# Patient Record
Sex: MALE | Race: WHITE | NOT HISPANIC OR LATINO | Employment: FULL TIME | URBAN - METROPOLITAN AREA
[De-identification: names, ages, dates, MRNs, and addresses within clinical notes are randomized per-mention and may not be internally consistent; named-entity substitution may affect disease eponyms.]

---

## 2017-01-05 ENCOUNTER — TELEPHONE (OUTPATIENT)
Dept: RHEUMATOLOGY | Facility: CLINIC | Age: 57
End: 2017-01-05

## 2017-01-05 NOTE — TELEPHONE ENCOUNTER
Therapy Plan already in place in EPIC.   Staff Message sent to Oncology to contact patient to discuss scheduling with them.  Rosalinda Rodrigues RN  [facility]

## 2017-01-17 ENCOUNTER — COMMUNICATION - HEALTHEAST (OUTPATIENT)
Dept: FAMILY MEDICINE | Facility: CLINIC | Age: 57
End: 2017-01-17

## 2017-01-20 ENCOUNTER — OFFICE VISIT - HEALTHEAST (OUTPATIENT)
Dept: FAMILY MEDICINE | Facility: CLINIC | Age: 57
End: 2017-01-20

## 2017-01-20 ENCOUNTER — COMMUNICATION - HEALTHEAST (OUTPATIENT)
Dept: SCHEDULING | Facility: CLINIC | Age: 57
End: 2017-01-20

## 2017-01-20 DIAGNOSIS — R05.9 COUGH: ICD-10-CM

## 2017-01-20 ASSESSMENT — MIFFLIN-ST. JEOR: SCORE: 1831.18

## 2017-01-23 ENCOUNTER — COMMUNICATION - HEALTHEAST (OUTPATIENT)
Dept: FAMILY MEDICINE | Facility: CLINIC | Age: 57
End: 2017-01-23

## 2017-02-03 ENCOUNTER — TELEPHONE (OUTPATIENT)
Dept: RHEUMATOLOGY | Facility: CLINIC | Age: 57
End: 2017-02-03

## 2017-02-03 NOTE — TELEPHONE ENCOUNTER
Received a call from Infusion authorizations (Paolo) and she reported that she sent a fax over to be signed by Dr. Phillips for authorization to infuse Remicade. She also reported that they need a prior authorization started for the infusion of remicade. Please start HARVEY TRACEY RN BSN PHN  [facility]

## 2017-02-07 ENCOUNTER — INFUSION THERAPY VISIT (OUTPATIENT)
Dept: INFUSION THERAPY | Facility: CLINIC | Age: 57
End: 2017-02-07
Attending: INTERNAL MEDICINE
Payer: COMMERCIAL

## 2017-02-07 VITALS
SYSTOLIC BLOOD PRESSURE: 132 MMHG | DIASTOLIC BLOOD PRESSURE: 75 MMHG | HEART RATE: 103 BPM | WEIGHT: 230 LBS | BODY MASS INDEX: 36.57 KG/M2 | TEMPERATURE: 98.3 F

## 2017-02-07 DIAGNOSIS — H20.10 IRITIS, CHRONIC: Primary | ICD-10-CM

## 2017-02-07 DIAGNOSIS — M06.9 RHEUMATOID ARTHRITIS OF HAND, UNSPECIFIED LATERALITY, UNSPECIFIED RHEUMATOID FACTOR PRESENCE: ICD-10-CM

## 2017-02-07 PROCEDURE — 96415 CHEMO IV INFUSION ADDL HR: CPT

## 2017-02-07 PROCEDURE — 96413 CHEMO IV INFUSION 1 HR: CPT

## 2017-02-07 PROCEDURE — 25000128 H RX IP 250 OP 636: Performed by: INTERNAL MEDICINE

## 2017-02-07 RX ADMIN — INFLIXIMAB 1000 MG: 100 INJECTION, POWDER, LYOPHILIZED, FOR SOLUTION INTRAVENOUS at 13:42

## 2017-02-07 NOTE — PROGRESS NOTES
"Infusion Nursing Note:  Prosper Miller presents today for Remicade.    Patient seen by provider today: No   present during visit today: Not Applicable.    Note: N/A.    Intravenous Access:  Peripheral IV placed.    Treatment Conditions:  Rheumatology Infusion Checklist: PRIOR TO INFUSION OF BIOLOGICAL MEDICATIONS OR ANY OF THESE AS LISTED: Remicade (infliximab) \".rheumbiologicalchecklist\"    Prior to Infusion of biological medications or any of these as listed:    1. Elevated temperature, fever, chills, productive cough or abnormal vital signs, night sweats, coughing up blood or sputum, no appetite or abnormal vital signs : NO    2. Open wounds or new incisions: NO    3. Recent hospitalization: NO    4.  Recent surgeries:  NO    5. Any upcoming surgeries or dental procedures?:NO    6. Any current or recent bouts of illness or infection? On any antibiotics? : NO    7. Any new, sudden or worsening abdominal pain :NO    8. Vaccination within 4 weeks? Patient or someone in the household is scheduled to receive vaccination? No live virus vaccines prior to or during treatment :NO    9. Any nervous system diseases [i.e. multiple sclerosis, Guillain-[place], seizures, neurological  changes]: NO    10. Pregnant or breast feeding; or plans on pregnancy in the future: NO    11. Signs of worsening depression or suicidal ideations while taking benlysta:NO    12. New-onset medical symptoms: NO    13.  New cancer diagnosis or on chemotherapy or radiation NO    14.  Evaluate for any sign of active TB [Unexplained weight loss, Loss of appetite, Night sweats, Fever, Fatigue, Chills, Coughing for 3 weeks or longer, Hemoptysis (coughing up blood), Chest pain]: NO    **Note: If answered yes to any of the above, hold the infusion and contact ordering rheumatologist or on-call rheumatologist.         Post Infusion Assessment:  Patient tolerated infusion without incident.  Rheumatology Post Infusion: POST-INFUSION OF BIOLOGICAL " MEDICATION:    Reviewed with patient.  Inform patient if any fever, chills or signs of infection, new symptoms, abdominal pain, heart palpitations, shortness of breath, reaction, weakness, neurological changes, seek medical attention immediately and should not receive infusions. No live virus vaccines prior to or during treatment or up to 6 months post infusion. If the patient has an upcoming procedure or surgery, this should be discussed with the rheumatologist and surgeon or provider.    Discharge Plan:   Patient discharged in stable condition accompanied by: self.    Katie Kemp RN

## 2017-02-15 ENCOUNTER — COMMUNICATION - HEALTHEAST (OUTPATIENT)
Dept: NURSING | Facility: CLINIC | Age: 57
End: 2017-02-15

## 2017-03-01 ENCOUNTER — OFFICE VISIT (OUTPATIENT)
Dept: RHEUMATOLOGY | Facility: CLINIC | Age: 57
End: 2017-03-01
Payer: COMMERCIAL

## 2017-03-01 VITALS
SYSTOLIC BLOOD PRESSURE: 128 MMHG | DIASTOLIC BLOOD PRESSURE: 91 MMHG | WEIGHT: 236.4 LBS | BODY MASS INDEX: 37.58 KG/M2 | HEART RATE: 98 BPM

## 2017-03-01 DIAGNOSIS — H20.10 IRITIS, CHRONIC: ICD-10-CM

## 2017-03-01 DIAGNOSIS — M06.9 RHEUMATOID ARTHRITIS OF HAND, UNSPECIFIED LATERALITY, UNSPECIFIED RHEUMATOID FACTOR PRESENCE: Primary | ICD-10-CM

## 2017-03-01 PROCEDURE — 99214 OFFICE O/P EST MOD 30 MIN: CPT | Performed by: INTERNAL MEDICINE

## 2017-03-01 NOTE — NURSING NOTE
"Initial BP (!) 128/91 (BP Location: Right arm, Patient Position: Chair, Cuff Size: Adult Large)  Pulse 98  Wt 236 lb 6.4 oz (107.2 kg)  BMI 37.58 kg/m2 Estimated body mass index is 37.58 kg/(m^2) as calculated from the following:    Height as of 11/30/16: 5' 6.5\" (1.689 m).    Weight as of this encounter: 236 lb 6.4 oz (107.2 kg). .    "

## 2017-03-21 ENCOUNTER — TELEPHONE (OUTPATIENT)
Dept: RHEUMATOLOGY | Facility: CLINIC | Age: 57
End: 2017-03-21

## 2017-03-30 ENCOUNTER — OFFICE VISIT - HEALTHEAST (OUTPATIENT)
Dept: FAMILY MEDICINE | Facility: CLINIC | Age: 57
End: 2017-03-30

## 2017-03-30 DIAGNOSIS — Z01.818 PREOP EXAMINATION: ICD-10-CM

## 2017-03-30 DIAGNOSIS — H26.9 CATARACT, BILATERAL: ICD-10-CM

## 2017-03-30 ASSESSMENT — MIFFLIN-ST. JEOR: SCORE: 1849.32

## 2017-03-31 ENCOUNTER — INFUSION THERAPY VISIT (OUTPATIENT)
Dept: INFUSION THERAPY | Facility: CLINIC | Age: 57
End: 2017-03-31
Attending: INTERNAL MEDICINE
Payer: COMMERCIAL

## 2017-03-31 VITALS
DIASTOLIC BLOOD PRESSURE: 79 MMHG | WEIGHT: 238 LBS | SYSTOLIC BLOOD PRESSURE: 124 MMHG | HEART RATE: 88 BPM | BODY MASS INDEX: 37.84 KG/M2 | TEMPERATURE: 97.8 F

## 2017-03-31 DIAGNOSIS — M06.9 RHEUMATOID ARTHRITIS OF HAND, UNSPECIFIED LATERALITY, UNSPECIFIED RHEUMATOID FACTOR PRESENCE: ICD-10-CM

## 2017-03-31 DIAGNOSIS — H20.10 IRITIS, CHRONIC: Primary | ICD-10-CM

## 2017-03-31 PROCEDURE — 25000128 H RX IP 250 OP 636: Performed by: INTERNAL MEDICINE

## 2017-03-31 PROCEDURE — 96415 CHEMO IV INFUSION ADDL HR: CPT

## 2017-03-31 PROCEDURE — 96413 CHEMO IV INFUSION 1 HR: CPT

## 2017-03-31 RX ADMIN — INFLIXIMAB 1100 MG: 100 INJECTION, POWDER, LYOPHILIZED, FOR SOLUTION INTRAVENOUS at 14:55

## 2017-03-31 NOTE — PROGRESS NOTES
Infusion Nursing Note:  Prosper Miller presents today for Remicade.    Patient seen by provider today: No   present during visit today: Not Applicable.    Note: N/A.    Intravenous Access:  Peripheral IV placed.    Treatment Conditions:  Rheumatology Infusion Checklist: PRIOR TO INFUSION OF BIOLOGICAL MEDICATIONS OR ANY OF THESE AS LISTED: Remicade (infliximab)     Prior to Infusion of biological medications or any of these as listed:    1. Elevated temperature, fever, chills, productive cough or abnormal vital signs, night sweats, coughing up blood or sputum, no appetite or abnormal vital signs : NO    2. Open wounds or new incisions: NO    3. Recent hospitalization: NO    4.  Recent surgeries:  NO    5. Any upcoming surgeries or dental procedures?:NO    6. Any current or recent bouts of illness or infection? On any antibiotics? : NO    7. Any new, sudden or worsening abdominal pain :NO    8. Vaccination within 4 weeks? Patient or someone in the household is scheduled to receive vaccination? No live virus vaccines prior to or during treatment :NO    9. Any nervous system diseases [i.e. multiple sclerosis, Guillain-[place], seizures, neurological  changes]: NO    10. Pregnant or breast feeding; or plans on pregnancy in the future: NO    11. Signs of worsening depression or suicidal ideations while taking benlysta:NO    12. New-onset medical symptoms: NO    13.  New cancer diagnosis or on chemotherapy or radiation NO    14.  Evaluate for any sign of active TB [Unexplained weight loss, Loss of appetite, Night sweats, Fever, Fatigue, Chills, Coughing for 3 weeks or longer, Hemoptysis (coughing up blood), Chest pain]: NO    **Note: If answered yes to any of the above, hold the infusion and contact ordering rheumatologist or on-call rheumatologist.   .      Post Infusion Assessment:  Patient tolerated infusion without incident.    Discharge Plan:   Patient discharged in stable condition accompanied by:  self.  Departure Mode: Ambulatory.  Next dose scheduled for 4/19.    Katie Kemp RN

## 2017-03-31 NOTE — MR AVS SNAPSHOT
"              After Visit Summary   3/31/2017    Prosper Miller    MRN: [id number]           Patient Information     Date Of Birth          1960        Visit Information        Provider Department      3/31/2017 1:30 PM ROOM 1 [facility]        Today's Diagnoses     Iritis, chronic    -  1    Rheumatoid arthritis of hand, unspecified laterality, unspecified rheumatoid factor presence (H)           Follow-ups after your visit        Your next 10 appointments already scheduled     May 19, 2017  1:00 PM CDT   Level 3 with ROOM 6 [facility] ([facility])    n Medical Ctr [facility]  [address]  [facility] [address]   [phone]            Sep 13, 2017  4:15 PM CDT   Return Visit with Brandon Phillips MD   [facility] ([facility]    [address] [facility] [id number] [phone]              Who to contact     If you have questions or need follow up information about today's clinic visit or your schedule please contact [facility] directly at [phone].  Normal or non-critical lab and imaging results will be communicated to you by Kinsightshart, letter or phone within 4 business days after the clinic has received the results. If you do not hear from us within 7 days, please contact the clinic through [employer] or phone. If you have a critical or abnormal lab result, we will notify you by phone as soon as possible.  Submit refill requests through [employer] or call your pharmacy and they will forward the refill request to us. Please allow 3 business days for your refill to be completed.          Additional Information About Your Visit        Kinsightshart Information     [employer] lets you send messages to your doctor, view your test results, renew your prescriptions, schedule appointments and more. To sign up, go to [email or web address] . Click on \"Log in\" on the left side of the screen, which will " "take you to the Welcome page. Then click on \"Sign up Now\" on the right side of the page.     You will be asked to enter the access code listed below, as well as some personal information. Please follow the directions to create your username and password.     Your access code is: [email or web address]  Expires: 2017  5:26 PM     Your access code will  in 90 days. If you need help or a new code, please call your [place] clinic or [phone].        Care EveryWhere ID     This is your Care EveryWhere ID. This could be used by other organizations to access your [place] medical records  [id number]        Your Vitals Were     Pulse Temperature BMI (Body Mass Index)             88 97.8  F (36.6  C) (Oral) 37.84 kg/m2          Blood Pressure from Last 3 Encounters:   17 124/79   17 (!) 128/91   17 132/75    Weight from Last 3 Encounters:   17 108 kg (238 lb)   17 107.2 kg (236 lb 6.4 oz)   17 104.3 kg (230 lb)              We Performed the Following     Treatment Conditions     Treatment Conditions     Treatment Conditions        Primary Care Provider    None Specified       No primary provider on file.        Thank you!     Thank you for choosing [facility]  for your care. Our goal is always to provide you with excellent care. Hearing back from our patients is one way we can continue to improve our services. Please take a few minutes to complete the written survey that you may receive in the mail after your visit with us. Thank you!             Your Updated Medication List - Protect others around you: Learn how to safely use, store and throw away your medicines at www.disposemymeds.org.          This list is accurate as of: 3/31/17  5:26 PM.  Always use your most recent med list.                   Brand Name Dispense Instructions for use    cycloSPORINE modified 25 MG capsule    GENERIC EQUIVALENT     Take 25 mg by mouth       EQL NATURAL ZINC 50 MG Tabs          " fluticasone 50 MCG/ACT spray    FLONASE     USE ONE SPRAY IN EACH NOSTRIL TWICE DAILY       lisinopril 30 MG tablet    PRINIVIL,ZESTRIL     Take 45 mg by mouth       pantoprazole 40 MG EC tablet    PROTONIX     TAKE ONE TABLET BY MOUTH TWICE DAILY       REMICADE 100 MG injection   Generic drug:  inFLIXimab          vitamin D3 2000 UNITS Caps

## 2017-05-26 ENCOUNTER — INFUSION THERAPY VISIT (OUTPATIENT)
Dept: INFUSION THERAPY | Facility: CLINIC | Age: 57
End: 2017-05-26
Attending: INTERNAL MEDICINE
Payer: COMMERCIAL

## 2017-05-26 VITALS
BODY MASS INDEX: 37.84 KG/M2 | TEMPERATURE: 97.6 F | HEART RATE: 71 BPM | DIASTOLIC BLOOD PRESSURE: 67 MMHG | SYSTOLIC BLOOD PRESSURE: 125 MMHG | WEIGHT: 238 LBS

## 2017-05-26 DIAGNOSIS — M06.9 RHEUMATOID ARTHRITIS OF HAND, UNSPECIFIED LATERALITY, UNSPECIFIED RHEUMATOID FACTOR PRESENCE: ICD-10-CM

## 2017-05-26 DIAGNOSIS — H20.10 IRITIS, CHRONIC: Primary | ICD-10-CM

## 2017-05-26 PROCEDURE — 25000128 H RX IP 250 OP 636: Performed by: INTERNAL MEDICINE

## 2017-05-26 PROCEDURE — 96413 CHEMO IV INFUSION 1 HR: CPT

## 2017-05-26 PROCEDURE — 96415 CHEMO IV INFUSION ADDL HR: CPT

## 2017-05-26 RX ADMIN — SODIUM CHLORIDE 250 ML: 9 INJECTION, SOLUTION INTRAVENOUS at 13:20

## 2017-05-26 RX ADMIN — INFLIXIMAB 1100 MG: 100 INJECTION, POWDER, LYOPHILIZED, FOR SOLUTION INTRAVENOUS at 13:20

## 2017-05-26 NOTE — PROGRESS NOTES
"Infusion Nursing Note:  Prosper Miller presents today for Remicade.   Patient seen by provider today: No   present during visit today: Not Applicable.    Note:     Intravenous Access:  Peripheral IV placed.    Treatment Conditions:  Rheumatology Infusion Checklist: PRIOR TO INFUSION OF BIOLOGICAL MEDICATIONS OR ANY OF THESE AS LISTED: Remicaide (infliximab) \".rheumbiologicalchecklist\"    Prior to Infusion of biological medications or any of these as listed:    1. Elevated temperature, fever, chills, productive cough or abnormal vital signs, night sweats, coughing up blood or sputum, no appetite or abnormal vital signs : NO    2. Open wounds or new incisions: NO    3. Recent hospitalization: NO    4.  Recent surgeries:  NO    5. Any upcoming surgeries or dental procedures?:NO    6. Any current or recent bouts of illness or infection? On any antibiotics? : NO    7. Any new, sudden or worsening abdominal pain :NO    8. Vaccination within 4 weeks? Patient or someone in the household is scheduled to receive vaccination? No live virus vaccines prior to or during treatment :NO    9. Any nervous system diseases [i.e. multiple sclerosis, Guillain-[place], seizures, neurological  changes]: NO    10. Pregnant or breast feeding; or plans on pregnancy in the future: NO    11. Signs of worsening depression or suicidal ideations while taking benlysta:NO    12. New-onset medical symptoms: NO    13.  New cancer diagnosis or on chemotherapy or radiation NO    14.  Evaluate for any sign of active TB [Unexplained weight loss, Loss of appetite, Night sweats, Fever, Fatigue, Chills, Coughing for 3 weeks or longer, Hemoptysis (coughing up blood), Chest pain]: NO    **Note: If answered yes to any of the above, hold the infusion and contact ordering rheumatologist or on-call rheumatologist.   .      Post Infusion Assessment:  Patient tolerated infusion without incident.  No evidence of extravasations.  Access discontinued per " protocol.    Discharge Plan:   Patient discharged in stable condition accompanied by: self.    Germania Bermudez RN

## 2017-05-26 NOTE — MR AVS SNAPSHOT
"              After Visit Summary   5/26/2017    Prosper Miller    MRN: [id number]           Patient Information     Date Of Birth          1960        Visit Information        Provider Department      5/26/2017 1:00 PM ROOM 7 [facility]        Today's Diagnoses     Iritis, chronic    -  1    Rheumatoid arthritis of hand, unspecified laterality, unspecified rheumatoid factor presence (H)           Follow-ups after your visit        Your next 10 appointments already scheduled     Jul 14, 2017  1:00 PM CDT   Level 3 with ROOM 1 [facility] ([facility])    n Medical Ctr [facility]  [address]  [facility] [address]   [phone]            Sep 13, 2017  4:15 PM CDT   Return Visit with Brandon Phillips MD   [facility]    [address] [facility] [id number] [phone]              Who to contact     If you have questions or need follow up information about today's clinic visit or your schedule please contact [facility] directly at [phone].  Normal or non-critical lab and imaging results will be communicated to you by kaufDAhart, letter or phone within 4 business days after the clinic has received the results. If you do not hear from us within 7 days, please contact the clinic through [employer] or phone. If you have a critical or abnormal lab result, we will notify you by phone as soon as possible.  Submit refill requests through [employer] or call your pharmacy and they will forward the refill request to us. Please allow 3 business days for your refill to be completed.          Additional Information About Your Visit        kaufDAhart Information     [employer] lets you send messages to your doctor, view your test results, renew your prescriptions, schedule appointments and more. To sign up, go to [email or web address] . Click on \"Log in\" on the left side of the screen, which will " "take you to the Welcome page. Then click on \"Sign up Now\" on the right side of the page.     You will be asked to enter the access code listed below, as well as some personal information. Please follow the directions to create your username and password.     Your access code is: [email or web address]  Expires: 2017  5:26 PM     Your access code will  in 90 days. If you need help or a new code, please call your [place] clinic or [phone].        Care EveryWhere ID     This is your Care EveryWhere ID. This could be used by other organizations to access your [place] medical records  [id number]        Your Vitals Were     Pulse Temperature BMI (Body Mass Index)             71 97.6  F (36.4  C) 37.84 kg/m2          Blood Pressure from Last 3 Encounters:   17 125/67   17 124/79   17 (!) 128/91    Weight from Last 3 Encounters:   17 108 kg (238 lb)   17 108 kg (238 lb)   17 107.2 kg (236 lb 6.4 oz)              Today, you had the following     No orders found for display       Primary Care Provider    None Specified       No primary provider on file.        Thank you!     Thank you for choosing [facility]  for your care. Our goal is always to provide you with excellent care. Hearing back from our patients is one way we can continue to improve our services. Please take a few minutes to complete the written survey that you may receive in the mail after your visit with us. Thank you!             Your Updated Medication List - Protect others around you: Learn how to safely use, store and throw away your medicines at www.disposemymeds.org.          This list is accurate as of: 17  3:50 PM.  Always use your most recent med list.                   Brand Name Dispense Instructions for use    cycloSPORINE modified 25 MG capsule    GENERIC EQUIVALENT     Take 25 mg by mouth       EQL NATURAL ZINC 50 MG Tabs          fluticasone 50 MCG/ACT spray    FLONASE     USE ONE SPRAY " IN EACH NOSTRIL TWICE DAILY       lisinopril 30 MG tablet    PRINIVIL,ZESTRIL     Take 45 mg by mouth       pantoprazole 40 MG EC tablet    PROTONIX     TAKE ONE TABLET BY MOUTH TWICE DAILY       REMICADE 100 MG injection   Generic drug:  inFLIXimab          vitamin D3 2000 UNITS Caps

## 2017-06-20 ENCOUNTER — COMMUNICATION - HEALTHEAST (OUTPATIENT)
Dept: FAMILY MEDICINE | Facility: CLINIC | Age: 57
End: 2017-06-20

## 2017-06-20 DIAGNOSIS — K21.9 ESOPHAGEAL REFLUX: ICD-10-CM

## 2017-07-14 ENCOUNTER — INFUSION THERAPY VISIT (OUTPATIENT)
Dept: INFUSION THERAPY | Facility: CLINIC | Age: 57
End: 2017-07-14
Attending: INTERNAL MEDICINE
Payer: COMMERCIAL

## 2017-07-14 VITALS — HEART RATE: 93 BPM | SYSTOLIC BLOOD PRESSURE: 114 MMHG | DIASTOLIC BLOOD PRESSURE: 74 MMHG

## 2017-07-14 DIAGNOSIS — H20.10 IRITIS, CHRONIC: Primary | ICD-10-CM

## 2017-07-14 DIAGNOSIS — M06.9 RHEUMATOID ARTHRITIS OF HAND, UNSPECIFIED LATERALITY, UNSPECIFIED RHEUMATOID FACTOR PRESENCE: ICD-10-CM

## 2017-07-14 PROCEDURE — 25000128 H RX IP 250 OP 636: Performed by: INTERNAL MEDICINE

## 2017-07-14 PROCEDURE — 96415 CHEMO IV INFUSION ADDL HR: CPT

## 2017-07-14 PROCEDURE — 96413 CHEMO IV INFUSION 1 HR: CPT

## 2017-07-14 RX ADMIN — INFLIXIMAB 1100 MG: 100 INJECTION, POWDER, LYOPHILIZED, FOR SOLUTION INTRAVENOUS at 13:08

## 2017-07-14 NOTE — MR AVS SNAPSHOT
"              After Visit Summary   7/14/2017    Prosper Miller    MRN: [id number]           Patient Information     Date Of Birth          1960        Visit Information        Provider Department      7/14/2017 1:00 PM ROOM 1 [facility]        Today's Diagnoses     Iritis, chronic    -  1    Rheumatoid arthritis of hand, unspecified laterality, unspecified rheumatoid factor presence (H)           Follow-ups after your visit        Your next 10 appointments already scheduled     Sep 01, 2017  1:00 PM CDT   Level 3 with ROOM 2 [facility] ([facility])    n Medical Ctr [facility]  [address]  [facility] [address]   [phone]            Sep 13, 2017  4:15 PM CDT   Return Visit with Brandon Phillips MD   [facility]    [address] [facility] [id number] [phone]              Who to contact     If you have questions or need follow up information about today's clinic visit or your schedule please contact [facility] directly at [phone].  Normal or non-critical lab and imaging results will be communicated to you by [employer], letter or phone within 4 business days after the clinic has received the results. If you do not hear from us within 7 days, please contact the clinic through [facility] or phone. If you have a critical or abnormal lab result, we will notify you by phone as soon as possible.  Submit refill requests through [employer] or call your pharmacy and they will forward the refill request to us. Please allow 3 business days for your refill to be completed.          Additional Information About Your Visit        H[employer] Mobile[employer] Information     [employer] lets you send messages to your doctor, view your test results, renew your prescriptions, schedule appointments and more. To sign up, go to [email or web address] . Click on \"Log in\" on the left side of the screen, which will " "take you to the Welcome page. Then click on \"Sign up Now\" on the right side of the page.     You will be asked to enter the access code listed below, as well as some personal information. Please follow the directions to create your username and password.     Your access code is: [email or web address]  Expires: 10/12/2017  4:38 PM     Your access code will  in 90 days. If you need help or a new code, please call your [place] clinic or [phone].        Care EveryWhere ID     This is your Care EveryWhere ID. This could be used by other organizations to access your [place] medical records  [id number]        Your Vitals Were     Pulse                   93            Blood Pressure from Last 3 Encounters:   17 114/74   17 125/67   17 124/79    Weight from Last 3 Encounters:   17 108 kg (238 lb)   17 108 kg (238 lb)   17 107.2 kg (236 lb 6.4 oz)              Today, you had the following     No orders found for display       Primary Care Provider    None Specified       No primary provider on file.        Equal Access to Services     [facility]: Hadii sophie Bermudez, magy pierre, clotilde garcia, faina patel . So [facility] [phone].    ATENCIÓN: Si habla español, tiene a boyer disposición servicios gratuitos de asistencia lingüística. Llame al [phone].    We comply with applicable federal civil rights laws and [place] laws. We do not discriminate on the basis of race, color, national origin, age, disability sex, sexual orientation or gender identity.            Thank you!     Thank you for choosing [facility]  for your care. Our goal is always to provide you with excellent care. Hearing back from our patients is one way we can continue to improve our services. Please take a few minutes to complete the written survey that you may receive in the mail after your visit with us. Thank you!             Your Updated " Medication List - Protect others around you: Learn how to safely use, store and throw away your medicines at www.disposemymeds.org.          This list is accurate as of: 7/14/17  4:38 PM.  Always use your most recent med list.                   Brand Name Dispense Instructions for use Diagnosis    cycloSPORINE modified 25 MG capsule    GENERIC EQUIVALENT     Take 25 mg by mouth        EQL NATURAL ZINC 50 MG Tabs           fluticasone 50 MCG/ACT spray    FLONASE     USE ONE SPRAY IN EACH NOSTRIL TWICE DAILY        lisinopril 30 MG tablet    PRINIVIL,ZESTRIL     Take 45 mg by mouth        pantoprazole 40 MG EC tablet    PROTONIX     TAKE ONE TABLET BY MOUTH TWICE DAILY        REMICADE 100 MG injection   Generic drug:  inFLIXimab           vitamin D3 2000 UNITS Caps

## 2017-07-14 NOTE — PROGRESS NOTES
"Infusion Nursing Note:  Prosper Miller presents today for Remicade.    Patient seen by provider today: No   present during visit today: Not Applicable.    Note: N/A.    Intravenous Access:  Peripheral IV placed.    Treatment Conditions:  Rheumatology Infusion Checklist: PRIOR TO INFUSION OF BIOLOGICAL MEDICATIONS OR ANY OF THESE AS LISTED: Remicaide (infliximab) \".rheumbiologicalchecklist\"    Prior to Infusion of biological medications or any of these as listed:    1. Elevated temperature, fever, chills, productive cough or abnormal vital signs, night sweats, coughing up blood or sputum, no appetite or abnormal vital signs : NO    2. Open wounds or new incisions: NO    3. Recent hospitalization: NO    4.  Recent surgeries:  NO    5. Any upcoming surgeries or dental procedures?:NO    6. Any current or recent bouts of illness or infection? On any antibiotics? : NO    7. Any new, sudden or worsening abdominal pain :NO    8. Vaccination within 4 weeks? Patient or someone in the household is scheduled to receive vaccination? No live virus vaccines prior to or during treatment :NO    9. Any nervous system diseases [i.e. multiple sclerosis, Guillain-[place], seizures, neurological  changes]: NO    10. Pregnant or breast feeding; or plans on pregnancy in the future: NO    11. Signs of worsening depression or suicidal ideations while taking benlysta:NO    12. New-onset medical symptoms: NO    13.  New cancer diagnosis or on chemotherapy or radiation NO    14.  Evaluate for any sign of active TB [Unexplained weight loss, Loss of appetite, Night sweats, Fever, Fatigue, Chills, Coughing for 3 weeks or longer, Hemoptysis (coughing up blood), Chest pain]: NO    **Note: If answered yes to any of the above, hold the infusion and contact ordering rheumatologist or on-call rheumatologist.   .      Post Infusion Assessment:  Patient tolerated infusion without incident.    Discharge Plan:   Patient discharged in stable " condition accompanied by: self.    Rebel Nance, RN

## 2017-09-01 ENCOUNTER — INFUSION THERAPY VISIT (OUTPATIENT)
Dept: INFUSION THERAPY | Facility: CLINIC | Age: 57
End: 2017-09-01
Attending: INTERNAL MEDICINE
Payer: COMMERCIAL

## 2017-09-01 VITALS — HEART RATE: 74 BPM | TEMPERATURE: 97.1 F | SYSTOLIC BLOOD PRESSURE: 128 MMHG | DIASTOLIC BLOOD PRESSURE: 78 MMHG

## 2017-09-01 DIAGNOSIS — M06.9 RHEUMATOID ARTHRITIS OF HAND, UNSPECIFIED LATERALITY, UNSPECIFIED RHEUMATOID FACTOR PRESENCE: ICD-10-CM

## 2017-09-01 DIAGNOSIS — H20.10 IRITIS, CHRONIC: Primary | ICD-10-CM

## 2017-09-01 PROCEDURE — 96413 CHEMO IV INFUSION 1 HR: CPT

## 2017-09-01 PROCEDURE — 96415 CHEMO IV INFUSION ADDL HR: CPT

## 2017-09-01 PROCEDURE — 25000128 H RX IP 250 OP 636: Performed by: INTERNAL MEDICINE

## 2017-09-01 RX ADMIN — SODIUM CHLORIDE 250 ML: 9 INJECTION, SOLUTION INTRAVENOUS at 15:34

## 2017-09-01 RX ADMIN — INFLIXIMAB 1100 MG: 100 INJECTION, POWDER, LYOPHILIZED, FOR SOLUTION INTRAVENOUS at 13:20

## 2017-09-01 NOTE — MR AVS SNAPSHOT
"              After Visit Summary   9/1/2017    Prosper Miller    MRN: [id number]           Patient Information     Date Of Birth          1960        Visit Information        Provider Department      9/1/2017 1:00 PM ROOM 2 [facility]fusion        Today's Diagnoses     Iritis, chronic    -  1    Rheumatoid arthritis of hand, unspecified laterality, unspecified rheumatoid factor presence (H)           Follow-ups after your visit        Your next 10 appointments already scheduled     Sep 13, 2017  4:15 PM CDT   Return Visit with Brandon Phillips MD   [facility] ([facility]    [address]  [facility] [address]   [phone]            Oct 20, 2017  1:00 PM CDT   Level 3 with ROOM 8 [facility] ([facility])    n Medical C[facility]  [address]  [facility] [address]   [phone]              Who to contact     If you have questions or need follow up information about today's clinic visit or your schedule please contact [facility] directly at [phone].  Normal or non-critical lab and imaging results will be communicated to you by Meteo Protecthart, letter or phone within 4 business days after the clinic has received the results. If you do not hear from us within 7 days, please contact the clinic through Incentientt or phone. If you have a critical or abnormal lab result, we will notify you by phone as soon as possible.  Submit refill requests through July Systems or call your pharmacy and they will forward the refill request to us. Please allow 3 business days for your refill to be completed.          Additional Information About Your Visit        MyChart Information     July Systems lets you send messages to your doctor, view your test results, renew your prescriptions, schedule appointments and more. To sign up, go to [email or web address] . Click on \"Log in\" on the left side of the screen, which will " "take you to the Welcome page. Then click on \"Sign up Now\" on the right side of the page.     You will be asked to enter the access code listed below, as well as some personal information. Please follow the directions to create your username and password.     Your access code is: [email or web address]  Expires: 10/12/2017  4:38 PM     Your access code will  in 90 days. If you need help or a new code, please call your [place] clinic or [phone].        Care EveryWhere ID     This is your Care EveryWhere ID. This could be used by other organizations to access your [place] medical records  [id number]        Your Vitals Were     Pulse Temperature                74 97.1  F (36.2  C) (Oral)           Blood Pressure from Last 3 Encounters:   17 128/78   17 114/74   17 125/67    Weight from Last 3 Encounters:   17 108 kg (238 lb)   17 108 kg (238 lb)   17 107.2 kg (236 lb 6.4 oz)              Today, you had the following     No orders found for display       Primary Care Provider    None Specified       No primary provider on file.        Equal Access to Services     [facility] : Hadii sophie Bermudez, magy pierre, clotilde garcia, faina patel . So [facility] [phone].    ATENCIÓN: Si habla español, tiene a boyer disposición servicios gratuitos de asistencia lingüística. Llame al [phone].    We comply with applicable federal civil rights laws and [place] laws. We do not discriminate on the basis of race, color, national origin, age, disability sex, sexual orientation or gender identity.            Thank you!     Thank you for choosing [facility]  for your care. Our goal is always to provide you with excellent care. Hearing back from our patients is one way we can continue to improve our services. Please take a few minutes to complete the written survey that you may receive in the mail after your visit with us. Thank " you!             Your Updated Medication List - Protect others around you: Learn how to safely use, store and throw away your medicines at www.disposemymeds.org.          This list is accurate as of: 9/1/17  4:09 PM.  Always use your most recent med list.                   Brand Name Dispense Instructions for use Diagnosis    cycloSPORINE modified 25 MG capsule    GENERIC EQUIVALENT     Take 25 mg by mouth        EQL NATURAL ZINC 50 MG Tabs           fluticasone 50 MCG/ACT spray    FLONASE     USE ONE SPRAY IN EACH NOSTRIL TWICE DAILY        lisinopril 30 MG tablet    PRINIVIL,ZESTRIL     Take 45 mg by mouth        pantoprazole 40 MG EC tablet    PROTONIX     TAKE ONE TABLET BY MOUTH TWICE DAILY        REMICADE 100 MG injection   Generic drug:  inFLIXimab           vitamin D3 2000 UNITS Caps

## 2017-09-01 NOTE — PROGRESS NOTES
"Infusion Nursing Note:  Prosper Miller presents today for Remicade    Patient seen by provider today: No   present during visit today: Not Applicable.    Note: N/A.    Intravenous Access:  Peripheral IV placed.    Treatment Conditions:  Rheumatology Infusion Checklist: PRIOR TO INFUSION OF BIOLOGICAL MEDICATIONS OR ANY OF THESE AS LISTED: Remicaide (infliximab) \".rheumbiologicalchecklist\"    Prior to Infusion of biological medications or any of these as listed:    1. Elevated temperature, fever, chills, productive cough or abnormal vital signs, night sweats, coughing up blood or sputum, no appetite or abnormal vital signs : NO    2. Open wounds or new incisions: NO    3. Recent hospitalization: NO    4.  Recent surgeries:  NO    5. Any upcoming surgeries or dental procedures?:NO    6. Any current or recent bouts of illness or infection? On any antibiotics? : NO    7. Any new, sudden or worsening abdominal pain :NO    8. Vaccination within 4 weeks? Patient or someone in the household is scheduled to receive vaccination? No live virus vaccines prior to or during treatment :NO    9. Any nervous system diseases [i.e. multiple sclerosis, Guillain-[place], seizures, neurological  changes]: NO    10. Pregnant or breast feeding; or plans on pregnancy in the future: NA    11. Signs of worsening depression or suicidal ideations while taking benlysta:NO    12. New-onset medical symptoms: NO    13.  New cancer diagnosis or on chemotherapy or radiation NO    14.  Evaluate for any sign of active TB [Unexplained weight loss, Loss of appetite, Night sweats, Fever, Fatigue, Chills, Coughing for 3 weeks or longer, Hemoptysis (coughing up blood), Chest pain]: NO    **Note: If answered yes to any of the above, hold the infusion and contact ordering rheumatologist or on-call rheumatologist.   .        Post Infusion Assessment:  Patient tolerated Remicade infusion without incident.  Blood return noted pre and post " infusion.  Site patent and intact, free from redness, edema or discomfort.  No evidence of extravasations.  Access discontinued per protocol.      Rheumatology Post Infusion: POST-INFUSION OF BIOLOGICAL MEDICATION:    Reviewed with patient.  Given biologic medication or medication hand-out. Inform patient if any fever, chills or signs of infection, new symptoms, abdominal pain, heart palpitations, shortness of breath, reaction, weakness, neurological changes, seek medical attention immediately and should not receive infusions. No live virus vaccines prior to or during treatment or up to 6 months post infusion. If the patient has an upcoming procedure or surgery, this should be discussed with the rheumatologist and surgeon or provider..      Discharge Plan:   Patient discharged in stable condition accompanied by: self.  Departure Mode: Ambulatory.  Pt to return on 10/20/17 at 1:00 pm for next Remicade.      Lily Alexandre RN

## 2017-09-13 ENCOUNTER — OFFICE VISIT (OUTPATIENT)
Dept: RHEUMATOLOGY | Facility: CLINIC | Age: 57
End: 2017-09-13
Payer: COMMERCIAL

## 2017-09-13 VITALS
DIASTOLIC BLOOD PRESSURE: 101 MMHG | SYSTOLIC BLOOD PRESSURE: 154 MMHG | HEART RATE: 101 BPM | BODY MASS INDEX: 37.68 KG/M2 | WEIGHT: 237 LBS | OXYGEN SATURATION: 94 % | RESPIRATION RATE: 16 BRPM

## 2017-09-13 DIAGNOSIS — H20.10 IRITIS, CHRONIC: ICD-10-CM

## 2017-09-13 DIAGNOSIS — M06.9 RHEUMATOID ARTHRITIS OF HAND, UNSPECIFIED LATERALITY, UNSPECIFIED RHEUMATOID FACTOR PRESENCE: Primary | ICD-10-CM

## 2017-09-13 LAB
ALBUMIN SERPL-MCNC: 3.8 G/DL (ref 3.4–5)
ALP SERPL-CCNC: 66 U/L (ref 40–150)
ALT SERPL W P-5'-P-CCNC: 45 U/L (ref 0–70)
ANION GAP SERPL CALCULATED.3IONS-SCNC: 7 MMOL/L (ref 3–14)
AST SERPL W P-5'-P-CCNC: 25 U/L (ref 0–45)
BASOPHILS # BLD AUTO: 0.1 10E9/L (ref 0–0.2)
BASOPHILS NFR BLD AUTO: 0.8 %
BILIRUB SERPL-MCNC: 0.4 MG/DL (ref 0.2–1.3)
BUN SERPL-MCNC: 18 MG/DL (ref 7–30)
CALCIUM SERPL-MCNC: 9.5 MG/DL (ref 8.5–10.1)
CHLORIDE SERPL-SCNC: 107 MMOL/L (ref 94–109)
CO2 SERPL-SCNC: 28 MMOL/L (ref 20–32)
CREAT SERPL-MCNC: 1.1 MG/DL (ref 0.66–1.25)
DIFFERENTIAL METHOD BLD: NORMAL
EOSINOPHIL # BLD AUTO: 0.3 10E9/L (ref 0–0.7)
EOSINOPHIL NFR BLD AUTO: 3.7 %
ERYTHROCYTE [DISTWIDTH] IN BLOOD BY AUTOMATED COUNT: 12.4 % (ref 10–15)
GFR SERPL CREATININE-BSD FRML MDRD: 69 ML/MIN/1.7M2
GLUCOSE SERPL-MCNC: 95 MG/DL (ref 70–99)
HCT VFR BLD AUTO: 42.9 % (ref 40–53)
HGB BLD-MCNC: 15.1 G/DL (ref 13.3–17.7)
LYMPHOCYTES # BLD AUTO: 3 10E9/L (ref 0.8–5.3)
LYMPHOCYTES NFR BLD AUTO: 39.4 %
MCH RBC QN AUTO: 32.4 PG (ref 26.5–33)
MCHC RBC AUTO-ENTMCNC: 35.2 G/DL (ref 31.5–36.5)
MCV RBC AUTO: 92 FL (ref 78–100)
MONOCYTES # BLD AUTO: 0.6 10E9/L (ref 0–1.3)
MONOCYTES NFR BLD AUTO: 8.3 %
NEUTROPHILS # BLD AUTO: 3.6 10E9/L (ref 1.6–8.3)
NEUTROPHILS NFR BLD AUTO: 47.8 %
PLATELET # BLD AUTO: 280 10E9/L (ref 150–450)
POTASSIUM SERPL-SCNC: 4.2 MMOL/L (ref 3.4–5.3)
PROT SERPL-MCNC: 7.7 G/DL (ref 6.8–8.8)
RBC # BLD AUTO: 4.66 10E12/L (ref 4.4–5.9)
SODIUM SERPL-SCNC: 142 MMOL/L (ref 133–144)
WBC # BLD AUTO: 7.6 10E9/L (ref 4–11)

## 2017-09-13 PROCEDURE — 99214 OFFICE O/P EST MOD 30 MIN: CPT | Performed by: INTERNAL MEDICINE

## 2017-09-13 PROCEDURE — 85025 COMPLETE CBC W/AUTO DIFF WBC: CPT | Performed by: INTERNAL MEDICINE

## 2017-09-13 PROCEDURE — 80053 COMPREHEN METABOLIC PANEL: CPT | Performed by: INTERNAL MEDICINE

## 2017-09-13 PROCEDURE — 36415 COLL VENOUS BLD VENIPUNCTURE: CPT | Performed by: INTERNAL MEDICINE

## 2017-09-13 NOTE — NURSING NOTE
"Chief Complaint   Patient presents with     Recheck Medication     RA       Initial BP (!) 154/101 (BP Location: Left arm, Patient Position: Chair)  Pulse 101  Resp 16  Wt 237 lb (107.5 kg)  SpO2 94%  BMI 37.68 kg/m2 Estimated body mass index is 37.68 kg/(m^2) as calculated from the following:    Height as of 11/30/16: 5' 6.5\" (1.689 m).    Weight as of this encounter: 237 lb (107.5 kg).  Medication Reconciliation: complete   Leeanne Abbott LPN    "

## 2017-09-13 NOTE — PROGRESS NOTES
HISTORY OF PRESENT ILLNESS:  Suresh Miller is seen back in followup for his seropositive rheumatoid arthritis.  He is doing his Remicade infusions basically every 7 weeks, they are lasting the full-time and doing well.  He says he may be needing a knee replacement at some point in the future.  We discussed the need to hold the Remicade and if to get any type of surgery like that performed after the regular interval for the Remicade.  He is also doing so well that he is thinking of increasing the dosing interval for the Remicade.  Is not having any infusion reactions or infections.      He had cataract surgery which went well and he is quite happy with the outcome.  Otherwise, today he is dealing with end of life issues with his mother and is the go between the medical community and her family.        PHYSICAL EXAM:     VITALS:  Blood pressure 154/101.     MUSCULOSKELETAL:  No synovitis of the wrists, MCPs, or PIPs.  No synovitis of the elbows, shoulders, knees or ankles.     SKIN:  Without lesions.      IMPRESSION:   1.  Seropositive rheumatoid arthritis.      RECOMMENDATIONS:   1.  Continue Remicade infusions every 7-8 weeks.     2.  Check labs today.   3.  Follow up with me in 6 months or sooner if there are problems.         ISRA BELLO MD             D: 2017 16:57   T: 2017 18:25   MT: CHANDU#150      Name:     SURESH MILLER   MRN:      [id number]        Account:      [id number]   :      1960           Visit Date:   2017      Document: [id number]

## 2017-10-20 ENCOUNTER — INFUSION THERAPY VISIT (OUTPATIENT)
Dept: INFUSION THERAPY | Facility: CLINIC | Age: 57
End: 2017-10-20
Attending: INTERNAL MEDICINE
Payer: COMMERCIAL

## 2017-10-20 VITALS — SYSTOLIC BLOOD PRESSURE: 128 MMHG | HEART RATE: 89 BPM | DIASTOLIC BLOOD PRESSURE: 83 MMHG | TEMPERATURE: 96.8 F

## 2017-10-20 DIAGNOSIS — H20.10 IRITIS, CHRONIC: Primary | ICD-10-CM

## 2017-10-20 DIAGNOSIS — M06.9 RHEUMATOID ARTHRITIS OF HAND, UNSPECIFIED LATERALITY, UNSPECIFIED RHEUMATOID FACTOR PRESENCE: ICD-10-CM

## 2017-10-20 PROCEDURE — 25000128 H RX IP 250 OP 636: Performed by: INTERNAL MEDICINE

## 2017-10-20 PROCEDURE — 96413 CHEMO IV INFUSION 1 HR: CPT

## 2017-10-20 PROCEDURE — 96415 CHEMO IV INFUSION ADDL HR: CPT

## 2017-10-20 RX ADMIN — INFLIXIMAB 1100 MG: 100 INJECTION, POWDER, LYOPHILIZED, FOR SOLUTION INTRAVENOUS at 13:27

## 2017-10-20 NOTE — MR AVS SNAPSHOT
"              After Visit Summary   10/20/2017    Prosper Miller    MRN: [id number]           Patient Information     Date Of Birth          1960        Visit Information        Provider Department      10/20/2017 1:00 PM ROOM 8 [facility]fusion        Today's Diagnoses     Iritis, chronic    -  1    Rheumatoid arthritis of hand, unspecified laterality, unspecified rheumatoid factor presence (H)           Follow-ups after your visit        Your next 10 appointments already scheduled     Dec 08, 2017  1:00 PM CST   Level 3 with ROOM 8 [facility] ([facility])    n Medical Ctr [facility]  [address]  [facility] [address]   [phone]            Mar 14, 2018  4:00 PM CDT   Return Visit with Brandon Phillips MD   [facility]    [address] [facility] [id number] [phone]              Who to contact     If you have questions or need follow up information about today's clinic visit or your schedule please contact [facility] directly at [phone].  Normal or non-critical lab and imaging results will be communicated to you by Ph.Creativehart, letter or phone within 4 business days after the clinic has received the results. If you do not hear from us within 7 days, please contact the clinic through [employer] or phone. If you have a critical or abnormal lab result, we will notify you by phone as soon as possible.  Submit refill requests through Naked or call your pharmacy and they will forward the refill request to us. Please allow 3 business days for your refill to be completed.          Additional Information About Your Visit        Ph.Creativehart Information     Naked lets you send messages to your doctor, view your test results, renew your prescriptions, schedule appointments and more. To sign up, go to [email or web address][email or web address] . Click on \"Log in\" on the left side of the screen, which will " "take you to the Welcome page. Then click on \"Sign up Now\" on the right side of the page.     You will be asked to enter the access code listed below, as well as some personal information. Please follow the directions to create your username and password.     Your access code is: [email or web address]  Expires: 2018  3:44 PM     Your access code will  in 90 days. If you need help or a new code, please call your [place] clinic or [phone].        Care EveryWhere ID     This is your Care EveryWhere ID. This could be used by other organizations to access your [facility] medical records  [id number]        Your Vitals Were     Pulse Temperature                89 96.8  F (36  C) (Oral)           Blood Pressure from Last 3 Encounters:   10/20/17 128/83   17 (!) 154/101   17 128/78    Weight from Last 3 Encounters:   17 107.5 kg (237 lb)   17 108 kg (238 lb)   17 108 kg (238 lb)              Today, you had the following     No orders found for display       Primary Care Provider Office Phone # Fax #    Momo Gooden [phone]       [facility] [address] [place] [address]        Equal Access to Services     [facility]: Hadii aad ku hadasho Soomaali, waaxda luqadaha, qaybta kaalmada adeegyada, faina kraftin hayaan betty patel . So [facility] [phone].    ATENCIÓN: Si habla español, tiene a boyer disposición servicios gratuitos de asistencia lingüística. Llame al [phone].    We comply with applicable federal civil rights laws and [place] laws. We do not discriminate on the basis of race, color, national origin, age, disability, sex, sexual orientation, or gender identity.            Thank you!     Thank you for choosing [facility]  for your care. Our goal is always to provide you with excellent care. Hearing back from our patients is one way we can continue to improve our services. Please take a few minutes to complete the " written survey that you may receive in the mail after your visit with us. Thank you!             Your Updated Medication List - Protect others around you: Learn how to safely use, store and throw away your medicines at www.disposemymeds.org.          This list is accurate as of: 10/20/17  3:44 PM.  Always use your most recent med list.                   Brand Name Dispense Instructions for use Diagnosis    EQL NATURAL ZINC 50 MG Tabs           fluticasone 50 MCG/ACT spray    FLONASE     USE ONE SPRAY IN EACH NOSTRIL TWICE DAILY        lisinopril 30 MG tablet    PRINIVIL,ZESTRIL     Take 45 mg by mouth        pantoprazole 40 MG EC tablet    PROTONIX     TAKE ONE TABLET BY MOUTH TWICE DAILY        REMICADE 100 MG injection   Generic drug:  inFLIXimab           vitamin D3 2000 UNITS Caps

## 2017-10-20 NOTE — PROGRESS NOTES
Infusion Nursing Note:  Prosper Miller presents today for Remicade.    Patient seen by provider today: No   present during visit today: Not Applicable.    Note: N/A.    Intravenous Access:  Peripheral IV placed.    Post Infusion Assessment:  Patient tolerated infusion without incident.  Blood return noted pre and post infusion.  Site patent and intact, free from redness, edema or discomfort.  No evidence of extravasations.  Access discontinued per protocol.    Discharge Plan:   Patient discharged in stable condition accompanied by: self.  Departure Mode: Ambulatory.    Ritika Wharton RN

## 2017-10-20 NOTE — PROGRESS NOTES
Treatment Conditions:  Rheumatology Infusion Checklist: PRIOR TO INFUSION OF BIOLOGICAL MEDICATIONS OR ANY OF THESE AS LISTED: Remicade (infliximab)       Prior to Infusion of biological medications or any of these as listed:    1. Elevated temperature, fever, chills, productive cough or abnormal vital signs, night sweats, coughing up blood or sputum, no appetite or abnormal vital signs : NO    2. Open wounds or new incisions: NO    3. Recent hospitalization: NO    4.  Recent surgeries:  NO    5. Any upcoming surgeries or dental procedures?:NO    6. Any current or recent bouts of illness or infection? On any antibiotics? : NO    7. Any new, sudden or worsening abdominal pain :NO    8. Vaccination within 4 weeks? Patient or someone in the household is scheduled to receive vaccination? No live virus vaccines prior to or during treatment :NO    9. Any nervous system diseases [i.e. multiple sclerosis, Guillain-[place], seizures, neurological  changes]: NO    10. Pregnant or breast feeding; or plans on pregnancy in the future: NO    11. Signs of worsening depression or suicidal ideations while taking benlysta:NO    12. New-onset medical symptoms: NO    13.  New cancer diagnosis or on chemotherapy or radiation NO    14.  Evaluate for any sign of active TB [Unexplained weight loss, Loss of appetite, Night sweats, Fever, Fatigue, Chills, Coughing for 3 weeks or longer, Hemoptysis (coughing up blood), Chest pain]: NO    **Note: If answered yes to any of the above, hold the infusion and contact ordering rheumatologist or on-call rheumatologist.   .  See additional note for visit data.  Katie Kemp RN

## 2017-10-25 ENCOUNTER — COMMUNICATION - HEALTHEAST (OUTPATIENT)
Dept: FAMILY MEDICINE | Facility: CLINIC | Age: 57
End: 2017-10-25

## 2017-11-20 ENCOUNTER — NURSE TRIAGE (OUTPATIENT)
Dept: NURSING | Facility: CLINIC | Age: 57
End: 2017-11-20

## 2017-11-20 NOTE — TELEPHONE ENCOUNTER
Caller is wife; has concerns about patient's continuing care after  Rheumatologist  resigns on 01/01/18; has on going care with Remicade infusion very 7 weeks; Has tried to obtain  appointment with  Rheumatology in [place] but unable to secure an appointment within 6 weeks .  Advised to call rheumatology clinic in [facility] direct and speak with clinic staff about continuation of therapy and expedited referrals  Given specialty care number    Advised to call back if unsuccessful with this approach.          Additional Information    [1] Follow-up call to recent contact AND [2] information only call, no triage required    Protocols used: INFORMATION ONLY CALL-ADULT-  Medina Luna RN  FNA

## 2017-11-21 ENCOUNTER — TELEPHONE (OUTPATIENT)
Dept: RHEUMATOLOGY | Facility: CLINIC | Age: 57
End: 2017-11-21

## 2017-11-21 NOTE — TELEPHONE ENCOUNTER
Marium will not be available by phone between 11:45-1:30.     Denise Behrendt  [facility] CSS

## 2017-11-21 NOTE — TELEPHONE ENCOUNTER
Reason for Call:  Other prescription    Detailed comments: pt wife calling stating she would like to talk to someone about pts infusions. She was told to keep in contact w/ infusion center and they would guide the pt. She would like to know when the order will  for the infusion.     Phone Number Patient can be reached at: Other phone number:  [phone] - wife Marium    Best Time: any     Can we leave a detailed message on this number? YES    Call taken on 2017 at 9:23 AM by Esme Sylvester

## 2017-11-21 NOTE — TELEPHONE ENCOUNTER
Called and advised the set plan is still to be finalized but believe we will still be having pt's covered for a period of time (short period of time) after Dr Phillips leaves.   Advised ensuring ASAP appt with new Rheumatologist that can take over ordering right away to avoid infusion interruptions.  And check again when he gets his next infusion.  Rosalinda Rodrigues RN  [facility]

## 2017-12-08 ENCOUNTER — INFUSION THERAPY VISIT (OUTPATIENT)
Dept: INFUSION THERAPY | Facility: CLINIC | Age: 57
End: 2017-12-08
Attending: INTERNAL MEDICINE
Payer: COMMERCIAL

## 2017-12-08 VITALS — SYSTOLIC BLOOD PRESSURE: 135 MMHG | HEART RATE: 76 BPM | DIASTOLIC BLOOD PRESSURE: 83 MMHG

## 2017-12-08 DIAGNOSIS — M06.9 RHEUMATOID ARTHRITIS OF HAND, UNSPECIFIED LATERALITY, UNSPECIFIED RHEUMATOID FACTOR PRESENCE: ICD-10-CM

## 2017-12-08 DIAGNOSIS — H20.10 IRITIS, CHRONIC: Primary | ICD-10-CM

## 2017-12-08 PROCEDURE — 25000128 H RX IP 250 OP 636: Performed by: INTERNAL MEDICINE

## 2017-12-08 PROCEDURE — 96413 CHEMO IV INFUSION 1 HR: CPT

## 2017-12-08 PROCEDURE — 96415 CHEMO IV INFUSION ADDL HR: CPT

## 2017-12-08 RX ADMIN — INFLIXIMAB 1100 MG: 100 INJECTION, POWDER, LYOPHILIZED, FOR SOLUTION INTRAVENOUS at 12:38

## 2017-12-08 RX ADMIN — SODIUM CHLORIDE 250 ML: 9 INJECTION, SOLUTION INTRAVENOUS at 12:24

## 2017-12-08 NOTE — MR AVS SNAPSHOT
"              After Visit Summary   12/8/2017    Prosper Miller    MRN: [id number]           Patient Information     Date Of Birth          1960        Visit Information        Provider Department      12/8/2017 1:00 PM ROOM 8 [facility]fusion         Follow-ups after your visit        Your next 10 appointments already scheduled     Dec 08, 2017  1:00 PM CST   Level 3 with ROOM 8 [facility] ([facility])    n Medical Ctr [facility]  [address]  [place] [address]   [phone]            Mar 26, 2018  3:00 PM CDT   [facility]    [address]  [place] [address] [phone]              Who to contact     If you have questions or need follow up information about today's clinic visit or your schedule please contact [facility] CANC[facility] INFUSION directly at [phone].  Normal or non-critical lab and imaging results will be communicated to you by HoneyComb Corporationhart, letter or phone within 4 business days after the clinic has received the results. If you do not hear from us within 7 days, please contact the clinic through Guides.cot or phone. If you have a critical or abnormal lab result, we will notify you by phone as soon as possible.  Submit refill requests through [employer] or call your pharmacy and they will forward the refill request to us. Please allow 3 business days for your refill to be completed.          Additional Information About Your Visit        HoneyComb Corporationhart Information     [employer] lets you send messages to your doctor, view your test results, renew your prescriptions, schedule appointments and more. To sign up, go to [email or web address] . Click on \"Log in\" on the left side of the screen, which will take you to the Welcome page. Then click on \"Sign up Now\" on the right side of the page.     You will be asked to enter the access code listed below, as well as some " personal information. Please follow the directions to create your username and password.     Your access code is: [email or web address]  Expires: 2018  2:44 PM     Your access code will  in 90 days. If you need help or a new code, please call your [place] clinic or [phone].        Care EveryWhere ID     This is your Care EveryWhere ID. This could be used by other organizations to access your [place] medical records  [id number]         Blood Pressure from Last 3 Encounters:   10/20/17 128/83   17 (!) 154/101   17 128/78    Weight from Last 3 Encounters:   17 107.5 kg (237 lb)   17 108 kg (238 lb)   17 108 kg (238 lb)              Today, you had the following     No orders found for display       Primary Care Provider Office Phone # Fax #    Momo Gooden [phone]       [facility] [address] [place] [address]        Equal Access to Services     [facility]: Hadii aad ku hadasho Soomaali, waaxda luqadaha, qaybta kaalmada adeegyada, faina patel . So [facility] [phone].    ATENCIÓN: Si habla español, tiene a boyer disposición servicios gratuitos de asistencia lingüística. Yumiame al [phone].    We comply with applicable federal civil rights laws and [place] laws. We do not discriminate on the basis of race, color, national origin, age, disability, sex, sexual orientation, or gender identity.            Thank you!     Thank you for choosing [facility]  for your care. Our goal is always to provide you with excellent care. Hearing back from our patients is one way we can continue to improve our services. Please take a few minutes to complete the written survey that you may receive in the mail after your visit with us. Thank you!             Your Updated Medication List - Protect others around you: Learn how to safely use, store and throw away your medicines at www.disposemymeds.org.          This list  is accurate as of: 12/8/17 11:19 AM.  Always use your most recent med list.                   Brand Name Dispense Instructions for use Diagnosis    EQL NATURAL ZINC 50 MG Tabs           fluticasone 50 MCG/ACT spray    FLONASE     USE ONE SPRAY IN EACH NOSTRIL TWICE DAILY        lisinopril 30 MG tablet    PRINIVIL,ZESTRIL     Take 45 mg by mouth        pantoprazole 40 MG EC tablet    PROTONIX     TAKE ONE TABLET BY MOUTH TWICE DAILY        REMICADE 100 MG injection   Generic drug:  inFLIXimab           vitamin D3 2000 UNITS Caps

## 2017-12-08 NOTE — PROGRESS NOTES
"Infusion Nursing Note:  Prosper Miller presents today for Remicade.    Patient seen by provider today: No   present during visit today: Not Applicable.    Note: N/A.    Intravenous Access:  Peripheral IV placed.    Treatment Conditions:  Rheumatology Infusion Checklist: PRIOR TO INFUSION OF BIOLOGICAL MEDICATIONS OR ANY OF THESE AS LISTED: Remicaide (infliximab) \".rheumbiologicalchecklist\"    Prior to Infusion of biological medications or any of these as listed:    1. Elevated temperature, fever, chills, productive cough or abnormal vital signs, night sweats, coughing up blood or sputum, no appetite or abnormal vital signs : NO    2. Open wounds or new incisions: NO    3. Recent hospitalization: NO    4.  Recent surgeries:  NO    5. Any upcoming surgeries or dental procedures?:NO    6. Any current or recent bouts of illness or infection? On any antibiotics? : NO    7. Any new, sudden or worsening abdominal pain :NO    8. Vaccination within 4 weeks? Patient or someone in the household is scheduled to receive vaccination? No live virus vaccines prior to or during treatment :NO    9. Any nervous system diseases [i.e. multiple sclerosis, Guillain-[place], seizures, neurological  changes]: NO    10. Pregnant or breast feeding; or plans on pregnancy in the future: NO    11. Signs of worsening depression or suicidal ideations while taking benlysta:NO    12. New-onset medical symptoms: NO    13.  New cancer diagnosis or on chemotherapy or radiation NO    14.  Evaluate for any sign of active TB [Unexplained weight loss, Loss of appetite, Night sweats, Fever, Fatigue, Chills, Coughing for 3 weeks or longer, Hemoptysis (coughing up blood), Chest pain]: NO    **Note: If answered yes to any of the above, hold the infusion and contact ordering rheumatologist or on-call rheumatologist.       Post Infusion Assessment:  Patient tolerated infusion without incident.  Blood return noted pre and post infusion.  Site patent and " intact, free from redness, edema or discomfort.  No evidence of extravasations.  Access discontinued per protocol.    Discharge Plan:   Patient discharged in stable condition accompanied by: self.  Departure Mode: Ambulatory.    Ritika Wharton RN

## 2018-01-26 ENCOUNTER — INFUSION THERAPY VISIT (OUTPATIENT)
Dept: INFUSION THERAPY | Facility: CLINIC | Age: 58
End: 2018-01-26
Attending: INTERNAL MEDICINE
Payer: COMMERCIAL

## 2018-01-26 VITALS — DIASTOLIC BLOOD PRESSURE: 81 MMHG | SYSTOLIC BLOOD PRESSURE: 139 MMHG | TEMPERATURE: 97.4 F | HEART RATE: 70 BPM

## 2018-01-26 DIAGNOSIS — M06.9 RHEUMATOID ARTHRITIS OF HAND, UNSPECIFIED LATERALITY, UNSPECIFIED RHEUMATOID FACTOR PRESENCE: ICD-10-CM

## 2018-01-26 DIAGNOSIS — H20.10 IRITIS, CHRONIC: Primary | ICD-10-CM

## 2018-01-26 PROCEDURE — 96415 CHEMO IV INFUSION ADDL HR: CPT

## 2018-01-26 PROCEDURE — 96413 CHEMO IV INFUSION 1 HR: CPT

## 2018-01-26 PROCEDURE — 25000128 H RX IP 250 OP 636: Performed by: INTERNAL MEDICINE

## 2018-01-26 RX ADMIN — INFLIXIMAB 1100 MG: 100 INJECTION, POWDER, LYOPHILIZED, FOR SOLUTION INTRAVENOUS at 13:22

## 2018-01-26 NOTE — MR AVS SNAPSHOT
"              After Visit Summary   1/26/2018    Prosper Miller    MRN: [id number]           Patient Information     Date Of Birth          1960        Visit Information        Provider Department      1/26/2018 1:00 PM ROOM 8 [facility]        Today's Diagnoses     Iritis, chronic    -  1    Rheumatoid arthritis of hand, unspecified laterality, unspecified rheumatoid factor presence (H)           Follow-ups after your visit        Your next 10 appointments already scheduled     Mar 16, 2018  1:00 PM CDT   Level 3 with ROOM 3 [facility] ([facility])    n [facility]  [address]  [facility] [address]   [phone]            Mar 26, 2018  3:00 PM CDT   New Visit with Alex Segundo MD   [facility] ([facility]    [address]  [place] [address] [phone]              Who to contact     If you have questions or need follow up information about today's clinic visit or your schedule please contact [facility] directly at [phone].  Normal or non-critical lab and imaging results will be communicated to you by Lipocalyxhart, letter or phone within 4 business days after the clinic has received the results. If you do not hear from us within 7 days, please contact the clinic through CarWoo!t or phone. If you have a critical or abnormal lab result, we will notify you by phone as soon as possible.  Submit refill requests through [employer] or call your pharmacy and they will forward the refill request to us. Please allow 3 business days for your refill to be completed.          Additional Information About Your Visit        MyChart Information     [employer] lets you send messages to your doctor, view your test results, renew your prescriptions, schedule appointments and more. To sign up, go to [email or web address].[email or web address] . Click on \"Log in\" on the left side of the screen, which will take you to " "the Welcome page. Then click on \"Sign up Now\" on the right side of the page.     You will be asked to enter the access code listed below, as well as some personal information. Please follow the directions to create your username and password.     Your access code is: [email or web address]  Expires: 2018  3:50 PM     Your access code will  in 90 days. If you need help or a new code, please call your [place] clinic or [phone].        Care EveryWhere ID     This is your Care EveryWhere ID. This could be used by other organizations to access your [place] medical records  [id number]        Your Vitals Were     Pulse Temperature                70 97.4  F (36.3  C) (Oral)           Blood Pressure from Last 3 Encounters:   18 139/81   17 135/83   10/20/17 128/83    Weight from Last 3 Encounters:   17 107.5 kg (237 lb)   17 108 kg (238 lb)   17 108 kg (238 lb)              Today, you had the following     No orders found for display       Primary Care Provider Office Phone # Fax #    Momo Gooden [phone]       [address] [address]        Equal Access to Services     MARISSA FLORES AH: Hadii aad ku hadasho Soomaali, waaxda luqadaha, qaybta kaalmada adeegyada, waxay idiin hayaan betty patel . So [facility] [phone].    ATENCIÓN: Si habla español, tiene a boyer disposición servicios gratuitos de asistencia lingüística. Llame al [phone].    We comply with applicable federal civil rights laws and [place] laws. We do not discriminate on the basis of race, color, national origin, age, disability, sex, sexual orientation, or gender identity.            Thank you!     Thank you for choosing [facility]  for your care. Our goal is always to provide you with excellent care. Hearing back from our patients is one way we can continue to improve our services. Please take a few minutes to complete the written survey that " you may receive in the mail after your visit with us. Thank you!             Your Updated Medication List - Protect others around you: Learn how to safely use, store and throw away your medicines at www.disposemymeds.org.          This list is accurate as of 1/26/18  3:50 PM.  Always use your most recent med list.                   Brand Name Dispense Instructions for use Diagnosis    EQL NATURAL ZINC 50 MG Tabs           fluticasone 50 MCG/ACT spray    FLONASE     USE ONE SPRAY IN EACH NOSTRIL TWICE DAILY        lisinopril 30 MG tablet    PRINIVIL,ZESTRIL     Take 45 mg by mouth        pantoprazole 40 MG EC tablet    PROTONIX     TAKE ONE TABLET BY MOUTH TWICE DAILY        REMICADE 100 MG injection   Generic drug:  inFLIXimab           vitamin D3 2000 UNITS Caps

## 2018-01-26 NOTE — PROGRESS NOTES
"Infusion Nursing Note:  Prosper Miller presents today for Remicade.    Patient seen by provider today: No   present during visit today: Not Applicable.    Note: N/A.    Intravenous Access:  Peripheral IV placed.    Treatment Conditions:  Rheumatology Infusion Checklist: PRIOR TO INFUSION OF BIOLOGICAL MEDICATIONS OR ANY OF THESE AS LISTED: Remicaide (infliximab) \".rheumbiologicalchecklist\"    Prior to Infusion of biological medications or any of these as listed:    1. Elevated temperature, fever, chills, productive cough or abnormal vital signs, night sweats, coughing up blood or sputum, no appetite or abnormal vital signs : NO    2. Open wounds or new incisions: NO    3. Recent hospitalization: NO    4.  Recent surgeries:  NO    5. Any upcoming surgeries or dental procedures?:NO    6. Any current or recent bouts of illness or infection? On any antibiotics? : NO    7. Any new, sudden or worsening abdominal pain :NO    8. Vaccination within 4 weeks? Patient or someone in the household is scheduled to receive vaccination? No live virus vaccines prior to or during treatment :NO    9. Any nervous system diseases [i.e. multiple sclerosis, Guillain-[place], seizures, neurological  changes]: NO    10. Pregnant or breast feeding; or plans on pregnancy in the future: N/A    11. Signs of worsening depression or suicidal ideations while taking benlysta:N/A    12. New-onset medical symptoms: NO    13.  New cancer diagnosis or on chemotherapy or radiation NO    14.  Evaluate for any sign of active TB [Unexplained weight loss, Loss of appetite, Night sweats, Fever, Fatigue, Chills, Coughing for 3 weeks or longer, Hemoptysis (coughing up blood), Chest pain]: NO    **Note: If answered yes to any of the above, hold the infusion and contact ordering rheumatologist or on-call rheumatologist.       Post Infusion Assessment:  Patient tolerated infusion without incident.  Blood return noted pre and post infusion.  Site patent " and intact, free from redness, edema or discomfort.  No evidence of extravasations.  Access discontinued per protocol.    Discharge Plan:   Patient discharged in stable condition accompanied by: self.  Departure Mode: Ambulatory.    Rebel Nance RN

## 2018-02-02 DIAGNOSIS — M06.9 RHEUMATOID ARTHRITIS OF HAND, UNSPECIFIED LATERALITY, UNSPECIFIED RHEUMATOID FACTOR PRESENCE: Primary | ICD-10-CM

## 2018-02-02 DIAGNOSIS — Z79.899 HIGH RISK MEDICATION USE: ICD-10-CM

## 2018-02-02 DIAGNOSIS — H20.10 IRITIS, CHRONIC: ICD-10-CM

## 2018-02-02 NOTE — PROGRESS NOTES
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  With his next infusion, will update labs.  Previous patient of Dr. Phillips.     RA and iritis  - Continue remicade 10mg/kg IV every 7 weeks  - Labs: CBC, creatinine, hepatic panel, hepatitis B/C, QuantiFERON-TB    Has scheduled rheumatology f/u with me on 3/26/2018    CBC  Recent Labs   Lab Test  09/13/17   1542   WBC  7.6   RBC  4.66   HGB  15.1   HCT  42.9   MCV  92   RDW  12.4   PLT  280   MCH  32.4   MCHC  35.2   NEUTROPHIL  47.8   LYMPH  39.4   MONOCYTE  8.3   EOSINOPHIL  3.7   BASOPHIL  0.8   ANEU  3.6   ALYM  3.0   MICHAEL  0.6   AEOS  0.3   ABAS  0.1     CMP  Recent Labs   Lab Test  09/13/17   1542   NA  142   POTASSIUM  4.2   CHLORIDE  107   CO2  28   ANIONGAP  7   GLC  95   BUN  18   CR  1.10   GFRESTIMATED  69   GFRESTBLACK  83   ERNESTINA  9.5   BILITOTAL  0.4   ALBUMIN  3.8   PROTTOTAL  7.7   ALKPHOS  66   AST  25   ALT  45     Alex Segundo MD  2/2/2018 6:03 AM

## 2018-03-16 ENCOUNTER — INFUSION THERAPY VISIT (OUTPATIENT)
Dept: INFUSION THERAPY | Facility: CLINIC | Age: 58
End: 2018-03-16
Attending: INTERNAL MEDICINE
Payer: COMMERCIAL

## 2018-03-16 VITALS — DIASTOLIC BLOOD PRESSURE: 77 MMHG | TEMPERATURE: 97.7 F | HEART RATE: 74 BPM | SYSTOLIC BLOOD PRESSURE: 135 MMHG

## 2018-03-16 DIAGNOSIS — M06.9 RHEUMATOID ARTHRITIS OF HAND, UNSPECIFIED LATERALITY, UNSPECIFIED RHEUMATOID FACTOR PRESENCE: ICD-10-CM

## 2018-03-16 DIAGNOSIS — H20.10 IRITIS, CHRONIC: Primary | ICD-10-CM

## 2018-03-16 PROCEDURE — 96413 CHEMO IV INFUSION 1 HR: CPT

## 2018-03-16 PROCEDURE — 96415 CHEMO IV INFUSION ADDL HR: CPT

## 2018-03-16 PROCEDURE — 25000128 H RX IP 250 OP 636: Performed by: INTERNAL MEDICINE

## 2018-03-16 RX ADMIN — INFLIXIMAB 1100 MG: 100 INJECTION, POWDER, LYOPHILIZED, FOR SOLUTION INTRAVENOUS at 13:48

## 2018-03-16 NOTE — MR AVS SNAPSHOT
"              After Visit Summary   3/16/2018    Prosper Miller    MRN: [id number]           Patient Information     Date Of Birth          1960        Visit Information        Provider Department      3/16/2018 1:00 PM ROOM 3 [facility]        Today's Diagnoses     Iritis, chronic    -  1    Rheumatoid arthritis of hand, unspecified laterality, unspecified rheumatoid factor presence (H)           Follow-ups after your visit        Your next 10 appointments already scheduled     Mar 26, 2018  3:00 PM CDT   New Visit with lAex Segundo MD   [facility] ([facility]    [address]  [place] [address]   [phone]            May 04, 2018  1:00 PM CDT   Level 3 with ROOM 5 [facility] ([facility])    [facility]  [address]  [facility] [address] [phone]              Who to contact     If you have questions or need follow up information about today's clinic visit or your schedule please contact [facility] directly at [phone].  Normal or non-critical lab and imaging results will be communicated to you by ABT Molecular Imaginghart, letter or phone within 4 business days after the clinic has received the results. If you do not hear from us within 7 days, please contact the clinic through QR Wildt or phone. If you have a critical or abnormal lab result, we will notify you by phone as soon as possible.  Submit refill requests through [employer] or call your pharmacy and they will forward the refill request to us. Please allow 3 business days for your refill to be completed.          Additional Information About Your Visit        MyChart Information     [employer] lets you send messages to your doctor, view your test results, renew your prescriptions, schedule appointments and more. To sign up, go to [email or web address].[email or web address] . Click on \"Log in\" on the left side of the screen, which will take you to " "the Welcome page. Then click on \"Sign up Now\" on the right side of the page.     You will be asked to enter the access code listed below, as well as some personal information. Please follow the directions to create your username and password.     Your access code is: [email or web address]  Expires: 2018  4:50 PM     Your access code will  in 90 days. If you need help or a new code, please call your [place] clinic or [phone].        Care EveryWhere ID     This is your Care EveryWhere ID. This could be used by other organizations to access your [place] medical records  [id number]        Your Vitals Were     Pulse Temperature                74 97.7  F (36.5  C) (Oral)           Blood Pressure from Last 3 Encounters:   18 135/77   18 139/81   17 135/83    Weight from Last 3 Encounters:   17 107.5 kg (237 lb)   17 108 kg (238 lb)   17 108 kg (238 lb)              Today, you had the following     No orders found for display       Primary Care Provider Office Phone # Fax #    Momo Gooden [phone]       [address] [address]        Equal Access to Services     MARISSA FLORES AH: Hadii aad ku hadasho Soomaali, waaxda luqadaha, qaybta kaalmada adeegyada, waxay idiin hayaan betty patel . So [facility] [phone].    ATENCIÓN: Si habla español, tiene a boyer disposición servicios gratuitos de asistencia lingüística. Llame al [phone].    We comply with applicable federal civil rights laws and [place] laws. We do not discriminate on the basis of race, color, national origin, age, disability, sex, sexual orientation, or gender identity.            Thank you!     Thank you for choosing [facility]  for your care. Our goal is always to provide you with excellent care. Hearing back from our patients is one way we can continue to improve our services. Please take a few minutes to complete the written survey that " you may receive in the mail after your visit with us. Thank you!             Your Updated Medication List - Protect others around you: Learn how to safely use, store and throw away your medicines at www.disposemymeds.org.          This list is accurate as of 3/16/18  3:54 PM.  Always use your most recent med list.                   Brand Name Dispense Instructions for use Diagnosis    EQL NATURAL ZINC 50 MG Tabs           fluticasone 50 MCG/ACT spray    FLONASE     USE ONE SPRAY IN EACH NOSTRIL TWICE DAILY        lisinopril 30 MG tablet    PRINIVIL,ZESTRIL     Take 45 mg by mouth        pantoprazole 40 MG EC tablet    PROTONIX     TAKE ONE TABLET BY MOUTH TWICE DAILY        REMICADE 100 MG injection   Generic drug:  inFLIXimab           vitamin D3 2000 UNITS Caps

## 2018-03-16 NOTE — PROGRESS NOTES
"Infusion Nursing Note:  Prosper Miller presents today for Remicade.    Patient seen by provider today: No   present during visit today: Not Applicable.    Note: N/A.    Intravenous Access:  Peripheral IV placed.    Treatment Conditions:  Rheumatology Infusion Checklist: PRIOR TO INFUSION OF BIOLOGICAL MEDICATIONS OR ANY OF THESE AS LISTED: Remicaide (infliximab) \".rheumbiologicalchecklist\"    Prior to Infusion of biological medications or any of these as listed:    1. Elevated temperature, fever, chills, productive cough or abnormal vital signs, night sweats, coughing up blood or sputum, no appetite or abnormal vital signs : NO    2. Open wounds or new incisions: NO    3. Recent hospitalization: NO    4.  Recent surgeries:  NO    5. Any upcoming surgeries or dental procedures?:NO    6. Any current or recent bouts of illness or infection? On any antibiotics? : NO    7. Any new, sudden or worsening abdominal pain :NO    8. Vaccination within 4 weeks? Patient or someone in the household is scheduled to receive vaccination? No live virus vaccines prior to or during treatment :no    9. Any nervous system diseases [i.e. multiple sclerosis, Guillain-[place], seizures, neurological  changes]: NO    10. Pregnant or breast feeding; or plans on pregnancy in the future: NO    11. Signs of worsening depression or suicidal ideations while taking benlysta:NO    12. New-onset medical symptoms: NO    13.  New cancer diagnosis or on chemotherapy or radiation NO    14.  Evaluate for any sign of active TB [Unexplained weight loss, Loss of appetite, Night sweats, Fever, Fatigue, Chills, Coughing for 3 weeks or longer, Hemoptysis (coughing up blood), Chest pain]: NO    **Note: If answered yes to any of the above, hold the infusion and contact ordering rheumatologist or on-call rheumatologist.   .      Post Infusion Assessment:  Patient tolerated infusion without incident.  Blood return noted pre and post infusion.  Site patent " and intact, free from redness, edema or discomfort.  No evidence of extravasations.  Access discontinued per protocol.    Discharge Plan:   Patient discharged in stable condition accompanied by: self.    Consuelo Thakur RN

## 2018-03-26 ENCOUNTER — RECORDS - HEALTHEAST (OUTPATIENT)
Dept: ADMINISTRATIVE | Facility: OTHER | Age: 58
End: 2018-03-26

## 2018-03-26 ENCOUNTER — OFFICE VISIT (OUTPATIENT)
Dept: RHEUMATOLOGY | Facility: CLINIC | Age: 58
End: 2018-03-26
Payer: COMMERCIAL

## 2018-03-26 VITALS
BODY MASS INDEX: 38.63 KG/M2 | DIASTOLIC BLOOD PRESSURE: 100 MMHG | HEART RATE: 98 BPM | OXYGEN SATURATION: 95 % | TEMPERATURE: 98.4 F | SYSTOLIC BLOOD PRESSURE: 167 MMHG | WEIGHT: 243 LBS | RESPIRATION RATE: 14 BRPM

## 2018-03-26 DIAGNOSIS — Z79.899 HIGH RISK MEDICATION USE: ICD-10-CM

## 2018-03-26 DIAGNOSIS — M06.9 RHEUMATOID ARTHRITIS OF HAND, UNSPECIFIED LATERALITY, UNSPECIFIED RHEUMATOID FACTOR PRESENCE: Primary | ICD-10-CM

## 2018-03-26 DIAGNOSIS — H20.10 IRITIS, CHRONIC: ICD-10-CM

## 2018-03-26 LAB
ALBUMIN SERPL-MCNC: 3.5 G/DL (ref 3.4–5)
ALP SERPL-CCNC: 63 U/L (ref 40–150)
ALT SERPL W P-5'-P-CCNC: 47 U/L (ref 0–70)
AST SERPL W P-5'-P-CCNC: 30 U/L (ref 0–45)
BASOPHILS # BLD AUTO: 0 10E9/L (ref 0–0.2)
BASOPHILS NFR BLD AUTO: 0.3 %
BILIRUB DIRECT SERPL-MCNC: 0.1 MG/DL (ref 0–0.2)
BILIRUB SERPL-MCNC: 0.4 MG/DL (ref 0.2–1.3)
CREAT SERPL-MCNC: 1.01 MG/DL (ref 0.66–1.25)
CRP SERPL-MCNC: <2.9 MG/L (ref 0–8)
DIFFERENTIAL METHOD BLD: NORMAL
EOSINOPHIL # BLD AUTO: 0.1 10E9/L (ref 0–0.7)
EOSINOPHIL NFR BLD AUTO: 1.5 %
ERYTHROCYTE [DISTWIDTH] IN BLOOD BY AUTOMATED COUNT: 12.6 % (ref 10–15)
ERYTHROCYTE [SEDIMENTATION RATE] IN BLOOD BY WESTERGREN METHOD: 16 MM/H (ref 0–20)
GFR SERPL CREATININE-BSD FRML MDRD: 76 ML/MIN/1.7M2
HCT VFR BLD AUTO: 41.6 % (ref 40–53)
HGB BLD-MCNC: 14.9 G/DL (ref 13.3–17.7)
LYMPHOCYTES # BLD AUTO: 3 10E9/L (ref 0.8–5.3)
LYMPHOCYTES NFR BLD AUTO: 34.5 %
MCH RBC QN AUTO: 32.3 PG (ref 26.5–33)
MCHC RBC AUTO-ENTMCNC: 35.8 G/DL (ref 31.5–36.5)
MCV RBC AUTO: 90 FL (ref 78–100)
MONOCYTES # BLD AUTO: 0.7 10E9/L (ref 0–1.3)
MONOCYTES NFR BLD AUTO: 7.8 %
NEUTROPHILS # BLD AUTO: 4.8 10E9/L (ref 1.6–8.3)
NEUTROPHILS NFR BLD AUTO: 55.9 %
PLATELET # BLD AUTO: 330 10E9/L (ref 150–450)
PROT SERPL-MCNC: 7.3 G/DL (ref 6.8–8.8)
RBC # BLD AUTO: 4.62 10E12/L (ref 4.4–5.9)
WBC # BLD AUTO: 8.6 10E9/L (ref 4–11)

## 2018-03-26 PROCEDURE — 86480 TB TEST CELL IMMUN MEASURE: CPT | Performed by: INTERNAL MEDICINE

## 2018-03-26 PROCEDURE — 82565 ASSAY OF CREATININE: CPT | Performed by: INTERNAL MEDICINE

## 2018-03-26 PROCEDURE — 99214 OFFICE O/P EST MOD 30 MIN: CPT | Performed by: INTERNAL MEDICINE

## 2018-03-26 PROCEDURE — 36415 COLL VENOUS BLD VENIPUNCTURE: CPT | Performed by: INTERNAL MEDICINE

## 2018-03-26 PROCEDURE — 85652 RBC SED RATE AUTOMATED: CPT | Performed by: INTERNAL MEDICINE

## 2018-03-26 PROCEDURE — 80076 HEPATIC FUNCTION PANEL: CPT | Performed by: INTERNAL MEDICINE

## 2018-03-26 PROCEDURE — 86140 C-REACTIVE PROTEIN: CPT | Performed by: INTERNAL MEDICINE

## 2018-03-26 PROCEDURE — 86803 HEPATITIS C AB TEST: CPT | Performed by: INTERNAL MEDICINE

## 2018-03-26 PROCEDURE — 86704 HEP B CORE ANTIBODY TOTAL: CPT | Performed by: INTERNAL MEDICINE

## 2018-03-26 PROCEDURE — 85025 COMPLETE CBC W/AUTO DIFF WBC: CPT | Performed by: INTERNAL MEDICINE

## 2018-03-26 PROCEDURE — 87340 HEPATITIS B SURFACE AG IA: CPT | Performed by: INTERNAL MEDICINE

## 2018-03-26 ASSESSMENT — PAIN SCALES - GENERAL: PAINLEVEL: NO PAIN (0)

## 2018-03-26 NOTE — Clinical Note
Please fax my clinic note dated 3/26/2018 to Mr. Miller's PCP:  Momo Gooden at [facility]  Thank you, Alex Segundo MD 3/26/2018 4:29 PM

## 2018-03-26 NOTE — PROGRESS NOTES
Last clinic notes have been faxed to Momo Gooden at Fax #  [phone]    Thank you  Torri Castorena MA

## 2018-03-26 NOTE — LETTER
[address] [place] [address]    April 2, 2018    Prosper Miller  [address] [place] [address]          Dear Prosper,    Your labs are normal.     Please let me know if you have any questions    Enclosed is a copy of your results.     Results for orders placed or performed in visit on 03/26/18   CBC with platelets differential   Result Value Ref Range    WBC 8.6 4.0 - 11.0 10e9/L    RBC Count 4.62 4.4 - 5.9 10e12/L    Hemoglobin 14.9 13.3 - 17.7 g/dL    Hematocrit 41.6 40.0 - 53.0 %    MCV 90 78 - 100 fl    MCH 32.3 26.5 - 33.0 pg    MCHC 35.8 31.5 - 36.5 g/dL    RDW 12.6 10.0 - 15.0 %    Platelet Count 330 150 - 450 10e9/L    Diff Method Automated Method     % Neutrophils 55.9 %    % Lymphocytes 34.5 %    % Monocytes 7.8 %    % Eosinophils 1.5 %    % Basophils 0.3 %    Absolute Neutrophil 4.8 1.6 - 8.3 10e9/L    Absolute Lymphocytes 3.0 0.8 - 5.3 10e9/L    Absolute Monocytes 0.7 0.0 - 1.3 10e9/L    Absolute Eosinophils 0.1 0.0 - 0.7 10e9/L    Absolute Basophils 0.0 0.0 - 0.2 10e9/L   Creatinine   Result Value Ref Range    Creatinine 1.01 0.66 - 1.25 mg/dL    GFR Estimate 76 >60 mL/min/1.7m2    GFR Estimate If Black >90 >60 mL/min/1.7m2   Erythrocyte sedimentation rate auto   Result Value Ref Range    Sed Rate 16 0 - 20 mm/h   CRP inflammation   Result Value Ref Range    CRP Inflammation <2.9 0.0 - 8.0 mg/L   Hepatic panel   Result Value Ref Range    Bilirubin Direct 0.1 0.0 - 0.2 mg/dL    Bilirubin Total 0.4 0.2 - 1.3 mg/dL    Albumin 3.5 3.4 - 5.0 g/dL    Protein Total 7.3 6.8 - 8.8 g/dL    Alkaline Phosphatase 63 40 - 150 U/L    ALT 47 0 - 70 U/L    AST 30 0 - 45 U/L   Hepatitis C Screen Reflex to HCV RNA Quant and Genotype   Result Value Ref Range    Hepatitis C Antibody Nonreactive NR^Nonreactive   Hepatitis B surface antigen   Result Value Ref Range    Hep B Surface Agn Nonreactive NR^Nonreactive   Hepatitis  B core antibody   Result Value Ref Range    Hepatitis B Core Lazara Nonreactive NR^Nonreactive   M Tuberculosis by Quantiferon   Result Value Ref Range    M Tuberculosis Result Negative NEG^Negative    M Tuberculosis Antigen Value 0.00 IU/mL       If you have any questions or concerns, please call myself or my nurse at [phone].      Sincerely,        Alex Segundo MD/[place]

## 2018-03-26 NOTE — PROGRESS NOTES
Rheumatology Clinic Visit      Prosper Miller MRN# [id number]   YOB: 1960 Age: 57 year old      Date of visit: 3/26/18   PCP: Momo Gooden at [facility]  Ophthalmology: Dr. Michael Jenkins at the [facility]    Chief Complaint   Patient presents with:  Arthritis: Followup for his seropositive rheumatoid arthritis Dr. BELLO patient      Assessment and Plan     1.  Rheumatoid arthritis: No active synovitis on exam today.  He was diagnosed with an inflammatory arthritis in approximately 2006.  Records documents seropositive rheumatoid arthritis but I cannot find records for CCP or rheumatoid factor.  Previously on leflunomide and cyclosporine.  He is doing well with Remicade 10 mg/kg every 7 weeks and therefore will continue this.  He notes that if he has a delay in Remicade infusion that his iritis flares.  We had a discussion today about inflammatory arthritis, iritis, and Remicade; he had several questions about the diagnoses and treatments.  - Continue Remicade 10 mg/kg IV every 7 weeks  - Labs today: CBC, creatinine, hepatic panel, ESR, CRP, hepatitis B/C, QuantiFERON-TB    # Infliximab (Remicade) Risks and Benefits: The risks and benefits of infliximab were discussed in detail and the patient verbalized understanding.  The risks discussed include, but are not limited to, the risk for hypersensitivity, anaphylaxis, anaphylactoid reactions, an increased risk for serious infections leading to hospitalization or death, a possible increased risk for lymphoma and other malignancies, a possible worsening of demyelinating diseases, a possible worsening of heart failure, cytopenias, hepatotoxicity, risk for drug induced lupus, possible reactivation of hepatitis B, and possible reactivation of latent tuberculosis.   The most common adverse reactions are infections, infusion-related reactions, and headache.  It was discussed that the medication would need to be discontinued  if a serious infection develops.  It was discussed that live vaccinations should not be received while using infliximab or within 30 days prior to starting infliximab.  I encouraged reviewing the package insert and asking any questions about the medication.     2.  Iritis: Well controlled with Remicade as noted in #1.  If he has a delay in the Remicade infusion then he says he has a flare of his iritis.  He follows with Dr. Michael Jenkins at [facility]; will request the ophthalmology records.     3.  Elevated blood pressure: Advised that he follow-up with his PCP for management.    Mr. Miller verbalized agreement with and understanding of the rational for the diagnosis and treatment plan.  All questions were answered to best of my ability and the patient's satisfaction. Mr. Miller was advised to contact the clinic with any questions that may arise after the clinic visit.      More than 50% of the face-to-face time was spent counseling the patient.  Total face-to-face time was at least 25 minutes.    Thank you for involving me in the care of the patient    Return to clinic: 6 months, sooner if needed      HPI   Prosper Miller is a 57 year old male with a past medical history significant for hypertension, hyperlipidemia, obstructive sleep apnea, IgA nephropathy, rheumatoid arthritis, and iritis who presents for follow-up of rheumatoid arthritis and immunosuppressive management for iritis. Note that this is Mr. Miller's first clinic visit with me.      9/13/2017 rheumatology clinic note by Dr. Brandon Phillips documents seropositive rheumatoid arthritis doing well on Remicade infusions every 7 weeks.  Might be needing a knee replacement in the future.    [facility] records document previous use of leflunomide.     Prior to this appointment, his chart was reviewed and it was determined that it was reasonable to continue Remicade 10 mg/kg every 7 weeks as as previously written. [facility] did not honor  Dr. Phillips's order since his departure so the orders needed to be re-written.     Today, Mr. Miller reports that he is doing well.  He reports that if he has any delay and Remicade, that he is receiving 10 mg/kg IV every 7 weeks, that he has a flare of his iritis.  He tells me that he has aches and pains in his joints because of his work as an .  Currently with morning stiffness for no more than 10-15 minutes.  No joint swelling.  If he has joint pain, typically worsens with activity and improves with rest.    Denies fevers, chills, nausea, vomiting, constipation, diarrhea. No abdominal pain. No chest pain/pressure, palpitations, or shortness of breath. No LE swelling. No neck pain. No oral or nasal sores.  No rash.     Tobacco: Former smoker  EtOH: Weekly  Drugs: None  Occupation:     [employer]   GEN: No fevers, chills, night sweats, or weight change  SKIN: No itching, rashes, sores  HEENT: No epistaxis. No oral or nasal ulcers.  See HPI  CV: No chest pain, pressure, palpitations, or dyspnea on exertion.  PULM: No SOB, wheeze, cough.  GI: No nausea, vomiting, constipation, diarrhea. No blood in stool. No abdominal pain.  : No blood in urine.  MSK: See HPI.  NEURO: No numbness or tingling  EXT: No LE swelling  PSYCH: Negative    Active Problem List     Patient Active Problem List   Diagnosis     Iritis, chronic     Rheumatoid arthritis of hand, unspecified laterality, unspecified rheumatoid factor presence (H)     Past Medical History   History reviewed. No pertinent past medical history.  Past Surgical History   History reviewed. No pertinent surgical history.  Allergy   No Known Allergies  Current Medication List     Current Outpatient Prescriptions   Medication Sig     Cholecalciferol (VITAMIN D3) 2000 UNITS CAPS      fluticasone (FLONASE) 50 MCG/ACT spray USE ONE SPRAY IN EACH NOSTRIL TWICE DAILY     inFLIXimab (REMICADE) 100 MG injection      lisinopril (PRINIVIL,ZESTRIL) 30 MG tablet  "Take 45 mg by mouth     pantoprazole (PROTONIX) 40 MG EC tablet TAKE ONE TABLET BY MOUTH TWICE DAILY     EQL NATURAL ZINC 50 MG TABS      No current facility-administered medications for this visit.          Social History   See HPI    Family History   History reviewed. No pertinent family history.       Physical Exam     Temp Readings from Last 3 Encounters:   03/26/18 98.4  F (36.9  C) (Oral)   03/16/18 97.7  F (36.5  C) (Oral)   01/26/18 97.4  F (36.3  C) (Oral)     BP Readings from Last 5 Encounters:   03/26/18 (!) 167/100   03/16/18 135/77   01/26/18 139/81   12/08/17 135/83   10/20/17 128/83     Pulse Readings from Last 1 Encounters:   03/26/18 98     Resp Readings from Last 1 Encounters:   03/26/18 14     Estimated body mass index is 38.63 kg/(m^2) as calculated from the following:    Height as of 11/30/16: 1.689 m (5' 6.5\").    Weight as of this encounter: 110.2 kg (243 lb).    GEN: NAD; obese  HEENT: MMM. No oral lesions. Anicteric, noninjected sclera  CV: S1, S2. RRR. No m/r/g.  PULM: CTA bilaterally. No w/c.  ABD: +BS.    MSK: MCPs, PIPs, DIPs, wrists, elbows, shoulders, knees, ankles, and MTPs without swelling or tenderness to palpation.  Hips nontender to palpation.  Heberden's and Bennie's nodes present.  No dactylitis.      NEURO: UE and LE strengths 5/5 and equal bilaterally.   SKIN: No rash.  No nail pitting.  EXT: No LE edema  PSYCH: Alert. Appropriate.    Labs / Imaging (select studies)     CBC  Recent Labs   Lab Test  09/13/17   1542   WBC  7.6   RBC  4.66   HGB  15.1   HCT  42.9   MCV  92   RDW  12.4   PLT  280   MCH  32.4   MCHC  35.2   NEUTROPHIL  47.8   LYMPH  39.4   MONOCYTE  8.3   EOSINOPHIL  3.7   BASOPHIL  0.8   ANEU  3.6   ALYM  3.0   MICHAEL  0.6   AEOS  0.3   ABAS  0.1     CMP  Recent Labs   Lab Test  09/13/17   1542   NA  142   POTASSIUM  4.2   CHLORIDE  107   CO2  28   ANIONGAP  7   GLC  95   BUN  18   CR  1.10   GFRESTIMATED  69   GFRESTBLACK  83   ERNESTINA  9.5   BILITOTAL  0.4   ALBUMIN "  3.8   PROTTOTAL  7.7   ALKPHOS  66   AST  25   ALT  45     Calcium/VitaminD  Recent Labs   Lab Test  09/13/17   1542   ERNESTINA  9.5       Immunization History     Immunization History   Administered Date(s) Administered     Mantoux Tuberculin Skin Test 02/01/2007          Chart documentation done in part with Dragon Voice recognition Software. Although reviewed after completion, some word and grammatical error may remain.    Alex Segundo MD

## 2018-03-26 NOTE — MR AVS SNAPSHOT
After Visit Summary   3/26/2018    Prosper Miller    MRN: [id number]           Patient Information     Date Of Birth          1960        Visit Information        Provider Department      3/26/2018 3:00 PM Alex Segundo MD [facility]        Today's Diagnoses     Rheumatoid arthritis of hand, unspecified laterality, unspecified rheumatoid factor presence (H)        Iritis, chronic        High risk medication use          Care Instructions    [email or web address] - go to rheumatoid arthritis, then cost support          Follow-ups after your visit        Your next 10 appointments already scheduled     May 04, 2018  1:00 PM CDT   Level 3 with ROOM 5 [facility] ([facility])    [facility]  [address]  [facility] [place] [address]   [phone]            Sep 24, 2018  3:20 PM CDT   Return Visit with Alex Segundo MD   [facility] [place] ([facility])    [address]  [place] [address] [phone]              Who to contact     If you have questions or need follow up information about today's clinic visit or your schedule please contact [facility] directly at [phone].  Normal or non-critical lab and imaging results will be communicated to you by MyChart, letter or phone within 4 business days after the clinic has received the results. If you do not hear from us within 7 days, please contact the clinic through [employer] or phone. If you have a critical or abnormal lab result, we will notify you by phone as soon as possible.  Submit refill requests through S[employer] or call your pharmacy and they will forward the refill request to us. Please allow 3 business days for your refill to be completed.          Additional Information About Your Visit        [employer] Information     Spling lets you send messages to your doctor, view your test results, renew your prescriptions, schedule  "appointments and more. To sign up, go to [email or web address] . Click on \"Log in\" on the left side of the screen, which will take you to the Welcome page. Then click on \"Sign up Now\" on the right side of the page.     You will be asked to enter the access code listed below, as well as some personal information. Please follow the directions to create your username and password.     Your access code is: [email or web address]  Expires: 2018  4:50 PM     Your access code will  in 90 days. If you need help or a new code, please call your [place] clinic or [phone].        Care EveryWhere ID     This is your Care EveryWhere ID. This could be used by other organizations to access your [place] medical records  [id number]        Your Vitals Were     Pulse Temperature Respirations Pulse Oximetry BMI (Body Mass Index)       98 98.4  F (36.9  C) (Oral) 14 95% 38.63 kg/m2        Blood Pressure from Last 3 Encounters:   18 (!) 167/100   18 135/77   18 139/81    Weight from Last 3 Encounters:   18 110.2 kg (243 lb)   17 107.5 kg (237 lb)   17 108 kg (238 lb)              We Performed the Following     CBC with platelets differential     Creatinine     CRP inflammation     Erythrocyte sedimentation rate auto     Hepatic panel     Hepatitis B core antibody     Hepatitis B surface antigen     Hepatitis C Screen Reflex to HCV RNA Quant and Genotype     M Tuberculosis by Quantiferon        Primary Care Provider Office Phone # Fax #    Momo Gooden [phone]       [facility] [address] [address] [address]        Equal Access to Services     RODRIGO FLORES : Hadii sophie Bermudez, waaxda luqadaha, qaybta kaalmada radha, faina negrete. So [facility] [phone].    ATENCIÓN: Si habla español, tiene a boyer disposición servicios gratuitos de asistencia lingüística. Llame al [phone].    We comply with applicable " federal civil rights laws and [place] laws. We do not discriminate on the basis of race, color, national origin, age, disability, sex, sexual orientation, or gender identity.            Thank you!     Thank you for choosing [facility]  for your care. Our goal is always to provide you with excellent care. Hearing back from our patients is one way we can continue to improve our services. Please take a few minutes to complete the written survey that you may receive in the mail after your visit with us. Thank you!             Your Updated Medication List - Protect others around you: Learn how to safely use, store and throw away your medicines at www.disposemymeds.org.          This list is accurate as of 3/26/18  3:34 PM.  Always use your most recent med list.                   Brand Name Dispense Instructions for use Diagnosis    EQL NATURAL ZINC 50 MG Tabs           fluticasone 50 MCG/ACT spray    FLONASE     USE ONE SPRAY IN EACH NOSTRIL TWICE DAILY        lisinopril 30 MG tablet    PRINIVIL,ZESTRIL     Take 45 mg by mouth        pantoprazole 40 MG EC tablet    PROTONIX     TAKE ONE TABLET BY MOUTH TWICE DAILY        REMICADE 100 MG injection   Generic drug:  inFLIXimab           vitamin D3 2000 UNITS Caps

## 2018-03-27 LAB
HBV CORE AB SERPL QL IA: NONREACTIVE
HBV SURFACE AG SERPL QL IA: NONREACTIVE
HCV AB SERPL QL IA: NONREACTIVE

## 2018-03-28 LAB
M TB TUBERC IFN-G BLD QL: NEGATIVE
M TB TUBERC IFN-G/MITOGEN IGNF BLD: 0 IU/ML

## 2018-04-02 NOTE — PROGRESS NOTES
"Please mail Mr. Miller his results with the following message.    \"Mr. Miller,    Your labs are normal.    Please let me know if you have any questions.    Sincerely,  Alex Segundo MD  4/1/2018 11:12 PM\"  "

## 2018-04-22 ENCOUNTER — APPOINTMENT (OUTPATIENT)
Dept: GENERAL RADIOLOGY | Facility: CLINIC | Age: 58
End: 2018-04-22
Attending: PHYSICIAN ASSISTANT
Payer: COMMERCIAL

## 2018-04-22 ENCOUNTER — RECORDS - HEALTHEAST (OUTPATIENT)
Dept: ADMINISTRATIVE | Facility: OTHER | Age: 58
End: 2018-04-22

## 2018-04-22 ENCOUNTER — HOSPITAL ENCOUNTER (EMERGENCY)
Facility: CLINIC | Age: 58
Discharge: HOME | End: 2018-04-22
Attending: PHYSICIAN ASSISTANT | Admitting: PHYSICIAN ASSISTANT
Payer: COMMERCIAL

## 2018-04-22 VITALS
DIASTOLIC BLOOD PRESSURE: 91 MMHG | WEIGHT: 230 LBS | HEIGHT: 66 IN | BODY MASS INDEX: 36.96 KG/M2 | SYSTOLIC BLOOD PRESSURE: 168 MMHG | TEMPERATURE: 98.6 F | OXYGEN SATURATION: 96 % | RESPIRATION RATE: 18 BRPM

## 2018-04-22 DIAGNOSIS — S22.41XA CLOSED FRACTURE OF MULTIPLE RIBS OF RIGHT SIDE, INITIAL ENCOUNTER: Primary | ICD-10-CM

## 2018-04-22 PROCEDURE — 99213 OFFICE O/P EST LOW 20 MIN: CPT | Mod: Z6 | Performed by: PHYSICIAN ASSISTANT

## 2018-04-22 PROCEDURE — G0463 HOSPITAL OUTPT CLINIC VISIT: HCPCS | Performed by: PHYSICIAN ASSISTANT

## 2018-04-22 PROCEDURE — 71101 X-RAY EXAM UNILAT RIBS/CHEST: CPT | Mod: RT

## 2018-04-22 RX ORDER — OXYCODONE HYDROCHLORIDE 5 MG/1
5 TABLET ORAL EVERY 6 HOURS PRN
Qty: 12 TABLET | Refills: 0 | Status: SHIPPED | OUTPATIENT
Start: 2018-04-22 | End: 2021-11-16

## 2018-04-22 ASSESSMENT — ENCOUNTER SYMPTOMS
NEUROLOGICAL NEGATIVE: 1
CONSTITUTIONAL NEGATIVE: 1
CARDIOVASCULAR NEGATIVE: 1
GASTROINTESTINAL NEGATIVE: 1
BACK PAIN: 1
RESPIRATORY NEGATIVE: 1

## 2018-04-22 NOTE — ED AVS SNAPSHOT
[facility]    [address] [address]    Phone:  [phone]    Fax:  [phone]                                       Prosper Miller   MRN: [id number]    Department:  [facility]   Date of Visit:  4/22/2018           After Visit Summary Signature Page     I have received my discharge instructions, and my questions have been answered. I have discussed any challenges I see with this plan with the nurse or doctor.    ..........................................................................................................................................  Patient/Patient Representative Signature      ..........................................................................................................................................  Patient Representative Print Name and Relationship to Patient    ..................................................               ................................................  Date                                            Time    ..........................................................................................................................................  Reviewed by Signature/Title    ...................................................              ..............................................  Date                                                            Time

## 2018-04-22 NOTE — LETTER
April 22, 2018      To Whom It May Concern:      Prosper Miller was seen in our Emergency Department today, 04/22/18.  Please allow patient to do light duty as work as tolerable as he has two rib fractures.  Thank you.      Sincerely,        Aide Comer PA-C

## 2018-04-22 NOTE — ED AVS SNAPSHOT
[facility] Emergency Department    [address] [facility] [address]    Phone:  [phone]    Fax:  [phone]                                       Prosper Miller   MRN: [id number]    Department:  [facility] Emergency Department   Date of Visit:  4/22/2018           Patient Information     Date Of Birth          1960        Your diagnoses for this visit were:     Closed fracture of multiple ribs of right side, initial encounter Nondisplaced fractures through the right 10th and 11th posterior ribs       You were seen by Aide Comer PA-C.      Follow-up Information     Follow up with Momo Gooden MD. Call in 1 week.    Specialty:  Family Practice    Why:  For follow-up    Contact information:    [facility]  [address] [address] [id number] [phone]          Follow up with [facility] Emergency Department.    Specialty:  EMERGENCY MEDICINE    Why:  As needed, If symptoms worsen    Contact information:    [address] [place] [address] [phone]    Additional information:    The medical center is located at   [address] [place] [address] (between [facility] and   [place] [address] in [place], [place] miles north   of [place]).        Discharge Instructions         Rib Fracture    You broke one or more ribs. This is called a rib fracture. Rib fractures do not require a cast like other bones. They will heal by themselves in about 4 to 6 weeks. The first 3 to 4 weeks will be the most painful because deep breathing, coughing, or changing position from sitting to lying down, may cause the broken ends to move slightly.  Home care    Rest. You should not be doing any heavy lifting or strenuous exertion until the pain goes away.    It hurts to breathe when you have a broken rib. This puts you at risk of getting pneumonia from poor airflow through your lungs. To prevent this:  ? Take several very deep breaths once an hour while you're awake. Exhale through pursed  lips as if you are blowing up a balloon. If possible, actually blow up a balloon or a rubber glove. This exercise builds up pressure inside the lung and prevents collapse of the small air sacs of the lung. This exercise may cause some pain at the site of injury, which is normal.  ? You may have gotten a breathing exercise device called an incentive spirometer. Use it at least 4 times a day, or as directed.    Apply an ice pack over the injured area for 15 to 20 minutes every 1 to 2 hours. You should do this for the first 24 to 48 hours. You can make an ice pack by filling a plastic bag that seals at the top with ice cubes and then wrapping it with a thin towel. Continue with ice packs as needed for the relief of pain and swelling.    You may use over-the-counter pain medicine to control pain, unless another pain medicine was prescribed. If you have chronic liver or kidney disease or ever had a stomach ulcer or GI bleeding, talk with your healthcare provider before using these medicines.    If your pain is not controlled, contact your healthcare provider. Sometimes a stronger pain medicine may be needed. A nerve block can be done in case of severe pain. It will numb the nerve between the ribs.  Follow-up care  Follow up with your healthcare provider, or as advised. Rarely, a broken rib will cause complications within the first few days that may not be evident during your initial exam. This can include collapsed lung, bleeding around the lung or into the abdomen, or pneumonia. Therefore, watch for the signs below.  If X-rays were taken, you will be told of any new findings that may affect your care.  Call 911  Call 911 if you have:    Dizziness, weakness or fainting    Shortness of breath with or without chest discomfort    New or worsening abdominal pain    Discomfort in other areas of your upper body such as your shoulders, jaw, neck, or arms  When to seek medical advice  Call your healthcare provider right away if  any of these occur:    Increasing chest pain with breathing    Fever of 100.4 F (38 C) or higher, or as directed by your healthcare provider    Congested cough  Date Last Reviewed: 12/3/2015    6813-2429 [employer]. [address] [place] [address], [place] [address]. All rights reserved. This information is not intended as a substitute for professional medical care. Always follow your healthcare professional's instructions.          Your next 10 appointments already scheduled     May 04, 2018  1:00 PM CDT   Level 3 with ROOM 5 [facility] ([facility])    [facility]  [address]  [facility] [address]   [phone]            Sep 24, 2018  3:20 PM CDT   Return Visit with Alex Segundo MD   [facility]    [address] [place] [address]  [place] [address] [phone]              24 Hour Appointment Hotline       To make an appointment at any [facility], call [phone] ([phone]). If you don't have a family doctor or clinic, we will help you find one. [facility] are conveniently located to serve the needs of you and your family.             Review of your medicines      START taking        Dose / Directions Last dose taken    oxyCODONE IR 5 MG tablet   Commonly known as:  ROXICODONE   Dose:  5 mg   Quantity:  12 tablet        Take 1 tablet (5 mg) by mouth every 6 hours as needed for pain   Refills:  0          Our records show that you are taking the medicines listed below. If these are incorrect, please call your family doctor or clinic.        Dose / Directions Last dose taken    EQL NATURAL ZINC 50 MG Tabs        Refills:  0        fluticasone 50 MCG/ACT spray   Commonly known as:  FLONASE        USE ONE SPRAY IN EACH NOSTRIL TWICE DAILY   Refills:  0        lisinopril 30 MG tablet   Commonly known as:  PRINIVIL,ZESTRIL   Dose:  45 mg        Take 45 mg by mouth   Refills:  0         pantoprazole 40 MG EC tablet   Commonly known as:  PROTONIX        TAKE ONE TABLET BY MOUTH TWICE DAILY   Refills:  0        REMICADE 100 MG injection   Generic drug:  inFLIXimab        Refills:  0        vitamin D3 2000 units Caps        Refills:  0                Information about OPIOIDS     PRESCRIPTION OPIOIDS: WHAT YOU NEED TO KNOW   You have a prescription for an opioid (narcotic) pain medicine. Opioids can cause addiction. If you have a history of chemical dependency of any type, you are at a higher risk of becoming addicted to opioids. Only take this medicine after all other options have been tried. Take it for as short a time and as few doses as possible.     Do not:    Drive. If you drive while taking these medicines, you could be arrested for driving under the influence (DUI).    Operate heavy machinery    Do any other dangerous activities while taking these medicines.     Drink any alcohol while taking these medicines.      Take with any other medicines that contain acetaminophen. Read all labels carefully. Look for the word  acetaminophen  or  Tylenol.  Ask your pharmacist if you have questions or are unsure.    Store your pills in a secure place, locked if possible. We will not replace any lost or stolen medicine. If you don t finish your medicine, please throw away (dispose) as directed by your pharmacist. The [place] [employer]ution [employer] has more information about safe disposal: https://www.pca.[facility].[place].us/living-green/managing-unwanted-medications    All opioids tend to cause constipation. Drink plenty of water and eat foods that have a lot of fiber, such as fruits, vegetables, prune juice, apple juice and high-fiber cereal. Take a laxative (Miralax, milk of magnesia, Colace, Senna) if you don t move your bowels at least every other day.         Prescriptions were sent or printed at these locations (1 Prescription)                   [facility] [place], [place] - [address] [facility]    [address] [facility] [address]    Telephone:  [phone]   Fax:  [phone]   Hours:                  Printed at Department/Unit printer (1 of 1)         oxyCODONE IR (ROXICODONE) 5 MG tablet                Procedures and tests performed during your visit     Ribs XR, unilat 3 views + PA chest, right      Orders Needing Specimen Collection     None      Pending Results     Date and Time Order Name Status Description    4/22/2018 1413 Ribs XR, unilat 3 views + PA chest, right Preliminary             Pending Culture Results     No orders found from 4/20/2018 to 4/23/2018.            Pending Results Instructions     If you had any lab results that were not finalized at the time of your Discharge, you can call the ED Lab Result RN at [phone]. You will be contacted by this team for any positive Lab results or changes in treatment. The nurses are available 7 days a week from 10A to 6:30P.  You can leave a message 24 hours per day and they will return your call.        Test Results From Your Hospital Stay        4/22/2018  2:48 PM      Narrative     RIBS AND CHEST RIGHT THREE VIEWS   4/22/2018 2:31 PM     HISTORY: Fall onto right mid-back, tenderness to posterior inferior  ribs.     COMPARISON: None.        Impression     IMPRESSION: There are some subtle nondisplaced fractures through the  right 10th and 11th posterior ribs. No evidence for pneumothorax.                Thank you for choosing [facility]       Thank you for choosing [facility] for your care. Our goal is always to provide you with excellent care. Hearing back from our patients is one way we can continue to improve our services. Please take a few minutes to complete the written survey that you may receive in the mail after you visit with us. Thank you!        [employer] Information     [employer] lets you send messages to your doctor, view your test results, renew your prescriptions, schedule appointments and more. To sign up, go to  [email or web address] . Click on \"Log in\" on the left side of the screen, which will take you to the Welcome page. Then click on \"Sign up Now\" on the right side of the page.     You will be asked to enter the access code listed below, as well as some personal information. Please follow the directions to create your username and password.     Your access code is: [email or web address]  Expires: 2018  4:50 PM     Your access code will  in 90 days. If you need help or a new code, please call your [facility] clinic or [phone].        Care EveryWhere ID     This is your Care EveryWhere ID. This could be used by other organizations to access your [facility] medical records  [id number]        Equal Access to Services     MARISSA FLORES : Denice Bermudez, magy pierre, clotilde garcia, faina patel . So [facility] [phone].    ATENCIÓN: Si habla español, tiene a boyer disposición servicios gratuitos de asistencia lingüística. Llame al [phone].    We comply with applicable federal civil rights laws and [place] laws. We do not discriminate on the basis of race, color, national origin, age, disability, sex, sexual orientation, or gender identity.            After Visit Summary       This is your record. Keep this with you and show to your community pharmacist(s) and doctor(s) at your next visit.                  "

## 2018-04-22 NOTE — ED PROVIDER NOTES
History     Chief Complaint   Patient presents with     Back Pain     pt fell on Friday landed on a brick, then today he was going to hang some trim and reached up and now is having a lot more pain in his back/rib, also injured left elbo when fell      HPI  Prosper Landers is a 57 year old male with hx RA who presents with complaints of right mid-back and posterior rib pain for the past 2 days.  Pt reports that he slipped on the ice 2 days ago and fell backwards, landing against a brick with his right mid-back/ribs.  He developed pain immediately at that time that worsened yesterday when he was reaching up above his head to hang some trim when the pain significantly worsened.  He reports increased pain of the area with movement, palpation, deep breathing.  Denies saddle anesthesia, bowel or bladder incontinence, or lower extremity numbness/tingling/weakness.  Gait is steady but guarded.  Denies fevers, chills, nausea, vomiting, abdominal pain, urinary symptoms, or leg pain/swelling.         Problem List:    Patient Active Problem List    Diagnosis Date Noted     Iritis, chronic 11/30/2016     Priority: Medium     Rheumatoid arthritis of hand, unspecified laterality, unspecified rheumatoid factor presence (H) 11/30/2016     Priority: Medium        Past Medical History:    No past medical history on file.    Past Surgical History:    No past surgical history on file.    Family History:    No family history on file.    Social History:  Marital Status:   [2]  Social History   Substance Use Topics     Smoking status: Former Smoker     Smokeless tobacco: Never Used     Alcohol use Yes      Comment: weekly        Medications:      oxyCODONE IR (ROXICODONE) 5 MG tablet   Cholecalciferol (VITAMIN D3) 2000 UNITS CAPS   EQL NATURAL ZINC 50 MG TABS   fluticasone (FLONASE) 50 MCG/ACT spray   inFLIXimab (REMICADE) 100 MG injection   lisinopril (PRINIVIL,ZESTRIL) 30 MG tablet   pantoprazole (PROTONIX) 40 MG EC tablet  "        Review of Systems   Constitutional: Negative.    Respiratory: Negative.    Cardiovascular: Negative.    Gastrointestinal: Negative.    Musculoskeletal: Positive for back pain (right, mid-back).   Skin: Negative.    Neurological: Negative.    All other systems reviewed and are negative.      Physical Exam   BP: (!) 168/91  Heart Rate: 90  Temp: 98.6  F (37  C)  Resp: 18  Height: 167.6 cm (5' 6\")  Weight: 104.3 kg (230 lb)  SpO2: 96 %      Physical Exam   Constitutional: He is oriented to person, place, and time. He appears well-developed and well-nourished. No distress.   HENT:   Head: Normocephalic and atraumatic.   Cardiovascular: Normal rate, regular rhythm and normal heart sounds.    Pulmonary/Chest: Effort normal and breath sounds normal. No accessory muscle usage. No respiratory distress. He has no decreased breath sounds. He has no wheezes. He has no rhonchi. He has no rales. He exhibits tenderness and bony tenderness. He exhibits no laceration, no crepitus, no edema, no deformity, no swelling and no retraction.   No midline vertebral back tenderness on exam.  There is diffuse right-sided posterior inferior ribs and right-sided thoracic paraspinal muscle tenderness to palpation.     Musculoskeletal:        Cervical back: Normal. He exhibits normal range of motion, no tenderness and no bony tenderness.        Thoracic back: He exhibits tenderness. He exhibits normal range of motion and no bony tenderness.        Lumbar back: Normal. He exhibits normal range of motion, no tenderness and no bony tenderness.   Neurological: He is alert and oriented to person, place, and time. He has normal strength. No sensory deficit.   Skin: Skin is warm and dry.       ED Course     ED Course     Procedures    Results for orders placed or performed during the hospital encounter of 04/22/18 (from the past 24 hour(s))   Ribs XR, unilat 3 views + PA chest, right    Narrative    RIBS AND CHEST RIGHT THREE VIEWS   4/22/2018 " 2:31 PM     HISTORY: Fall onto right mid-back, tenderness to posterior inferior  ribs.     COMPARISON: None.      Impression    IMPRESSION: There are some subtle nondisplaced fractures through the  right 10th and 11th posterior ribs. No evidence for pneumothorax.    MESHA GUEVARA MD       Medications - No data to display    Assessments & Plan (with Medical Decision Making)     DDx: Acute muscle strain, muscle spasm, herniated disc, cauda equina, spinal fracture, spinal stenosis, sciatica, degenerative disease, ligamentous injury, spondylolisthesis, epidural abscess, osteomyelitis, AAA, abdominal etiologies, nephrolithiasis, pyelonephritis     Pt is a 57 year old male with hx RA who presents with complaints of right mid-back and posterior rib pain for the past 2 days.  Pt reports that he slipped on the ice 2 days ago and fell backwards, landing against a brick with his right mid-back/ribs.  He developed pain immediately at that time that worsened yesterday when he was reaching up above his head to hang some trim when the pain significantly worsened.  He reports increased pain of the area with movement, palpation, deep breathing.  Pt is afebrile on arrival.  No midline vertebral back tenderness on exam.  There is diffuse right-sided posterior inferior ribs and right-sided thoracic paraspinal muscle tenderness to palpation.  No evidence of cauda equina.  Denies saddle anesthesia, bowel or bladder incontinence, lower extremity numbness/tingling/weakness.  Normal lower extremity strength.  X-rays of chest and right ribs reveal subtle nondisplaced fractures through the right 10th and 11th posterior ribs.  No evidence of pneumothorax.  Discussed results with patient.  Encouraged symptomatic treatments at home.  Patient was instructed on using and provided with an incentive spirometer.  Pt does not appear in respiratory distress . O2 sats are stable in the upper 90s on RA.  Return precautions were reviewed.  Hand-outs were  provided.    Patient was sent with Oxycodone and was instructed to follow-up with PCP in 5-7 days for re-check and continued care and management or sooner if new or worsening symptoms.  He is to return to the ED for persistent and/or worsening symptoms.  Patient expressed understanding of the diagnosis and plan and was discharged home in good condition.    I have reviewed the nursing notes.    I have reviewed the findings, diagnosis, plan and need for follow up with the patient.    Discharge Medication List as of 4/22/2018  3:07 PM      START taking these medications    Details   oxyCODONE IR (ROXICODONE) 5 MG tablet Take 1 tablet (5 mg) by mouth every 6 hours as needed for pain, Disp-12 tablet, R-0, Local Print             Final diagnoses:   Closed fracture of multiple ribs of right side, initial encounter - Nondisplaced fractures through the right 10th and 11th posterior ribs       4/22/2018   [facility] EMERGENCY DEPARTMENT     Aide Comer PA-C  04/22/18 2051       Aide Comer PA-C  04/22/18 2052

## 2018-04-22 NOTE — DISCHARGE INSTRUCTIONS
Rib Fracture    You broke one or more ribs. This is called a rib fracture. Rib fractures do not require a cast like other bones. They will heal by themselves in about 4 to 6 weeks. The first 3 to 4 weeks will be the most painful because deep breathing, coughing, or changing position from sitting to lying down, may cause the broken ends to move slightly.  Home care    Rest. You should not be doing any heavy lifting or strenuous exertion until the pain goes away.    It hurts to breathe when you have a broken rib. This puts you at risk of getting pneumonia from poor airflow through your lungs. To prevent this:  ? Take several very deep breaths once an hour while you're awake. Exhale through pursed lips as if you are blowing up a balloon. If possible, actually blow up a balloon or a rubber glove. This exercise builds up pressure inside the lung and prevents collapse of the small air sacs of the lung. This exercise may cause some pain at the site of injury, which is normal.  ? You may have gotten a breathing exercise device called an incentive spirometer. Use it at least 4 times a day, or as directed.    Apply an ice pack over the injured area for 15 to 20 minutes every 1 to 2 hours. You should do this for the first 24 to 48 hours. You can make an ice pack by filling a plastic bag that seals at the top with ice cubes and then wrapping it with a thin towel. Continue with ice packs as needed for the relief of pain and swelling.    You may use over-the-counter pain medicine to control pain, unless another pain medicine was prescribed. If you have chronic liver or kidney disease or ever had a stomach ulcer or GI bleeding, talk with your healthcare provider before using these medicines.    If your pain is not controlled, contact your healthcare provider. Sometimes a stronger pain medicine may be needed. A nerve block can be done in case of severe pain. It will numb the nerve between the ribs.  Follow-up care  Follow up with  your healthcare provider, or as advised. Rarely, a broken rib will cause complications within the first few days that may not be evident during your initial exam. This can include collapsed lung, bleeding around the lung or into the abdomen, or pneumonia. Therefore, watch for the signs below.  If X-rays were taken, you will be told of any new findings that may affect your care.  Call 911  Call 911 if you have:    Dizziness, weakness or fainting    Shortness of breath with or without chest discomfort    New or worsening abdominal pain    Discomfort in other areas of your upper body such as your shoulders, jaw, neck, or arms  When to seek medical advice  Call your healthcare provider right away if any of these occur:    Increasing chest pain with breathing    Fever of 100.4 F (38 C) or higher, or as directed by your healthcare provider    Congested cough  Date Last Reviewed: 12/3/2015    6002-1208 [email or web address]. [address] [place] [address], [place] [address]. All rights reserved. This information is not intended as a substitute for professional medical care. Always follow your healthcare professional's instructions.

## 2018-04-23 ENCOUNTER — COMMUNICATION - HEALTHEAST (OUTPATIENT)
Dept: FAMILY MEDICINE | Facility: CLINIC | Age: 58
End: 2018-04-23

## 2018-05-04 ENCOUNTER — INFUSION THERAPY VISIT (OUTPATIENT)
Dept: INFUSION THERAPY | Facility: CLINIC | Age: 58
End: 2018-05-04
Attending: INTERNAL MEDICINE
Payer: COMMERCIAL

## 2018-05-04 VITALS
SYSTOLIC BLOOD PRESSURE: 141 MMHG | TEMPERATURE: 98.2 F | HEART RATE: 76 BPM | WEIGHT: 232 LBS | DIASTOLIC BLOOD PRESSURE: 82 MMHG | BODY MASS INDEX: 37.45 KG/M2

## 2018-05-04 DIAGNOSIS — H20.10 IRITIS, CHRONIC: Primary | ICD-10-CM

## 2018-05-04 DIAGNOSIS — M06.9 RHEUMATOID ARTHRITIS OF HAND, UNSPECIFIED LATERALITY, UNSPECIFIED RHEUMATOID FACTOR PRESENCE: ICD-10-CM

## 2018-05-04 PROCEDURE — 96413 CHEMO IV INFUSION 1 HR: CPT

## 2018-05-04 PROCEDURE — 25000128 H RX IP 250 OP 636: Performed by: INTERNAL MEDICINE

## 2018-05-04 PROCEDURE — 96415 CHEMO IV INFUSION ADDL HR: CPT

## 2018-05-04 RX ADMIN — SODIUM CHLORIDE 250 ML: 9 INJECTION, SOLUTION INTRAVENOUS at 13:01

## 2018-05-04 RX ADMIN — INFLIXIMAB 1100 MG: 100 INJECTION, POWDER, LYOPHILIZED, FOR SOLUTION INTRAVENOUS at 13:07

## 2018-05-04 NOTE — MR AVS SNAPSHOT
"              After Visit Summary   5/4/2018    Prosper Miller    MRN: [id number]           Patient Information     Date Of Birth          1960        Visit Information        Provider Department      5/4/2018 1:00 PM ROOM 5 [facility] Infusion        Today's Diagnoses     Iritis, chronic    -  1    Rheumatoid arthritis of hand, unspecified laterality, unspecified rheumatoid factor presence (H)           Follow-ups after your visit        Your next 10 appointments already scheduled     Jun 22, 2018  1:30 PM CDT   Level 3 with ROOM 3 [facility]fusion ([facility])    n Medical Ctr [facility]  [address]  [facility] [address]   [phone]            Sep 24, 2018  3:20 PM CDT   Return Visit with Alex Segundo MD   [facility] [place] ([facility])    [address]  [place] [address] [phone]              Who to contact     If you have questions or need follow up information about today's clinic visit or your schedule please contact [facility] directly at [phone].  Normal or non-critical lab and imaging results will be communicated to you by "[employer]hart, letter or phone within 4 business days after the clinic has received the results. If you do not hear from us within 7 days, please contact the clinic through [email or web address] or phone. If you have a critical or abnormal lab result, we will notify you by phone as soon as possible.  Submit refill requests through [email or web address] or call your pharmacy and they will forward the refill request to us. Please allow 3 business days for your refill to be completed.          Additional Information About Your Visit        MyChart Information     [email or web address] lets you send messages to your doctor, view your test results, renew your prescriptions, schedule appointments and more. To sign up, go to [email or web address] . Click on \"Log in\" on the left side of the screen, which will take you to " "the Welcome page. Then click on \"Sign up Now\" on the right side of the page.     You will be asked to enter the access code listed below, as well as some personal information. Please follow the directions to create your username and password.     Your access code is: [email or web address]  Expires: 2018  3:30 PM     Your access code will  in 90 days. If you need help or a new code, please call your [place] clinic or [phone].        Care EveryWhere ID     This is your Care EveryWhere ID. This could be used by other organizations to access your [place] medical records  [id number]        Your Vitals Were     Pulse Temperature BMI (Body Mass Index)             76 98.2  F (36.8  C) (Oral) 37.45 kg/m2          Blood Pressure from Last 3 Encounters:   18 141/82   18 (!) 168/91   18 (!) 167/100    Weight from Last 3 Encounters:   18 105.2 kg (232 lb)   18 104.3 kg (230 lb)   18 110.2 kg (243 lb)              Today, you had the following     No orders found for display       Primary Care Provider Office Phone # Fax #    Momo Gooden [phone]       [facility] [id number] [place] [address]        Equal Access to Services     [facility]: Hadii aad ku hadasho Sotaisha, waaxda luqadaha, qaybta kaalmada radha, faina patel . So [facility] [phone].    ATENCIÓN: Si habla español, tiene a boyer disposición servicios gratuitos de asistencia lingüística. Y[facility] al [phone].    We comply with applicable federal civil rights laws and [place] laws. We do not discriminate on the basis of race, color, national origin, age, disability, sex, sexual orientation, or gender identity.            Thank you!     Thank you for choosing [facility]  for your care. Our goal is always to provide you with excellent care. Hearing back from our patients is one way we can continue to improve our services. Please take a few " minutes to complete the written survey that you may receive in the mail after your visit with us. Thank you!             Your Updated Medication List - Protect others around you: Learn how to safely use, store and throw away your medicines at www.disposemymeds.org.          This list is accurate as of 5/4/18  3:30 PM.  Always use your most recent med list.                   Brand Name Dispense Instructions for use Diagnosis    EQL NATURAL ZINC 50 MG Tabs           fluticasone 50 MCG/ACT spray    FLONASE     USE ONE SPRAY IN EACH NOSTRIL TWICE DAILY        lisinopril 30 MG tablet    PRINIVIL,ZESTRIL     Take 45 mg by mouth        oxyCODONE IR 5 MG tablet    ROXICODONE    12 tablet    Take 1 tablet (5 mg) by mouth every 6 hours as needed for pain        pantoprazole 40 MG EC tablet    PROTONIX     TAKE ONE TABLET BY MOUTH TWICE DAILY        REMICADE 100 MG injection   Generic drug:  inFLIXimab           vitamin D3 2000 units Caps

## 2018-05-04 NOTE — PROGRESS NOTES
"Infusion Nursing Note:  Prosper Miller presents today for Remicade    Patient seen by provider today: No   present during visit today: Not Applicable.    Note: N/A.    Intravenous Access:  Peripheral IV placed.    Treatment Conditions:  Rheumatology Infusion Checklist: PRIOR TO INFUSION OF BIOLOGICAL MEDICATIONS OR ANY OF THESE AS LISTED: Remicaide (infliximab) \".rheumbiologicalchecklist\"    Prior to Infusion of biological medications or any of these as listed:    1. Elevated temperature, fever, chills, productive cough or abnormal vital signs, night sweats, coughing up blood or sputum, no appetite or abnormal vital signs : NO    2. Open wounds or new incisions: NO    3. Recent hospitalization: NO    4.  Recent surgeries:  NO    5. Any upcoming surgeries or dental procedures?:NO    6. Any current or recent bouts of illness or infection? On any antibiotics? : NO    7. Any new, sudden or worsening abdominal pain :NO    8. Vaccination within 4 weeks? Patient or someone in the household is scheduled to receive vaccination? No live virus vaccines prior to or during treatment :NO    9. Any nervous system diseases [i.e. multiple sclerosis, Guillain-[place], seizures, neurological  changes]: NO    10. Pregnant or breast feeding; or plans on pregnancy in the future: NO    11. Signs of worsening depression or suicidal ideations while taking benlysta:NO    12. New-onset medical symptoms: NO    13.  New cancer diagnosis or on chemotherapy or radiation NO    14.  Evaluate for any sign of active TB [Unexplained weight loss, Loss of appetite, Night sweats, Fever, Fatigue, Chills, Coughing for 3 weeks or longer, Hemoptysis (coughing up blood), Chest pain]: NO    Pt fractured multiple ribs and was seen in the ED on 4/22/18.  No surgery indicated.  Called Dr. Segundo's care team and updated him on this information and he gave the verbal okay to go ahead and proceed with Remicade today.      **Note: If answered yes to any of " the above, hold the infusion and contact ordering rheumatologist or on-call rheumatologist.       Post Infusion Assessment:  Patient tolerated infusion without incident.  Blood return noted pre and post infusion.  Site patent and intact, free from redness, edema or discomfort.  No evidence of extravasations.  Access discontinued per protocol.      Rheumatology Post Infusion: POST-INFUSION OF BIOLOGICAL MEDICATION:    Reviewed with patient.  Given biologic medication or medication hand-out. Inform patient if any fever, chills or signs of infection, new symptoms, abdominal pain, heart palpitations, shortness of breath, reaction, weakness, neurological changes, seek medical attention immediately and should not receive infusions. No live virus vaccines prior to or during treatment or up to 6 months post infusion. If the patient has an upcoming procedure or surgery, this should be discussed with the rheumatologist and surgeon or provider..    Discharge Plan:   Patient discharged in stable condition accompanied by: self.  Departure Mode: Ambulatory.  Pt to return on 6/22/18 at 1:30 pm for next Remicade infusion.     Lily Alexandre RN

## 2018-06-19 ENCOUNTER — RECORDS - HEALTHEAST (OUTPATIENT)
Dept: ADMINISTRATIVE | Facility: OTHER | Age: 58
End: 2018-06-19

## 2018-06-22 ENCOUNTER — TELEPHONE (OUTPATIENT)
Dept: RHEUMATOLOGY | Facility: CLINIC | Age: 58
End: 2018-06-22

## 2018-06-22 ENCOUNTER — INFUSION THERAPY VISIT (OUTPATIENT)
Dept: INFUSION THERAPY | Facility: CLINIC | Age: 58
End: 2018-06-22
Attending: INTERNAL MEDICINE
Payer: COMMERCIAL

## 2018-06-22 VITALS — HEART RATE: 81 BPM | DIASTOLIC BLOOD PRESSURE: 79 MMHG | TEMPERATURE: 97.5 F | SYSTOLIC BLOOD PRESSURE: 137 MMHG

## 2018-06-22 DIAGNOSIS — M06.9 RHEUMATOID ARTHRITIS OF HAND, UNSPECIFIED LATERALITY, UNSPECIFIED RHEUMATOID FACTOR PRESENCE: ICD-10-CM

## 2018-06-22 DIAGNOSIS — H20.10 IRITIS, CHRONIC: Primary | ICD-10-CM

## 2018-06-22 PROCEDURE — 96415 CHEMO IV INFUSION ADDL HR: CPT

## 2018-06-22 PROCEDURE — 25000128 H RX IP 250 OP 636: Performed by: INTERNAL MEDICINE

## 2018-06-22 PROCEDURE — 96413 CHEMO IV INFUSION 1 HR: CPT

## 2018-06-22 RX ADMIN — INFLIXIMAB 1100 MG: 100 INJECTION, POWDER, LYOPHILIZED, FOR SOLUTION INTRAVENOUS at 13:34

## 2018-06-22 NOTE — TELEPHONE ENCOUNTER
Reason for Call: Question    Detailed comments: Patient states he would like to have knee surgery done but needs to discuss how this will fit into his infusion schedule. Please call.    Phone Number Patient can be reached at: Home number on file [phone] (home)    Best Time: any    Can we leave a detailed message on this number? YES    Call taken on 6/22/2018 at 1:59 PM by Ann Marie Lantigua

## 2018-06-22 NOTE — MR AVS SNAPSHOT
After Visit Summary   6/22/2018    Prosper Miller    MRN: [id number]           Patient Information     Date Of Birth          1960        Visit Information        Provider Department      6/22/2018 1:30 PM ROOM 3 [facility] [place] [facility]        Today's Diagnoses     Iritis, chronic    -  1    Rheumatoid arthritis of hand, unspecified laterality, unspecified rheumatoid factor presence (H)           Follow-ups after your visit        Your next 10 appointments already scheduled     Aug 10, 2018  1:00 PM CDT   Level 3 with ROOM 3 [facility] [place] [facility] ([facility])    n Medical Ctr [facility]  [address] [place] [address]  [facility] [address]   [phone]            Sep 24, 2018  3:20 PM CDT   Return Visit with Alex Segundo MD   [facility] [place] ([facility])    [address]  [place] [address] [phone]              Who to contact     If you have questions or need follow up information about today's clinic visit or your schedule please contact [facility] directly at [phone].  Normal or non-critical lab and imaging results will be communicated to you by MyChart, letter or phone within 4 business days after the clinic has received the results. If you do not hear from us within 7 days, please contact the clinic through MyChart or phone. If you have a critical or abnormal lab result, we will notify you by phone as soon as possible.  Submit refill requests through [employer] or call your pharmacy and they will forward the refill request to us. Please allow 3 business days for your refill to be completed.          Additional Information About Your Visit        Care EveryWhere ID     This is your Care EveryWhere ID. This could be used by other organizations to access your [place] medical records  [id number]        Your Vitals Were     Pulse Temperature                81 97.5  F (36.4  C)           Blood Pressure from Last  3 Encounters:   06/22/18 137/79   05/04/18 141/82   04/22/18 (!) 168/91    Weight from Last 3 Encounters:   05/04/18 105.2 kg (232 lb)   04/22/18 104.3 kg (230 lb)   03/26/18 110.2 kg (243 lb)              Today, you had the following     No orders found for display       Primary Care Provider Office Phone # Fax #    Momo Gooden [phone]       [facility] [address]  [facility] [address]        Equal Access to Services     [facility]: Hadii aad ku hadasho Soomaali, waaxda luqadaha, qaybta kaalmada ademaríayacasper, faina patel . So [facility] [phone].    ATENCIÓN: Si habla español, tiene a boyer disposición servicios gratuitos de asistencia lingüística. [facility] [phone].    We comply with applicable federal civil rights laws and [place] laws. We do not discriminate on the basis of race, color, national origin, age, disability, sex, sexual orientation, or gender identity.            Thank you!     Thank you for choosing [facility]  for your care. Our goal is always to provide you with excellent care. Hearing back from our patients is one way we can continue to improve our services. Please take a few minutes to complete the written survey that you may receive in the mail after your visit with us. Thank you!             Your Updated Medication List - Protect others around you: Learn how to safely use, store and throw away your medicines at www.disposemymeds.org.          This list is accurate as of 6/22/18  3:46 PM.  Always use your most recent med list.                   Brand Name Dispense Instructions for use Diagnosis    EQL NATURAL ZINC 50 MG Tabs           fluticasone 50 MCG/ACT spray    FLONASE     USE ONE SPRAY IN EACH NOSTRIL TWICE DAILY        lisinopril 30 MG tablet    PRINIVIL,ZESTRIL     Take 45 mg by mouth        oxyCODONE IR 5 MG tablet    ROXICODONE    12 tablet    Take 1 tablet (5 mg) by mouth every 6 hours as needed for pain         pantoprazole 40 MG EC tablet    PROTONIX     TAKE ONE TABLET BY MOUTH TWICE DAILY        REMICADE 100 MG injection   Generic drug:  inFLIXimab           vitamin D3 2000 units Caps

## 2018-06-22 NOTE — PROGRESS NOTES
"Infusion Nursing Note:  Prosper Miller presents today for Remicade    Patient seen by provider today: No   present during visit today: Not Applicable.    Note: N/A.    Intravenous Access:  Peripheral IV placed.    Treatment Conditions:  Rheumatology Infusion Checklist: PRIOR TO INFUSION OF BIOLOGICAL MEDICATIONS OR ANY OF THESE AS LISTED: Remicaide (infliximab) \".rheumbiologicalchecklist\"    Prior to Infusion of biological medications or any of these as listed:    1. Elevated temperature, fever, chills, productive cough or abnormal vital signs, night sweats, coughing up blood or sputum, no appetite or abnormal vital signs : NO    2. Open wounds or new incisions: NO    3. Recent hospitalization: NO    4.  Recent surgeries:  NO    5. Any upcoming surgeries or dental procedures?:NO    6. Any current or recent bouts of illness or infection? On any antibiotics? : NO    7. Any new, sudden or worsening abdominal pain :NO    8. Vaccination within 4 weeks? Patient or someone in the household is scheduled to receive vaccination? No live virus vaccines prior to or during treatment :NO    9. Any nervous system diseases [i.e. multiple sclerosis, Guillain-[place], seizures, neurological  changes]: NO    10. Pregnant or breast feeding; or plans on pregnancy in the future: NO    11. Signs of worsening depression or suicidal ideations while taking benlysta:NO    12. New-onset medical symptoms: NO    13.  New cancer diagnosis or on chemotherapy or radiation NO    14.  Evaluate for any sign of active TB [Unexplained weight loss, Loss of appetite, Night sweats, Fever, Fatigue, Chills, Coughing for 3 weeks or longer, Hemoptysis (coughing up blood), Chest pain]: NO    **Note: If answered yes to any of the above, hold the infusion and contact ordering rheumatologist or on-call rheumatologist.   .      Post Infusion Assessment:  Patient tolerated infusion without incident.  Site patent and intact, free from redness, edema or " discomfort.  No evidence of extravasations.  Access discontinued per protocol.    Discharge Plan:   Patient discharged in stable condition accompanied by: self.  Departure Mode: Ambulatory.    Germania Bermudez RN

## 2018-06-25 NOTE — TELEPHONE ENCOUNTER
Rheumatology team: Please call to notify Mr. Miller that remicade is given every 7 weeks, so the ideal time to have surgery with regard to remicade dosing is to have it on the day that he would have had his next infusion (so 7 weeks after his last infusion); and then to restart remicade no sooner than 2 weeks after surgery is completed and in the absence of infection or delayed wound healing, and only after given the okay to restart by his surgeon.     Alex Segundo MD  6/25/2018 4:13 PM

## 2018-06-26 NOTE — TELEPHONE ENCOUNTER
RN called and spoke to patient regarding surgery and Remicade infusion. All questions answered at this time. Closing encounter.    ELEANOR Mohr

## 2018-07-31 ENCOUNTER — OFFICE VISIT - HEALTHEAST (OUTPATIENT)
Dept: FAMILY MEDICINE | Facility: CLINIC | Age: 58
End: 2018-07-31

## 2018-07-31 DIAGNOSIS — Z01.818 PREOPERATIVE EXAMINATION: ICD-10-CM

## 2018-07-31 DIAGNOSIS — M17.12 PRIMARY OSTEOARTHRITIS OF LEFT KNEE: ICD-10-CM

## 2018-07-31 DIAGNOSIS — M06.9 RHEUMATOID ARTHRITIS (H): ICD-10-CM

## 2018-07-31 DIAGNOSIS — G47.33 OSA (OBSTRUCTIVE SLEEP APNEA): ICD-10-CM

## 2018-07-31 DIAGNOSIS — I10 ESSENTIAL HYPERTENSION, BENIGN: ICD-10-CM

## 2018-07-31 LAB
ANION GAP SERPL CALCULATED.3IONS-SCNC: 10 MMOL/L (ref 5–18)
BUN SERPL-MCNC: 18 MG/DL (ref 8–22)
CALCIUM SERPL-MCNC: 9.9 MG/DL (ref 8.5–10.5)
CHLORIDE BLD-SCNC: 106 MMOL/L (ref 98–107)
CO2 SERPL-SCNC: 27 MMOL/L (ref 22–31)
CREAT SERPL-MCNC: 1.17 MG/DL (ref 0.7–1.3)
ERYTHROCYTE [DISTWIDTH] IN BLOOD BY AUTOMATED COUNT: 11.6 % (ref 11–14.5)
GFR SERPL CREATININE-BSD FRML MDRD: >60 ML/MIN/1.73M2
GLUCOSE BLD-MCNC: 95 MG/DL (ref 70–125)
HCT VFR BLD AUTO: 44.6 % (ref 40–54)
HGB BLD-MCNC: 14.8 G/DL (ref 14–18)
INR PPP: 1.03 (ref 0.9–1.1)
MCH RBC QN AUTO: 31.8 PG (ref 27–34)
MCHC RBC AUTO-ENTMCNC: 33.2 G/DL (ref 32–36)
MCV RBC AUTO: 96 FL (ref 80–100)
PLATELET # BLD AUTO: 306 THOU/UL (ref 140–440)
PMV BLD AUTO: 7.2 FL (ref 7–10)
POTASSIUM BLD-SCNC: 4.5 MMOL/L (ref 3.5–5)
RBC # BLD AUTO: 4.66 MILL/UL (ref 4.4–6.2)
SODIUM SERPL-SCNC: 143 MMOL/L (ref 136–145)
WBC: 6.7 THOU/UL (ref 4–11)

## 2018-07-31 ASSESSMENT — MIFFLIN-ST. JEOR: SCORE: 1849.32

## 2018-08-01 ENCOUNTER — COMMUNICATION - HEALTHEAST (OUTPATIENT)
Dept: FAMILY MEDICINE | Facility: CLINIC | Age: 58
End: 2018-08-01

## 2018-08-01 LAB
ATRIAL RATE - MUSE: 90 BPM
DIASTOLIC BLOOD PRESSURE - MUSE: NORMAL MMHG
INTERPRETATION ECG - MUSE: NORMAL
P AXIS - MUSE: 39 DEGREES
PR INTERVAL - MUSE: 138 MS
QRS DURATION - MUSE: 82 MS
QT - MUSE: 348 MS
QTC - MUSE: 425 MS
R AXIS - MUSE: -9 DEGREES
SYSTOLIC BLOOD PRESSURE - MUSE: NORMAL MMHG
T AXIS - MUSE: 31 DEGREES
VENTRICULAR RATE- MUSE: 90 BPM

## 2018-08-06 ENCOUNTER — RECORDS - HEALTHEAST (OUTPATIENT)
Dept: ADMINISTRATIVE | Facility: OTHER | Age: 58
End: 2018-08-06

## 2018-08-21 ENCOUNTER — RECORDS - HEALTHEAST (OUTPATIENT)
Dept: ADMINISTRATIVE | Facility: OTHER | Age: 58
End: 2018-08-21

## 2018-08-24 ENCOUNTER — INFUSION THERAPY VISIT (OUTPATIENT)
Dept: INFUSION THERAPY | Facility: CLINIC | Age: 58
End: 2018-08-24
Attending: INTERNAL MEDICINE
Payer: COMMERCIAL

## 2018-08-24 VITALS
SYSTOLIC BLOOD PRESSURE: 119 MMHG | DIASTOLIC BLOOD PRESSURE: 70 MMHG | RESPIRATION RATE: 18 BRPM | TEMPERATURE: 97.9 F | HEART RATE: 72 BPM

## 2018-08-24 DIAGNOSIS — M06.9 RHEUMATOID ARTHRITIS OF HAND, UNSPECIFIED LATERALITY, UNSPECIFIED RHEUMATOID FACTOR PRESENCE: ICD-10-CM

## 2018-08-24 DIAGNOSIS — H20.10 IRITIS, CHRONIC: Primary | ICD-10-CM

## 2018-08-24 PROCEDURE — 25000128 H RX IP 250 OP 636: Performed by: INTERNAL MEDICINE

## 2018-08-24 PROCEDURE — 96413 CHEMO IV INFUSION 1 HR: CPT

## 2018-08-24 PROCEDURE — 96415 CHEMO IV INFUSION ADDL HR: CPT

## 2018-08-24 RX ADMIN — SODIUM CHLORIDE 250 ML: 9 INJECTION, SOLUTION INTRAVENOUS at 13:31

## 2018-08-24 RX ADMIN — INFLIXIMAB 1100 MG: 100 INJECTION, POWDER, LYOPHILIZED, FOR SOLUTION INTRAVENOUS at 11:35

## 2018-08-24 ASSESSMENT — PAIN SCALES - GENERAL: PAINLEVEL: NO PAIN (1)

## 2018-08-24 NOTE — PROGRESS NOTES
1. Elevated temperature, fever, chills, productive cough or abnormal vital signs, night sweats, coughing up blood or sputum, no appetite or abnormal vital signs : NO    2. Open wounds or new incisions: NO    3. Recent hospitalization: NO    4.  Recent surgeries:  Yes, surgery done on 8/6. Patient got clearance from Dr. Segundo for this dose of Remicade.    5. Any upcoming surgeries or dental procedures?:NO    6. Any current or recent bouts of illness or infection? On any antibiotics? : NO    7. Any new, sudden or worsening abdominal pain :NO    8. Vaccination within 4 weeks? Patient or someone in the household is scheduled to receive vaccination? No live virus vaccines prior to or during treatment :NO    9. Any nervous system diseases [i.e. multiple sclerosis, Guillain-[place], seizures, neurological  changes]: NO    10. Pregnant or breast feeding; or plans on pregnancy in the future: NO    11. Signs of worsening depression or suicidal ideations while taking benlysta:NO    12. New-onset medical symptoms: NO    13.  New cancer diagnosis or on chemotherapy or radiation NO    14.  Evaluate for any sign of active TB [Unexplained weight loss, Loss of appetite, Night sweats, Fever, Fatigue, Chills, Coughing for 3 weeks or longer, Hemoptysis (coughing up blood), Chest pain]: NO        Katie Kemp RN

## 2018-08-24 NOTE — MR AVS SNAPSHOT
After Visit Summary   8/24/2018    Prosper Miller    MRN: [id number]           Patient Information     Date Of Birth          1960        Visit Information        Provider Department      8/24/2018 11:30 AM ROOM 3 [facility] Infusion        Today's Diagnoses     Iritis, chronic    -  1    Rheumatoid arthritis of hand, unspecified laterality, unspecified rheumatoid factor presence (H)           Follow-ups after your visit        Your next 10 appointments already scheduled     Sep 24, 2018  3:20 PM CDT   Return Visit with Alex Segundo MD   [facility] ([facility]    [address]  [place] [address]   [phone]            Oct 12, 2018  1:00 PM CDT   Level 3 with ROOM 7 [facility] Infusion ([facility])    [facility]  [address]  [facility] [address] [phone]              Who to contact     If you have questions or need follow up information about today's clinic visit or your schedule please contact [facility] directly at [phone].  Normal or non-critical lab and imaging results will be communicated to you by MyChart, letter or phone within 4 business days after the clinic has received the results. If you do not hear from us within 7 days, please contact the clinic through MyChart or phone. If you have a critical or abnormal lab result, we will notify you by phone as soon as possible.  Submit refill requests through [employer] or call your pharmacy and they will forward the refill request to us. Please allow 3 business days for your refill to be completed.          Additional Information About Your Visit        Care EveryWhere ID     This is your Care EveryWhere ID. This could be used by other organizations to access your [place] medical records  [id number]        Your Vitals Were     Pulse Temperature Respirations             72 97.9  F (36.6  C) (Oral) 18          Blood  Pressure from Last 3 Encounters:   08/24/18 119/70   06/22/18 137/79   05/04/18 141/82    Weight from Last 3 Encounters:   05/04/18 105.2 kg (232 lb)   04/22/18 104.3 kg (230 lb)   03/26/18 110.2 kg (243 lb)              Today, you had the following     No orders found for display       Primary Care Provider Office Phone # Fax #    Momo Gooden [phone]       [facility] [address]  [facility] [address]        Equal Access to Services     [facility]: Hadii aad ku hadasho Soomaali, waaxda luqadaha, qaybta kaalmada adeashish, faina patel . So [facility] [phone].    ATENCIÓN: Si habla español, tiene a boyer disposición servicios gratuitos de asistencia lingüística. [facility] [phone].    We comply with applicable federal civil rights laws and [place] laws. We do not discriminate on the basis of race, color, national origin, age, disability, sex, sexual orientation, or gender identity.            Thank you!     Thank you for choosing [facility]  for your care. Our goal is always to provide you with excellent care. Hearing back from our patients is one way we can continue to improve our services. Please take a few minutes to complete the written survey that you may receive in the mail after your visit with us. Thank you!             Your Updated Medication List - Protect others around you: Learn how to safely use, store and throw away your medicines at www.disposemymeds.org.          This list is accurate as of 8/24/18  2:47 PM.  Always use your most recent med list.                   Brand Name Dispense Instructions for use Diagnosis    EQL NATURAL ZINC 50 MG Tabs           fluticasone 50 MCG/ACT spray    FLONASE     USE ONE SPRAY IN EACH NOSTRIL TWICE DAILY        lisinopril 30 MG tablet    PRINIVIL,ZESTRIL     Take 45 mg by mouth        oxyCODONE IR 5 MG tablet    ROXICODONE    12 tablet    Take 1 tablet (5 mg) by mouth every 6 hours as  needed for pain        pantoprazole 40 MG EC tablet    PROTONIX     TAKE ONE TABLET BY MOUTH TWICE DAILY        REMICADE 100 MG injection   Generic drug:  inFLIXimab           vitamin D3 2000 units Caps

## 2018-08-24 NOTE — PROGRESS NOTES
Infusion Nursing Note:  Prosper Miller presents today for Remicade.    Patient seen by provider today: No   present during visit today: Not Applicable.    Note: All questions negative on checklist per ELEANOR Curran. With exception of recent knee replacement, pt.'s provide okay with Remicade.    Intravenous Access:  Peripheral IV placed.    Treatment Conditions:  Not Applicable.      Post Infusion Assessment:  Patient tolerated infusion without incident.  Blood return noted pre and post infusion.  Site patent and intact, free from redness, edema or discomfort.  No evidence of extravasations.  Access discontinued per protocol.  Rheumatology Post Infusion: POST-INFUSION OF BIOLOGICAL MEDICATION:    Reviewed with patient.  Given biologic medication or medication hand-out. Inform patient if any fever, chills or signs of infection, new symptoms, abdominal pain, heart palpitations, shortness of breath, reaction, weakness, neurological changes, seek medical attention immediately and should not receive infusions. No live virus vaccines prior to or during treatment or up to 6 months post infusion. If the patient has an upcoming procedure or surgery, this should be discussed with the rheumatologist and surgeon or provider..    Discharge Plan:   Patient discharged in stable condition accompanied by: self.  Departure Mode: Ambulatory.    Mitesh Villegas RN

## 2018-09-18 ENCOUNTER — RECORDS - HEALTHEAST (OUTPATIENT)
Dept: ADMINISTRATIVE | Facility: OTHER | Age: 58
End: 2018-09-18

## 2018-10-12 ENCOUNTER — INFUSION THERAPY VISIT (OUTPATIENT)
Dept: INFUSION THERAPY | Facility: CLINIC | Age: 58
End: 2018-10-12
Attending: INTERNAL MEDICINE
Payer: COMMERCIAL

## 2018-10-12 VITALS — HEART RATE: 104 BPM | RESPIRATION RATE: 16 BRPM | SYSTOLIC BLOOD PRESSURE: 146 MMHG | DIASTOLIC BLOOD PRESSURE: 82 MMHG

## 2018-10-12 DIAGNOSIS — H20.10 IRITIS, CHRONIC: Primary | ICD-10-CM

## 2018-10-12 DIAGNOSIS — M06.9 RHEUMATOID ARTHRITIS OF HAND, UNSPECIFIED LATERALITY, UNSPECIFIED RHEUMATOID FACTOR PRESENCE: ICD-10-CM

## 2018-10-12 PROCEDURE — 25000128 H RX IP 250 OP 636: Performed by: INTERNAL MEDICINE

## 2018-10-12 PROCEDURE — 96413 CHEMO IV INFUSION 1 HR: CPT

## 2018-10-12 PROCEDURE — 96415 CHEMO IV INFUSION ADDL HR: CPT

## 2018-10-12 RX ADMIN — INFLIXIMAB 1100 MG: 100 INJECTION, POWDER, LYOPHILIZED, FOR SOLUTION INTRAVENOUS at 13:11

## 2018-10-12 ASSESSMENT — PAIN SCALES - GENERAL: PAINLEVEL: NO PAIN (0)

## 2018-10-12 NOTE — PROGRESS NOTES
Infusion Nursing Note:  Prsoper Miller presents today for Remicade.    Patient seen by provider today: No   present during visit today: Not Applicable.    Note: N/A.    Intravenous Access:  Peripheral IV placed.    Treatment Conditions:  Rheumatology checklist completed.      Post Infusion Assessment:  Patient tolerated infusion without incident.  Blood return noted pre and post infusion.  Site patent and intact, free from redness, edema or discomfort.  No evidence of extravasations.  Access discontinued per protocol.    Discharge Plan:   Patient discharged in stable condition accompanied by: self.  Departure Mode: Ambulatory.    Mitesh Villegas RN

## 2018-10-12 NOTE — MR AVS SNAPSHOT
After Visit Summary   10/12/2018    Prosper Miller    MRN: [id number]           Patient Information     Date Of Birth          1960        Visit Information        Provider Department      10/12/2018 1:00 PM ROOM 7 [facility]        Today's Diagnoses     Iritis, chronic    -  1    Rheumatoid arthritis of hand, unspecified laterality, unspecified rheumatoid factor presence (H)           Follow-ups after your visit        Your next 10 appointments already scheduled     Nov 12, 2018 11:40 AM CST   Return Visit with Alex Segundo MD   [facility] ([facility]    [address]  [place] [address]   [phone]            Nov 30, 2018  1:00 PM CST   Level 3 with ROOM 3 [facility] ([facility])    [facility]  [address]  [facility] [address] [phone]              Who to contact     If you have questions or need follow up information about today's clinic visit or your schedule please contact [facility] directly at [phone].  Normal or non-critical lab and imaging results will be communicated to you by MyChart, letter or phone within 4 business days after the clinic has received the results. If you do not hear from us within 7 days, please contact the clinic through MyChart or phone. If you have a critical or abnormal lab result, we will notify you by phone as soon as possible.  Submit refill requests through [email or web address] or call your pharmacy and they will forward the refill request to us. Please allow 3 business days for your refill to be completed.          Additional Information About Your Visit        Care EveryWhere ID     This is your Care EveryWhere ID. This could be used by other organizations to access your [place] medical records  [id number]        Your Vitals Were     Pulse Respirations                104 16           Blood Pressure from Last 3  Encounters:   10/12/18 146/82   08/24/18 119/70   06/22/18 137/79    Weight from Last 3 Encounters:   05/04/18 105.2 kg (232 lb)   04/22/18 104.3 kg (230 lb)   03/26/18 110.2 kg (243 lb)              Today, you had the following     No orders found for display       Primary Care Provider Office Phone # Fax #    Momo Gooden [phone]       [facility] [address] [address]        Equal Access to Services     [facility]: Hadii sophie ku hadasho Sotaisha, waaxda luqadaha, qaybta kaalmada adeashish, faina patel . So [facility] [phone].    ATENCIÓN: Si habla español, tiene a boyer disposición servicios gratuitos de asistencia lingüística. [facility] [phone].    We comply with applicable federal civil rights laws and [place] laws. We do not discriminate on the basis of race, color, national origin, age, disability, sex, sexual orientation, or gender identity.            Thank you!     Thank you for choosing [facility]  for your care. Our goal is always to provide you with excellent care. Hearing back from our patients is one way we can continue to improve our services. Please take a few minutes to complete the written survey that you may receive in the mail after your visit with us. Thank you!             Your Updated Medication List - Protect others around you: Learn how to safely use, store and throw away your medicines at www.disposemymeds.org.          This list is accurate as of 10/12/18  4:31 PM.  Always use your most recent med list.                   Brand Name Dispense Instructions for use Diagnosis    EQL NATURAL ZINC 50 MG Tabs           fluticasone 50 MCG/ACT spray    FLONASE     USE ONE SPRAY IN EACH NOSTRIL TWICE DAILY        lisinopril 30 MG tablet    PRINIVIL,ZESTRIL     Take 45 mg by mouth        oxyCODONE IR 5 MG tablet    ROXICODONE    12 tablet    Take 1 tablet (5 mg) by mouth every 6 hours as needed for pain         pantoprazole 40 MG EC tablet    PROTONIX     TAKE ONE TABLET BY MOUTH TWICE DAILY        REMICADE 100 MG injection   Generic drug:  inFLIXimab           vitamin D3 2000 units Caps

## 2018-10-12 NOTE — PROGRESS NOTES
Treatment Conditions:  Biological Infusion Checklist:    ~~~ NOTE: If the patient answers yes to any of the questions below, hold the infusion and contact ordering provider or on-call provider.    1. Have you recently had an elevated temperature, fever, chills, productive cough, coughing for 3 weeks or longer or hemoptysis,  abnormal vital signs, night sweats,  chest pain or have you noticed a decrease in your appetite, unexplained weight loss or fatigue? No  2. Do you have any open wounds or new incisions? No  3. Do you have any recent or upcoming hospitalizations, surgeries or dental procedures? No  4. Do you currently have or recently have had any signs of illness or infection or are you on any antibiotics? No  5. Have you had any new, sudden or worsening abdominal pain? No  6. Have you or anyone in your household received a live vaccination in the past 4 weeks? Please note:  No live vaccines while on biologic/chemotherapy until 6 months after the last treatment.  Patient can receive the flu vaccine (shot only) and the pneumovax.  It is optimal for the patient to get these vaccines mid cycle, but they can be given at any time as long as it is not on the day of the infusion. No  7. Have you recently been diagnosed with any new nervous system diseases (ie. Multiple sclerosis, Guillain [place], seizures, neurological changes) or cancer diagnosis? Are you on any form of radiation or chemotherapy? No  8. Are you pregnant or breast feeding or do you have plans of pregnancy in the future? NA  9. Have you been having any signs of worsening depression or suicidal ideations?  (benlysta only) NA  10. Have there been any other new onset medical symptoms? No  Katie Kemp RN

## 2018-11-01 ENCOUNTER — RECORDS - HEALTHEAST (OUTPATIENT)
Dept: ADMINISTRATIVE | Facility: OTHER | Age: 58
End: 2018-11-01

## 2018-11-20 ENCOUNTER — COMMUNICATION - HEALTHEAST (OUTPATIENT)
Dept: FAMILY MEDICINE | Facility: CLINIC | Age: 58
End: 2018-11-20

## 2018-11-20 DIAGNOSIS — K21.9 ESOPHAGEAL REFLUX: ICD-10-CM

## 2018-11-24 ENCOUNTER — COMMUNICATION - HEALTHEAST (OUTPATIENT)
Dept: SCHEDULING | Facility: CLINIC | Age: 58
End: 2018-11-24

## 2018-11-24 DIAGNOSIS — K21.9 ESOPHAGEAL REFLUX: ICD-10-CM

## 2018-11-30 ENCOUNTER — INFUSION THERAPY VISIT (OUTPATIENT)
Dept: INFUSION THERAPY | Facility: CLINIC | Age: 58
End: 2018-11-30
Attending: INTERNAL MEDICINE
Payer: COMMERCIAL

## 2018-11-30 VITALS — HEART RATE: 92 BPM | SYSTOLIC BLOOD PRESSURE: 125 MMHG | DIASTOLIC BLOOD PRESSURE: 74 MMHG | TEMPERATURE: 98.1 F

## 2018-11-30 DIAGNOSIS — M06.9 RHEUMATOID ARTHRITIS OF HAND, UNSPECIFIED LATERALITY, UNSPECIFIED RHEUMATOID FACTOR PRESENCE: ICD-10-CM

## 2018-11-30 DIAGNOSIS — H20.10 IRITIS, CHRONIC: Primary | ICD-10-CM

## 2018-11-30 PROCEDURE — 25000128 H RX IP 250 OP 636: Performed by: INTERNAL MEDICINE

## 2018-11-30 PROCEDURE — 96413 CHEMO IV INFUSION 1 HR: CPT

## 2018-11-30 PROCEDURE — 96415 CHEMO IV INFUSION ADDL HR: CPT

## 2018-11-30 RX ADMIN — SODIUM CHLORIDE 250 ML: 9 INJECTION, SOLUTION INTRAVENOUS at 13:10

## 2018-11-30 RX ADMIN — INFLIXIMAB 1100 MG: 100 INJECTION, POWDER, LYOPHILIZED, FOR SOLUTION INTRAVENOUS at 13:18

## 2018-11-30 NOTE — PROGRESS NOTES
Infusion Nursing Note:  Prosper Miller presents today for Remicade.    Patient seen by provider today: No   present during visit today: Not Applicable.    Note: N/A.    Intravenous Access:  Peripheral IV placed.    Treatment Conditions:  Biological Infusion Checklist:    ~~~ NOTE: If the patient answers yes to any of the questions below, hold the infusion and contact ordering provider or on-call provider.    1. Have you recently had an elevated temperature, fever, chills, productive cough, coughing for 3 weeks or longer or hemoptysis,  abnormal vital signs, night sweats,  chest pain or have you noticed a decrease in your appetite, unexplained weight loss or fatigue? No  2. Do you have any open wounds or new incisions? No  3. Do you have any recent or upcoming hospitalizations, surgeries or dental procedures? No  4. Do you currently have or recently have had any signs of illness or infection or are you on any antibiotics? No  5. Have you had any new, sudden or worsening abdominal pain? No  6. Have you or anyone in your household received a live vaccination in the past 4 weeks? Please note:  No live vaccines while on biologic/chemotherapy until 6 months after the last treatment.  Patient can receive the flu vaccine (shot only) and the pneumovax.  It is optimal for the patient to get these vaccines mid cycle, but they can be given at any time as long as it is not on the day of the infusion. No  7. Have you recently been diagnosed with any new nervous system diseases (ie. Multiple sclerosis, Guillain [place], seizures, neurological changes) or cancer diagnosis? Are you on any form of radiation or chemotherapy? No  8. Are you pregnant or breast feeding or do you have plans of pregnancy in the future? No  9. Have you been having any signs of worsening depression or suicidal ideations?  (benlysta only) No  10. Have there been any other new onset medical symptoms? No        Post Infusion Assessment:  Patient  tolerated infusion without incident.  Blood return noted pre and post infusion.  Site patent and intact, free from redness, edema or discomfort.  No evidence of extravasations.  Access discontinued per protocol.    Discharge Plan:   Patient discharged in stable condition accompanied by: self.  Departure Mode: Ambulatory.    Ritika Wharton RN

## 2018-11-30 NOTE — MR AVS SNAPSHOT
After Visit Summary   11/30/2018    Prosper Miller    MRN: [id number]           Patient Information     Date Of Birth          1960        Visit Information        Provider Department      11/30/2018 1:00 PM ROOM 3 [facility]        Today's Diagnoses     Iritis, chronic    -  1    Rheumatoid arthritis of hand, unspecified laterality, unspecified rheumatoid factor presence (H)           Follow-ups after your visit        Your next 10 appointments already scheduled     Jan 18, 2019  1:00 PM CST   Level 3 with ROOM 6 [facility] ([facility])    n [facility]  [address]  [facility] [address]   [phone]            Apr 15, 2019  3:20 PM CDT   Return Visit with Alex Segundo MD   [facility] [place] ([facility])    [address]  [place] [address] [phone]              Who to contact     If you have questions or need follow up information about today's clinic visit or your schedule please contact [facility] directly at [phone].  Normal or non-critical lab and imaging results will be communicated to you by MyChart, letter or phone within 4 business days after the clinic has received the results. If you do not hear from us within 7 days, please contact the clinic through MyChart or phone. If you have a critical or abnormal lab result, we will notify you by phone as soon as possible.  Submit refill requests through [employer] or call your pharmacy and they will forward the refill request to us. Please allow 3 business days for your refill to be completed.          Additional Information About Your Visit        Care EveryWhere ID     This is your Care EveryWhere ID. This could be used by other organizations to access your [place] medical records  [id number]        Your Vitals Were     Pulse Temperature                92 98.1  F (36.7  C) (Oral)           Blood Pressure  from Last 3 Encounters:   11/30/18 125/74   10/12/18 146/82   08/24/18 119/70    Weight from Last 3 Encounters:   05/04/18 105.2 kg (232 lb)   04/22/18 104.3 kg (230 lb)   03/26/18 110.2 kg (243 lb)              Today, you had the following     No orders found for display       Primary Care Provider Office Phone # Fax #    Momo Gooden [phone]       [facility] [address]  [facility] [address]        Equal Access to Services     [facility]: Hadii aad ku hadasho Soomaali, waaxda luqadaha, qaybta kaalmada adeashish, faina patel . So [facility] [phone].    ATENCIÓN: Si habla español, tiene a boyer disposición servicios gratuitos de asistencia lingüística. [facility] [phone].    We comply with applicable federal civil rights laws and [place] laws. We do not discriminate on the basis of race, color, national origin, age, disability, sex, sexual orientation, or gender identity.            Thank you!     Thank you for choosing [facility]  for your care. Our goal is always to provide you with excellent care. Hearing back from our patients is one way we can continue to improve our services. Please take a few minutes to complete the written survey that you may receive in the mail after your visit with us. Thank you!             Your Updated Medication List - Protect others around you: Learn how to safely use, store and throw away your medicines at www.disposemymeds.org.          This list is accurate as of 11/30/18  3:21 PM.  Always use your most recent med list.                   Brand Name Dispense Instructions for use Diagnosis    EQL NATURAL ZINC 50 MG Tabs           fluticasone 50 MCG/ACT nasal spray    FLONASE     USE ONE SPRAY IN EACH NOSTRIL TWICE DAILY        lisinopril 30 MG tablet    PRINIVIL/ZESTRIL     Take 45 mg by mouth        oxyCODONE 5 MG tablet    ROXICODONE    12 tablet    Take 1 tablet (5 mg) by mouth every 6 hours as  needed for pain        pantoprazole 40 MG EC tablet    PROTONIX     TAKE ONE TABLET BY MOUTH TWICE DAILY        REMICADE 100 MG injection   Generic drug:  inFLIXimab           vitamin D3 2000 units Caps

## 2018-12-03 ENCOUNTER — OFFICE VISIT - HEALTHEAST (OUTPATIENT)
Dept: FAMILY MEDICINE | Facility: CLINIC | Age: 58
End: 2018-12-03

## 2018-12-03 DIAGNOSIS — M06.9 RHEUMATOID ARTHRITIS INVOLVING MULTIPLE SITES, UNSPECIFIED RHEUMATOID FACTOR PRESENCE: ICD-10-CM

## 2018-12-03 DIAGNOSIS — Z12.11 COLON CANCER SCREENING: ICD-10-CM

## 2018-12-03 DIAGNOSIS — E66.01 MORBID OBESITY (H): ICD-10-CM

## 2018-12-03 DIAGNOSIS — H93.13 TINNITUS, BILATERAL: ICD-10-CM

## 2018-12-03 DIAGNOSIS — R42 DIZZINESS: ICD-10-CM

## 2018-12-03 DIAGNOSIS — I10 ESSENTIAL HYPERTENSION, BENIGN: ICD-10-CM

## 2018-12-12 ENCOUNTER — COMMUNICATION - HEALTHEAST (OUTPATIENT)
Dept: FAMILY MEDICINE | Facility: CLINIC | Age: 58
End: 2018-12-12

## 2018-12-12 DIAGNOSIS — J31.0 CHRONIC RHINITIS: ICD-10-CM

## 2019-01-18 ENCOUNTER — INFUSION THERAPY VISIT (OUTPATIENT)
Dept: INFUSION THERAPY | Facility: CLINIC | Age: 59
End: 2019-01-18
Attending: INTERNAL MEDICINE
Payer: COMMERCIAL

## 2019-01-18 VITALS
DIASTOLIC BLOOD PRESSURE: 64 MMHG | RESPIRATION RATE: 16 BRPM | SYSTOLIC BLOOD PRESSURE: 126 MMHG | HEART RATE: 80 BPM | TEMPERATURE: 97.3 F

## 2019-01-18 DIAGNOSIS — H20.10 IRITIS, CHRONIC: Primary | ICD-10-CM

## 2019-01-18 DIAGNOSIS — M06.9 RHEUMATOID ARTHRITIS OF HAND, UNSPECIFIED LATERALITY, UNSPECIFIED RHEUMATOID FACTOR PRESENCE: ICD-10-CM

## 2019-01-18 PROCEDURE — 25000128 H RX IP 250 OP 636: Performed by: INTERNAL MEDICINE

## 2019-01-18 PROCEDURE — 96413 CHEMO IV INFUSION 1 HR: CPT

## 2019-01-18 PROCEDURE — 96415 CHEMO IV INFUSION ADDL HR: CPT

## 2019-01-18 RX ADMIN — INFLIXIMAB 1100 MG: 100 INJECTION, POWDER, LYOPHILIZED, FOR SOLUTION INTRAVENOUS at 12:32

## 2019-01-18 ASSESSMENT — PAIN SCALES - GENERAL: PAINLEVEL: MILD PAIN (2)

## 2019-01-18 NOTE — PROGRESS NOTES
After Visit Summary   5/31/2017    Laci Hinkle    MRN: [id number]           Patient Information     Date Of Birth          1994        Visit Information        Provider Department      5/31/2017 3:30 PM Bo Oconnor MD [facility]        Today's Diagnoses     Bipolar I disorder, most recent episode (or current) manic (H)    -  1      Care Instructions    Start Lamictal 12.5 mg (half-tab) at night for 2 weeks on May 31, then increase to 25 mg (1 tab) for 2 weeks starting June 14, then increase to 50 mg (2 tabs) for 2 weeks starting June 28    Watch out for rash - stop medication if this occurs    Be very consistent with taking it    Continue other usual medications    Return in 6 weeks          Follow-ups after your visit        Follow-up notes from your care team     Return in about 6 weeks (around 7/12/2017).      Your next 10 appointments already scheduled     Jul 18, 2017  3:30 PM CDT   Adult Med Follow UP with Bo Oconnor MD   Psychiatry Clinic ([facility])    [address] [facility] [address] [phone]            Aug 08, 2017  3:30 PM CDT   Adult Med Follow UP with Bo Oconnor MD   [facility] Clinic ([facility])    [address] [facility] [address] [phone]              Who to contact     Please call your clinic at [phone] to:    Ask questions about your health    Make or cancel appointments    Discuss your medicines    Learn about your test results    Speak to your doctor   If you have compliments or concerns about an experience at your clinic, or if you wish to file a complaint, please contact [facility] Patient Relations at [phone] or email us at [email or web address]         Additional Information About Your Visit        MyChart Information     Cloudbot is an electronic gateway that  Infusion Nursing Note:  Prosper Miller presents today for Remicade    Treatment Conditions:  Biological Infusion Checklist:    ~~~ NOTE: If the patient answers yes to any of the questions below, hold the infusion and contact ordering provider or on-call provider.    1. Have you recently had an elevated temperature, fever, chills, productive cough, coughing for 3 weeks or longer or hemoptysis,  abnormal vital signs, night sweats,  chest pain or have you noticed a decrease in your appetite, unexplained weight loss or fatigue? No  2. Do you have any open wounds or new incisions? No  3. Do you have any recent or upcoming hospitalizations, surgeries or dental procedures? No  4. Do you currently have or recently have had any signs of illness or infection or are you on any antibiotics? No  5. Have you had any new, sudden or worsening abdominal pain? No  6. Have you or anyone in your household received a live vaccination in the past 4 weeks? Please note:  No live vaccines while on biologic/chemotherapy until 6 months after the last treatment.  Patient can receive the flu vaccine (shot only) and the pneumovax.  It is optimal for the patient to get these vaccines mid cycle, but they can be given at any time as long as it is not on the day of the infusion. No  7. Have you recently been diagnosed with any new nervous system diseases (ie. Multiple sclerosis, Guillain [place], seizures, neurological changes) or cancer diagnosis? Are you on any form of radiation or chemotherapy? No  8. Are you pregnant or breast feeding or do you have plans of pregnancy in the future? No  9. Have you been having any signs of worsening depression or suicidal ideations?  (benlysta only) No  10. Have there been any other new onset medical symptoms? No      Lily Alexandre RN                         provides easy, online access to your medical records. With [employer], you can request a clinic appointment, read your test results, renew a prescription or communicate with your care team.     To sign up for [employer] visit the website at [email or web address]   You will be asked to enter the access code listed below, as well as some personal information. Please follow the directions to create your username and password.     Your access code is: [email or web address]  Expires: 2017  3:26 PM     Your access code will  in 90 days. If you need help or a new code, please contact your [facility] or call [phone] for assistance.        Care EveryWhere ID     This is your Care EveryWhere ID. This could be used by other organizations to access your [place] medical records  [id number]        Your Vitals Were     Pulse BMI (Body Mass Index)                86 32.77 kg/m2           Blood Pressure from Last 3 Encounters:   17 (!) 144/97   17 143/84   17 (!) 154/98    Weight from Last 3 Encounters:   17 94.9 kg (209 lb 3.2 oz)   17 93.9 kg (207 lb)   17 93.2 kg (205 lb 6.4 oz)              Today, you had the following     No orders found for display         Today's Medication Changes          These changes are accurate as of: 17  4:05 PM.  If you have any questions, ask your nurse or doctor.               Start taking these medicines.        Dose/Directions    lamoTRIgine 25 MG tablet   Commonly known as:  LaMICtal   Used for:  Bipolar I disorder, most recent episode (or current) manic (H)   Started by:  Bo Oconnor MD        12.5mg po HS for 2 weeks, then 25mg HS for 2 weeks, then 50mg HS for 2 weeks   Quantity:  49 tablet   Refills:  0            Where to get your medicines      These medications were sent to [facility] [place], [place] - [address]  [address] [place] [address], [facility] [address]     Phone:   [phone]     lamoTRIgine 25 MG tablet                Primary Care Provider Office Phone # Fax #    [facility] [phone]       [address] [place] [address]        Thank you!     Thank you for choosing [facility]  for your care. Our goal is always to provide you with excellent care. Hearing back from our patients is one way we can continue to improve our services. Please take a few minutes to complete the written survey that you may receive in the mail after your visit with us. Thank you!             Your Updated Medication List - Protect others around you: Learn how to safely use, store and throw away your medicines at www.disposemymeds.org.          This list is accurate as of: 5/31/17  4:05 PM.  Always use your most recent med list.                   Brand Name Dispense Instructions for use    cloNIDine 0.1 MG tablet    CATAPRES    270 tablet    Take 1 tab (0.1 mg) po q AM and take 2 tabs (0.2 mg) po q HS.       divalproex 500 MG 24 hr tablet    DEPAKOTE ER    360 tablet    Take 4 tablets (2,000 mg) by mouth daily       * gabapentin 100 MG capsule    NEURONTIN    90 capsule    Take 2 caps (200 mg) po q AM.  In addition, take one 400 mg cap at HS.  Daily total of 600 mg.       * gabapentin 400 MG capsule    NEURONTIN    90 capsule    Take 1 capsule (400 mg) by mouth At Bedtime .  In addition, take two 100 mg capt po q AM.  Daily total of 600 mg.       lamoTRIgine 25 MG tablet    LaMICtal    49 tablet    12.5mg po HS for 2 weeks, then 25mg HS for 2 weeks, then 50mg HS for 2 weeks       nicotine polacrilex 2 MG gum    EQ NICOTINE    60 tablet    Place 1 each (2 mg) inside cheek as needed for smoking cessation       omeprazole 20 MG CR capsule    priLOSEC    30 capsule    Take 1 capsule (20 mg) by mouth every morning (before breakfast)       * Notice:  This list has 2 medication(s) that are the same as other medications prescribed for you. Read the  directions carefully, and ask your doctor or other care provider to review them with you.

## 2019-01-18 NOTE — PROGRESS NOTES
Infusion Nursing Note:  Prosper Miller presents today for Remicade.    Patient seen by provider today: No   present during visit today: Not Applicable.    Note: N/A.    Intravenous Access:  Peripheral IV placed.    Treatment Conditions:  Biologic checklist completed by ELEANOR Bautista..      Post Infusion Assessment:  Blood return noted pre and post infusion.  Site patent and intact, free from redness, edema or discomfort.  No evidence of extravasations.  Access discontinued per protocol.    Discharge Plan:   Patient discharged in stable condition accompanied by: self.  Departure Mode: Ambulatory.    Mitesh Villegas RN

## 2019-01-22 ENCOUNTER — TELEPHONE (OUTPATIENT)
Dept: RHEUMATOLOGY | Facility: CLINIC | Age: 59
End: 2019-01-22

## 2019-01-23 NOTE — TELEPHONE ENCOUNTER
Rheumatology team: Please call to notify Mr. Miller that the remicade orders have .  I added an additional infusion order for his last remicade infusion.  He needs clinic follow up to continue on infusions - ensuring appropriate dosing and monitoring for side effects.     Alex Segundo MD  2019 10:53 PM

## 2019-02-08 ENCOUNTER — COMMUNICATION - HEALTHEAST (OUTPATIENT)
Dept: ADMINISTRATIVE | Facility: CLINIC | Age: 59
End: 2019-02-08

## 2019-03-06 ENCOUNTER — TELEPHONE (OUTPATIENT)
Dept: RHEUMATOLOGY | Facility: CLINIC | Age: 59
End: 2019-03-06

## 2019-03-06 NOTE — TELEPHONE ENCOUNTER
msg left on pt's phone regarding need for follow up with Dr. Segundo and orders for 3/8/19 infusion. Consuelo Thakur RN

## 2019-03-06 NOTE — TELEPHONE ENCOUNTER
Reason for call:  Other   Patient called regarding (reason for call): call back  Additional comments: Patient is calling because he is having a infusion on Friday and they need orders. Please call back asap    Phone number to reach patient:  Home number on file [phone] (home)    Best Time:  any    Can we leave a detailed message on this number?  YES

## 2019-03-07 ENCOUNTER — TELEPHONE (OUTPATIENT)
Dept: RHEUMATOLOGY | Facility: CLINIC | Age: 59
End: 2019-03-07

## 2019-03-07 NOTE — TELEPHONE ENCOUNTER
Pt has an appt on 4/15 to see Dr. Segundo for follow up.  He is hoping that Dr. Segundo will write orders for Remicade so that he doesn't miss a dose.  Scheduled for infusion on 3/8.  Katie Kemp RN

## 2019-03-08 NOTE — TELEPHONE ENCOUNTER
Spoke with Pat at the infusion center who states patient has an infusion apt today however there are no orders in place. Dr. Segundo's note from 19 states:    Please call to notify Mr. Miller that the remicade orders have .  I added an additional infusion order for his last remicade infusion.  He needs clinic follow up to continue on infusions - ensuring appropriate dosing and monitoring for side effects.     Patient received the last remicaide infusion however does not have a follow up apt until 4/15/19. Wondering if a sooner apt can be made.    Skip Lemos RN....3/8/2019 10:32 AM

## 2019-03-08 NOTE — TELEPHONE ENCOUNTER
Rheumatology team: Mr. Miller has not been seen for almost a year.  One appointment cancelled and one appointment no show since last seen.  Seen in clinic only once by me in March 2018.  Had been advised to have follow-up.  Cannot safely continue Remicade without appropriate monitoring.  Can see if earlier appointment to eval in clinic - possibly next Tuesday at 8AM if not already scheduled.    Please notify infusion center that new orders are not being placed at this time.    Alex Segundo MD  3/8/2019 10:14 AM

## 2019-03-08 NOTE — TELEPHONE ENCOUNTER
Pat from infusion is calling regarding previous message and needs orders placed in Epic before patients scheduled appointment today at 1:00 pm please.

## 2019-03-08 NOTE — TELEPHONE ENCOUNTER
Called infusion center and explained that patient needs to be seen in clinic before Dr. Segundo will refill remicade. I then called the patient and he was very understanding and agreed to come in on Tuesday 3/12/18 at 8 AM in [place]. Patient thanked me for calling him and offering a sooner appointment.  Jaja Sanchez [facility]  3/8/2019 10:34 AM

## 2019-03-08 NOTE — TELEPHONE ENCOUNTER
Patient needs an apt prior to additional infusion orders and has an apt on 3/12/19 in [facility]. Called and spoke with patient who has no questions.    Skip Lemos RN....3/8/2019 1:44 PM

## 2019-03-12 ENCOUNTER — OFFICE VISIT (OUTPATIENT)
Dept: RHEUMATOLOGY | Facility: CLINIC | Age: 59
End: 2019-03-12
Payer: COMMERCIAL

## 2019-03-12 VITALS
HEART RATE: 68 BPM | TEMPERATURE: 97.7 F | HEIGHT: 66 IN | SYSTOLIC BLOOD PRESSURE: 142 MMHG | BODY MASS INDEX: 37.77 KG/M2 | WEIGHT: 235 LBS | OXYGEN SATURATION: 95 % | DIASTOLIC BLOOD PRESSURE: 80 MMHG

## 2019-03-12 DIAGNOSIS — M06.9 RHEUMATOID ARTHRITIS INVOLVING MULTIPLE SITES, UNSPECIFIED RHEUMATOID FACTOR PRESENCE: Primary | ICD-10-CM

## 2019-03-12 DIAGNOSIS — H20.9 IRITIS: ICD-10-CM

## 2019-03-12 DIAGNOSIS — Z72.820 POOR SLEEP: ICD-10-CM

## 2019-03-12 DIAGNOSIS — M18.0 PRIMARY OSTEOARTHRITIS OF BOTH FIRST CARPOMETACARPAL JOINTS: ICD-10-CM

## 2019-03-12 DIAGNOSIS — Z79.899 HIGH RISK MEDICATIONS (NOT ANTICOAGULANTS) LONG-TERM USE: ICD-10-CM

## 2019-03-12 LAB
ALBUMIN SERPL-MCNC: 3.8 G/DL (ref 3.4–5)
ALP SERPL-CCNC: 74 U/L (ref 40–150)
ALT SERPL W P-5'-P-CCNC: 42 U/L (ref 0–70)
AST SERPL W P-5'-P-CCNC: 23 U/L (ref 0–45)
BASOPHILS # BLD AUTO: 0.1 10E9/L (ref 0–0.2)
BASOPHILS NFR BLD AUTO: 0.9 %
BILIRUB DIRECT SERPL-MCNC: 0.1 MG/DL (ref 0–0.2)
BILIRUB SERPL-MCNC: 0.3 MG/DL (ref 0.2–1.3)
CREAT SERPL-MCNC: 1.04 MG/DL (ref 0.66–1.25)
CRP SERPL-MCNC: <2.9 MG/L (ref 0–8)
DIFFERENTIAL METHOD BLD: NORMAL
EOSINOPHIL # BLD AUTO: 0.3 10E9/L (ref 0–0.7)
EOSINOPHIL NFR BLD AUTO: 5.5 %
ERYTHROCYTE [DISTWIDTH] IN BLOOD BY AUTOMATED COUNT: 13.1 % (ref 10–15)
ERYTHROCYTE [SEDIMENTATION RATE] IN BLOOD BY WESTERGREN METHOD: 10 MM/H (ref 0–20)
GFR SERPL CREATININE-BSD FRML MDRD: 78 ML/MIN/{1.73_M2}
HCT VFR BLD AUTO: 44.5 % (ref 40–53)
HGB BLD-MCNC: 15.7 G/DL (ref 13.3–17.7)
LYMPHOCYTES # BLD AUTO: 2.6 10E9/L (ref 0.8–5.3)
LYMPHOCYTES NFR BLD AUTO: 47.1 %
MCH RBC QN AUTO: 32.2 PG (ref 26.5–33)
MCHC RBC AUTO-ENTMCNC: 35.3 G/DL (ref 31.5–36.5)
MCV RBC AUTO: 91 FL (ref 78–100)
MONOCYTES # BLD AUTO: 0.5 10E9/L (ref 0–1.3)
MONOCYTES NFR BLD AUTO: 8.9 %
NEUTROPHILS # BLD AUTO: 2.1 10E9/L (ref 1.6–8.3)
NEUTROPHILS NFR BLD AUTO: 37.6 %
PLATELET # BLD AUTO: 343 10E9/L (ref 150–450)
PROT SERPL-MCNC: 7.8 G/DL (ref 6.8–8.8)
RBC # BLD AUTO: 4.88 10E12/L (ref 4.4–5.9)
WBC # BLD AUTO: 5.6 10E9/L (ref 4–11)

## 2019-03-12 PROCEDURE — 80076 HEPATIC FUNCTION PANEL: CPT | Performed by: INTERNAL MEDICINE

## 2019-03-12 PROCEDURE — 85652 RBC SED RATE AUTOMATED: CPT | Performed by: INTERNAL MEDICINE

## 2019-03-12 PROCEDURE — 82565 ASSAY OF CREATININE: CPT | Performed by: INTERNAL MEDICINE

## 2019-03-12 PROCEDURE — 86140 C-REACTIVE PROTEIN: CPT | Performed by: INTERNAL MEDICINE

## 2019-03-12 PROCEDURE — 99214 OFFICE O/P EST MOD 30 MIN: CPT | Performed by: INTERNAL MEDICINE

## 2019-03-12 PROCEDURE — 36415 COLL VENOUS BLD VENIPUNCTURE: CPT | Performed by: INTERNAL MEDICINE

## 2019-03-12 PROCEDURE — 85025 COMPLETE CBC W/AUTO DIFF WBC: CPT | Performed by: INTERNAL MEDICINE

## 2019-03-12 ASSESSMENT — MIFFLIN-ST. JEOR: SCORE: 1828.7

## 2019-03-12 NOTE — PROGRESS NOTES
Rheumatology Clinic Visit      Prosper Miller MRN# [id number]   YOB: 1960 Age: 58 year old      Date of visit: 3/12/19   PCP: Momo Gooden at [facility]  Ophthalmology: Dr. Michael Jenkins at the [facility]    Chief Complaint   Patient presents with:  Arthritis: RA, Bilateral thumb pain.       Assessment and Plan     1.  Rheumatoid arthritis: Reportedly dx'd in 2006. Previously followed by Dr. Phillips. Established care with me on 3/26/2018.  No active synovitis on exam when first evaluated by me. Records documents seropositive rheumatoid arthritis but I cannot find records for CCP or rheumatoid factor.  Previously on leflunomide and cyclosporine.  He is doing well with Remicade 10 mg/kg every 7 weeks and he would like to see if he can take it every 8 weeks.  I re-wrote the orders to be for infusions every 7-8 weeks and if he does well on this every 8 weeks then will try reducing the dose.   - Continue Remicade 10 mg/kg IV every 7-8 weeks  - Labs today: CBC, Creatinine, Hepatic Panel, ESR, CRP    2.  Iritis: Well controlled with Remicade as noted in #1.      3.  Osteoarthritis of the bilateral first CMC joints: Mild symptoms today.  Discussed the diagnosis and treatment options.   for   NSAIDs or Tylenol as needed.  - Hand therapy referral    4.  Poor sleep: Has sleep apnea and uses a CPAP.  Suspect that he would benefit from a sleep psychology evaluation.  He is going to try listing everything that he needs to remember for the next day on a note pad that he will keep by his bed to see if that helps, as he says that he cannot turn off his brain when he goes to sleep.  He does not want to see sleep psychology right now.  Follow-up with his PCP for this issue.    5.  Elevated blood pressure: Prosper to follow up with Primary Care provider regarding elevated blood pressure.     6.  Vaccinations: Vaccinations reviewed with Mr. Miller.  Risks and benefits of vaccinations  were discussed.   CDC stance on shingrix when on moderate to high immunosuppression was reviewed.   - Influenza: encouraged yearly vaccination  - [id number]: refused by patient  - [id number]: refused by patient  - Shingrix: Refused by patient     7.  Nonradiating chronic lower back pain: Advised physical therapy but he would like to just do exercises on his own for now.  Symptoms are reportedly mild    Mr. Miller verbalized agreement with and understanding of the rational for the diagnosis and treatment plan.  All questions were answered to best of my ability and the patient's satisfaction. Mr. Miller was advised to contact the clinic with any questions that may arise after the clinic visit.      Thank you for involving me in the care of the patient    Return to clinic: 6 months      HPI   Prosper Miller is a 58 year old male with a past medical history significant for hypertension, hyperlipidemia, obstructive sleep apnea, IgA nephropathy, rheumatoid arthritis, and iritis who presents for follow-up of rheumatoid arthritis and immunosuppressive management for iritis.     9/13/2017 rheumatology clinic note by Dr. Brandon Phillips documents seropositive rheumatoid arthritis doing well on Remicade infusions every 7 weeks.  Might be needing a knee replacement in the future.    [facility] records document previous use of leflunomide.     3/2018: Mr. Miller reported that he was doing well.  He reports that if he has any delay and Remicade, that he is receiving 10 mg/kg IV every 7 weeks, that he has a flare of his iritis.  He tells me that he has aches and pains in his joints because of his work as an .  Currently with morning stiffness for no more than 10-15 minutes.  No joint swelling.  If he has joint pain, typically worsens with activity and improves with rest.    9/24/2018: patient cancelled appointment  11/12/2018: no show     I was notified by the infusion center last week that   Angela's remiade orders had .  Advised that Mr. Miller was overdue for clinic follow-up.     Today, Mr. Miller reports that he is doing well.  He has joint pain at his bilateral first CMC joints that is worse with activity, and worse in the morning; no swelling or increased warmth.  Bilateral first CMC joint pain has been worse for the past 2-3 weeks.  Nonradiating chronic lower back pain that he would like to treat on his own with self taught physical activity; and he says that his wife has encouraged him to do stretching exercises.  No other joint pain.  Eyes have remained stable but he has not followed up with his ophthalmologist; I advised him to do so.  He would like to see if he can reduce the frequency of his Remicade dose.      Denies fevers, chills, nausea, vomiting, constipation, diarrhea. No abdominal pain. No chest pain/pressure, palpitations, or shortness of breath. No LE swelling. No neck pain. No oral or nasal sores.  No rash.     Tobacco: Former smoker  EtOH: Weekly  Drugs: None  Occupation:     [employer]   GEN: No fevers, chills, night sweats, or weight change  SKIN: No itching, rashes, sores  HEENT: No epistaxis. No oral or nasal ulcers.  See HPI  CV: No chest pain, pressure, palpitations, or dyspnea on exertion.  PULM: No SOB, wheeze, cough.  GI: No nausea, vomiting, constipation, diarrhea. No blood in stool. No abdominal pain.  : No blood in urine.  MSK: See HPI.  NEURO: No numbness or tingling  EXT: No LE swelling  PSYCH: Negative    Active Problem List     Patient Active Problem List   Diagnosis     Iritis, chronic     Rheumatoid arthritis of hand, unspecified laterality, unspecified rheumatoid factor presence (H)     Past Medical History   History reviewed. No pertinent past medical history.  Past Surgical History   History reviewed. No pertinent surgical history.  Allergy   No Known Allergies  Current Medication List     Current Outpatient Medications   Medication Sig      "Cholecalciferol (VITAMIN D3) 2000 UNITS CAPS      EQL NATURAL ZINC 50 MG TABS      inFLIXimab (REMICADE) 100 MG injection      lisinopril (PRINIVIL,ZESTRIL) 30 MG tablet Take 45 mg by mouth     pantoprazole (PROTONIX) 40 MG EC tablet TAKE ONE TABLET BY MOUTH TWICE DAILY     fluticasone (FLONASE) 50 MCG/ACT spray USE ONE SPRAY IN EACH NOSTRIL TWICE DAILY     oxyCODONE IR (ROXICODONE) 5 MG tablet Take 1 tablet (5 mg) by mouth every 6 hours as needed for pain (Patient not taking: Reported on 3/12/2019)     No current facility-administered medications for this visit.          Social History   See HPI    Family History   History reviewed. No pertinent family history.       Physical Exam     Temp Readings from Last 3 Encounters:   03/12/19 97.7  F (36.5  C) (Oral)   01/18/19 97.3  F (36.3  C) (Tympanic)   11/30/18 98.1  F (36.7  C) (Oral)     BP Readings from Last 5 Encounters:   03/12/19 157/80   01/18/19 126/64   11/30/18 125/74   10/12/18 146/82   08/24/18 119/70     Pulse Readings from Last 1 Encounters:   03/12/19 68     Resp Readings from Last 1 Encounters:   01/18/19 16     Estimated body mass index is 37.93 kg/m  as calculated from the following:    Height as of this encounter: 1.676 m (5' 6\").    Weight as of this encounter: 106.6 kg (235 lb).    GEN: NAD  HEENT: MMM. No oral lesions. Anicteric, noninjected sclera  CV: S1, S2. RRR. No m/r/g.  PULM: CTA bilaterally. No w/c.  MSK: MCPs, PIPs, DIPs, wrists, elbows, shoulders, knees, ankles, and MTPs without swelling or tenderness to palpation.  Hips nontender to palpation.  Heberden's and Bennie's nodes present.  Subtle squaring and tenderness palpation of the bilateral first CMC joints that were without effusion or overlying erythema or increased warmth.       NEURO: UE and LE strengths 5/5 and equal bilaterally.   SKIN: No rash.  No nail pitting.  EXT: No LE edema  PSYCH: Alert. Appropriate.    Labs / Imaging (select studies)     CBC  Recent Labs   Lab Test " 03/26/18  1540 09/13/17  1542   WBC 8.6 7.6   RBC 4.62 4.66   HGB 14.9 15.1   HCT 41.6 42.9   MCV 90 92   RDW 12.6 12.4    280   MCH 32.3 32.4   MCHC 35.8 35.2   NEUTROPHIL 55.9 47.8   LYMPH 34.5 39.4   MONOCYTE 7.8 8.3   EOSINOPHIL 1.5 3.7   BASOPHIL 0.3 0.8   ANEU 4.8 3.6   ALYM 3.0 3.0   MICHAEL 0.7 0.6   AEOS 0.1 0.3   ABAS 0.0 0.1     CMP  Recent Labs   Lab Test 03/26/18  1540 09/13/17  1542   NA  --  142   POTASSIUM  --  4.2   CHLORIDE  --  107   CO2  --  28   ANIONGAP  --  7   GLC  --  95   BUN  --  18   CR 1.01 1.10   GFRESTIMATED 76 69   GFRESTBLACK >90 83   ERNESTINA  --  9.5   BILITOTAL 0.4 0.4   ALBUMIN 3.5 3.8   PROTTOTAL 7.3 7.7   ALKPHOS 63 66   AST 30 25   ALT 47 45     Calcium/VitaminD  Recent Labs   Lab Test 09/13/17  1542   ERNESTINA 9.5     ESR/CRP  Recent Labs   Lab Test 03/26/18  1540   SED 16   CRP <2.9     Hepatitis B  Recent Labs   Lab Test 03/26/18  1540   HBCAB Nonreactive   HEPBANG Nonreactive     Hepatitis C  Recent Labs   Lab Test 03/26/18  1540   HCVAB Nonreactive     Tuberculosis Screening  Recent Labs   Lab Test 03/26/18  1541   TBRSLT Negative   TBAGN 0.00       Immunization History     Immunization History   Administered Date(s) Administered     Mantoux Tuberculin Skin Test 02/01/2007          Chart documentation done in part with Dragon Voice recognition Software. Although reviewed after completion, some word and grammatical error may remain.    Alex Segundo MD

## 2019-03-12 NOTE — NURSING NOTE
Prosper to follow up with Primary Care provider regarding elevated blood pressure.    Blood pressure rechecked after visit 142/80  Jaja Sanchez CMA Rheumatology  3/12/2019 8:51 AM

## 2019-03-15 ENCOUNTER — INFUSION THERAPY VISIT (OUTPATIENT)
Dept: INFUSION THERAPY | Facility: CLINIC | Age: 59
End: 2019-03-15
Attending: INTERNAL MEDICINE
Payer: COMMERCIAL

## 2019-03-15 VITALS — SYSTOLIC BLOOD PRESSURE: 129 MMHG | TEMPERATURE: 97.8 F | DIASTOLIC BLOOD PRESSURE: 64 MMHG | HEART RATE: 94 BPM

## 2019-03-15 DIAGNOSIS — M06.9 RHEUMATOID ARTHRITIS OF HAND, UNSPECIFIED LATERALITY, UNSPECIFIED RHEUMATOID FACTOR PRESENCE: Primary | ICD-10-CM

## 2019-03-15 DIAGNOSIS — H20.9 IRITIS: ICD-10-CM

## 2019-03-15 PROCEDURE — 25800030 ZZH RX IP 258 OP 636: Performed by: INTERNAL MEDICINE

## 2019-03-15 PROCEDURE — 25000128 H RX IP 250 OP 636: Performed by: INTERNAL MEDICINE

## 2019-03-15 PROCEDURE — 96413 CHEMO IV INFUSION 1 HR: CPT

## 2019-03-15 PROCEDURE — 96415 CHEMO IV INFUSION ADDL HR: CPT

## 2019-03-15 RX ADMIN — SODIUM CHLORIDE 250 ML: 9 INJECTION, SOLUTION INTRAVENOUS at 11:31

## 2019-03-15 RX ADMIN — INFLIXIMAB 1100 MG: 100 INJECTION, POWDER, LYOPHILIZED, FOR SOLUTION INTRAVENOUS at 11:32

## 2019-03-15 NOTE — PROGRESS NOTES
Infusion Nursing Note:  Prosper Angela presents today for Remicade   Patient seen by provider today: No   present during visit today: Not Applicable.    Note: N/A.    Intravenous Access:  Peripheral IV placed.    Treatment Conditions:  Biological Infusion Checklist:    ~~~ NOTE: If the patient answers yes to any of the questions below, hold the infusion and contact ordering provider or on-call provider.    1. Have you recently had an elevated temperature, fever, chills, productive cough, coughing for 3 weeks or longer or hemoptysis,  abnormal vital signs, night sweats,  chest pain or have you noticed a decrease in your appetite, unexplained weight loss or fatigue? No  2. Do you have any open wounds or new incisions? No  3. Do you have any recent or upcoming hospitalizations, surgeries or dental procedures? No  4. Do you currently have or recently have had any signs of illness or infection or are you on any antibiotics? No  5. Have you had any new, sudden or worsening abdominal pain? No  6. Have you or anyone in your household received a live vaccination in the past 4 weeks? Please note:  No live vaccines while on biologic/chemotherapy until 6 months after the last treatment.  Patient can receive the flu vaccine (shot only) and the pneumovax.  It is optimal for the patient to get these vaccines mid cycle, but they can be given at any time as long as it is not on the day of the infusion. No  7. Have you recently been diagnosed with any new nervous system diseases (ie. Multiple sclerosis, Guillain [place], seizures, neurological changes) or cancer diagnosis? Are you on any form of radiation or chemotherapy? No  8. Are you pregnant or breast feeding or do you have plans of pregnancy in the future? No  9. Have you been having any signs of worsening depression or suicidal ideations?  (benlysta only) No  10. Have there been any other new onset medical symptoms? No        Post Infusion Assessment:  Patient  tolerated infusion without incident.  Blood return noted pre and post infusion.  Site patent and intact, free from redness, edema or discomfort.  No evidence of extravasations.  Access discontinued per protocol.      Biologic Infusion Post Education: Call the triage nurse at your clinic or seek medical attention if you have chills and/or temperature greater than or equal to 100.5, uncontrolled nausea/vomiting, diarrhea, constipation, dizziness, shortness of breath, chest pain, heart palpitations, weakness or any other new or concerning symptoms, questions or concerns.  You cannot have any live virus vaccines prior to or during treatment or up to 6 months post infusion.  If you have an upcoming surgery, medical procedure or dental procedure during treatment, this should be discussed with your ordering physician and your surgeon/dentist.  If you are having any concerning symptom, if you are unsure if you should get your next infusion or wish to speak to a provider before your next infusion, please call your care coordinator or triage nurse at your clinic to notify them so we can adequately serve you.       Discharge Plan:   Patient discharged in stable condition accompanied by: self.  Departure Mode: Ambulatory.  Pt to return on 5/10/19 at 1:00 pm for next Remicade infusion.     Lily Alexandre RN

## 2019-03-22 ENCOUNTER — RECORDS - HEALTHEAST (OUTPATIENT)
Dept: ADMINISTRATIVE | Facility: OTHER | Age: 59
End: 2019-03-22

## 2019-05-10 ENCOUNTER — INFUSION THERAPY VISIT (OUTPATIENT)
Dept: INFUSION THERAPY | Facility: CLINIC | Age: 59
End: 2019-05-10
Attending: INTERNAL MEDICINE
Payer: COMMERCIAL

## 2019-05-10 VITALS
HEART RATE: 86 BPM | BODY MASS INDEX: 37.19 KG/M2 | RESPIRATION RATE: 16 BRPM | TEMPERATURE: 97.6 F | WEIGHT: 230.4 LBS | SYSTOLIC BLOOD PRESSURE: 123 MMHG | DIASTOLIC BLOOD PRESSURE: 71 MMHG

## 2019-05-10 DIAGNOSIS — H20.9 IRITIS: Primary | ICD-10-CM

## 2019-05-10 DIAGNOSIS — M06.9 RHEUMATOID ARTHRITIS OF HAND, UNSPECIFIED LATERALITY, UNSPECIFIED RHEUMATOID FACTOR PRESENCE: ICD-10-CM

## 2019-05-10 PROCEDURE — 96413 CHEMO IV INFUSION 1 HR: CPT

## 2019-05-10 PROCEDURE — 96375 TX/PRO/DX INJ NEW DRUG ADDON: CPT

## 2019-05-10 PROCEDURE — 96415 CHEMO IV INFUSION ADDL HR: CPT

## 2019-05-10 PROCEDURE — 25800030 ZZH RX IP 258 OP 636: Performed by: INTERNAL MEDICINE

## 2019-05-10 PROCEDURE — 25000128 H RX IP 250 OP 636: Performed by: INTERNAL MEDICINE

## 2019-05-10 RX ADMIN — INFLIXIMAB 1100 MG: 100 INJECTION, POWDER, LYOPHILIZED, FOR SOLUTION INTRAVENOUS at 12:35

## 2019-05-10 RX ADMIN — SODIUM CHLORIDE 250 ML: 9 INJECTION, SOLUTION INTRAVENOUS at 14:39

## 2019-05-10 ASSESSMENT — PAIN SCALES - GENERAL: PAINLEVEL: NO PAIN (0)

## 2019-05-10 NOTE — PROGRESS NOTES
Infusion Nursing Note:  Prosper Miller presents today for Remicade.    Patient seen by provider today: No   present during visit today: Not Applicable.    Note: N/A.    Intravenous Access:  Peripheral IV placed.    Treatment Conditions:  Biological Infusion Checklist:  ~~~ NOTE: If the patient answers yes to any of the questions below, hold the infusion and contact ordering provider or on-call provider.    1. Have you recently had an elevated temperature, fever, chills, productive cough, coughing for 3 weeks or longer or hemoptysis, abnormal vital signs, night sweats,  chest pain or have you noticed a decrease in your appetite, unexplained weight loss or fatigue? No  2. Do you have any open wounds or new incisions? No  3. Do you have any recent or upcoming hospitalizations, surgeries or dental procedures? No  4. Do you currently have or recently have had any signs of illness or infection or are you on any antibiotics? No  5. Have you had any new, sudden or worsening abdominal pain? No  6. Have you or anyone in your household received a live vaccination in the past 4 weeks? Please note:  No live vaccines while on biologic/chemotherapy until 6 months after the last treatment.  Patient can receive the flu vaccine (shot only) and the pneumovax.  It is optimal for the patient to get these vaccines mid cycle, but they can be given at any time as long as it is not on the day of the infusion. No  7. Have you recently been diagnosed with any new nervous system diseases (ie. Multiple sclerosis, Guillain [place], seizures, neurological changes) or cancer diagnosis? No  8. Are you on any form of radiation or chemotherapy? No  9. Are you pregnant or breast feeding or do you have plans of pregnancy in the future? No  10. Have you been having any signs of worsening depression or suicidal ideations?  (benlysta only) No  11. Have there been any other new onset medical symptoms? No        Post Infusion Assessment:  Patient  tolerated infusion without incident.  Blood return noted pre and post infusion.  Site patent and intact, free from redness, edema or discomfort.  No evidence of extravasations.  Access discontinued per protocol.  Biologic Infusion Post Education: Call the triage nurse at your clinic or seek medical attention if you have chills and/or temperature greater than or equal to 100.5, uncontrolled nausea/vomiting, diarrhea, constipation, dizziness, shortness of breath, chest pain, heart palpitations, weakness or any other new or concerning symptoms, questions or concerns.  You cannot have any live virus vaccines prior to or during treatment or up to 6 months post infusion.  If you have an upcoming surgery, medical procedure or dental procedure during treatment, this should be discussed with your ordering physician and your surgeon/dentist.  If you are having any concerning symptom, if you are unsure if you should get your next infusion or wish to speak to a provider before your next infusion, please call your care coordinator or triage nurse at your clinic to notify them so we can adequately serve you.       Discharge Plan:   Patient discharged in stable condition accompanied by: self.  Departure Mode: Ambulatory.    Mitesh Villegas RN

## 2019-07-05 ENCOUNTER — INFUSION THERAPY VISIT (OUTPATIENT)
Dept: INFUSION THERAPY | Facility: CLINIC | Age: 59
End: 2019-07-05
Attending: INTERNAL MEDICINE
Payer: COMMERCIAL

## 2019-07-05 VITALS
DIASTOLIC BLOOD PRESSURE: 64 MMHG | WEIGHT: 235 LBS | SYSTOLIC BLOOD PRESSURE: 123 MMHG | HEART RATE: 76 BPM | BODY MASS INDEX: 37.93 KG/M2 | TEMPERATURE: 98.2 F

## 2019-07-05 DIAGNOSIS — H20.9 IRITIS: Primary | ICD-10-CM

## 2019-07-05 DIAGNOSIS — M06.9 RHEUMATOID ARTHRITIS OF HAND, UNSPECIFIED LATERALITY, UNSPECIFIED RHEUMATOID FACTOR PRESENCE: ICD-10-CM

## 2019-07-05 PROCEDURE — 25000128 H RX IP 250 OP 636: Performed by: INTERNAL MEDICINE

## 2019-07-05 PROCEDURE — 25800030 ZZH RX IP 258 OP 636: Performed by: INTERNAL MEDICINE

## 2019-07-05 PROCEDURE — 96413 CHEMO IV INFUSION 1 HR: CPT

## 2019-07-05 PROCEDURE — 96415 CHEMO IV INFUSION ADDL HR: CPT

## 2019-07-05 RX ADMIN — INFLIXIMAB 1100 MG: 100 INJECTION, POWDER, LYOPHILIZED, FOR SOLUTION INTRAVENOUS at 09:52

## 2019-07-05 NOTE — PROGRESS NOTES
Infusion Nursing Note:  Prosper Miller presents today for IV Remicade.    Patient seen by provider today: No   present during visit today: Not Applicable.    Note: IV Remicade infused via a peripheral IV without complications. Pt. To return on 8/30 for IV Remicade.    Intravenous Access:  No Intravenous access/labs at this visit.    Treatment Conditions:  Biological Infusion Checklist:  ~~~ NOTE: If the patient answers yes to any of the questions below, hold the infusion and contact ordering provider or on-call provider.    1. Have you recently had an elevated temperature, fever, chills, productive cough, coughing for 3 weeks or longer or hemoptysis, abnormal vital signs, night sweats,  chest pain or have you noticed a decrease in your appetite, unexplained weight loss or fatigue? No  2. Do you have any open wounds or new incisions? No  3. Do you have any recent or upcoming hospitalizations, surgeries or dental procedures? No  4. Do you currently have or recently have had any signs of illness or infection or are you on any antibiotics? No  5. Have you had any new, sudden or worsening abdominal pain? No  6. Have you or anyone in your household received a live vaccination in the past 4 weeks? Please note:  No live vaccines while on biologic/chemotherapy until 6 months after the last treatment.  Patient can receive the flu vaccine (shot only) and the pneumovax.  It is optimal for the patient to get these vaccines mid cycle, but they can be given at any time as long as it is not on the day of the infusion. No  7. Have you recently been diagnosed with any new nervous system diseases (ie. Multiple sclerosis, Guillain [place], seizures, neurological changes) or cancer diagnosis? No  8. Are you on any form of radiation or chemotherapy? No  9. Are you pregnant or breast feeding or do you have plans of pregnancy in the future? No  10. Have you been having any signs of worsening depression or suicidal ideations?   (benlysta only) No  11. Have there been any other new onset medical symptoms? No        Post Infusion Assessment:  Patient tolerated infusion without incident.  Blood return noted pre and post infusion.  Site patent and intact, free from redness, edema or discomfort.  No evidence of extravasations.  Access discontinued per protocol.  Biologic Infusion Post Education: Call the triage nurse at your clinic or seek medical attention if you have chills and/or temperature greater than or equal to 100.5, uncontrolled nausea/vomiting, diarrhea, constipation, dizziness, shortness of breath, chest pain, heart palpitations, weakness or any other new or concerning symptoms, questions or concerns.  You cannot have any live virus vaccines prior to or during treatment or up to 6 months post infusion.  If you have an upcoming surgery, medical procedure or dental procedure during treatment, this should be discussed with your ordering physician and your surgeon/dentist.  If you are having any concerning symptom, if you are unsure if you should get your next infusion or wish to speak to a provider before your next infusion, please call your care coordinator or triage nurse at your clinic to notify them so we can adequately serve you.       Discharge Plan:   Patient and/or family verbalized understanding of discharge instructions and all questions answered.  Patient discharged in stable condition accompanied by: self.  Departure Mode: Ambulatory.    Genie Aguilar RN

## 2019-07-29 ENCOUNTER — COMMUNICATION - HEALTHEAST (OUTPATIENT)
Dept: FAMILY MEDICINE | Facility: CLINIC | Age: 59
End: 2019-07-29

## 2019-07-29 DIAGNOSIS — I10 ESSENTIAL HYPERTENSION, BENIGN: ICD-10-CM

## 2019-08-26 ENCOUNTER — OFFICE VISIT - HEALTHEAST (OUTPATIENT)
Dept: FAMILY MEDICINE | Facility: CLINIC | Age: 59
End: 2019-08-26

## 2019-08-26 DIAGNOSIS — J06.9 VIRAL UPPER RESPIRATORY TRACT INFECTION: ICD-10-CM

## 2019-08-26 DIAGNOSIS — H92.01 RIGHT EAR PAIN: ICD-10-CM

## 2019-08-26 DIAGNOSIS — M06.9 RHEUMATOID ARTHRITIS INVOLVING MULTIPLE SITES, UNSPECIFIED RHEUMATOID FACTOR PRESENCE: ICD-10-CM

## 2019-08-30 ENCOUNTER — INFUSION THERAPY VISIT (OUTPATIENT)
Dept: INFUSION THERAPY | Facility: CLINIC | Age: 59
End: 2019-08-30
Attending: INTERNAL MEDICINE
Payer: COMMERCIAL

## 2019-08-30 VITALS
SYSTOLIC BLOOD PRESSURE: 141 MMHG | DIASTOLIC BLOOD PRESSURE: 77 MMHG | TEMPERATURE: 96.4 F | HEART RATE: 100 BPM | RESPIRATION RATE: 18 BRPM

## 2019-08-30 DIAGNOSIS — H20.9 IRITIS: Primary | ICD-10-CM

## 2019-08-30 DIAGNOSIS — M06.9 RHEUMATOID ARTHRITIS OF HAND, UNSPECIFIED LATERALITY, UNSPECIFIED RHEUMATOID FACTOR PRESENCE: ICD-10-CM

## 2019-08-30 PROCEDURE — 25800030 ZZH RX IP 258 OP 636: Performed by: INTERNAL MEDICINE

## 2019-08-30 PROCEDURE — 96415 CHEMO IV INFUSION ADDL HR: CPT

## 2019-08-30 PROCEDURE — 25000128 H RX IP 250 OP 636: Performed by: INTERNAL MEDICINE

## 2019-08-30 PROCEDURE — 96413 CHEMO IV INFUSION 1 HR: CPT

## 2019-08-30 RX ADMIN — SODIUM CHLORIDE 250 ML: 9 INJECTION, SOLUTION INTRAVENOUS at 13:31

## 2019-08-30 RX ADMIN — INFLIXIMAB 1100 MG: 100 INJECTION, POWDER, LYOPHILIZED, FOR SOLUTION INTRAVENOUS at 13:29

## 2019-08-30 ASSESSMENT — PAIN SCALES - GENERAL: PAINLEVEL: NO PAIN (0)

## 2019-08-30 NOTE — PROGRESS NOTES
Infusion Nursing Note:  Prosper Miller presents today for Remicade.    Patient seen by provider today: No   present during visit today: Not Applicable.    Note: N/A.    Intravenous Access:  Peripheral IV placed.    Treatment Conditions:  Biological Infusion Checklist:  ~~~ NOTE: If the patient answers yes to any of the questions below, hold the infusion and contact ordering provider or on-call provider.    1. Have you recently had an elevated temperature, fever, chills, productive cough, coughing for 3 weeks or longer or hemoptysis, abnormal vital signs, night sweats,  chest pain or have you noticed a decrease in your appetite, unexplained weight loss or fatigue? No  2. Do you have any open wounds or new incisions? No  3. Do you have any recent or upcoming hospitalizations, surgeries or dental procedures? No  4. Do you currently have or recently have had any signs of illness or infection or are you on any antibiotics? No  5. Have you had any new, sudden or worsening abdominal pain? No  6. Have you or anyone in your household received a live vaccination in the past 4 weeks? Please note:  No live vaccines while on biologic/chemotherapy until 6 months after the last treatment.  Patient can receive the flu vaccine (shot only) and the pneumovax.  It is optimal for the patient to get these vaccines mid cycle, but they can be given at any time as long as it is not on the day of the infusion. No  7. Have you recently been diagnosed with any new nervous system diseases (ie. Multiple sclerosis, Guillain [place], seizures, neurological changes) or cancer diagnosis? No  8. Are you on any form of radiation or chemotherapy? No  9. Are you pregnant or breast feeding or do you have plans of pregnancy in the future? No  10. Have you been having any signs of worsening depression or suicidal ideations?  (benlysta only) No  11. Have there been any other new onset medical symptoms? No  Post Infusion Assessment:  Patient  tolerated infusion without incident.  Blood return noted pre and post infusion.  Site patent and intact, free from redness, edema or discomfort.  No evidence of extravasations.  Access discontinued per protocol.     Discharge Plan:   Discharge instructions reviewed with: Patient.  Patient and/or family verbalized understanding of discharge instructions and all questions answered.  Copy of AVS reviewed with patient and/or family.  Patient will return 10/25/2019 for next appointment.  Patient discharged in stable condition accompanied by: self.  Departure Mode: Ambulatory.    Navya Castro RN

## 2019-09-14 ENCOUNTER — COMMUNICATION - HEALTHEAST (OUTPATIENT)
Dept: FAMILY MEDICINE | Facility: CLINIC | Age: 59
End: 2019-09-14

## 2019-09-14 DIAGNOSIS — I10 ESSENTIAL HYPERTENSION, BENIGN: ICD-10-CM

## 2019-09-16 ENCOUNTER — OFFICE VISIT (OUTPATIENT)
Dept: RHEUMATOLOGY | Facility: CLINIC | Age: 59
End: 2019-09-16
Payer: COMMERCIAL

## 2019-09-16 VITALS
SYSTOLIC BLOOD PRESSURE: 132 MMHG | BODY MASS INDEX: 38.35 KG/M2 | TEMPERATURE: 98.4 F | HEIGHT: 66 IN | WEIGHT: 238.6 LBS | HEART RATE: 89 BPM | OXYGEN SATURATION: 94 % | DIASTOLIC BLOOD PRESSURE: 80 MMHG

## 2019-09-16 DIAGNOSIS — R19.7 DIARRHEA, UNSPECIFIED TYPE: ICD-10-CM

## 2019-09-16 DIAGNOSIS — Z79.899 HIGH RISK MEDICATIONS (NOT ANTICOAGULANTS) LONG-TERM USE: ICD-10-CM

## 2019-09-16 DIAGNOSIS — H20.9 IRITIS: ICD-10-CM

## 2019-09-16 DIAGNOSIS — M06.9 RHEUMATOID ARTHRITIS INVOLVING MULTIPLE SITES, UNSPECIFIED RHEUMATOID FACTOR PRESENCE: Primary | ICD-10-CM

## 2019-09-16 LAB
ALBUMIN SERPL-MCNC: 3.6 G/DL (ref 3.4–5)
ALP SERPL-CCNC: 72 U/L (ref 40–150)
ALT SERPL W P-5'-P-CCNC: 36 U/L (ref 0–70)
AST SERPL W P-5'-P-CCNC: 24 U/L (ref 0–45)
BASOPHILS # BLD AUTO: 0 10E9/L (ref 0–0.2)
BASOPHILS NFR BLD AUTO: 0.5 %
BILIRUB DIRECT SERPL-MCNC: 0.1 MG/DL (ref 0–0.2)
BILIRUB SERPL-MCNC: 0.3 MG/DL (ref 0.2–1.3)
CREAT SERPL-MCNC: 1.06 MG/DL (ref 0.66–1.25)
CRP SERPL-MCNC: 3.9 MG/L (ref 0–8)
DIFFERENTIAL METHOD BLD: NORMAL
EOSINOPHIL # BLD AUTO: 0.4 10E9/L (ref 0–0.7)
EOSINOPHIL NFR BLD AUTO: 6.3 %
ERYTHROCYTE [DISTWIDTH] IN BLOOD BY AUTOMATED COUNT: 12.7 % (ref 10–15)
ERYTHROCYTE [SEDIMENTATION RATE] IN BLOOD BY WESTERGREN METHOD: 30 MM/H (ref 0–20)
GFR SERPL CREATININE-BSD FRML MDRD: 76 ML/MIN/{1.73_M2}
HCT VFR BLD AUTO: 42.3 % (ref 40–53)
HGB BLD-MCNC: 14.5 G/DL (ref 13.3–17.7)
LYMPHOCYTES # BLD AUTO: 2.5 10E9/L (ref 0.8–5.3)
LYMPHOCYTES NFR BLD AUTO: 43.2 %
MCH RBC QN AUTO: 32.2 PG (ref 26.5–33)
MCHC RBC AUTO-ENTMCNC: 34.3 G/DL (ref 31.5–36.5)
MCV RBC AUTO: 94 FL (ref 78–100)
MONOCYTES # BLD AUTO: 0.5 10E9/L (ref 0–1.3)
MONOCYTES NFR BLD AUTO: 9.1 %
NEUTROPHILS # BLD AUTO: 2.3 10E9/L (ref 1.6–8.3)
NEUTROPHILS NFR BLD AUTO: 40.9 %
PLATELET # BLD AUTO: 302 10E9/L (ref 150–450)
PROT SERPL-MCNC: 7.7 G/DL (ref 6.8–8.8)
RBC # BLD AUTO: 4.5 10E12/L (ref 4.4–5.9)
WBC # BLD AUTO: 5.7 10E9/L (ref 4–11)

## 2019-09-16 PROCEDURE — 83516 IMMUNOASSAY NONANTIBODY: CPT | Mod: 59 | Performed by: INTERNAL MEDICINE

## 2019-09-16 PROCEDURE — 36415 COLL VENOUS BLD VENIPUNCTURE: CPT | Performed by: INTERNAL MEDICINE

## 2019-09-16 PROCEDURE — 99214 OFFICE O/P EST MOD 30 MIN: CPT | Performed by: INTERNAL MEDICINE

## 2019-09-16 PROCEDURE — 80076 HEPATIC FUNCTION PANEL: CPT | Performed by: INTERNAL MEDICINE

## 2019-09-16 PROCEDURE — 82565 ASSAY OF CREATININE: CPT | Performed by: INTERNAL MEDICINE

## 2019-09-16 PROCEDURE — 86140 C-REACTIVE PROTEIN: CPT | Performed by: INTERNAL MEDICINE

## 2019-09-16 PROCEDURE — 83516 IMMUNOASSAY NONANTIBODY: CPT | Performed by: INTERNAL MEDICINE

## 2019-09-16 PROCEDURE — 85025 COMPLETE CBC W/AUTO DIFF WBC: CPT | Performed by: INTERNAL MEDICINE

## 2019-09-16 PROCEDURE — 85652 RBC SED RATE AUTOMATED: CPT | Performed by: INTERNAL MEDICINE

## 2019-09-16 ASSESSMENT — MIFFLIN-ST. JEOR: SCORE: 1840.03

## 2019-09-16 NOTE — NURSING NOTE
RAPID3 (0-30) Cumulative Score  0.12          RAPID3 Weighted Score (divide #4 by 3 and that is the weighted score)  0.4       Jaja Sanchez [facility] Rheumatology  9/16/2019 8:07 AM

## 2019-09-16 NOTE — PROGRESS NOTES
Rheumatology Clinic Visit      Prosper Miller MRN# [id number]   YOB: 1960 Age: 59 year old      Date of visit: 9/16/19   PCP: Momo Gooden at [facility]  Ophthalmology: Dr. Michael Jenkins at the [facility]    Chief Complaint   Patient presents with:  Arthritis: RA. Patient has some pain and stiffness    Assessment and Plan     1.  Rheumatoid arthritis: Reportedly dx'd in 2006. Previously followed by Dr. Phillips. Established care with me on 3/26/2018.  No active synovitis on exam when first evaluated by me. Records documents seropositive rheumatoid arthritis but I cannot find records for CCP or rheumatoid factor.  Previously on leflunomide and cyclosporine.  He is doing well with Remicade 10 mg/kg every 7-8 weeks, currently on a frequency of every 8 weeks.  He reports that he is doing great.  No synovitis on exam.  Continue Remicade     - Continue Remicade 10 mg/kg IV every 7-8 weeks  - Labs today: CBC, Creatinine, Hepatic Panel, ESR, CRP    2.  Iritis: Well controlled with Remicade as noted in #1.  No flares since last seen.    3.  Osteoarthritis of the bilateral first CMC joints: Mild symptoms today.  Re-discussed the diagnosis and treatment options.   He was previously referred to hand therapy but did not want to go.  He says that he will monitor for now as the symptoms are tolerable.    4.  Vaccinations: Vaccinations reviewed with Mr. Miller.  Risks and benefits of vaccinations were discussed.    - Influenza: refused by patient  - [id number]: refused by patient  - [id number]: refused by patient  - Shingrix: Refused by patient     5.  Nonradiating chronic lower back pain: Advised physical therapy but he refused.  He says that he is going to continue doing exercises but admits that he does not do them frequently.  Encouraged him to continue doing physical therapy exercises.     6.  Chronic diarrhea: Reportedly has 2-4 loose bowel movements each morning, and this has  been stable for many years.  He reports having a colonoscopy in outside facility about 3 months ago that showed polyps, but he says that he never mentioned that he was having diarrhea to the gastroenterologist.  Check TTG with his labs today.  Advised that if this does not suggest a gluten sensitivity that he then talk with his primary care provider for further work-up, and if needed to consider seeing a gastroenterologist.    Mr. Miller verbalized agreement with and understanding of the rational for the diagnosis and treatment plan.  All questions were answered to best of my ability and the patient's satisfaction. Mr. Miller was advised to contact the clinic with any questions that may arise after the clinic visit.      Thank you for involving me in the care of the patient    Return to clinic: 6 months      HPI   Prosper Miller is a 59 year old male with a past medical history significant for hypertension, hyperlipidemia, obstructive sleep apnea, IgA nephropathy, rheumatoid arthritis, and iritis who presents for follow-up of rheumatoid arthritis and immunosuppressive management for iritis.     9/13/2017 rheumatology clinic note by Dr. Brandon Phillips documents seropositive rheumatoid arthritis doing well on Remicade infusions every 7 weeks.  Might be needing a knee replacement in the future.    [facility] records document previous use of leflunomide.     3/2018: Mr. Miller reported that he was doing well.  He reports that if he has any delay and Remicade, that he is receiving 10 mg/kg IV every 7 weeks, that he has a flare of his iritis.  He tells me that he has aches and pains in his joints because of his work as an .  Currently with morning stiffness for no more than 10-15 minutes.  No joint swelling.  If he has joint pain, typically worsens with activity and improves with rest.    9/24/2018: patient cancelled appointment  11/12/2018: no show     3/12/2019: Mr. Miller reported that he  was doing well.  He has joint pain at his bilateral first CMC joints that is worse with activity, and worse in the morning; no swelling or increased warmth.  Bilateral first CMC joint pain has been worse for the past 2-3 weeks.  Nonradiating chronic lower back pain that he would like to treat on his own with self taught physical activity; and he says that his wife has encouraged him to do stretching exercises.  No other joint pain.  Eyes have remained stable but he has not followed up with his ophthalmologist; I advised him to do so.  He would like to see if he can reduce the frequency of his Remicade dose.      Today, 9/16/2019: Doing well.  Status-post left TKA.  Right knee bothers him and is worse with activity; improves with rest; improves with a knee brace; he is following with orthopedic surgery for this issue.  Mild bilateral first CMC osteoarthritis; did not go to hand therapy; he says that his symptoms are tolerable so he does not want to do anything for the mild bilateral first CMC osteoarthritis at this time.  Tolerating Remicade and currently getting it every 8 weeks.  No flares of arthritis since last seen.  He says overall he is doing well.  On chronic issue that he brings up today is that he has diarrhea and says that it occurs each morning, 2-4 BMs each morning, and has been this way for over 10 years.  He says that he recently had a colonoscopy where polyps were removed but nothing else was found.  He says that he has not seen a gastroneurologist for this issue.    Denies fevers, chills, nausea, vomiting, constipation. No abdominal pain. No chest pain/pressure, palpitations, or shortness of breath. No LE swelling. No neck pain. No oral or nasal sores.  No rash.     Tobacco: Former smoker  EtOH: Weekly  Drugs: None  Occupation:     [employer]   GEN: No fevers, chills, night sweats, or weight change  SKIN: No itching, rashes, sores  HEENT: No epistaxis. No oral or nasal ulcers.  See HPI  CV:  No chest pain, pressure, palpitations, or dyspnea on exertion.  PULM: No SOB, wheeze, cough.  GI: No nausea, vomiting, constipation.  No blood in stool. No abdominal pain.  See HPI  : No blood in urine.  MSK: See HPI.  NEURO: No numbness or tingling  EXT: No LE swelling  PSYCH: Negative    Active Problem List     Patient Active Problem List   Diagnosis     Iritis     Rheumatoid arthritis of hand, unspecified laterality, unspecified rheumatoid factor presence (H)     Rheumatoid arthritis involving multiple sites, unspecified rheumatoid factor presence (H)     Past Medical History   History reviewed. No pertinent past medical history.  Past Surgical History   History reviewed. No pertinent surgical history.  Allergy   No Known Allergies  Current Medication List     Current Outpatient Medications   Medication Sig     Cholecalciferol (VITAMIN D3) 2000 UNITS CAPS      EQL NATURAL ZINC 50 MG TABS      fluticasone (FLONASE) 50 MCG/ACT spray USE ONE SPRAY IN EACH NOSTRIL TWICE DAILY     inFLIXimab (REMICADE) 100 MG injection      lisinopril (PRINIVIL,ZESTRIL) 30 MG tablet Take 45 mg by mouth     pantoprazole (PROTONIX) 40 MG EC tablet TAKE ONE TABLET BY MOUTH TWICE DAILY     oxyCODONE IR (ROXICODONE) 5 MG tablet Take 1 tablet (5 mg) by mouth every 6 hours as needed for pain (Patient not taking: Reported on 3/12/2019)     No current facility-administered medications for this visit.          Social History   See HPI    Family History   History reviewed. No pertinent family history.       Physical Exam     Temp Readings from Last 3 Encounters:   09/16/19 98.4  F (36.9  C) (Oral)   08/30/19 96.4  F (35.8  C) (Oral)   07/05/19 98.2  F (36.8  C) (Oral)     BP Readings from Last 5 Encounters:   09/16/19 132/80   08/30/19 (!) 141/77   07/05/19 123/64   05/10/19 123/71   03/15/19 129/64     Pulse Readings from Last 1 Encounters:   09/16/19 89     Resp Readings from Last 1 Encounters:   08/30/19 18     Estimated body mass index is  "38.51 kg/m  as calculated from the following:    Height as of this encounter: 1.676 m (5' 6\").    Weight as of this encounter: 108.2 kg (238 lb 9.6 oz).    GEN: NAD  HEENT: MMM. No oral lesions. Anicteric, noninjected sclera  CV: S1, S2. RRR. No m/r/g.  PULM: CTA bilaterally. No w/c.  MSK: MCPs, PIPs, DIPs, wrists, elbows, shoulders, knees, ankles, and MTPs without swelling or tenderness to palpation.  Hips nontender to palpation.  Heberden's and Bennie's nodes present.  Subtle squaring and tenderness to palpation of the bilateral first CMC joints that were without effusion or overlying erythema or increased warmth.     Wearing a soft brace on the right knee  NEURO: UE and LE strengths 5/5 and equal bilaterally.   SKIN: No rash.  No nail pitting.  EXT: No LE edema  PSYCH: Alert. Appropriate.    Labs / Imaging (select studies)     CBC  Recent Labs   Lab Test 03/12/19  0849 03/26/18  1540 09/13/17  1542   WBC 5.6 8.6 7.6   RBC 4.88 4.62 4.66   HGB 15.7 14.9 15.1   HCT 44.5 41.6 42.9   MCV 91 90 92   RDW 13.1 12.6 12.4    330 280   MCH 32.2 32.3 32.4   MCHC 35.3 35.8 35.2   NEUTROPHIL 37.6 55.9 47.8   LYMPH 47.1 34.5 39.4   MONOCYTE 8.9 7.8 8.3   EOSINOPHIL 5.5 1.5 3.7   BASOPHIL 0.9 0.3 0.8   ANEU 2.1 4.8 3.6   ALYM 2.6 3.0 3.0   MICHAEL 0.5 0.7 0.6   AEOS 0.3 0.1 0.3   ABAS 0.1 0.0 0.1     CMP  Recent Labs   Lab Test 03/12/19  0849 03/26/18  1540 09/13/17  1542   NA  --   --  142   POTASSIUM  --   --  4.2   CHLORIDE  --   --  107   CO2  --   --  28   ANIONGAP  --   --  7   GLC  --   --  95   BUN  --   --  18   CR 1.04 1.01 1.10   GFRESTIMATED 78 76 69   GFRESTBLACK >90 >90 83   ERNESTINA  --   --  9.5   BILITOTAL 0.3 0.4 0.4   ALBUMIN 3.8 3.5 3.8   PROTTOTAL 7.8 7.3 7.7   ALKPHOS 74 63 66   AST 23 30 25   ALT 42 47 45     Calcium/VitaminD  Recent Labs   Lab Test 09/13/17  1542   ERNESTINA 9.5     ESR/CRP  Recent Labs   Lab Test 03/12/19  0849 03/26/18  1540   SED 10 16   CRP <2.9 <2.9     Hepatitis B  Recent Labs   Lab " Test 03/26/18  1540   HBCAB Nonreactive   HEPBANG Nonreactive     Hepatitis C  Recent Labs   Lab Test 03/26/18  1540   HCVAB Nonreactive     Tuberculosis Screening  Recent Labs   Lab Test 03/26/18  1541   TBRSLT Negative   TBAGN 0.00       Immunization History     Immunization History   Administered Date(s) Administered     Mantoux Tuberculin Skin Test 02/01/2007          Chart documentation done in part with Dragon Voice recognition Software. Although reviewed after completion, some word and grammatical error may remain.    Alex Segundo MD

## 2019-09-16 NOTE — LETTER
[address] [place] [address]    September 26, 2019    Prosper Angela  [address] [place] [id number]          Dear Prosper,    Inflammatory marker ESR was elevated.  Other labs were normal.  The blood test for celiac disease was negative.     Please let me know if you have any questions    Enclosed is a copy of your results.     Results for orders placed or performed in visit on 09/16/19   CBC with platelets differential   Result Value Ref Range    WBC 5.7 4.0 - 11.0 10e9/L    RBC Count 4.50 4.4 - 5.9 10e12/L    Hemoglobin 14.5 13.3 - 17.7 g/dL    Hematocrit 42.3 40.0 - 53.0 %    MCV 94 78 - 100 fl    MCH 32.2 26.5 - 33.0 pg    MCHC 34.3 31.5 - 36.5 g/dL    RDW 12.7 10.0 - 15.0 %    Platelet Count 302 150 - 450 10e9/L    % Neutrophils 40.9 %    % Lymphocytes 43.2 %    % Monocytes 9.1 %    % Eosinophils 6.3 %    % Basophils 0.5 %    Absolute Neutrophil 2.3 1.6 - 8.3 10e9/L    Absolute Lymphocytes 2.5 0.8 - 5.3 10e9/L    Absolute Monocytes 0.5 0.0 - 1.3 10e9/L    Absolute Eosinophils 0.4 0.0 - 0.7 10e9/L    Absolute Basophils 0.0 0.0 - 0.2 10e9/L    Diff Method Automated Method    Creatinine   Result Value Ref Range    Creatinine 1.06 0.66 - 1.25 mg/dL    GFR Estimate 76 >60 mL/min/[1.73_m2]    GFR Estimate If Black 88 >60 mL/min/[1.73_m2]   Erythrocyte sedimentation rate auto   Result Value Ref Range    Sed Rate 30 (H) 0 - 20 mm/h   CRP inflammation   Result Value Ref Range    CRP Inflammation 3.9 0.0 - 8.0 mg/L   Hepatic panel   Result Value Ref Range    Bilirubin Direct 0.1 0.0 - 0.2 mg/dL    Bilirubin Total 0.3 0.2 - 1.3 mg/dL    Albumin 3.6 3.4 - 5.0 g/dL    Protein Total 7.7 6.8 - 8.8 g/dL    Alkaline Phosphatase 72 40 - 150 U/L    ALT 36 0 - 70 U/L    AST 24 0 - 45 U/L   Tissue transglutaminase celestina IgA and IgG   Result Value Ref Range    Tissue Transglutaminase Antibody IgA 1 <7 U/mL    Tissue Transglutaminase  Lazara IgG <1 <7 U/mL       If you have any questions or concerns, please call myself or my nurse at [phone].      Sincerely,        Alex Segundo MD/[place]

## 2019-09-16 NOTE — PATIENT INSTRUCTIONS
Rheumatology    Dr. Alex Segundo         [facility]   (Monday)  [address]  [place] [address]       [facility]   (Tuesday)  [address]  [place] [address]    [facility]   (Wed., Thurs., and Friday)  [address] [place] [address]    Phone number: [phone]  Thank you for choosing [facility].  Jaja Sanchez CMA

## 2019-09-17 LAB
TTG IGA SER-ACNC: 1 U/ML
TTG IGG SER-ACNC: <1 U/ML

## 2019-09-25 NOTE — RESULT ENCOUNTER NOTE
"Please mail Mr. Miller his results with the following message.    \"Mr. Miller,    Inflammatory marker ESR was elevated.  Other labs were normal.  The blood test for celiac disease was negative.    Please let me know if you have any questions.    Sincerely,  Alex Segundo MD\"    "

## 2019-10-25 ENCOUNTER — INFUSION THERAPY VISIT (OUTPATIENT)
Dept: INFUSION THERAPY | Facility: CLINIC | Age: 59
End: 2019-10-25
Attending: INTERNAL MEDICINE
Payer: COMMERCIAL

## 2019-10-25 VITALS — HEART RATE: 78 BPM | TEMPERATURE: 95.8 F | DIASTOLIC BLOOD PRESSURE: 80 MMHG | SYSTOLIC BLOOD PRESSURE: 137 MMHG

## 2019-10-25 DIAGNOSIS — H20.9 IRITIS: Primary | ICD-10-CM

## 2019-10-25 DIAGNOSIS — M06.9 RHEUMATOID ARTHRITIS OF HAND, UNSPECIFIED LATERALITY, UNSPECIFIED RHEUMATOID FACTOR PRESENCE: ICD-10-CM

## 2019-10-25 PROCEDURE — 96415 CHEMO IV INFUSION ADDL HR: CPT

## 2019-10-25 PROCEDURE — 25000128 H RX IP 250 OP 636: Performed by: INTERNAL MEDICINE

## 2019-10-25 PROCEDURE — 25800030 ZZH RX IP 258 OP 636: Performed by: INTERNAL MEDICINE

## 2019-10-25 PROCEDURE — 96413 CHEMO IV INFUSION 1 HR: CPT

## 2019-10-25 RX ADMIN — INFLIXIMAB 1100 MG: 100 INJECTION, POWDER, LYOPHILIZED, FOR SOLUTION INTRAVENOUS at 13:22

## 2019-10-25 NOTE — PROGRESS NOTES
Infusion Nursing Note:  Prosper Miller presents today for Remicade.    Patient seen by provider today: No   present during visit today: Not Applicable.    Note: N/A.    Intravenous Access:  Peripheral IV placed.    Treatment Conditions:  Biological Infusion Checklist:  ~~~ NOTE: If the patient answers yes to any of the questions below, hold the infusion and contact ordering provider or on-call provider.    1. Have you recently had an elevated temperature, fever, chills, productive cough, coughing for 3 weeks or longer or hemoptysis, abnormal vital signs, night sweats,  chest pain or have you noticed a decrease in your appetite, unexplained weight loss or fatigue? No  2. Do you have any open wounds or new incisions? No  3. Do you have any recent or upcoming hospitalizations, surgeries or dental procedures? No  4. Do you currently have or recently have had any signs of illness or infection or are you on any antibiotics? No  5. Have you had any new, sudden or worsening abdominal pain? No  6. Have you or anyone in your household received a live vaccination in the past 4 weeks? Please note:  No live vaccines while on biologic/chemotherapy until 6 months after the last treatment.  Patient can receive the flu vaccine (shot only) and the pneumovax.  It is optimal for the patient to get these vaccines mid cycle, but they can be given at any time as long as it is not on the day of the infusion. No  7. Have you recently been diagnosed with any new nervous system diseases (ie. Multiple sclerosis, Guillain Bailey, seizures, neurological changes) or cancer diagnosis? No  8. Are you on any form of radiation or chemotherapy? No  9. Are you pregnant or breast feeding or do you have plans of pregnancy in the future? No  10. Have you been having any signs of worsening depression or suicidal ideations?  (benlysta only) No  11. Have there been any other new onset medical symptoms? No        Post Infusion Assessment:  Patient  tolerated infusion without incident.  Blood return noted pre and post infusion.  Site patent and intact, free from redness, edema or discomfort.  No evidence of extravasations.  Access discontinued per protocol.  Biologic Infusion Post Education: Call the triage nurse at your clinic or seek medical attention if you have chills and/or temperature greater than or equal to 100.5, uncontrolled nausea/vomiting, diarrhea, constipation, dizziness, shortness of breath, chest pain, heart palpitations, weakness or any other new or concerning symptoms, questions or concerns.  You cannot have any live virus vaccines prior to or during treatment or up to 6 months post infusion.  If you have an upcoming surgery, medical procedure or dental procedure during treatment, this should be discussed with your ordering physician and your surgeon/dentist.  If you are having any concerning symptom, if you are unsure if you should get your next infusion or wish to speak to a provider before your next infusion, please call your care coordinator or triage nurse at your clinic to notify them so we can adequately serve you.       Discharge Plan:   Patient discharged in stable condition accompanied by: self.  Departure Mode: Ambulatory.    Mitesh Villegas RN

## 2019-12-14 ENCOUNTER — COMMUNICATION - HEALTHEAST (OUTPATIENT)
Dept: FAMILY MEDICINE | Facility: CLINIC | Age: 59
End: 2019-12-14

## 2019-12-14 DIAGNOSIS — I10 ESSENTIAL HYPERTENSION, BENIGN: ICD-10-CM

## 2019-12-20 ENCOUNTER — INFUSION THERAPY VISIT (OUTPATIENT)
Dept: INFUSION THERAPY | Facility: CLINIC | Age: 59
End: 2019-12-20
Attending: INTERNAL MEDICINE
Payer: COMMERCIAL

## 2019-12-20 VITALS — TEMPERATURE: 95.5 F | HEART RATE: 83 BPM | SYSTOLIC BLOOD PRESSURE: 143 MMHG | DIASTOLIC BLOOD PRESSURE: 85 MMHG

## 2019-12-20 DIAGNOSIS — H20.9 IRITIS: ICD-10-CM

## 2019-12-20 DIAGNOSIS — M06.9 RHEUMATOID ARTHRITIS OF HAND, UNSPECIFIED LATERALITY, UNSPECIFIED RHEUMATOID FACTOR PRESENCE: Primary | ICD-10-CM

## 2019-12-20 PROCEDURE — 25800030 ZZH RX IP 258 OP 636: Performed by: INTERNAL MEDICINE

## 2019-12-20 PROCEDURE — 25000128 H RX IP 250 OP 636: Performed by: INTERNAL MEDICINE

## 2019-12-20 PROCEDURE — 96413 CHEMO IV INFUSION 1 HR: CPT

## 2019-12-20 PROCEDURE — 96415 CHEMO IV INFUSION ADDL HR: CPT

## 2019-12-20 PROCEDURE — 99207 ZZC NO CHARGE NURSE ONLY: CPT

## 2019-12-20 RX ADMIN — INFLIXIMAB 1100 MG: 100 INJECTION, POWDER, LYOPHILIZED, FOR SOLUTION INTRAVENOUS at 12:53

## 2019-12-20 RX ADMIN — SODIUM CHLORIDE 250 ML: 9 INJECTION, SOLUTION INTRAVENOUS at 12:53

## 2019-12-20 NOTE — PROGRESS NOTES
Treatment Conditions:  Pt called and checklist completed.   Biological Infusion Checklist:  ~~~ NOTE: If the patient answers yes to any of the questions below, hold the infusion and contact ordering provider or on-call provider.    1. Have you recently had an elevated temperature, fever, chills, productive cough, coughing for 3 weeks or longer or hemoptysis, abnormal vital signs, night sweats,  chest pain or have you noticed a decrease in your appetite, unexplained weight loss or fatigue? No  2. Do you have any open wounds or new incisions? No  3. Do you have any recent or upcoming hospitalizations, surgeries or dental procedures? No  4. Do you currently have or recently have had any signs of illness or infection or are you on any antibiotics? No  5. Have you had any new, sudden or worsening abdominal pain? No  6. Have you or anyone in your household received a live vaccination in the past 4 weeks? Please note:  No live vaccines while on biologic/chemotherapy until 6 months after the last treatment.  Patient can receive the flu vaccine (shot only) and the pneumovax.  It is optimal for the patient to get these vaccines mid cycle, but they can be given at any time as long as it is not on the day of the infusion. No  7. Have you recently been diagnosed with any new nervous system diseases (ie. Multiple sclerosis, Guillain Kipling, seizures, neurological changes) or cancer diagnosis? No  8. Are you on any form of radiation or chemotherapy? No  9. Are you pregnant or breast feeding or do you have plans of pregnancy in the future? No  10. Have you been having any signs of worsening depression or suicidal ideations?  (benlysta only) No  11. Have there been any other new onset medical symptoms? No      Katie Kemp RN

## 2019-12-20 NOTE — PROGRESS NOTES
Infusion Nursing Note:  Prosper Miller presents today for Remicade.    Patient seen by provider today: No   present during visit today: Not Applicable.    Note: Pt completed biologic checklist with Bina HAYES prior to arrival.    Intravenous Access:  Peripheral IV placed.    Treatment Conditions:  See note from Bina HAYES for biologic checklist.      Post Infusion Assessment:   Patient tolerated infusion without incident.  Blood return noted pre and post infusion.  Site patent and intact, free from redness, edema or discomfort.  No evidence of extravasations.  Access discontinued per protocol.  Biologic Infusion Post Education: Call the triage nurse at your clinic or seek medical attention if you have chills and/or temperature greater than or equal to 100.5, uncontrolled nausea/vomiting, diarrhea, constipation, dizziness, shortness of breath, chest pain, heart palpitations, weakness or any other new or concerning symptoms, questions or concerns.  You cannot have any live virus vaccines prior to or during treatment or up to 6 months post infusion.  If you have an upcoming surgery, medical procedure or dental procedure during treatment, this should be discussed with your ordering physician and your surgeon/dentist.  If you are having any concerning symptom, if you are unsure if you should get your next infusion or wish to speak to a provider before your next infusion, please call your care coordinator or triage nurse at your clinic to notify them so we can adequately serve you.       Discharge Plan:   AVS to patient via [employer].  Patient will return 2/14/2020 for next appointment.   Patient discharged in stable condition accompanied by: self.  Departure Mode: Ambulatory.    Cecile Ruano RN

## 2020-02-14 ENCOUNTER — INFUSION THERAPY VISIT (OUTPATIENT)
Dept: INFUSION THERAPY | Facility: CLINIC | Age: 60
End: 2020-02-14
Attending: INTERNAL MEDICINE
Payer: COMMERCIAL

## 2020-02-14 VITALS — HEART RATE: 66 BPM | TEMPERATURE: 97 F | SYSTOLIC BLOOD PRESSURE: 124 MMHG | DIASTOLIC BLOOD PRESSURE: 82 MMHG

## 2020-02-14 DIAGNOSIS — H20.9 IRITIS: ICD-10-CM

## 2020-02-14 DIAGNOSIS — M06.9 RHEUMATOID ARTHRITIS OF HAND, UNSPECIFIED LATERALITY, UNSPECIFIED RHEUMATOID FACTOR PRESENCE: Primary | ICD-10-CM

## 2020-02-14 PROCEDURE — 96413 CHEMO IV INFUSION 1 HR: CPT

## 2020-02-14 PROCEDURE — 99207 ZZC NO CHARGE NURSE ONLY: CPT

## 2020-02-14 PROCEDURE — 25800030 ZZH RX IP 258 OP 636: Performed by: INTERNAL MEDICINE

## 2020-02-14 PROCEDURE — 96415 CHEMO IV INFUSION ADDL HR: CPT

## 2020-02-14 PROCEDURE — 25000128 H RX IP 250 OP 636: Performed by: INTERNAL MEDICINE

## 2020-02-14 RX ADMIN — SODIUM CHLORIDE 250 ML: 9 INJECTION, SOLUTION INTRAVENOUS at 12:00

## 2020-02-14 RX ADMIN — INFLIXIMAB 1100 MG: 100 INJECTION, POWDER, LYOPHILIZED, FOR SOLUTION INTRAVENOUS at 12:02

## 2020-02-14 NOTE — PROGRESS NOTES
Infusion Nursing Note:  Prosper Miller presents today for Remicade.    Patient seen by provider today: No   present during visit today: Not Applicable.    Note: Pt completed checklist over the phone with Zaida DEL REAL    Intravenous Access:  Peripheral IV placed.    Treatment Conditions:  Biological Infusion Checklist:  ~~~ NOTE: If the patient answers yes to any of the questions below, hold the infusion and contact ordering provider or on-call provider.    1. Have you recently had an elevated temperature, fever, chills, productive cough, coughing for 3 weeks or longer or hemoptysis, abnormal vital signs, night sweats,  chest pain or have you noticed a decrease in your appetite, unexplained weight loss or fatigue? No  2. Do you have any open wounds or new incisions? No  3. Do you have any recent or upcoming hospitalizations, surgeries or dental procedures? No  4. Do you currently have or recently have had any signs of illness or infection or are you on any antibiotics? No  5. Have you had any new, sudden or worsening abdominal pain? No  6. Have you or anyone in your household received a live vaccination in the past 4 weeks? Please note:  No live vaccines while on biologic/chemotherapy until 6 months after the last treatment.  Patient can receive the flu vaccine (shot only) and the pneumovax.  It is optimal for the patient to get these vaccines mid cycle, but they can be given at any time as long as it is not on the day of the infusion. No  7. Have you recently been diagnosed with any new nervous system diseases (ie. Multiple sclerosis, Guillain Montara, seizures, neurological changes) or cancer diagnosis? No  8. Are you on any form of radiation or chemotherapy? No  9. Are you pregnant or breast feeding or do you have plans of pregnancy in the future? No  10. Have you been having any signs of worsening depression or suicidal ideations?  (benlysta only) No  11. Have there been any other new onset medical symptoms?  No        Post Infusion Assessment:  Patient tolerated infusion without incident.  Blood return noted pre and post infusion.  Site patent and intact, free from redness, edema or discomfort.  No evidence of extravasations.  Access discontinued per protocol.  Biologic Infusion Post Education: Call the triage nurse at your clinic or seek medical attention if you have chills and/or temperature greater than or equal to 100.5, uncontrolled nausea/vomiting, diarrhea, constipation, dizziness, shortness of breath, chest pain, heart palpitations, weakness or any other new or concerning symptoms, questions or concerns.  You cannot have any live virus vaccines prior to or during treatment or up to 6 months post infusion.  If you have an upcoming surgery, medical procedure or dental procedure during treatment, this should be discussed with your ordering physician and your surgeon/dentist.  If you are having any concerning symptom, if you are unsure if you should get your next infusion or wish to speak to a provider before your next infusion, please call your care coordinator or triage nurse at your clinic to notify them so we can adequately serve you.       Discharge Plan:   AVS to patient via [employer].  Patient will return 4/10/2020 for next appointment.   Patient discharged in stable condition accompanied by: self.  Departure Mode: Ambulatory.    Cecile Ruano RN

## 2020-02-21 ENCOUNTER — COMMUNICATION - HEALTHEAST (OUTPATIENT)
Dept: FAMILY MEDICINE | Facility: CLINIC | Age: 60
End: 2020-02-21

## 2020-02-21 DIAGNOSIS — J31.0 CHRONIC RHINITIS: ICD-10-CM

## 2020-02-24 ENCOUNTER — OFFICE VISIT - HEALTHEAST (OUTPATIENT)
Dept: FAMILY MEDICINE | Facility: CLINIC | Age: 60
End: 2020-02-24

## 2020-02-24 DIAGNOSIS — E78.00 PURE HYPERCHOLESTEROLEMIA: ICD-10-CM

## 2020-02-24 DIAGNOSIS — D12.6 ADENOMATOUS POLYP OF COLON, UNSPECIFIED PART OF COLON: ICD-10-CM

## 2020-02-24 DIAGNOSIS — R19.7 DIARRHEA, UNSPECIFIED TYPE: ICD-10-CM

## 2020-02-24 DIAGNOSIS — M06.9 RHEUMATOID ARTHRITIS INVOLVING MULTIPLE SITES, UNSPECIFIED RHEUMATOID FACTOR PRESENCE: ICD-10-CM

## 2020-02-24 DIAGNOSIS — L98.9 SKIN LESION: ICD-10-CM

## 2020-02-24 DIAGNOSIS — I10 ESSENTIAL HYPERTENSION, BENIGN: ICD-10-CM

## 2020-02-24 LAB
ANION GAP SERPL CALCULATED.3IONS-SCNC: 11 MMOL/L (ref 5–18)
BUN SERPL-MCNC: 12 MG/DL (ref 8–22)
CALCIUM SERPL-MCNC: 9.2 MG/DL (ref 8.5–10.5)
CHLORIDE BLD-SCNC: 105 MMOL/L (ref 98–107)
CO2 SERPL-SCNC: 26 MMOL/L (ref 22–31)
CREAT SERPL-MCNC: 1.07 MG/DL (ref 0.7–1.3)
GFR SERPL CREATININE-BSD FRML MDRD: >60 ML/MIN/1.73M2
GLUCOSE BLD-MCNC: 92 MG/DL (ref 70–125)
POTASSIUM BLD-SCNC: 3.8 MMOL/L (ref 3.5–5)
SODIUM SERPL-SCNC: 142 MMOL/L (ref 136–145)
TSH SERPL DL<=0.005 MIU/L-ACNC: 1.67 UIU/ML (ref 0.3–5)

## 2020-02-29 ENCOUNTER — COMMUNICATION - HEALTHEAST (OUTPATIENT)
Dept: FAMILY MEDICINE | Facility: CLINIC | Age: 60
End: 2020-02-29

## 2020-03-09 ENCOUNTER — OFFICE VISIT (OUTPATIENT)
Dept: RHEUMATOLOGY | Facility: CLINIC | Age: 60
End: 2020-03-09
Payer: COMMERCIAL

## 2020-03-09 VITALS
DIASTOLIC BLOOD PRESSURE: 85 MMHG | SYSTOLIC BLOOD PRESSURE: 145 MMHG | HEART RATE: 90 BPM | WEIGHT: 235 LBS | OXYGEN SATURATION: 95 % | BODY MASS INDEX: 36.88 KG/M2 | HEIGHT: 67 IN

## 2020-03-09 DIAGNOSIS — M06.9 RHEUMATOID ARTHRITIS INVOLVING MULTIPLE SITES, UNSPECIFIED RHEUMATOID FACTOR PRESENCE: Primary | ICD-10-CM

## 2020-03-09 LAB
ALBUMIN SERPL-MCNC: 3.5 G/DL (ref 3.4–5)
ALP SERPL-CCNC: 69 U/L (ref 40–150)
ALT SERPL W P-5'-P-CCNC: 37 U/L (ref 0–70)
AST SERPL W P-5'-P-CCNC: 21 U/L (ref 0–45)
BASOPHILS # BLD AUTO: 0 10E9/L (ref 0–0.2)
BASOPHILS NFR BLD AUTO: 0.5 %
BILIRUB DIRECT SERPL-MCNC: 0.1 MG/DL (ref 0–0.2)
BILIRUB SERPL-MCNC: 0.3 MG/DL (ref 0.2–1.3)
CREAT SERPL-MCNC: 1.05 MG/DL (ref 0.66–1.25)
CRP SERPL-MCNC: <2.9 MG/L (ref 0–8)
DIFFERENTIAL METHOD BLD: NORMAL
EOSINOPHIL # BLD AUTO: 0.4 10E9/L (ref 0–0.7)
EOSINOPHIL NFR BLD AUTO: 5.6 %
ERYTHROCYTE [DISTWIDTH] IN BLOOD BY AUTOMATED COUNT: 14 % (ref 10–15)
ERYTHROCYTE [SEDIMENTATION RATE] IN BLOOD BY WESTERGREN METHOD: 15 MM/H (ref 0–20)
GFR SERPL CREATININE-BSD FRML MDRD: 77 ML/MIN/{1.73_M2}
HCT VFR BLD AUTO: 44.5 % (ref 40–53)
HGB BLD-MCNC: 15.2 G/DL (ref 13.3–17.7)
LYMPHOCYTES # BLD AUTO: 2.9 10E9/L (ref 0.8–5.3)
LYMPHOCYTES NFR BLD AUTO: 43.4 %
MCH RBC QN AUTO: 31.9 PG (ref 26.5–33)
MCHC RBC AUTO-ENTMCNC: 34.2 G/DL (ref 31.5–36.5)
MCV RBC AUTO: 94 FL (ref 78–100)
MONOCYTES # BLD AUTO: 0.4 10E9/L (ref 0–1.3)
MONOCYTES NFR BLD AUTO: 6.7 %
NEUTROPHILS # BLD AUTO: 2.9 10E9/L (ref 1.6–8.3)
NEUTROPHILS NFR BLD AUTO: 43.8 %
PLATELET # BLD AUTO: 275 10E9/L (ref 150–450)
PROT SERPL-MCNC: 7.7 G/DL (ref 6.8–8.8)
RBC # BLD AUTO: 4.76 10E12/L (ref 4.4–5.9)
WBC # BLD AUTO: 6.6 10E9/L (ref 4–11)

## 2020-03-09 PROCEDURE — 82565 ASSAY OF CREATININE: CPT | Performed by: INTERNAL MEDICINE

## 2020-03-09 PROCEDURE — 80076 HEPATIC FUNCTION PANEL: CPT | Performed by: INTERNAL MEDICINE

## 2020-03-09 PROCEDURE — 36415 COLL VENOUS BLD VENIPUNCTURE: CPT | Performed by: INTERNAL MEDICINE

## 2020-03-09 PROCEDURE — 99213 OFFICE O/P EST LOW 20 MIN: CPT | Performed by: INTERNAL MEDICINE

## 2020-03-09 PROCEDURE — 85652 RBC SED RATE AUTOMATED: CPT | Performed by: INTERNAL MEDICINE

## 2020-03-09 PROCEDURE — 85025 COMPLETE CBC W/AUTO DIFF WBC: CPT | Performed by: INTERNAL MEDICINE

## 2020-03-09 PROCEDURE — 86140 C-REACTIVE PROTEIN: CPT | Performed by: INTERNAL MEDICINE

## 2020-03-09 ASSESSMENT — MIFFLIN-ST. JEOR: SCORE: 1839.58

## 2020-03-09 NOTE — PROGRESS NOTES
Rheumatology Clinic Visit      Prosper Miller MRN# [id number]   YOB: 1960 Age: 59 year old      Date of visit: 3/09/20   PCP: Momo Gooden at [facility]  Ophthalmology: Dr. Michael Jenkins at the [facility]    Chief Complaint   Patient presents with:  RECHECK: follow up    Assessment and Plan     1.  Rheumatoid arthritis: Reportedly dx'd in 2006. Previously followed by Dr. Phillips. Established care with me on 3/26/2018.  No active synovitis on exam when first evaluated by me. Records documents seropositive rheumatoid arthritis but I cannot find records for CCP or rheumatoid factor.  Previously on leflunomide and cyclosporine.  He is doing well with Remicade 10 mg/kg every 7-8 weeks, currently on a frequency of every 8 weeks.  He reports that he is doing great.  No synovitis on exam.  Continue Remicade     - Continue Remicade 10 mg/kg IV every 7-8 weeks  - Labs today: CBC, Creatinine, Hepatic Panel, ESR, CRP    2.  Iritis: Well controlled with Remicade as noted in #1.  No flares since last seen.    3.  Osteoarthritis of the bilateral first CMC joints: Mild symptoms today.  Re-discussed the diagnosis and treatment options.   He was previously referred to hand therapy but did not want to go.  He says that he will monitor for now as the symptoms are tolerable.    4.  Vaccinations: Vaccinations reviewed with Mr. Miller.  Risks and benefits of vaccinations were discussed.    - Influenza: refused by patient  - [id number]: refused by patient  - [id number]: refused by patient  - Shingrix: Refused by patient     5.  Nonradiating chronic lower back pain: Improved with at-home exercises; he has refused physical therapy in the past.  Not an issue today.    Mr. Miller verbalized agreement with and understanding of the rational for the diagnosis and treatment plan.  All questions were answered to best of my ability and the patient's satisfaction. Mr. Miller was advised to contact  the clinic with any questions that may arise after the clinic visit.      Thank you for involving me in the care of the patient    Return to clinic: 6 months      HPI   Prosper Miller is a 59 year old male with a past medical history significant for hypertension, hyperlipidemia, obstructive sleep apnea, IgA nephropathy, rheumatoid arthritis, and iritis who presents for follow-up of rheumatoid arthritis and immunosuppressive management for iritis.     9/13/2017 rheumatology clinic note by Dr. Brandon Phillips documents seropositive rheumatoid arthritis doing well on Remicade infusions every 7 weeks.  Might be needing a knee replacement in the future.    [facility] records document previous use of leflunomide.     3/2018: Mr. Miller reported that he was doing well.  He reports that if he has any delay and Remicade, that he is receiving 10 mg/kg IV every 7 weeks, that he has a flare of his iritis.  He tells me that he has aches and pains in his joints because of his work as an .  Currently with morning stiffness for no more than 10-15 minutes.  No joint swelling.  If he has joint pain, typically worsens with activity and improves with rest.    9/24/2018: patient cancelled appointment  11/12/2018: no show     3/12/2019: Mr. Miller reported that he was doing well.  He has joint pain at his bilateral first CMC joints that is worse with activity, and worse in the morning; no swelling or increased warmth.  Bilateral first CMC joint pain has been worse for the past 2-3 weeks.  Nonradiating chronic lower back pain that he would like to treat on his own with self taught physical activity; and he says that his wife has encouraged him to do stretching exercises.  No other joint pain.  Eyes have remained stable but he has not followed up with his ophthalmologist; I advised him to do so.  He would like to see if he can reduce the frequency of his Remicade dose.      9/16/2019: Doing well.  Status-post  left TKA.  Right knee bothers him and is worse with activity; improves with rest; improves with a knee brace; he is following with orthopedic surgery for this issue.  Mild bilateral first CMC osteoarthritis; did not go to hand therapy; he says that his symptoms are tolerable so he does not want to do anything for the mild bilateral first CMC osteoarthritis at this time.  Tolerating Remicade and currently getting it every 8 weeks.  No flares of arthritis since last seen.  He says overall he is doing well.  On chronic issue that he brings up today is that he has diarrhea and says that it occurs each morning, 2-4 BMs each morning, and has been this way for over 10 years.  He says that he recently had a colonoscopy where polyps were removed but nothing else was found.  He says that he has not seen a gastroneurologist for this issue.    Today, 3/9/2020: Doing well.  Occasional joint ache that is minimal and of short duration.  Morning stiffness for less than 20 minutes.  No gelling phenomenon.  Overall feels well.  Tolerates Remicade infusions well.  No recurrence of iritis.  Happy with how well he is doing.    Denies fevers, chills, nausea, vomiting, constipation. No abdominal pain. No chest pain/pressure, palpitations, or shortness of breath. No LE swelling. No neck pain. No oral or nasal sores.  No rash.     Tobacco: Former smoker  EtOH: Weekly  Drugs: None  Occupation:     [employer]   GEN: No fevers, chills, night sweats, or weight change  SKIN: No itching, rashes, sores  HEENT: No epistaxis. No oral or nasal ulcers.  See HPI  CV: No chest pain, pressure, palpitations, or dyspnea on exertion.  PULM: No SOB, wheeze, cough.  GI: No nausea, vomiting, constipation.  No blood in stool. No abdominal pain.  See HPI  : No blood in urine.  MSK: See HPI.  NEURO: No numbness or tingling  EXT: No LE swelling  PSYCH: Negative    Active Problem List     Patient Active Problem List   Diagnosis     Iritis     Rheumatoid  "arthritis of hand, unspecified laterality, unspecified rheumatoid factor presence (H)     Rheumatoid arthritis involving multiple sites, unspecified rheumatoid factor presence (H)     Past Medical History   History reviewed. No pertinent past medical history.  Past Surgical History   History reviewed. No pertinent surgical history.  Allergy   No Known Allergies  Current Medication List     Current Outpatient Medications   Medication Sig     Cholecalciferol (VITAMIN D3) 2000 UNITS CAPS      EQL NATURAL ZINC 50 MG TABS      fluticasone (FLONASE) 50 MCG/ACT spray USE ONE SPRAY IN EACH NOSTRIL TWICE DAILY     inFLIXimab (REMICADE) 100 MG injection      lisinopril (PRINIVIL,ZESTRIL) 30 MG tablet Take 45 mg by mouth     oxyCODONE IR (ROXICODONE) 5 MG tablet Take 1 tablet (5 mg) by mouth every 6 hours as needed for pain (Patient not taking: Reported on 3/12/2019)     pantoprazole (PROTONIX) 40 MG EC tablet TAKE ONE TABLET BY MOUTH TWICE DAILY     No current facility-administered medications for this visit.          Social History   See HPI    Family History   History reviewed. No pertinent family history.       Physical Exam     Temp Readings from Last 3 Encounters:   02/14/20 97  F (36.1  C) (Oral)   12/20/19 95.5  F (35.3  C) (Oral)   10/25/19 95.8  F (35.4  C) (Oral)     BP Readings from Last 5 Encounters:   03/09/20 (!) 145/85   02/14/20 124/82   12/20/19 (!) 143/85   10/25/19 137/80   09/16/19 132/80     Pulse Readings from Last 1 Encounters:   03/09/20 90     Resp Readings from Last 1 Encounters:   08/30/19 18     Estimated body mass index is 36.81 kg/m  as calculated from the following:    Height as of this encounter: 1.702 m (5' 7\").    Weight as of this encounter: 106.6 kg (235 lb).    GEN: NAD  HEENT: MMM. No oral lesions. Anicteric, noninjected sclera  CV: S1, S2. RRR. No m/r/g.  PULM: CTA bilaterally. No w/c.  MSK: MCPs, PIPs, DIPs, wrists, elbows, shoulders, knees, ankles, and MTPs without swelling or " tenderness to palpation.  Hips nontender to palpation.  Heberden's and Bennie's nodes present.  Subtle squaring without tenderness to palpation of the bilateral first CMC joints  NEURO: UE and LE strengths 5/5 and equal bilaterally.   SKIN: No rash.  No nail pitting.  EXT: No LE edema  PSYCH: Alert. Appropriate.    Labs / Imaging (select studies)     CBC  Recent Labs   Lab Test 09/16/19  0804 03/12/19  0849 03/26/18  1540   WBC 5.7 5.6 8.6   RBC 4.50 4.88 4.62   HGB 14.5 15.7 14.9   HCT 42.3 44.5 41.6   MCV 94 91 90   RDW 12.7 13.1 12.6    343 330   MCH 32.2 32.2 32.3   MCHC 34.3 35.3 35.8   NEUTROPHIL 40.9 37.6 55.9   LYMPH 43.2 47.1 34.5   MONOCYTE 9.1 8.9 7.8   EOSINOPHIL 6.3 5.5 1.5   BASOPHIL 0.5 0.9 0.3   ANEU 2.3 2.1 4.8   ALYM 2.5 2.6 3.0   MICHAEL 0.5 0.5 0.7   AEOS 0.4 0.3 0.1   ABAS 0.0 0.1 0.0     CMP  Recent Labs   Lab Test 09/16/19  0804 03/12/19  0849 03/26/18  1540 09/13/17  1542   NA  --   --   --  142   POTASSIUM  --   --   --  4.2   CHLORIDE  --   --   --  107   CO2  --   --   --  28   ANIONGAP  --   --   --  7   GLC  --   --   --  95   BUN  --   --   --  18   CR 1.06 1.04 1.01 1.10   GFRESTIMATED 76 78 76 69   GFRESTBLACK 88 >90 >90 83   ERNESTINA  --   --   --  9.5   BILITOTAL 0.3 0.3 0.4 0.4   ALBUMIN 3.6 3.8 3.5 3.8   PROTTOTAL 7.7 7.8 7.3 7.7   ALKPHOS 72 74 63 66   AST 24 23 30 25   ALT 36 42 47 45     Calcium/VitaminD  Recent Labs   Lab Test 09/13/17  1542   ERNESTINA 9.5     ESR/CRP  Recent Labs   Lab Test 09/16/19  0804 03/12/19  0849 03/26/18  1540   SED 30* 10 16   CRP 3.9 <2.9 <2.9     Hepatitis B  Recent Labs   Lab Test 03/26/18  1540   HBCAB Nonreactive   HEPBANG Nonreactive     Hepatitis C  Recent Labs   Lab Test 03/26/18  1540   HCVAB Nonreactive     Tuberculosis Screening  Recent Labs   Lab Test 03/26/18  1541   TBRSLT Negative   TBAGN 0.00     Immunization History     Immunization History   Administered Date(s) Administered     Mantoux Tuberculin Skin Test 02/01/2007          Chart  documentation done in part with Dragon Voice recognition Software. Although reviewed after completion, some word and grammatical error may remain.    Alex Segundo MD

## 2020-03-09 NOTE — RESULT ENCOUNTER NOTE
"Please mail Mr. Miller his results with the following message.    \"Mr. Miller,    Your labs are normal.    Please let me know if you have any questions.    Sincerely,  Alex Segundo MD  3/9/2020 5:26 PM\"  "

## 2020-03-11 ENCOUNTER — COMMUNICATION - HEALTHEAST (OUTPATIENT)
Dept: ADMINISTRATIVE | Facility: CLINIC | Age: 60
End: 2020-03-11

## 2020-04-02 ENCOUNTER — TELEPHONE (OUTPATIENT)
Dept: RHEUMATOLOGY | Facility: CLINIC | Age: 60
End: 2020-04-02

## 2020-04-02 NOTE — TELEPHONE ENCOUNTER
Left detailed message advising patient that Dr. Segundo would like him to proceed with infusion next week. Discussed rheum protocol to hold infusion with any signs or symptoms of infection and to resume once infection resolves. Provided call back number for additional questions.    Skip Lemos RN....4/2/2020 8:49 AM

## 2020-04-04 ENCOUNTER — COMMUNICATION - HEALTHEAST (OUTPATIENT)
Dept: FAMILY MEDICINE | Facility: CLINIC | Age: 60
End: 2020-04-04

## 2020-04-04 DIAGNOSIS — I10 ESSENTIAL HYPERTENSION, BENIGN: ICD-10-CM

## 2020-04-08 ENCOUNTER — TELEPHONE (OUTPATIENT)
Dept: RHEUMATOLOGY | Facility: CLINIC | Age: 60
End: 2020-04-08

## 2020-04-08 NOTE — TELEPHONE ENCOUNTER
Skip / RN: please call to inform Mr. Miller that his insurance is requiring inflectra instead of remicade; inflectra is the biosimilar for remicade; this change is reasonable and mandated by his insurance.    Paolo Ley: okay to use home infusion; infusion orders updated.  Note that there is no order set for inflectra so I was advised to just put it in the comments under the infusion medication and it is there now.      Thanks!  Alex Segundo MD  4/8/2020 1:47 PM          ----- Message from Paolo Ley sent at 4/7/2020 11:53 AM CDT -----  Regarding: Biosimilar request  Hi Dr. Segundo,    In addition to our request to moving Sam to home infusion his insurance ([employer]) is actually requiring he change to Inflectra. It's a new policy that took effect on April 1st. If you are okay with this can you please update his orders to reflect the change? If not I would need a letter of medical necessity stating why he cannot make the switch to Inflectra.    I'm still trying to get a hold of Sam to discuss home infusion with him as well. We've been playing a bit of phone tag.    Please let me know if you have any questions and thank you for your help.    Have a great day!  ~Paolo Ley  Infusion   [facility]  [place]  [address] [place] [address]  [email or web address]  p: [phone]  f: [phone]

## 2020-04-08 NOTE — TELEPHONE ENCOUNTER
Called patient and informed him of Dr. Segundo's message below.  Patient verbalized understanding of and agreement with plan and had no questions.     Skip Lemos RN....4/8/2020 1:56 PM

## 2020-04-09 ENCOUNTER — HOME INFUSION (PRE-WILLOW HOME INFUSION) (OUTPATIENT)
Dept: PHARMACY | Facility: CLINIC | Age: 60
End: 2020-04-09

## 2020-04-10 ENCOUNTER — HOME INFUSION (PRE-WILLOW HOME INFUSION) (OUTPATIENT)
Dept: PHARMACY | Facility: CLINIC | Age: 60
End: 2020-04-10

## 2020-04-13 ENCOUNTER — TELEPHONE (OUTPATIENT)
Dept: RHEUMATOLOGY | Facility: CLINIC | Age: 60
End: 2020-04-13

## 2020-04-13 ENCOUNTER — HOME INFUSION (PRE-WILLOW HOME INFUSION) (OUTPATIENT)
Dept: PHARMACY | Facility: CLINIC | Age: 60
End: 2020-04-13

## 2020-04-13 NOTE — TELEPHONE ENCOUNTER
Called [facility] and left message to return call to clinic.    Skip Lemos RN....4/13/2020 1:24 PM

## 2020-04-13 NOTE — TELEPHONE ENCOUNTER
Reason for call:  Other   Patient called regarding (reason for call): call back  Additional comments: Peggy from [facility] is calling because she wants to know if it is ok for the patient to have 250 ml per hour for 2 hrs. of the inFLIXimab (REMICADE) 100 MG injection. Please call back     Phone number to reach patient:  Other phone number:  [phone]    Best Time:  any    Can we leave a detailed message on this number?  YES    Travel screening: Not Applicable

## 2020-04-13 NOTE — TELEPHONE ENCOUNTER
Spoke with the pharmacist Peggy at  home infusion who stated patient came in for [employer] infusion today and reported he used to get 250 ml per hour for 2 hours. They use the standard 2 hour up-titration. He received the standard infusion dosing today however would like to know if he can can switch to 250 ml/hr for his next visit. Forwarded to Dr. Segundo to advise on.    Skip Lemos RN....4/13/2020 1:34 PM

## 2020-04-13 NOTE — PROGRESS NOTES
This is a recent snapshot of the patient's [facility] medical record.  For current drug dose and complete information and questions, call [phone] or In Basket pool, [facility] ([id number])  CSN Number:  [id number]

## 2020-04-14 ENCOUNTER — HOME INFUSION (PRE-WILLOW HOME INFUSION) (OUTPATIENT)
Dept: PHARMACY | Facility: CLINIC | Age: 60
End: 2020-04-14

## 2020-04-14 NOTE — PROGRESS NOTES
[facility] / Site of Care Referral     Patient Name: Prosper Miller  : 1960  Medication: Inflectra  Start of care:4/10/2020   Infusion location: Patient's home  Skilled Nursing: [facility] RN    Americo Pina  [facility]  [phone] Office  [phone] Fax  [phone] (Nurse Triage M-F 8-5)

## 2020-04-14 NOTE — PROGRESS NOTES
This is a recent snapshot of the patient's [facility] medical record.  For current drug dose and complete information and questions, call [phone] or In Basket pool, [facility] ([id number])  CSN Number:  [id number]

## 2020-04-15 NOTE — TELEPHONE ENCOUNTER
Skip: please inform the pharmacist that it is okay to infuse Inflectra at 250mL/hr as he was doing with remicade previously.    Alex Segundo MD  4/15/2020 5:53 PM

## 2020-04-16 ENCOUNTER — COMMUNICATION - HEALTHEAST (OUTPATIENT)
Dept: SCHEDULING | Facility: CLINIC | Age: 60
End: 2020-04-16

## 2020-04-16 DIAGNOSIS — K21.9 ESOPHAGEAL REFLUX: ICD-10-CM

## 2020-04-16 NOTE — TELEPHONE ENCOUNTER
Spoke with Peggy at [facility] and provided a verbal for 250 ml/hr of Inflectra per Dr. Segundo's message below.    Skip Lemos RN....4/16/2020 8:46 AM

## 2020-06-04 ENCOUNTER — HOME INFUSION (PRE-WILLOW HOME INFUSION) (OUTPATIENT)
Dept: PHARMACY | Facility: CLINIC | Age: 60
End: 2020-06-04

## 2020-06-05 ENCOUNTER — HOME INFUSION (PRE-WILLOW HOME INFUSION) (OUTPATIENT)
Dept: PHARMACY | Facility: CLINIC | Age: 60
End: 2020-06-05

## 2020-06-05 NOTE — PROGRESS NOTES
This is a recent snapshot of the patient's [facility] medical record.  For current drug dose and complete information and questions, call [phone] or In Basket pool, [facility] ([id number])  CSN Number:  [id number]

## 2020-06-08 NOTE — PROGRESS NOTES
This is a recent snapshot of the patient's [facility] medical record.  For current drug dose and complete information and questions, call [phone] or In Basket pool, [facility] ([id number])  CSN Number:  [id number]

## 2020-07-29 ENCOUNTER — HOME INFUSION (PRE-WILLOW HOME INFUSION) (OUTPATIENT)
Dept: PHARMACY | Facility: CLINIC | Age: 60
End: 2020-07-29

## 2020-07-30 NOTE — PROGRESS NOTES
This is a recent snapshot of the patient's [facility] medical record.  For current drug dose and complete information and questions, call [phone] or In Basket pool, [facility] ([id number])  CSN Number:  [id number]

## 2020-07-31 ENCOUNTER — HOME INFUSION (PRE-WILLOW HOME INFUSION) (OUTPATIENT)
Dept: PHARMACY | Facility: CLINIC | Age: 60
End: 2020-07-31

## 2020-08-01 NOTE — PROGRESS NOTES
Skilled Nurse visit in the  patient home to administer Inflectra 1100mg IV.  No recent elevated temperature, fever, chills, productive cough, coughing for 3 weeks or longer or hemoptysis, abnormal vital signs, night sweats, chest pain. No  decrease in your appetite, unexplained weight loss or fatigue.  No other new onset medical symptoms.  Current weight 235.  PIV placed left hand, 1 attempt.  No pre-medications ordered. Infusion completed without complication or reaction. Pt reports therapy is helping in managing symptoms related to therapy.

## 2020-08-28 ENCOUNTER — TELEPHONE (OUTPATIENT)
Dept: RHEUMATOLOGY | Facility: CLINIC | Age: 60
End: 2020-08-28

## 2020-08-28 NOTE — TELEPHONE ENCOUNTER
Reason for call:  Other   Patient called regarding (reason for call): call back  Additional comments: Patient would like a call back regarding when will be the time for a Flu Shot     Phone number to reach patient:  Cell number on file:    Telephone Information:   Mobile [phone]       Best Time:  Anytime      Can we leave a detailed message on this number?  YES    Travel screening: Not Applicable

## 2020-08-28 NOTE — TELEPHONE ENCOUNTER
RN: flu shot can be received at any point in relation to inflectra infusion. However, iIdeal to separate by at least a week so that if there is any reaction then it may be more clear as to the potential cause.    Alex Segundo MD  8/28/2020 12:36 PM

## 2020-08-28 NOTE — TELEPHONE ENCOUNTER
RN: Please call to notify Prosper Miller that I recommend getting the influenza vaccination by mid-October.        Alex Segundo MD  8/28/2020 10:31 AM

## 2020-08-28 NOTE — TELEPHONE ENCOUNTER
Called patient and left a detailed message relaying Dr. Segundo's message below.    Skip Lemos RN....8/28/2020 1:08 PM

## 2020-08-28 NOTE — TELEPHONE ENCOUNTER
Called patient and relayed the message below. Patient is wondering if he can get the flu shot 1 week prior to his Inflectra infusion. Forwarded to Dr. Segundo to advise if there is any contraindication.    (brie okay)    Skip Lemos RN....8/28/2020 11:33 AM

## 2020-09-14 ENCOUNTER — VIRTUAL VISIT (OUTPATIENT)
Dept: RHEUMATOLOGY | Facility: CLINIC | Age: 60
End: 2020-09-14
Payer: COMMERCIAL

## 2020-09-14 DIAGNOSIS — Z79.899 HIGH RISK MEDICATIONS (NOT ANTICOAGULANTS) LONG-TERM USE: ICD-10-CM

## 2020-09-14 DIAGNOSIS — M06.9 RHEUMATOID ARTHRITIS INVOLVING MULTIPLE SITES, UNSPECIFIED RHEUMATOID FACTOR PRESENCE: Primary | ICD-10-CM

## 2020-09-14 DIAGNOSIS — H20.9 IRITIS: ICD-10-CM

## 2020-09-14 PROCEDURE — 99213 OFFICE O/P EST LOW 20 MIN: CPT | Mod: TEL | Performed by: INTERNAL MEDICINE

## 2020-09-14 NOTE — PROGRESS NOTES
"Prosper Miller is a 60 year old male who is being evaluated via a billable telephone visit.      The patient has been notified of following:     \"This telephone visit will be conducted via a call between you and your physician/provider. We have found that certain health care needs can be provided without the need for a physical exam.  This service lets us provide the care you need with a short phone conversation.  If a prescription is necessary we can send it directly to your pharmacy.  If lab work is needed we can place an order for that and you can then stop by our lab to have the test done at a later time.    Telephone visits are billed at different rates depending on your insurance coverage. During this emergency period, for some insurers they may be billed the same as an in-person visit.  Please reach out to your insurance provider with any questions.    If during the course of the call the physician/provider feels a telephone visit is not appropriate, you will not be charged for this service.\"    Patient has given verbal consent for Telephone visit?  Yes    What phone number would you like to be contacted at? [phone]    How would you like to obtain your AVS? Mail a copy    Rheumatology Telephone/Telehealth Visit      Prosper Miller MRN# [id number]   YOB: 1960 Age: 60 year old      Date of visit: 9/14/20   PCP: Momo Gooden at [facility]  Ophthalmology: Dr. Michael Jenkins at the [facility]    Chief Complaint   Patient presents with:  Arthritis: Doing fine    Assessment and Plan     1.  Rheumatoid arthritis: Reportedly dx'd in 2006. Previously followed by Dr. Phillips. Established care with me on 3/26/2018.  No active synovitis on exam when first evaluated by me. Records documents seropositive rheumatoid arthritis but I cannot find records for CCP or rheumatoid factor.  Previously on leflunomide and cyclosporine.  He is doing well with Infliximab 10 mg/kg " every 7-8 weeks, currently on a frequency of every 8 weeks but he would like to try every 9 weeks; will change the order to reflect this but advised continuing every 8 weeks for now to reduce risk for flare during the COVID19 pandemic.  Note that he changed from Remicade to Inflectra with no change in symptoms.  - Continue Infliximab 10 mg/kg IV every 7-8 weeks  - Labs every 14-16 weeks with infusions: CBC, Creatinine, Hepatic Panel, ESR, CRP    2.  Iritis: Well controlled with Remicade as noted in #1.  No flares since last seen.    3.  Osteoarthritis of the bilateral first CMC joints: Mild symptoms today.  Re-discussed the diagnosis and treatment options.   He was previously referred to hand therapy but did not want to go.  He says that he will monitor for now as the symptoms are tolerable.    4.  Vaccinations: Vaccinations reviewed with Mr. Miller.  Risks and benefits of vaccinations were discussed.    - Influenza: refused by patient  - [id number]: refused by patient  - Blvgnreqv92: refused by patient  - Shingrix: Refused by patient     5.  Nonradiating chronic lower back pain, history: Improved with at-home exercises; he has refused physical therapy in the past.  Not an issue today.    # Relevant labs and imaging were reviewed with the patient    # High risk medication toxicity monitoring: discussion and labs reviewed; appropriate labs ordered. See above.  Instructed that if confirmed to have COVID-19 or exposure to someone with confirmed COVID-19 to call this clinic for directions on DMARD management.    # Note that this is a virtual visit to reduce the risk of COVID-19 exposure during this current pandemic.      # Considered to be at high risk of complications from the COVID-19 virus.  It is recommended to limit contact with other people and if possible to work remotely or provide a leave of absence to reduce the risk for COVID-19.      Mr. Miller verbalized agreement with and understanding of the rational for  the diagnosis and treatment plan.  All questions were answered to best of my ability and the patient's satisfaction. Mr. Miller was advised to contact the clinic with any questions that may arise after the clinic visit.      Thank you for involving me in the care of the patient    Return to clinic: 6 months      HPI   Prosper Miller is a 60 year old male with a past medical history significant for hypertension, hyperlipidemia, obstructive sleep apnea, IgA nephropathy, rheumatoid arthritis, and iritis who presents for follow-up of rheumatoid arthritis and immunosuppressive management for iritis.     Today, 9/14/2020: Doing well.  Rheumatoid iritis is well controlled and he notices the same benefit from Inflectra as he did from Remicade.  Would like to try infusions every 9 weeks instead of every 8 weeks.  Home infusions going well.  No joint swelling or pain. Morning stiffness < 10 min.  Happy with how well he is doing.  No recurrence of iritis.     Denies fevers, chills, nausea, vomiting, constipation. No abdominal pain. No chest pain/pressure, palpitations, or shortness of breath. No LE swelling. No neck pain. No oral or nasal sores.  No rash.     Tobacco: Former smoker  EtOH: Weekly  Drugs: None  Occupation:     ROS   GEN: No fevers, chills, night sweats, or weight change  SKIN: No itching, rashes, sores  HEENT:  No oral or nasal ulcers.  See HPI  CV: No chest pain, pressure, palpitations, or dyspnea on exertion.  PULM: No SOB, wheeze, cough.  GI: No nausea, vomiting, constipation. No abdominal pain.  See HPI  MSK: See HPI.  NEURO: No numbness or tingling  EXT: No LE swelling  PSYCH: Negative    Active Problem List     Patient Active Problem List   Diagnosis     Iritis     Rheumatoid arthritis of hand, unspecified laterality, unspecified rheumatoid factor presence (H)     Rheumatoid arthritis involving multiple sites, unspecified rheumatoid factor presence (H)     Past Medical History   History  "reviewed. No pertinent past medical history.  Past Surgical History   History reviewed. No pertinent surgical history.  Allergy   No Known Allergies  Current Medication List     Current Outpatient Medications   Medication Sig     Cholecalciferol (VITAMIN D3) 2000 UNITS CAPS      EQL NATURAL ZINC 50 MG TABS      fluticasone (FLONASE) 50 MCG/ACT spray USE ONE SPRAY IN EACH NOSTRIL TWICE DAILY     inFLIXimab (REMICADE) 100 MG injection      lisinopril (PRINIVIL,ZESTRIL) 30 MG tablet Take 45 mg by mouth     oxyCODONE IR (ROXICODONE) 5 MG tablet Take 1 tablet (5 mg) by mouth every 6 hours as needed for pain     pantoprazole (PROTONIX) 40 MG EC tablet TAKE ONE TABLET BY MOUTH TWICE DAILY     No current facility-administered medications for this visit.      Social History   See HPI    Family History   History reviewed. No pertinent family history.     Physical Exam     Temp Readings from Last 3 Encounters:   02/14/20 97  F (36.1  C) (Oral)   12/20/19 95.5  F (35.3  C) (Oral)   10/25/19 95.8  F (35.4  C) (Oral)     BP Readings from Last 5 Encounters:   03/09/20 (!) 145/85   02/14/20 124/82   12/20/19 (!) 143/85   10/25/19 137/80   09/16/19 132/80     Pulse Readings from Last 1 Encounters:   03/09/20 90     Resp Readings from Last 1 Encounters:   08/30/19 18     Estimated body mass index is 36.81 kg/m  as calculated from the following:    Height as of 3/9/20: 1.702 m (5' 7\").    Weight as of 3/9/20: 106.6 kg (235 lb).    Telephone Visit     Labs / Imaging (select studies)     CBC  Recent Labs   Lab Test 03/09/20  0812 09/16/19  0804 03/12/19  0849   WBC 6.6 5.7 5.6   RBC 4.76 4.50 4.88   HGB 15.2 14.5 15.7   HCT 44.5 42.3 44.5   MCV 94 94 91   RDW 14.0 12.7 13.1    302 343   MCH 31.9 32.2 32.2   MCHC 34.2 34.3 35.3   NEUTROPHIL 43.8 40.9 37.6   LYMPH 43.4 43.2 47.1   MONOCYTE 6.7 9.1 8.9   EOSINOPHIL 5.6 6.3 5.5   BASOPHIL 0.5 0.5 0.9   ANEU 2.9 2.3 2.1   ALYM 2.9 2.5 2.6   MICHAEL 0.4 0.5 0.5   AEOS 0.4 0.4 0.3   ABAS " 0.0 0.0 0.1     CMP  Recent Labs   Lab Test 03/09/20  0812 09/16/19  0804 03/12/19  0849  09/13/17  1542   NA  --   --   --   --  142   POTASSIUM  --   --   --   --  4.2   CHLORIDE  --   --   --   --  107   CO2  --   --   --   --  28   ANIONGAP  --   --   --   --  7   GLC  --   --   --   --  95   BUN  --   --   --   --  18   CR 1.05 1.06 1.04   < > 1.10   GFRESTIMATED 77 76 78   < > 69   GFRESTBLACK 89 88 >90   < > 83   ERNESTINA  --   --   --   --  9.5   BILITOTAL 0.3 0.3 0.3   < > 0.4   ALBUMIN 3.5 3.6 3.8   < > 3.8   PROTTOTAL 7.7 7.7 7.8   < > 7.7   ALKPHOS 69 72 74   < > 66   AST 21 24 23   < > 25   ALT 37 36 42   < > 45    < > = values in this interval not displayed.     Calcium/VitaminD  Recent Labs   Lab Test 09/13/17  1542   ERNESTINA 9.5     ESR/CRP  Recent Labs   Lab Test 03/09/20  0812 09/16/19  0804 03/12/19  0849   SED 15 30* 10   CRP <2.9 3.9 <2.9     Hepatitis B  Recent Labs   Lab Test 03/26/18  1540   HBCAB Nonreactive   HEPBANG Nonreactive     Hepatitis C  Recent Labs   Lab Test 03/26/18  1540   HCVAB Nonreactive     Tuberculosis Screening  Recent Labs   Lab Test 03/26/18  1541   TBRSLT Negative   TBAGN 0.00     Immunization History     Immunization History   Administered Date(s) Administered     Mantoux Tuberculin Skin Test 02/01/2007          Chart documentation done in part with Dragon Voice recognition Software. Although reviewed after completion, some word and grammatical error may remain.    Phone call start time: 8:11 AM  Phone call end time: 8:20 AM    This visit is equivalent to a 91186 visit    Location of patient: home, in [place]  Location of provider: home    Follow up:  follow up appointment scheduled to be in March 2021    Alex Segundo MD

## 2020-09-23 ENCOUNTER — HOME INFUSION (PRE-WILLOW HOME INFUSION) (OUTPATIENT)
Dept: PHARMACY | Facility: CLINIC | Age: 60
End: 2020-09-23

## 2020-09-24 ENCOUNTER — HOME INFUSION (PRE-WILLOW HOME INFUSION) (OUTPATIENT)
Dept: PHARMACY | Facility: CLINIC | Age: 60
End: 2020-09-24

## 2020-09-24 NOTE — PROGRESS NOTES
This is a recent snapshot of the patient's [facility] medical record.  For current drug dose and complete information and questions, call [phone] or In Basket pool, fv home infusion ([id number])  CSN Number:  [id number]

## 2020-09-25 ENCOUNTER — HOME INFUSION (PRE-WILLOW HOME INFUSION) (OUTPATIENT)
Dept: PHARMACY | Facility: CLINIC | Age: 60
End: 2020-09-25

## 2020-09-25 NOTE — PROGRESS NOTES
This is a recent snapshot of the patient's [facility] medical record.  For current drug dose and complete information and questions, call [phone] or In Basket pool, fv home infusion ([id number])  CSN Number:  [id number]

## 2020-09-28 NOTE — PROGRESS NOTES
This is a recent snapshot of the patient's [facility] medical record.  For current drug dose and complete information and questions, call [phone] or In Basket pool, fv home infusion ([id number])  CSN Number:  [id number]

## 2020-10-14 NOTE — TELEPHONE ENCOUNTER
Patient calling. He is at the infusion center now. Please place the order.    Detail Level: Detailed

## 2020-11-16 ENCOUNTER — HOME INFUSION (PRE-WILLOW HOME INFUSION) (OUTPATIENT)
Dept: PHARMACY | Facility: CLINIC | Age: 60
End: 2020-11-16

## 2020-11-17 ENCOUNTER — HOME INFUSION (PRE-WILLOW HOME INFUSION) (OUTPATIENT)
Dept: PHARMACY | Facility: CLINIC | Age: 60
End: 2020-11-17

## 2020-11-17 NOTE — PROGRESS NOTES
This is a recent snapshot of the patient's [facility] medical record.  For current drug dose and complete information and questions, call [phone] or In Basket pool, fv home infusion ([id number])  CSN Number:  [id number]

## 2020-11-18 ENCOUNTER — DOCUMENTATION ONLY (OUTPATIENT)
Dept: PHARMACY | Facility: CLINIC | Age: 60
End: 2020-11-18

## 2020-11-18 ENCOUNTER — HOME INFUSION (PRE-WILLOW HOME INFUSION) (OUTPATIENT)
Dept: PHARMACY | Facility: CLINIC | Age: 60
End: 2020-11-18

## 2020-11-18 NOTE — PROGRESS NOTES
Skilled Nurse visit in the  patient home to administer Inflectra.  No recent elevated temperature, fever, chills, productive cough, coughing for 3 weeks or longer or hemoptysis, abnormal vital signs, night sweats, chest pain. No  decrease in your appetite, unexplained weight loss or fatigue.  No other new onset medical symptoms.  Current weight 230.  PIV placed left hand, q attempt. Not  Pre medicated. No Labs drawn. Infusion completed without complication or reaction. Pt reports therapy is helping in managing symptoms related to therapy.

## 2020-11-19 NOTE — PROGRESS NOTES
This is a recent snapshot of the patient's [facility] medical record.  For current drug dose and complete information and questions, call [phone] or In Basket pool, fv home infusion ([id number])  CSN Number:  [id number]

## 2020-11-19 NOTE — PROGRESS NOTES
This is a recent snapshot of the patient's [facility] medical record.  For current drug dose and complete information and questions, call [phone] or In Basket pool, fv home infusion ([id number])  CSN Number:  [id number]

## 2020-12-23 ENCOUNTER — TELEPHONE (OUTPATIENT)
Dept: RHEUMATOLOGY | Facility: CLINIC | Age: 60
End: 2020-12-23

## 2020-12-23 NOTE — TELEPHONE ENCOUNTER
Patient asking if he is able to get COVID vaccine and what the best time for patient to receive during 8 week cycle of medication he is on.

## 2020-12-28 NOTE — TELEPHONE ENCOUNTER
RN: Please call to notify Prosper Miller that at this time the vaccine from Pfizer or Moderna for COVID19 are recommended based on our knowledge of this vaccine and vaccines in the past.  However, there is no data currently available to establish safety and efficacy for this vaccine in immunocompromised people.  There is also no guidance with regard to timing of the infliximab and vaccine; if possible try to separate by at least 2-3 days which is a recommendation based on no data as there is none available.      Alex Segundo MD  12/28/2020 8:57 AM

## 2020-12-28 NOTE — TELEPHONE ENCOUNTER
Called patient and discussed Dr. Segundo's message below. Patient verbalized understanding and has no questions.    Skip Lemos RN....12/28/2020 11:56 AM

## 2021-01-13 ENCOUNTER — HOME INFUSION (PRE-WILLOW HOME INFUSION) (OUTPATIENT)
Dept: PHARMACY | Facility: CLINIC | Age: 61
End: 2021-01-13

## 2021-01-14 NOTE — PROGRESS NOTES
This is a recent snapshot of the patient's [facility] medical record.  For current drug dose and complete information and questions, call [phone] or In Basket pool, fv home infusion ([id number])  CSN Number:  [id number]

## 2021-01-14 NOTE — PROGRESS NOTES
This is a recent snapshot of the patient's [facility] medical record.  For current drug dose and complete information and questions, call [phone] or In Basket pool, fv home infusion ([id number])  CSN Number:  [id number]

## 2021-01-15 ENCOUNTER — HOME INFUSION (PRE-WILLOW HOME INFUSION) (OUTPATIENT)
Dept: PHARMACY | Facility: CLINIC | Age: 61
End: 2021-01-15

## 2021-01-19 NOTE — PROGRESS NOTES
This is a recent snapshot of the patient's [facility] medical record.  For current drug dose and complete information and questions, call [phone] or In Basket pool, fv home infusion ([id number])  CSN Number:  [id number]

## 2021-01-25 ENCOUNTER — COMMUNICATION - HEALTHEAST (OUTPATIENT)
Dept: FAMILY MEDICINE | Facility: CLINIC | Age: 61
End: 2021-01-25

## 2021-01-26 ENCOUNTER — OFFICE VISIT - HEALTHEAST (OUTPATIENT)
Dept: FAMILY MEDICINE | Facility: CLINIC | Age: 61
End: 2021-01-26

## 2021-01-26 DIAGNOSIS — H93.8X3 PLUGGED FEELING IN EAR, BILATERAL: ICD-10-CM

## 2021-01-26 DIAGNOSIS — H69.93 DYSFUNCTION OF BOTH EUSTACHIAN TUBES: ICD-10-CM

## 2021-01-26 DIAGNOSIS — M06.9 RHEUMATOID ARTHRITIS INVOLVING MULTIPLE SITES, UNSPECIFIED WHETHER RHEUMATOID FACTOR PRESENT (H): ICD-10-CM

## 2021-01-26 ASSESSMENT — MIFFLIN-ST. JEOR: SCORE: 1872

## 2021-03-08 ENCOUNTER — HOME INFUSION (PRE-WILLOW HOME INFUSION) (OUTPATIENT)
Dept: PHARMACY | Facility: CLINIC | Age: 61
End: 2021-03-08

## 2021-03-08 ENCOUNTER — VIRTUAL VISIT (OUTPATIENT)
Dept: RHEUMATOLOGY | Facility: CLINIC | Age: 61
End: 2021-03-08
Payer: COMMERCIAL

## 2021-03-08 DIAGNOSIS — M06.9 RHEUMATOID ARTHRITIS INVOLVING MULTIPLE SITES, UNSPECIFIED WHETHER RHEUMATOID FACTOR PRESENT (H): Primary | ICD-10-CM

## 2021-03-08 DIAGNOSIS — M18.0 PRIMARY OSTEOARTHRITIS OF BOTH FIRST CARPOMETACARPAL JOINTS: ICD-10-CM

## 2021-03-08 DIAGNOSIS — Z79.899 HIGH RISK MEDICATIONS (NOT ANTICOAGULANTS) LONG-TERM USE: ICD-10-CM

## 2021-03-08 DIAGNOSIS — H20.9 IRITIS: ICD-10-CM

## 2021-03-08 PROCEDURE — 99214 OFFICE O/P EST MOD 30 MIN: CPT | Mod: TEL | Performed by: INTERNAL MEDICINE

## 2021-03-08 NOTE — Clinical Note
[facility] Infusion Team:  Will you please draw labs with Prosper Miller's next infusion: CBC, creatinine, hepatic panel, ESR, CRP, QuantiFERON-TB gold plus.  I added this comment into the infusion orders and placed the orders in Epic.  Thanks!  Alex Segundo MD  3/8/2021 8:33 AM

## 2021-03-08 NOTE — PROGRESS NOTES
Prosper Miller is a 60 year old year old male who is being evaluated via a billable telephone visit.      What phone number would you like to be contacted at? [phone]  How would you like to obtain your AVS? Mail a copy    Rheumatology Telephone/Telehealth Visit      Prosper Miller MRN# [id number]   YOB: 1960 Age: 60 year old      Date of visit: 3/08/21   PCP: Momo Gooden at [facility]  Ophthalmology: Dr. Michael Jenkins at the [facility]    Chief Complaint   Patient presents with:  Arthritis: RA    Assessment and Plan     1.  Rheumatoid arthritis: Reportedly dx'd in 2006. Previously followed by Dr. Phillips. Established care with me on 3/26/2018.  No active synovitis on exam when first evaluated by me. Records documents seropositive rheumatoid arthritis but I cannot find records for CCP or rheumatoid factor.  Previously on leflunomide and cyclosporine.  He is doing well with Infliximab 10 mg/kg every 7-9 weeks, currently on a frequency of every 8 weeks.  Note that inflximab was previously changed from Remicade to Inflectra with no change in symptoms.  Chronic illness, stable.    - Continue Infliximab 10 mg/kg IV every 7-9 weeks (receiving via home infusion)  - Labs with next infusion: CBC, creatinine, hepatic panel, ESR, CRP, QuantiFERON-TB gold plus     2.  Iritis: Well controlled with infliximab as noted in #1.  No flares since last seen.  Chronic illness, stable.      3.  Osteoarthritis of the bilateral first CMC joints: Mild symptoms today. He says that he will monitor for now because the symptoms are tolerable.  Chronic illness, stable.      4.  Nonradiating chronic lower back pain, history: Improved with at-home exercises; he has refused physical therapy in the past.  Not an issue today.    5. Elevated blood pressure: Blood pressure not rechecked today because this is a virtual appointment. Sam to follow up with Primary Care provider regarding elevated  blood pressure.     # At this time the COVID-19 vaccine (currently available from [employer] and [employer]) is recommended based on our knowledge of this vaccine and vaccines in the past.  Based on the data for the mRNA COVID-19 vaccines available in the [place], there is no preference for one COVID-19 vaccine over another. Note that there is no data currently available to specifically establish safety and efficacy for this vaccine in immunocompromised people.    # This is a virtual visit to reduce the risk of COVID-19 exposure during this current pandemic.      # Considered to be at high risk of complications from the COVID-19 virus.  It is recommended to limit contact with other people and if possible to work remotely or provide a leave of absence to reduce the risk for COVID-19.      Total minutes spent in evaluation with patient, documentation, , and review of pertinent studies and chart notes: 20     Mr. Mliler verbalized agreement with and understanding of the rational for the diagnosis and treatment plan.  All questions were answered to best of my ability and the patient's satisfaction. Mr. Miller was advised to contact the clinic with any questions that may arise after the clinic visit.      Thank you for involving me in the care of the patient    Return to clinic: 6 months      HPI   Prosper Miller is a 60 year old male with a past medical history significant for hypertension, hyperlipidemia, obstructive sleep apnea, IgA nephropathy, rheumatoid arthritis, and iritis who presents for follow-up of rheumatoid arthritis and immunosuppressive management for iritis.     Today, 3/8/2021: Doing well at this time.  Rheumatoid arthritis is well controlled.  Infusions are going well.  No joint pain or swelling.  Morning stiffness for no more than 10 minutes.  No recurrence of iritis since last seen; has not been an issue since being on infliximab    Denies fevers, chills, nausea, vomiting, constipation.  No abdominal pain. No chest pain/pressure, palpitations, or shortness of breath. No LE swelling. No neck pain. No oral or nasal sores.  No rash.     Tobacco: Former smoker  EtOH: Weekly  Drugs: None  Occupation:     ROS   12 point review of system was completed and negative except as noted in the [facility]     Active Problem List     Patient Active Problem List   Diagnosis     Iritis     Rheumatoid arthritis of hand, unspecified laterality, unspecified rheumatoid factor presence     Rheumatoid arthritis involving multiple sites, unspecified rheumatoid factor presence     Past Medical History   History reviewed. No pertinent past medical history.  Past Surgical History   History reviewed. No pertinent surgical history.  Allergy   No Known Allergies  Current Medication List     Current Outpatient Medications   Medication Sig     Cholecalciferol (VITAMIN D3) 2000 UNITS CAPS      EQL NATURAL ZINC 50 MG TABS      fluticasone (FLONASE) 50 MCG/ACT spray USE ONE SPRAY IN EACH NOSTRIL TWICE DAILY     inFLIXimab (REMICADE) 100 MG injection      lisinopril (PRINIVIL,ZESTRIL) 30 MG tablet Take 45 mg by mouth     pantoprazole (PROTONIX) 40 MG EC tablet TAKE ONE TABLET BY MOUTH TWICE DAILY     oxyCODONE IR (ROXICODONE) 5 MG tablet Take 1 tablet (5 mg) by mouth every 6 hours as needed for pain (Patient not taking: Reported on 3/8/2021)     No current facility-administered medications for this visit.      Social History   See [facility]    Family History   History reviewed. No pertinent family history.     Physical Exam     Temp Readings from Last 3 Encounters:   02/14/20 97  F (36.1  C) (Oral)   12/20/19 95.5  F (35.3  C) (Oral)   10/25/19 95.8  F (35.4  C) (Oral)     BP Readings from Last 5 Encounters:   03/09/20 (!) 145/85   02/14/20 124/82   12/20/19 (!) 143/85   10/25/19 137/80   09/16/19 132/80     Pulse Readings from Last 1 Encounters:   03/09/20 90     Resp Readings from Last 1 Encounters:   08/30/19 18     Estimated body  "mass index is 36.81 kg/m  as calculated from the following:    Height as of 3/9/20: 1.702 m (5' 7\").    Weight as of 3/9/20: 106.6 kg (235 lb).    GEN: alert and no distress  PSYCH: Alert; coherent speech, normal rate and volume, able to articulate logical thoughts, able   to abstract reason, no tangential thoughts. Normal affect.   RESP: No cough, no audible wheezing, able to talk in full sentences  Remainder of exam unable to be completed due to telephone visits      Labs / Imaging (select studies)     CBC  Recent Labs   Lab Test 03/09/20  0812 09/16/19  0804 03/12/19  0849   WBC 6.6 5.7 5.6   RBC 4.76 4.50 4.88   HGB 15.2 14.5 15.7   HCT 44.5 42.3 44.5   MCV 94 94 91   RDW 14.0 12.7 13.1    302 343   MCH 31.9 32.2 32.2   MCHC 34.2 34.3 35.3   NEUTROPHIL 43.8 40.9 37.6   LYMPH 43.4 43.2 47.1   MONOCYTE 6.7 9.1 8.9   EOSINOPHIL 5.6 6.3 5.5   BASOPHIL 0.5 0.5 0.9   ANEU 2.9 2.3 2.1   ALYM 2.9 2.5 2.6   MICHAEL 0.4 0.5 0.5   AEOS 0.4 0.4 0.3   ABAS 0.0 0.0 0.1     CMP  Recent Labs   Lab Test 03/09/20  0812 09/16/19  0804 03/12/19  0849 09/13/17  1542 09/13/17  1542   NA  --   --   --   --  142   POTASSIUM  --   --   --   --  4.2   CHLORIDE  --   --   --   --  107   CO2  --   --   --   --  28   ANIONGAP  --   --   --   --  7   GLC  --   --   --   --  95   BUN  --   --   --   --  18   CR 1.05 1.06 1.04   < > 1.10   GFRESTIMATED 77 76 78   < > 69   GFRESTBLACK 89 88 >90   < > 83   ERNESTINA  --   --   --   --  9.5   BILITOTAL 0.3 0.3 0.3   < > 0.4   ALBUMIN 3.5 3.6 3.8   < > 3.8   PROTTOTAL 7.7 7.7 7.8   < > 7.7   ALKPHOS 69 72 74   < > 66   AST 21 24 23   < > 25   ALT 37 36 42   < > 45    < > = values in this interval not displayed.     Calcium/VitaminD  Recent Labs   Lab Test 09/13/17  1542   ERNESTINA 9.5     ESR/CRP  Recent Labs   Lab Test 03/09/20  0812 09/16/19  0804 03/12/19  0849   SED 15 30* 10   CRP <2.9 3.9 <2.9       Hepatitis B  Recent Labs   Lab Test 03/26/18  1540   HBCAB Nonreactive   HEPBANG Nonreactive "     Hepatitis C  Recent Labs   Lab Test 03/26/18  1540   HCVAB Nonreactive     Tuberculosis Screening  Recent Labs   Lab Test 03/26/18  1541   TBRSLT Negative   TBAGN 0.00     Immunization History     Immunization History   Administered Date(s) Administered     Mantoux Tuberculin Skin Test 02/01/2007          Chart documentation done in part with Dragon Voice recognition Software. Although reviewed after completion, some word and grammatical error may remain.    Phone call duration with patient (in minutes): 12    Location of patient: [place], [place]   Location of provider: [place]    Alex Segundo MD

## 2021-03-08 NOTE — PATIENT INSTRUCTIONS
RHEUMATOLOGY    Dr. Alex Segundo    [facility]  [address] [address]    Our new phone number for Rheumatology is [phone], this number will be able to help you schedule appointments for Dr. Segundo or if you have any message you would like sent to us.    Thank you for choosing [facility]!    Jaja Sanchez [facility] Rheumatology

## 2021-03-10 ENCOUNTER — HOME INFUSION (PRE-WILLOW HOME INFUSION) (OUTPATIENT)
Dept: PHARMACY | Facility: CLINIC | Age: 61
End: 2021-03-10

## 2021-03-10 NOTE — PROGRESS NOTES
This is a recent snapshot of the patient's [facility] medical record.  For current drug dose and complete information and questions, call [phone] or In Basket pool, fv home infusion ([id number])  CSN Number:  [id number]

## 2021-03-11 NOTE — PROGRESS NOTES
This is a recent snapshot of the patient's [facility] medical record.  For current drug dose and complete information and questions, call [phone] or In Basket pool, fv home infusion ([id number])  CSN Number:  [id number]

## 2021-03-12 ENCOUNTER — HOSPITAL ENCOUNTER (OUTPATIENT)
Dept: LAB | Facility: CLINIC | Age: 61
Discharge: HOME | End: 2021-03-12
Attending: INTERNAL MEDICINE | Admitting: INTERNAL MEDICINE
Payer: COMMERCIAL

## 2021-03-12 ENCOUNTER — DOCUMENTATION ONLY (OUTPATIENT)
Dept: PHARMACY | Facility: CLINIC | Age: 61
End: 2021-03-12

## 2021-03-12 ENCOUNTER — HOME INFUSION (PRE-WILLOW HOME INFUSION) (OUTPATIENT)
Dept: PHARMACY | Facility: CLINIC | Age: 61
End: 2021-03-12

## 2021-03-12 LAB
ALBUMIN SERPL-MCNC: 3.2 G/DL (ref 3.4–5)
ALP SERPL-CCNC: 70 U/L (ref 40–150)
ALT SERPL W P-5'-P-CCNC: 30 U/L (ref 0–70)
AST SERPL W P-5'-P-CCNC: 28 U/L (ref 0–45)
BILIRUB DIRECT SERPL-MCNC: <0.1 MG/DL (ref 0–0.2)
BILIRUB SERPL-MCNC: 0.3 MG/DL (ref 0.2–1.3)
CREAT SERPL-MCNC: 1.52 MG/DL (ref 0.66–1.25)
CRP SERPL-MCNC: 4.2 MG/L (ref 0–8)
ERYTHROCYTE [DISTWIDTH] IN BLOOD BY AUTOMATED COUNT: 12.8 % (ref 10–15)
ERYTHROCYTE [SEDIMENTATION RATE] IN BLOOD BY WESTERGREN METHOD: 31 MM/H (ref 0–20)
GFR SERPL CREATININE-BSD FRML MDRD: 49 ML/MIN/{1.73_M2}
HCT VFR BLD AUTO: 36.7 % (ref 40–53)
HGB BLD-MCNC: 12.9 G/DL (ref 13.3–17.7)
MCH RBC QN AUTO: 31.8 PG (ref 26.5–33)
MCHC RBC AUTO-ENTMCNC: 35.1 G/DL (ref 31.5–36.5)
MCV RBC AUTO: 90 FL (ref 78–100)
PLATELET # BLD AUTO: 263 10E9/L (ref 150–450)
PROT SERPL-MCNC: 6.9 G/DL (ref 6.8–8.8)
RBC # BLD AUTO: 4.06 10E12/L (ref 4.4–5.9)
WBC # BLD AUTO: 3.8 10E9/L (ref 4–11)

## 2021-03-12 PROCEDURE — 80076 HEPATIC FUNCTION PANEL: CPT | Performed by: INTERNAL MEDICINE

## 2021-03-12 PROCEDURE — 85652 RBC SED RATE AUTOMATED: CPT | Performed by: INTERNAL MEDICINE

## 2021-03-12 PROCEDURE — 82565 ASSAY OF CREATININE: CPT | Performed by: INTERNAL MEDICINE

## 2021-03-12 PROCEDURE — 86140 C-REACTIVE PROTEIN: CPT | Performed by: INTERNAL MEDICINE

## 2021-03-12 PROCEDURE — 85027 COMPLETE CBC AUTOMATED: CPT | Performed by: INTERNAL MEDICINE

## 2021-03-12 NOTE — PROGRESS NOTES
Skilled Nurse visit in the  patient home to administer INFLECTRA 1100mg in 500mL NS IV via CADD pump over ~2hours.  No recent elevated temperature, fever, chills, productive cough, coughing for 3 weeks or longer or hemoptysis, abnormal vital signs, night sweats, chest pain. No  decrease in your appetite, unexplained weight loss or fatigue.  No other new onset medical symptoms.  Current weight 230lbs.  PIV placed L hand, 1 attempt/s.  Pre medicated with none ordered. Labs drawn CBC, creatinine, hepatic panel, ESR, CRP. Infusion completed without complication or reaction. Pt reports therapy is effective in managing symptoms related to therapy.  Karen Lieberman RN  [facility]  [email or web address]  [phone]

## 2021-03-15 NOTE — PROGRESS NOTES
This is a recent snapshot of the patient's [facility] medical record.  For current drug dose and complete information and questions, call [phone] or In Basket pool, fv home infusion ([id number])  CSN Number:  [id number]

## 2021-03-16 ENCOUNTER — TELEPHONE (OUTPATIENT)
Dept: RHEUMATOLOGY | Facility: CLINIC | Age: 61
End: 2021-03-16

## 2021-03-16 ENCOUNTER — HOME INFUSION (PRE-WILLOW HOME INFUSION) (OUTPATIENT)
Dept: PHARMACY | Facility: CLINIC | Age: 61
End: 2021-03-16

## 2021-03-16 NOTE — TELEPHONE ENCOUNTER
Pt calling to speak to provider in regards to covid, would not provide any further information.        Thank you,   Ronak CHERY   Central Scheduling  [phone]

## 2021-03-16 NOTE — TELEPHONE ENCOUNTER
Pt had his infusion for remicade this past Friday. He is currently dizzy, ringing in his ears, and nausea. He was was dx yesterday with covid via nasopharyngeal testing. He is wondering what he needs to do at this time. I let him know he should still quarantine, his wife has already had her two injections. At this tie we would treat symptoms, if they worsen then he would need to proceed to the ER. He only is on remicade. Message fwd to MD to see if he has anything to add.    Kaur NARANJO RN Specialty Triage 3/16/2021 2:21 PM

## 2021-03-17 NOTE — TELEPHONE ENCOUNTER
"Called and spoke with patient who is concerned that he had a Remicade infusion on Friday and then was diagnosed with COVID-19 on Monday. He is wondering if he needs to take any additional measures such as taking a steroid. He is also asking if it will take him longer to recover than others because he just had a Remicade infusion. RN advised that steroids are not being recommended by Dr. Segundo in the setting of COVID-19. Also discussed that I cannot gauge what his recovery will be but there is a possibility that it will take longer as he is immunocompromised. Patient is reporting symptoms as mentioned below: nausea, \"wooziness,\" and tinnitus. RN recommended that he rest, stay hydrated, and try taking an OTC anti-nausea medication. He was advised to inform his PCP that he has COVID-19.  He was also informed that his message was forwarded to Dr. Segundo who is out of office this week but will return next week. Patient agreed with this plan and has no further questions.     Skip ISSA RN....3/17/2021 4:06 PM     "

## 2021-03-23 NOTE — RESULT ENCOUNTER NOTE
RN: Please call to notify Prosper Miller that his creatinine was elevated on the most recent labs.  Therefore, a repeat blood test to reevaluate the creatinine is needed.  Creatinine may be affected by hydration status so it is important that he be well-hydrated for this lab.  Please assist him with scheduling a lab appointment within the next week.    Alex Segundo MD  3/22/2021 8:56 PM

## 2021-03-25 NOTE — PROGRESS NOTES
This is a recent snapshot of the patient's [facility] medical record.  For current drug dose and complete information and questions, call [phone] or In Basket pool, fv home infusion ([id number])  CSN Number:  [id number]

## 2021-04-08 ENCOUNTER — COMMUNICATION - HEALTHEAST (OUTPATIENT)
Dept: FAMILY MEDICINE | Facility: CLINIC | Age: 61
End: 2021-04-08

## 2021-04-08 DIAGNOSIS — J31.0 CHRONIC RHINITIS: ICD-10-CM

## 2021-04-13 ENCOUNTER — COMMUNICATION - HEALTHEAST (OUTPATIENT)
Dept: SCHEDULING | Facility: CLINIC | Age: 61
End: 2021-04-13

## 2021-04-13 DIAGNOSIS — K21.9 ESOPHAGEAL REFLUX: ICD-10-CM

## 2021-04-14 ENCOUNTER — COMMUNICATION - HEALTHEAST (OUTPATIENT)
Dept: FAMILY MEDICINE | Facility: CLINIC | Age: 61
End: 2021-04-14

## 2021-04-14 DIAGNOSIS — I10 ESSENTIAL HYPERTENSION, BENIGN: ICD-10-CM

## 2021-04-22 ENCOUNTER — AMBULATORY - HEALTHEAST (OUTPATIENT)
Dept: NURSING | Facility: CLINIC | Age: 61
End: 2021-04-22

## 2021-05-05 ENCOUNTER — HOME INFUSION (PRE-WILLOW HOME INFUSION) (OUTPATIENT)
Dept: PHARMACY | Facility: CLINIC | Age: 61
End: 2021-05-05

## 2021-05-07 ENCOUNTER — DOCUMENTATION ONLY (OUTPATIENT)
Dept: PHARMACY | Facility: CLINIC | Age: 61
End: 2021-05-07

## 2021-05-07 ENCOUNTER — HOME INFUSION (PRE-WILLOW HOME INFUSION) (OUTPATIENT)
Dept: PHARMACY | Facility: CLINIC | Age: 61
End: 2021-05-07

## 2021-05-07 NOTE — PROGRESS NOTES
Skilled Nurse visit in the  patient home to administer Inflectra 1100mg IV.  No recent elevated temperature, fever, chills, productive cough, coughing for 3 weeks or longer or hemoptysis, abnormal vital signs, night sweats, chest pain. No  decrease in your appetite, unexplained weight loss or fatigue.  No other new onset medical symptoms.  Current weight 235.  PIV placed left hand, 1 attempt/s.  Pre medicated with N/A. Labs drawn N/A. Infusion completed with/without complication or reaction. Pt reports therapy is effective in managing symptoms related to therapy.    Mela Newton RN  [facility]  [phone]  [email or web address]

## 2021-05-10 NOTE — PROGRESS NOTES
This is a recent snapshot of the patient's [facility] medical record.  For current drug dose and complete information and questions, call [phone] or In Basket pool, fv home infusion ([id number])  CSN Number:  [id number]

## 2021-05-11 ENCOUNTER — HOME INFUSION (PRE-WILLOW HOME INFUSION) (OUTPATIENT)
Dept: PHARMACY | Facility: CLINIC | Age: 61
End: 2021-05-11

## 2021-05-13 ENCOUNTER — AMBULATORY - HEALTHEAST (OUTPATIENT)
Dept: NURSING | Facility: CLINIC | Age: 61
End: 2021-05-13

## 2021-05-13 NOTE — PROGRESS NOTES
This is a recent snapshot of the patient's [facility] medical record.  For current drug dose and complete information and questions, call [phone] or In Basket pool, fv home infusion ([id number])  CSN Number:  [id number]

## 2021-05-18 NOTE — PROGRESS NOTES
This is a recent snapshot of the patient's [facility] medical record.  For current drug dose and complete information and questions, call [phone] or In Basket pool, fv home infusion ([id number])  CSN Number:  [id number]

## 2021-05-25 ENCOUNTER — RECORDS - HEALTHEAST (OUTPATIENT)
Dept: ADMINISTRATIVE | Facility: CLINIC | Age: 61
End: 2021-05-25

## 2021-05-27 ENCOUNTER — RECORDS - HEALTHEAST (OUTPATIENT)
Dept: ADMINISTRATIVE | Facility: CLINIC | Age: 61
End: 2021-05-27

## 2021-05-29 ENCOUNTER — RECORDS - HEALTHEAST (OUTPATIENT)
Dept: ADMINISTRATIVE | Facility: CLINIC | Age: 61
End: 2021-05-29

## 2021-05-30 VITALS — BODY MASS INDEX: 37.67 KG/M2 | WEIGHT: 240 LBS | HEIGHT: 67 IN

## 2021-05-30 VITALS — BODY MASS INDEX: 37.04 KG/M2 | HEIGHT: 67 IN | WEIGHT: 236 LBS

## 2021-05-31 NOTE — PATIENT INSTRUCTIONS - HE
----- Message from Sonia Goodwin MD sent at 2/27/2019  4:05 PM CST -----  His blood tests look great, so there is no need to change his medications. He can continue simvastatin 40 mg, and if simva 40 causes him cramps, he can go back to simva 20 mg daily.   I sent a prescription for Robitussin with codeine to your pharmacy  This may make you drowsy but should help with the discomfort  It may take several days for your symptoms to improve  Please follow-up if not improving

## 2021-05-31 NOTE — PROGRESS NOTES
Assessment/ Plan     1. Viral upper respiratory tract infection  2. Right ear pain    Recommend symptomatic treatment at this time including fluids and rest  He may take Tylenol or ibuprofen as needed  Prescribed Robitussin with codeine to take as needed.  Discussed potential side effects including sedation  Antibiotics do not appear to be warranted but advised follow-up if not improving  - codeine-guaiFENesin (GUAIFENESIN AC)  mg/5 mL liquid; Take 10 mL by mouth 4 (four) times a day as needed for cough.  Dispense: 240 mL; Refill: 0    3. Rheumatoid arthritis involving multiple sites, unspecified rheumatoid factor presence (H)    He is currently treated with Remicade  Follow-up with rheumatology    Subjective:       Prosper Miller is a 59 y.o. male who presents  to the clinic with a one-week history of a constellation of symptoms.  He has had a low-grade fever and his ears have felt plugged.  He has had some nasal congestion and a cough.  Been taking NyQuil and DayQuil.  His ears have been draining.  His cough has kept him up.  He does have a history of rheumatoid arthritis and is followed by rheumatology.  He is due for Remicade infusion in the near future.  He denies shortness of breath.       The following portions of the patient's history were reviewed and updated as appropriate: allergies, current medications, past family history, past medical history, past social history, past surgical history and problem list. Medications have been reconciled    Review of Systems   A 12 point comprehensive review of systems was negative except as noted.      Current Outpatient Medications   Medication Sig Dispense Refill     cholecalciferol, vitamin D3, (VITAMIN D3) 2,000 unit cap Take by mouth.       fluticasone (FLONASE) 50 mcg/actuation nasal spray USE 1 SPRAY INTO EACH NOSTRIL DAILY. 48 g 3     inFLIXimab (REMICADE) 100 mg injection Infuse into a venous catheter. As directed       lisinopril (PRINIVIL,ZESTRIL) 30  MG tablet Take 1 tablet (30 mg total) by mouth daily. 90 tablet 3     pantoprazole (PROTONIX) 40 MG tablet Take 1 tablet (40 mg total) by mouth 2 (two) times a day. 180 tablet 2     zinc 50 mg Tab Take by mouth.       codeine-guaiFENesin (GUAIFENESIN AC)  mg/5 mL liquid Take 10 mL by mouth 4 (four) times a day as needed for cough. 240 mL 0     fluticasone (FLONASE) 50 mcg/actuation nasal spray 1 spray into each nostril daily as needed. 16 g 0     No current facility-administered medications for this visit.        Objective:      /80   Pulse 76   Temp 98.2  F (36.8  C) (Oral)   Wt (!) 239 lb (108.4 kg)   BMI 38.00 kg/m        General appearance: alert, appears stated age and cooperative  Head: Normocephalic, without obvious abnormality, atraumatic  Eyes: conjunctivae/corneas clear. PERRL, EOM's intact.   Ears: normal TM's and external ear canals both ears  Nose: Nares normal. Septum midline. Mucosa normal. No drainage or sinus tenderness.  Throat: lips, mucosa, and tongue normal; teeth and gums normal  Neck: no adenopathy, supple, symmetrical  Lungs: clear to auscultation bilaterally  Heart: regular rate and rhythm, S1, S2 normal, no murmur, click, rub or gallop  Extremities: extremities normal, atraumatic, no cyanosis or edema  Skin: Skin color, texture, turgor normal. No rashes or lesions  Lymph nodes: Cervical nodes normal.  Neurologic: Alert and oriented X 3         No results found for this or any previous visit (from the past 168 hour(s)).       This note has been dictated using voice recognition software. Any grammatical or context distortions are unintentional and inherent to the software

## 2021-06-01 VITALS — HEIGHT: 67 IN | BODY MASS INDEX: 37.67 KG/M2 | WEIGHT: 240 LBS

## 2021-06-01 NOTE — TELEPHONE ENCOUNTER
RN cannot approve Refill Request    RN can NOT refill this medication Protocol failed and NO refill given. Last office visit: 8/26/2019 Momo Hartley MD Last Physical: 7/31/2018 Last MTM visit: Visit date not found Last visit same specialty: 8/26/2019 Momo Hartley MD.  Next visit within 3 mo: Visit date not found  Next physical within 3 mo: Visit date not found      Earnestine Saucedo, [facility] Triage/Med Refill 9/14/2019    Requested Prescriptions   Pending Prescriptions Disp Refills     lisinopril (PRINIVIL,ZESTRIL) 30 MG tablet [Pharmacy Med Name: LISINOPRIL 30 MG TABLET] 135 tablet 2     Sig: TAKE 1 & 1/2 TABLETS (45 MG TOTAL) BY MOUTH DAILY.       Ace Inhibitors Refill Protocol Failed - 9/14/2019  6:21 PM        Failed - Serum Potassium in past 12 months     No results found for: LN-POTASSIUM          Failed - Serum Creatinine in past 12 months     Creatinine   Date Value Ref Range Status   07/31/2018 1.17 0.70 - 1.30 mg/dL Final             Passed - PCP or prescribing provider visit in past 12 months       Last office visit with prescriber/PCP: 8/26/2019 Momo Hartley MD OR same dept: 8/26/2019 Momo Hartley MD OR same specialty: 8/26/2019 Momo Hartley MD  Last physical: 7/31/2018 Last MTM visit: Visit date not found   Next visit within 3 mo: Visit date not found  Next physical within 3 mo: Visit date not found  Prescriber OR PCP: Momo Hartley MD  Last diagnosis associated with med order: 1. Benign Essential Hypertension  - lisinopril (PRINIVIL,ZESTRIL) 30 MG tablet [Pharmacy Med Name: LISINOPRIL 30 MG TABLET]; TAKE 1 & 1/2 TABLETS (45 MG TOTAL) BY MOUTH DAILY.  Dispense: 135 tablet; Refill: 2    If protocol passes may refill for 12 months if within 3 months of last provider visit (or a total of 15 months).             Passed - Blood pressure filed in past 12 months     BP Readings from Last 1 Encounters:   08/26/19 124/80

## 2021-06-02 ENCOUNTER — RECORDS - HEALTHEAST (OUTPATIENT)
Dept: ADMINISTRATIVE | Facility: CLINIC | Age: 61
End: 2021-06-02

## 2021-06-02 VITALS — WEIGHT: 235 LBS | BODY MASS INDEX: 37.36 KG/M2

## 2021-06-03 VITALS — BODY MASS INDEX: 38.58 KG/M2 | WEIGHT: 239 LBS

## 2021-06-04 VITALS
WEIGHT: 242.4 LBS | SYSTOLIC BLOOD PRESSURE: 132 MMHG | BODY MASS INDEX: 39.12 KG/M2 | DIASTOLIC BLOOD PRESSURE: 78 MMHG | HEART RATE: 92 BPM | RESPIRATION RATE: 20 BRPM

## 2021-06-04 NOTE — TELEPHONE ENCOUNTER
Patient called and message relayed. Patient was driving and was unable to set up appt. Patient states he will call back to schedule.

## 2021-06-04 NOTE — TELEPHONE ENCOUNTER
Refill NOT Given    Refill given per Policy, patient informed they are overdue for Labs   OV 8/26/19    C[place], [facility] Triage/Med Refill 12/14/2019    Requested Prescriptions   Pending Prescriptions Disp Refills     lisinopril (PRINIVIL,ZESTRIL) 30 MG tablet [Pharmacy Med Name: LISINOPRIL 30 MG TABLET] 135 tablet 0     Sig: TAKE 1 & 1/2 TABLETS (45 MG TOTAL) BY MOUTH DAILY.       Ace Inhibitors Refill Protocol Failed - 12/14/2019 10:15 AM        Failed - Serum Potassium in past 12 months     No results found for: LN-POTASSIUM          Failed - Serum Creatinine in past 12 months     Creatinine   Date Value Ref Range Status   07/31/2018 1.17 0.70 - 1.30 mg/dL Final             Passed - PCP or prescribing provider visit in past 12 months       Last office visit with prescriber/PCP: 8/26/2019 Momo Hartley MD OR same dept: 8/26/2019 Momo Hartley MD OR same specialty: 8/26/2019 Momo Hartley MD  Last physical: 7/31/2018 Last MTM visit: Visit date not found   Next visit within 3 mo: Visit date not found  Next physical within 3 mo: Visit date not found  Prescriber OR PCP: Momo Hartley MD  Last diagnosis associated with med order: 1. Benign Essential Hypertension  - lisinopril (PRINIVIL,ZESTRIL) 30 MG tablet [Pharmacy Med Name: LISINOPRIL 30 MG TABLET]; TAKE 1 & 1/2 TABLETS (45 MG TOTAL) BY MOUTH DAILY.  Dispense: 135 tablet; Refill: 0    If protocol passes may refill for 12 months if within 3 months of last provider visit (or a total of 15 months).             Passed - Blood pressure filed in past 12 months     BP Readings from Last 1 Encounters:   08/26/19 124/80

## 2021-06-04 NOTE — TELEPHONE ENCOUNTER
Please call. I refilled his blood pressure medication but he is due for an appointment with me. He will need labs then.

## 2021-06-05 VITALS
HEART RATE: 80 BPM | SYSTOLIC BLOOD PRESSURE: 145 MMHG | HEIGHT: 67 IN | WEIGHT: 245 LBS | DIASTOLIC BLOOD PRESSURE: 86 MMHG | TEMPERATURE: 97.8 F | BODY MASS INDEX: 38.45 KG/M2

## 2021-06-06 NOTE — TELEPHONE ENCOUNTER
Refill Approved    Rx renewed per Medication Renewal Policy. Medication was last renewed on 12/14/18.    Germania Noble, [facility] Triage/Med Refill 2/24/2020     Requested Prescriptions   Pending Prescriptions Disp Refills     fluticasone propionate (FLONASE) 50 mcg/actuation nasal spray [Pharmacy Med Name: FLUTICASONE PROP 50 MCG SPRAY] 48 g 3     Sig: USE 1 SPRAY INTO EACH NOSTRIL DAILY.       Nasal Steroid Refill Protocol Passed - 2/21/2020 12:35 PM        Passed - Patient has had office visit/physical in last 2 years     Last office visit with prescriber/PCP: 8/26/2019 OR same dept: 8/26/2019 Momo Hartley MD OR same specialty: 8/26/2019 Momo Hartley MD Last physical: 7/31/2018 Last MTM visit: Visit date not found    Next appt within 3 mo: 2/24/2020 Momo Hartley MD  Next physical within 3 mo: Visit date not found  Prescriber OR PCP: Momo Hartley MD  Last diagnosis associated with med order: 1. Chronic rhinitis  - fluticasone propionate (FLONASE) 50 mcg/actuation nasal spray [Pharmacy Med Name: FLUTICASONE PROP 50 MCG SPRAY]; USE 1 SPRAY INTO EACH NOSTRIL DAILY.  Dispense: 48 g; Refill: 3     If protocol passes may refill for 12 months if within 3 months of last provider visit (or a total of 15 months).

## 2021-06-06 NOTE — PATIENT INSTRUCTIONS - HE
Take over the counter metamucil and take daily  That should add bulk and help with loose stools  Keep a food diary  If not improving I will refer to gastroenterology  Monitor the armpit lesion and follow-up if it changes  You can consider a medication for sleep  Apply Compound W or Duofilm to the wart  Use a file to file down the wart  You can consider seeing our audiologist    Set up the ultrasound of the abdomen  Say shravan Causey!

## 2021-06-06 NOTE — PROGRESS NOTES
Assessment/ Plan     1. Benign Essential Hypertension  Currently stable    Recommend improvement in diet and exercise  Continue lisinopril    2. Essential Hypercholesterolemia    Check a lipid cascade  Calculate the 10-year cardiovascular risk    3. Adenomatous polyp of colon, unspecified part of colon    He is up-to-date from his colonoscopy in March 2019 will be due in 2022    4. Diarrhea, unspecified type  Etiology unclear  May be secondary to irritable bowel syndrome.  Gallbladder etiology possible but may be less likely given lack of significant abdominal pain    Check laboratory testing as noted  Recommend keeping a food diary  Recommend Metamucil to see if that will help with loose stools  He will have the ultrasound as noted  Follow-up with gastroenterology if not improving  - US Abdomen Complete; Future  - Basic Metabolic Panel  - Thyroid Cascade    5. Rheumatoid arthritis involving multiple sites, unspecified rheumatoid factor presence (H)    Continue plan per rheumatology  Continue Remicade    6. Skin lesion, left axillary area    Patient will monitor  Recommend follow-up for consideration of removal if this persists        Subjective:       Prosper Miller is a 59 y.o. male who presents to the clinic in follow-up for hypertension.  He has a history of high blood pressure treated with lisinopril.  He also follows up with rheumatology given a history of rheumatoid arthritis.  This is treated with Remicade.  He was advised to make a medication follow-up visit.  He has multiple concerns.    Specifically, he notes that his bowels can be quite loose.  He will have frequent loose stools.  He cannot think of any specific food triggers.  He has not had obvious blood in his stool.  His most recent colonoscopy was in March 2019.  This did show colon polyps and he will be due again in 2022.    His blood pressure has been stable and he continues to take lisinopril.  He takes Protonix as well for reflux symptoms.  He  does have a wart on his foot and would like to discuss treatment options.  He also has had a lesion in the left axillary lesion.  This has not been changing.    Additionally, he has had some ringing in his ears but denies significant hearing loss.  He will follow-up with audiology if this persists.  His sleep can be somewhat poor at times as well.    Review of systems otherwise negative for headache, visual changes, chest pain, or palpitations.    The following portions of the patient's history were reviewed and updated as appropriate: allergies, current medications, past family history, past medical history, past social history, past surgical history and problem list. Medications have been reconciled    Review of Systems   A 12 point comprehensive review of systems was negative except as noted.      Current Outpatient Medications   Medication Sig Dispense Refill     cholecalciferol, vitamin D3, (VITAMIN D3) 2,000 unit cap Take by mouth.       inFLIXimab (REMICADE) 100 mg injection Infuse into a venous catheter. As directed       lisinopril (PRINIVIL,ZESTRIL) 30 MG tablet Take 1 tablet (30 mg total) by mouth daily. 90 tablet 3     pantoprazole (PROTONIX) 40 MG tablet Take 1 tablet (40 mg total) by mouth 2 (two) times a day. (Patient taking differently: Take 40 mg by mouth daily. ) 180 tablet 2     zinc 50 mg Tab Take by mouth.       fluticasone propionate (FLONASE) 50 mcg/actuation nasal spray USE 1 SPRAY INTO EACH NOSTRIL DAILY. 48 g 3     No current facility-administered medications for this visit.        Objective:      /78   Pulse 92   Resp 20   Wt (!) 242 lb 6.4 oz (110 kg)   BMI 38.54 kg/m        General appearance: alert, appears stated age and cooperative  Head: Normocephalic, without obvious abnormality, atraumatic  Eyes: conjunctivae/corneas clear. PERRL, EOM's intact.   Ears: normal TM's and external ear canals both ears  Nose: Nares normal. Septum midline. Mucosa normal. No drainage or sinus  tenderness.  Throat: lips, mucosa, and tongue normal; teeth and gums normal  Neck: no adenopathy, supple, symmetrical, trachea midline   Back: symmetric, no curvature. ROM normal. No CVA tenderness.  Lungs: clear to auscultation bilaterally  Heart: regular rate and rhythm, S1, S2 normal, no murmur, click, rub or gallop  Abdomen: soft, non-tender; bowel sounds normal; no masses,  no organomegaly  Extremities: extremities normal, atraumatic, no cyanosis or edema  Skin: Skin color, texture, turgor normal. No rashes or lesions  Lymph nodes: Cervical nodes normal.  Neurologic: Alert and oriented X 3         Recent Results (from the past 168 hour(s))   Basic Metabolic Panel   Result Value Ref Range    Sodium 142 136 - 145 mmol/L    Potassium 3.8 3.5 - 5.0 mmol/L    Chloride 105 98 - 107 mmol/L    CO2 26 22 - 31 mmol/L    Anion Gap, Calculation 11 5 - 18 mmol/L    Glucose 92 70 - 125 mg/dL    Calcium 9.2 8.5 - 10.5 mg/dL    BUN 12 8 - 22 mg/dL    Creatinine 1.07 0.70 - 1.30 mg/dL    GFR MDRD Af Amer >60 >60 mL/min/1.73m2    GFR MDRD Non Af Amer >60 >60 mL/min/1.73m2   Thyroid Cascade   Result Value Ref Range    TSH 1.67 0.30 - 5.00 uIU/mL          This note has been dictated using voice recognition software. Any grammatical or context distortions are unintentional and inherent to the software

## 2021-06-07 NOTE — TELEPHONE ENCOUNTER
RN cannot approve Refill Request    RN can NOT refill this medication pt reports is taking 40 mg daily.      Germania Noble, [facility] Triage/Med Refill 4/16/2020    Requested Prescriptions   Pending Prescriptions Disp Refills     pantoprazole (PROTONIX) 40 MG tablet [Pharmacy Med Name: PANTOPRAZOLE SOD DR 40 MG TAB] 180 tablet 2     Sig: TAKE 1 TABLET BY MOUTH TWICE A DAY       GI Medications Refill Protocol Passed - 4/16/2020  7:10 AM        Passed - PCP or prescribing provider visit in last 12 or next 3 months.     Last office visit with prescriber/PCP: 2/24/2020 Momo Hartley MD OR same dept: Visit date not found OR same specialty: Visit date not found  Last physical: 7/31/2018 Last MTM visit: Visit date not found   Next visit within 3 mo: Visit date not found  Next physical within 3 mo: Visit date not found  Prescriber OR PCP: Momo Hartley MD  Last diagnosis associated with med order: 1. Esophageal reflux  - pantoprazole (PROTONIX) 40 MG tablet [Pharmacy Med Name: PANTOPRAZOLE SOD DR 40 MG TAB]; TAKE 1 TABLET BY MOUTH TWICE A DAY  Dispense: 180 tablet; Refill: 2    If protocol passes may refill for 12 months if within 3 months of last provider visit (or a total of 15 months).

## 2021-06-07 NOTE — TELEPHONE ENCOUNTER
Refill Approved    Rx renewed per Medication Renewal Policy. Medication was last renewed on 12/9/18.    Germania Noble, [facility] Triage/Med Refill 4/6/2020     Requested Prescriptions   Pending Prescriptions Disp Refills     lisinopriL (PRINIVIL,ZESTRIL) 30 MG tablet [Pharmacy Med Name: LISINOPRIL 30 MG TABLET] 135 tablet 0     Sig: TAKE 1 & 1/2 TABLETS (45 MG TOTAL) BY MOUTH DAILY.       Ace Inhibitors Refill Protocol Passed - 4/4/2020 10:54 AM        Passed - PCP or prescribing provider visit in past 12 months       Last office visit with prescriber/PCP: 2/24/2020 Momo Hartley MD OR same dept: 2/24/2020 Momo Hartley MD OR same specialty: 2/24/2020 Momo Hartley MD  Last physical: 7/31/2018 Last MTM visit: Visit date not found   Next visit within 3 mo: Visit date not found  Next physical within 3 mo: Visit date not found  Prescriber OR PCP: Momo Hartley MD  Last diagnosis associated with med order: 1. Benign Essential Hypertension  - lisinopriL (PRINIVIL,ZESTRIL) 30 MG tablet [Pharmacy Med Name: LISINOPRIL 30 MG TABLET]; TAKE 1 & 1/2 TABLETS (45 MG TOTAL) BY MOUTH DAILY.  Dispense: 135 tablet; Refill: 0    If protocol passes may refill for 12 months if within 3 months of last provider visit (or a total of 15 months).             Passed - Serum Potassium in past 12 months     Lab Results   Component Value Date    Potassium 3.8 02/24/2020             Passed - Blood pressure filed in past 12 months     BP Readings from Last 1 Encounters:   02/24/20 132/78             Passed - Serum Creatinine in past 12 months     Creatinine   Date Value Ref Range Status   02/24/2020 1.07 0.70 - 1.30 mg/dL Final

## 2021-06-08 NOTE — PROGRESS NOTES
2/15/17I have called Prosper several times over the past 6 months.  This patient has not returned any of my messages.  I have mailed out an unreachable letter to the patient and I have discussed with the PCP.  At this time we will discontinue this patient from being active in clinic care coordination.

## 2021-06-08 NOTE — PROGRESS NOTES
SUBJECTIVE:    This is a 56-year-old male who presents to the clinic with a one-week history of a cough. He has had a harsh cough that generally has been nonproductive.  He feels he has not been able to mobilize the secretions. The cough is worse at night.  He does have some postnasal drainage. He has not had a fever or chills. He denies shortness of breath. He is a former smoker. His medical history is notable for a primary IGA nephritis. He has obstructive sleep apnea and has chronic rhinitis treated by rheumatology. Overall, his activity level has not been significantly diminished. However, he has not been sleeping well.      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     fluticasone (FLONASE) 50 mcg/actuation nasal spray USE ONE SPRAY IN EACH NOSTRIL TWICE DAILY     inFLIXimab (REMICADE) 100 mg injection Infuse into a venous catheter. As directed     lisinopril (PRINIVIL,ZESTRIL) 30 MG tablet Take 1.5 tablets (45 mg total) by mouth daily.     pantoprazole (PROTONIX) 40 MG tablet TAKE ONE TABLET BY MOUTH TWICE DAILY     zinc 50 mg Tab Take by mouth.     [DISCONTINUED] cycloSPORINE modified (GENGRAF) 25 MG capsule Take 1 capsule (25 mg total) by mouth 2 (two) times a day.     No current facility-administered medications on file prior to visit.        No Known Allergies         A 12 point comprehensive review of systems was negative except as noted.      OBJECTIVE:     Vitals:    01/20/17 1141   BP: 126/78   Pulse: 72   Resp: 20   Temp: 97.6  F (36.4  C)   SpO2: 97%       General:  Alert, not acutely distressed   Head:  Atraumatic, normocephalic  Ears:  TMs normal pearly-gray  Eyes:  PERRL, extraocular movements are intact  Nose:  Septum midline  Throat:  Oral mucosa moist, oropharynx clear  Neck:  Supple without adenopathy or thyromegaly   Cardiovascular: S1-S2 with regular rate and without murmur, rub, or gallop   Lungs:  Clear to auscultation bilaterally   Extremities: Without cyanosis or edema   Neuro:  CN II-XII appear intact        Laboratory Results:    Results for orders placed or performed in visit on 11/23/16   Culture, Urine   Result Value Ref Range    Culture No Growth    Urinalysis-UC if Indicated   Result Value Ref Range    Color, UA Yellow Colorless, Yellow, Straw, Light Yellow    Clarity, UA Clear Clear    Glucose, UA Negative Negative    Bilirubin, UA Negative Negative    Ketones, UA Negative Negative    Specific Gravity, UA 1.020 1.002 - 1.030    Blood, UA Moderate (!) Negative    pH, UA 5.5 4.5 - 8.0    Protein, UA 30 mg/dL (!) Negative mg/dL    Urobilinogen, UA 0.2 E.U./dL 0.2 E.U./dL, 1.0 E.U./dL    Nitrite, UA Negative Negative    Leukocytes, UA Negative Negative    RBC, UA 5-10 (!) None Seen, 0-2 hpf    Hyaline Casts, UA 0-5 0-5, None Seen lpf   Comprehensive Metabolic Panel   Result Value Ref Range    Sodium 141 136 - 145 mmol/L    Potassium 4.5 3.5 - 5.0 mmol/L    Chloride 105 98 - 107 mmol/L    CO2 28 22 - 31 mmol/L    Anion Gap, Calculation 8 5 - 18 mmol/L    Glucose 92 70 - 125 mg/dL    BUN 19 8 - 22 mg/dL    Creatinine 0.86 0.70 - 1.30 mg/dL    GFR MDRD Af Amer >60 >60 mL/min/1.73m2    GFR MDRD Non Af Amer >60 >60 mL/min/1.73m2    Bilirubin, Total 0.4 0.0 - 1.0 mg/dL    Calcium 9.7 8.5 - 10.5 mg/dL    Protein, Total 6.6 6.0 - 8.0 g/dL    Albumin 3.4 (L) 3.5 - 5.0 g/dL    Alkaline Phosphatase 73 45 - 120 U/L    AST 32 0 - 40 U/L    ALT 55 (H) 0 - 45 U/L   HM1 (CBC with Diff)   Result Value Ref Range    WBC 5.6 4.0 - 11.0 thou/uL    RBC 4.92 4.40 - 6.20 mill/uL     Hemoglobin 15.5 14.0 - 18.0 g/dL    Hematocrit 46.4 40.0 - 54.0 %    MCV 94 80 - 100 fL    MCH 31.4 27.0 - 34.0 pg    MCHC 33.3 32.0 - 36.0 g/dL    RDW 11.5 11.0 - 14.5 %    Platelets 308 140 - 440 thou/uL    MPV 6.5 (L) 7.0 - 10.0 fL    Neutrophils % 47 (L) 50 - 70 %    Lymphocytes % 40 20 - 40 %    Monocytes % 7 2 - 10 %    Eosinophils % 5 0 - 6 %    Basophils % 1 0 - 2 %    Neutrophils Absolute 2.6 2.0 - 7.7 thou/uL    Lymphocytes Absolute 2.2 0.8 - 4.4 thou/uL    Monocytes Absolute 0.4 0.0 - 0.9 thou/uL    Eosinophils Absolute 0.3 0.0 - 0.4 thou/uL    Basophils Absolute 0.1 0.0 - 0.2 thou/uL         ASSESSMENT AND PLAN:    1. Cough  Suspect viral bronchitis    Recommend symptomatic treatment with rest and fluids  Prescribed robitussin with codeine  Recommend mucinex  He may drink tea with honey  Follow-up if not improving        Momo Hartley MD      This note has been dictated and transcribed using voice recognition software.  Any grammatical or contextual distortions are unintentional and inherent to the software.

## 2021-06-09 NOTE — PROGRESS NOTES
Assessment/Plan:      Visit for Preoperative Exam.    Bilateral cataracts  Hypertension  Hyperlipidemia  Primary IgA nephritis  Obstructive sleep apnea    Patient approved for surgery with general or local anesthesia.   Follow-up as recommended by ophthalmology      Subjective:     Scheduled Procedure: Cataract - bilateral  Surgery Date:  4/12/17 and 4/26/17  Surgery Location:  [facility] - fax [phone]  Surgeon:  Dr. Michael Jenkins    This is a 56-year-old male who presents to the clinic for preoperative evaluation.  He has a history of bilateral cataracts and has had diminished vision.  As result, he is a candidate for corrective surgery.  His past medical history is notable for hypertension, hyperlipidemia, obstructive sleep apnea, and IgA nephropathy.  He generally has been feeling well except for some sore knees.  He has not had a recent fever or respiratory infection.  He is an appropriate candidate for the planned surgery.    Current Outpatient Prescriptions   Medication Sig Dispense Refill     cholecalciferol, vitamin D3, (VITAMIN D3) 2,000 unit cap Take by mouth.       inFLIXimab (REMICADE) 100 mg injection Infuse into a venous catheter. As directed       lisinopril (PRINIVIL,ZESTRIL) 30 MG tablet Take 1.5 tablets (45 mg total) by mouth daily. 135 tablet 2     pantoprazole (PROTONIX) 40 MG tablet TAKE ONE TABLET BY MOUTH TWICE DAILY 180 tablet 2     zinc 50 mg Tab Take by mouth.       No current facility-administered medications for this visit.        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    Social History     Social History     Marital status:      Spouse name: N/A     Number of children: N/A     Years of education: N/A     Occupational History     Not on file.     Social History Main Topics     Smoking status: Former Smoker     Packs/day: 0.50     Years: 10.00     Smokeless tobacco: Not on file      Comment: quit 1996     Alcohol use 0.6 oz/week     1 Shots of liquor per week     Drug use: Not on file     Sexual activity: Not on file     Other Topics Concern     Not on file     Social History Narrative       Past Surgical History:   Procedure Laterality Date     NV KNEE SCOPE,DIAGNOSTIC      Description: Arthroscopy Knee Right;  Recorded: 04/30/2008;       History of Present Illness  Recent Health  Fever: no  Chills: no  Fatigue: no  Chest Pain: no  Cough: no  Dyspnea: no  Urinary Frequency: no  Nausea: no  Vomiting: no  Diarrhea: no  Abdominal Pain: no  Easy Bruising: no  Lower Extremity Swelling: no  Poor Exercise Tolerance: no        Pertinent History    Prior Anesthesia: yes  Previous Anesthesia Reaction:  no  Diabetes: no  Cardiovascular Disease: no  Pulmonary Disease: no  Renal Disease: no  GI Disease: no  Sleep Apnea: yes  Thromboembolic Problems: no  Clotting Disorder: no  Bleeding Disorder: no  Transfusion Reaction: no  Impaired Immunity: no  Steroid use in the last 6 months: no  Frequent Aspirin use: no      Social history of there are no concerns regarding care after surgery    After surgery, the patient plans to recover at home with family.    Review of Systems  A 12 point comprehensive review of systems was negative except as noted.          Objective:         Vitals:    03/30/17 1447   BP: 138/74   Pulse: 100   Resp: 20   Temp: 98.3  F (36.8  C)   TempSrc: Oral   Weight: (!) 240 lb (108.9 kg)   Height: 5'  "6.5\" (1.689 m)       Physical Exam:    General Appearance: Alert, cooperative, no distress, appears stated age  Head: Normocephalic, without obvious abnormality, atraumatic  Eyes: PERRL, conjunctiva/corneas clear, EOM's intact  Ears: Normal TM's and external ear canals, both ears  Nose: Nares normal, septum midline,mucosa normal, no drainage  Throat: Lips, mucosa, and tongue normal; teeth and gums normal  Neck: Supple, symmetrical, trachea midline  Lungs: Clear to auscultation bilaterally, respirations unlabored  Heart: Regular rate and rhythm, S1 and S2 normal, no murmur, rub, or gallop,  Abdomen: Soft, non-tender  Musculoskeletal: Normal range of motion. No joint swelling or deformity.   Extremities: Extremities normal, atraumatic, no cyanosis or edema  Neurologic: He is alert. He has normal reflexes.   Psychiatric: He has a normal mood and affect.        "

## 2021-06-14 NOTE — PATIENT INSTRUCTIONS - HE
Take the prednisone as prescribed  Take zyrtec or allegra daily  Continue flonase and take daily  If not improving we can consider having you see an ENT specialist

## 2021-06-14 NOTE — TELEPHONE ENCOUNTER
It challenging to know what should be prescribed based on the limited information and without an examination.  He should be evaluated to see if he does have an infection.  Treatment will vary depending on what is seen with examination.  If they are okay with waiting I can check this tomorrow.  If symptoms are more severe than he should consider urgent care visit.  Please let me know if there is a problem.

## 2021-06-14 NOTE — TELEPHONE ENCOUNTER
Who is calling:  The patients wife,  Reason for Call:  Would like a prescription for antibiotics for the patient she feels he has a double ear infection  Date of last appointment with primary care:   Okay to leave a detailed message: Yes

## 2021-06-15 NOTE — PROGRESS NOTES
Assessment/ Plan     1. Plugged feeling in ear, bilateral  2. Dysfunction of both eustachian tubes    Likely secondary to eustachian tube dysfunction  Given the severity of symptoms will treat with a course of prednisone  Recommend consideration for an antihistamine such as Claritin or Zyrtec or Allegra  Continue fluticasone nasal spray  Suspect that this will improve over time  If having ongoing issues refer to ENT  - predniSONE (DELTASONE) 20 MG tablet; Take one PO two times a day x 4 days then one PO Qday x 4 days then 1/2 PO Qday x 4 days.  Dispense: 14 tablet; Refill: 0    3. Rheumatoid arthritis involving multiple sites, unspecified whether rheumatoid factor present (H)    Continue Remicade as prescribed by rheumatology    4.  Hypertension    His blood pressure is elevated today  He will continue lisinopril  Recommend limiting pseudoephedrine which can raise blood pressure  Continue to work on diet and exercise and follow-up for a recheck      Subjective:       Prosper Miller is a 60 y.o. male who presents to the clinic as his ears have felt plugged over the past 3 weeks.  He states that he has had a sense of pressure.  This can be quite uncomfortable.  It feels like he has had some postnasal drainage.  He denies fever or chills.  He has not had a loss of taste or smell.  He has taken Sudafed and has used to take his own nasal spray.  Denies cough or shortness of breath.    His medical history is notable for hypertension currently treated with lisinopril.  He has a history of rheumatoid arthritis managed by rheumatology.  He currently is treated with Remicade.  For reflux symptoms he continues to use pantoprazole.    He has not had chest pain or fever.     The following portions of the patient's history were reviewed and updated as appropriate: allergies, current medications, past family history, past medical history, past social history, past surgical history and problem list. Medications have been  "reconciled    Review of Systems   A 12 point comprehensive review of systems was negative except as noted.      Current Outpatient Medications   Medication Sig Dispense Refill     cholecalciferol, vitamin D3, (VITAMIN D3) 2,000 unit cap Take by mouth.       fluticasone propionate (FLONASE) 50 mcg/actuation nasal spray USE 1 SPRAY INTO EACH NOSTRIL DAILY. 48 g 3     inFLIXimab (REMICADE) 100 mg injection Infuse into a venous catheter. As directed       lisinopriL (PRINIVIL,ZESTRIL) 30 MG tablet TAKE 1 & 1/2 TABLETS (45 MG TOTAL) BY MOUTH DAILY. 135 tablet 3     pantoprazole (PROTONIX) 40 MG tablet Take 1 tablet (40 mg total) by mouth daily. 90 tablet 3     zinc 50 mg Tab Take by mouth.       predniSONE (DELTASONE) 20 MG tablet Take one PO two times a day x 4 days then one PO Qday x 4 days then 1/2 PO Qday x 4 days. 14 tablet 0     No current facility-administered medications for this visit.        Objective:      /86   Pulse 80   Temp 97.8  F (36.6  C) (Oral)   Ht 5' 6.5\" (1.689 m)   Wt (!) 245 lb (111.1 kg)   BMI 38.95 kg/m      General appearance: alert, appears stated age   Head: normocephalic, without obvious abnormality, atraumatic  Eyes: conjunctivae/corneas clear. PERRL, EOM's intact.   Ears: normal TM's and external ear canals both ears  Nose: nares normal. Septum midline. Mucosa normal.  Heart: regular rate and rhythm, S1, S2 normal, no murmur, click, rub or gallop  Abdomen: soft, non-tender; no masses,  no organomegaly  Lymph nodes: Cervical nodes normal.  Neurologic: Alert and oriented X 3           No results found for this or any previous visit (from the past 168 hour(s)).       This note has been dictated using voice recognition software. Any grammatical or context distortions are unintentional and inherent to the software    Momo Hartley MD    "

## 2021-06-16 NOTE — TELEPHONE ENCOUNTER
Refill Approved    Rx renewed per Medication Renewal Policy. Medication was last renewed on 4/17/20.    Pradeep Shabazz, [facility] Connection Triage/Med Refill 4/14/2021     Requested Prescriptions   Pending Prescriptions Disp Refills     pantoprazole (PROTONIX) 40 MG tablet [Pharmacy Med Name: PANTOPRAZOLE SOD DR 40 MG TAB] 90 tablet 3     Sig: TAKE 1 TABLET BY MOUTH EVERY DAY       GI Medications Refill Protocol Passed - 4/13/2021 12:29 AM        Passed - PCP or prescribing provider visit in last 12 or next 3 months.     Last office visit with prescriber/PCP: 1/26/2021 Momo Hartley MD OR same dept: Visit date not found OR same specialty: Visit date not found  Last physical: 7/31/2018 Last MTM visit: Visit date not found   Next visit within 3 mo: Visit date not found  Next physical within 3 mo: Visit date not found  Prescriber OR PCP: Momo Hartley MD  Last diagnosis associated with med order: 1. Esophageal reflux  - pantoprazole (PROTONIX) 40 MG tablet [Pharmacy Med Name: PANTOPRAZOLE SOD DR 40 MG TAB]; TAKE 1 TABLET BY MOUTH EVERY DAY  Dispense: 90 tablet; Refill: 3    If protocol passes may refill for 12 months if within 3 months of last provider visit (or a total of 15 months).

## 2021-06-16 NOTE — TELEPHONE ENCOUNTER
Refill Approved    Rx renewed per Medication Renewal Policy. Medication was last renewed on 2/24/20.    Pradeep Shabazz, [facility] Connection Triage/Med Refill 4/9/2021     Requested Prescriptions   Pending Prescriptions Disp Refills     fluticasone propionate (FLONASE) 50 mcg/actuation nasal spray [Pharmacy Med Name: FLUTICASONE PROP 50 MCG SPRAY] 16 g 3     Sig: USE 1 SPRAY INTO EACH NOSTRIL DAILY.       Nasal Steroid Refill Protocol Passed - 4/8/2021 12:29 AM        Passed - Patient has had office visit/physical in last 2 years     Last office visit with prescriber/PCP: 1/26/2021 OR same dept: Visit date not found OR same specialty: 1/26/2021 Momo Hartley MD Last physical: Visit date not found Last MTM visit: Visit date not found    Next appt within 3 mo: Visit date not found  Next physical within 3 mo: Visit date not found  Prescriber OR PCP: Momo Hartley MD  Last diagnosis associated with med order: 1. Chronic rhinitis  - fluticasone propionate (FLONASE) 50 mcg/actuation nasal spray [Pharmacy Med Name: FLUTICASONE PROP 50 MCG SPRAY]; USE 1 SPRAY INTO EACH NOSTRIL DAILY.  Dispense: 16 g; Refill: 3     If protocol passes may refill for 12 months if within 3 months of last provider visit (or a total of 15 months).

## 2021-06-16 NOTE — TELEPHONE ENCOUNTER
RN cannot approve Refill Request    RN can NOT refill this medication PCP messaged that patient is overdue for Labs. Last office visit: 1/26/2021 Momo Hartley MD Last Physical: 7/31/2018 Last MTM visit: Visit date not found Last visit same specialty: 1/26/2021 Momo Hartley MD.  Next visit within 3 mo: Visit date not found  Next physical within 3 mo: Visit date not found      Genie Rodriguez, [facility] Triage/Med Refill 4/14/2021    Requested Prescriptions   Pending Prescriptions Disp Refills     lisinopriL (PRINIVIL,ZESTRIL) 30 MG tablet [Pharmacy Med Name: LISINOPRIL 30 MG TABLET] 135 tablet 3     Sig: TAKE 1 & 1/2 TABLETS (45 MG TOTAL) BY MOUTH DAILY.       Ace Inhibitors Refill Protocol Failed - 4/14/2021 12:29 AM        Failed - Serum Potassium in past 12 months     No results found for: LN-POTASSIUM          Failed - Serum Creatinine in past 12 months     Creatinine   Date Value Ref Range Status   02/24/2020 1.07 0.70 - 1.30 mg/dL Final             Passed - PCP or prescribing provider visit in past 12 months       Last office visit with prescriber/PCP: 1/26/2021 Momo Hartley MD OR same dept: Visit date not found OR same specialty: 1/26/2021 Momo Hartley MD  Last physical: 7/31/2018 Last MTM visit: Visit date not found   Next visit within 3 mo: Visit date not found  Next physical within 3 mo: Visit date not found  Prescriber OR PCP: Momo Hartley MD  Last diagnosis associated with med order: 1. Benign Essential Hypertension  - lisinopriL (PRINIVIL,ZESTRIL) 30 MG tablet [Pharmacy Med Name: LISINOPRIL 30 MG TABLET]; TAKE 1 & 1/2 TABLETS (45 MG TOTAL) BY MOUTH DAILY.  Dispense: 135 tablet; Refill: 3    If protocol passes may refill for 12 months if within 3 months of last provider visit (or a total of 15 months).             Passed - Blood pressure filed in past 12 months     BP Readings from Last 1 Encounters:   01/26/21 145/86

## 2021-06-18 NOTE — LETTER
Letter by Momo Hartley MD at      Author: Momo Hartley MD Service: -- Author Type: --    Filed:  Encounter Date: 2/8/2019 Status: (Other)       Prosper Miller  [address] [place] [id number]                 February 8, 2019       Dear  Angela,        Recently, Dr. Hartley referred you to:      [facility]  Attached is the order placed by your provider.   If you feel the need to see this specialty office, please call their  Office at your earliest convenience for assistance in scheduling an appointment.    [phone]       Please call with questions or contact us using [employer].      Sincerely,        Electronically signed by Momo Hartley MD

## 2021-06-19 NOTE — PROGRESS NOTES
Preoperative Exam    Scheduled Procedure: Left total knee replacement  Surgery Date:  8/6/18  Surgery Location: [facility], fax [phone]    Surgeon:  Dr. THAD Nur    Assessment/Plan:     1. Preoperative examination  2. Primary osteoarthritis of left knee    ECG reveals normal sinus rhythm without acute changes  ECG was personally reviewed    Check laboratory testing as noted  - Electrocardiogram Perform - Clinic  - Basic Metabolic Panel  - HM2(CBC w/o Differential)  - INR    Patient has tolerated anesthesia previously  Patient is able to tolerate at least 4 METS of physical activity without difficulty  Recommend holding NSAIDs prior to surgery  Resume lisinopril monitor blood pressure  Follow-up as recommended by orthopedics      3. Benign Essential Hypertension    Blood pressure is elevated today though he has not been taking lisinopril  Resume lisinopril  Monitor blood pressure  - lisinopril (PRINIVIL,ZESTRIL) 30 MG tablet; Take 1.5 tablets (45 mg total) by mouth daily.  Dispense: 135 tablet; Refill: 2    4. JALEESA (obstructive sleep apnea)    Continue CPAP    5. Rheumatoid arthritis (H)    Continue Remicade  Follow-up with rheumatology        Surgical Procedure Risk: Low (reported cardiac risk generally < 1%)  Have you had prior anesthesia?: Yes  Have you or any family members had a previous anesthesia reaction:  No  Do you or any family members have a history of a clotting or bleeding disorder?: No  Cardiac Risk Assessment: no increased risk for major cardiac complications    Patient approved for surgery with general or local anesthesia.    Please Note:  Patient uses a CPAP machine.    Functional Status: Independent  Patient plans to recover at home with family.     Subjective:      Prosper Miller is a 58 y.o. male who presents for a preoperative consultation.  He has a known history of bilateral knee osteoarthritis which has not responded to conservative measures.  As  a result, he is a candidate for left total knee arthroplasty initially.  His medical history is otherwise notable for rheumatoid arthritis followed by rheumatology.  He is treated with Remicade.  He has a history of obstructive sleep apnea and does require CPAP.  Finally, he has history of hypertension and has been treated previously with lisinopril but has not been taking it recently.    In general, he otherwise feels well.  He is able to tolerate at least 4 METS physical activity without difficulty.  He denies recent respiratory infection, chest pain, palpitations, bowel changes, or other concerns.  He has typically tolerated anesthesia previously without difficulty.  He is an appropriate candidate for the planned procedure.    All other systems reviewed and are negative, other than those listed in the HPI.    Pertinent History  Do you have difficulty breathing or chest pain after walking up a flight of stairs: No  History of obstructive sleep apnea: Yes: Uses CPAP  Steroid use in the last 6 months: No  Frequent Aspirin/NSAID use: No  Prior Blood Transfusion: No  Prior Blood Transfusion Reaction: No  If for some reason prior to, during or after the procedure, if it is medically indicated, would you be willing to have a blood transfusion?:  There is no transfusion refusal.    Current Outpatient Prescriptions   Medication Sig Dispense Refill     cholecalciferol, vitamin D3, (VITAMIN D3) 2,000 unit cap Take by mouth.       fluticasone (FLONASE) 50 mcg/actuation nasal spray 1 spray into each nostril daily. (Patient taking differently: 1 spray into each nostril daily as needed. ) 16 g 3     inFLIXimab (REMICADE) 100 mg injection Infuse into a venous catheter. As directed       pantoprazole (PROTONIX) 40 MG tablet Take 1 tablet (40 mg total) by mouth 2 (two) times a day. 180 tablet 2     zinc 50 mg Tab Take by mouth.       No current facility-administered medications for this visit.         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    Past Surgical History:   Procedure Laterality Date     MD KNEE SCOPE,DIAGNOSTIC      Description: Arthroscopy Knee Right;  Recorded: 04/30/2008;       Social History     Social History     Marital status:      Spouse name: N/A     Number of children: N/A     Years of education: N/A     Occupational History     Not on file.     Social History Main Topics     Smoking status: Former Smoker     Packs/day: 0.50     Years: 10.00     Smokeless tobacco: Never Used      Comment: quit 1996     Alcohol use 0.6 oz/week     1 Shots of liquor per week     Drug use: Not on file     Sexual activity: Not on file     Other Topics Concern     Not on file     Social History Narrative       Patient Care Team:  Momo Hartley MD as PCP - General (Family Medicine)  Brandon Zuniga MD as Physician (Nephrology)  Michael Jenkins MD as Physician (Ophthalmology)  Williams Harden MD as Physician (Rheumatology)  Curtis Dee MD as Physician (Infectious Diseases)          Objective:     Vitals:    07/31/18 1517 07/31/18 1519   BP: 146/84 144/84   Pulse: 80    Resp: 16    Temp: 98.7  F (37.1  C)    TempSrc: Oral    Weight: (!) 240 lb (108.9 kg)    Height: 5' 6.5\" (1.689 m)          Physical Exam:  General appearance: alert, appears stated age and cooperative  Head: Normocephalic, without obvious abnormality, atraumatic  Eyes: conjunctivae/corneas clear. PERRL, EOM's intact.   Ears: normal TM's and external ear canals both ears  Nose: Nares normal. Septum midline. Mucosa normal. No drainage or sinus tenderness.  Neck: no adenopathy, supple, " symmetrical  Lungs: clear to auscultation bilaterally  Heart: regular rate and rhythm, S1, S2 normal, no murmur, click, rub or gallop  Abdomen: soft, non-tender;  Extremities: extremities normal, atraumatic, no cyanosis or edema  Lymph nodes: Cervical nodes normal.  Neurologic: Alert and oriented X 3      There are no Patient Instructions on file for this visit.    EKG: Normal sinus rhythm without acute changes.  This was personally reviewed and will be reviewed by cardiology    Labs:  Recent Results (from the past 48 hour(s))   Electrocardiogram Perform - Clinic    Collection Time: 07/31/18  3:29 PM   Result Value Ref Range    SYSTOLIC BLOOD PRESSURE  mmHg    DIASTOLIC BLOOD PRESSURE  mmHg    VENTRICULAR RATE 90 BPM    ATRIAL RATE 90 BPM    P-R INTERVAL 138 ms    QRS DURATION 82 ms    Q-T INTERVAL 348 ms    QTC CALCULATION (BEZET) 425 ms    P Axis 39 degrees    R AXIS -9 degrees    T AXIS 31 degrees    MUSE DIAGNOSIS       Normal sinus rhythm  Normal ECG  No previous ECGs available     HM2(CBC w/o Differential)    Collection Time: 07/31/18  3:55 PM   Result Value Ref Range    WBC 6.7 4.0 - 11.0 thou/uL    RBC 4.66 4.40 - 6.20 mill/uL    Hemoglobin 14.8 14.0 - 18.0 g/dL    Hematocrit 44.6 40.0 - 54.0 %    MCV 96 80 - 100 fL    MCH 31.8 27.0 - 34.0 pg    MCHC 33.2 32.0 - 36.0 g/dL    RDW 11.6 11.0 - 14.5 %    Platelets 306 140 - 440 thou/uL    MPV 7.2 7.0 - 10.0 fL        Immunization History   Administered Date(s) Administered     Hep A, historic 04/30/2008, 11/11/2008     Influenza [id number], 12/18/2009     Influenza, seasonal,quad inj 6-35 mos 11/11/2008, 12/18/2009     Td,adult,historic,unspecified 03/28/2005     Tdap 03/09/2011           Electronically signed by Momo Hartley MD 07/31/18 3:20 PM

## 2021-06-20 NOTE — LETTER
Letter by Momo Hartley MD at      Author: Momo Hartley MD Service: -- Author Type: --    Filed:  Encounter Date: 2/29/2020 Status: (Other)         Prosper Miller  [address] [place] [id number]             February 29, 2020         Dear Mr. Miller,    Below are the results from your recent visit:    Resulted Orders   Basic Metabolic Panel   Result Value Ref Range    Sodium 142 136 - 145 mmol/L    Potassium 3.8 3.5 - 5.0 mmol/L    Chloride 105 98 - 107 mmol/L    CO2 26 22 - 31 mmol/L    Anion Gap, Calculation 11 5 - 18 mmol/L    Glucose 92 70 - 125 mg/dL    Calcium 9.2 8.5 - 10.5 mg/dL    BUN 12 8 - 22 mg/dL    Creatinine 1.07 0.70 - 1.30 mg/dL    GFR MDRD Af Amer >60 >60 mL/min/1.73m2    GFR MDRD Non Af Amer >60 >60 mL/min/1.73m2    Narrative    Fasting Glucose reference range is 70-99 mg/dL per  American Diabetes Association (ADA) guidelines.   Thyroid Cascade   Result Value Ref Range    TSH 1.67 0.30 - 5.00 uIU/mL       Sam,    Here are your test results which are normal.  Your kidney test and your thyroid test are within normal limits.  Very good.    It was great to see you.  Please let me know if you have questions or concerns.  Say helfred to Maruim!    Please call with questions or contact us using [employer].    Sincerely,        Electronically signed by Momo Hartley MD

## 2021-06-20 NOTE — LETTER
Letter by Momo Hartley MD at      Author: Momo Hartley MD Service: -- Author Type: --    Filed:  Encounter Date: 3/11/2020 Status: (Other)        [facility] PATIENT ACCESS  [address] [facility] [id number] [phone]         Prosper Miller  [address] [facility] [address]        03/11/20    Dear Prosper Miller,     At [facility] we care about your health and well-being. Your primary care provider is committed to ensuring you receive high quality care and has chosen a network of specialists to assist in providing that care. Recently, Dr. Hartley referred you to Radiology for specialty care.      Please call [phone] at your earliest convenience for assistance in scheduling an appointment.  If you have already scheduled this appointment, please disregard this notice.  Thank you for choosing [facility] for your healthcare needs.       Sincerely,       [facility] Specialty Scheduling

## 2021-06-21 ENCOUNTER — HOME INFUSION (PRE-WILLOW HOME INFUSION) (OUTPATIENT)
Dept: PHARMACY | Facility: CLINIC | Age: 61
End: 2021-06-21

## 2021-06-22 NOTE — PROGRESS NOTES
Assessment/ Plan     1. Dizziness  2. Tinnitus, bilateral    Refer to audiology initially for hearing examination  Follow-up with orthopedics following the audiogram  - Ambulatory referral to Audiology  - Ambulatory referral to ENT    He may consider fluticasone nasal spray as he may have eustachian tube dysfunction  He may take meclizine as needed  Follow-up with audiology and ENT as noted    3. Rheumatoid arthritis involving multiple sites, unspecified rheumatoid factor presence (H)    Continue Remicade per rheumatology    4. Colon cancer screening    Refer for colonoscopy  - Ambulatory referral for Colonoscopy    5. Benign Essential Hypertension    He has been taking lisinopril 30 mg daily  Continue to monitor blood pressure      6. Morbid obesity (H) comorbid conditions include obstructive sleep apnea, hypertension, and hyperlipidemia    Recommend that he work on weight loss  The following high BMI interventions were performed this visit: encouragement to exercise and weight monitoring     7.  Rib fractures, right 10th and 11th ribs    Recommend conservative treatment  Anticipate healing over time    25 minutes were spent with the patient and greater than 50% of the time was spent in face to face counseling and coordination of care        Subjective:       Prosper Miller is a 58 y.o. male who presents to the clinic to review multiple medical concerns.  Recently, he has noticed that he has had a sense of ringing in his right ear.  He states that his right ear will feel plugged at times.  He has had occasional episodes of feeling nauseated as well as increased dizziness.  There are times where he can feel cold and clammy then he will feel warm.  He denies facial pain or pressure.  The dizziness has been more of an issue over the past 1-2 weeks.    His medical history is notable for obstructive sleep apnea, rheumatoid arthritis, hypertension, and hyperlipidemia.  He is treated with Remicade and has been  consistent in his treatment.  For hypertension he takes lisinopril 30 mg a day.  At one point he was advised to increase this to 1-1/2 but only takes this once a day.  In August 2018 he underwent a left total knee arthroplasty.  He tolerated the procedure well and generally has been improving.    He does feel achy.  He reports he did have a fall recently and had x-rays that confirmed rib fractures of the right 10th and 11th ribs.  He denies shortness of breath but does have some pain with inspiration.  As noted, he has had osteoarthritis of his knees.  His energy level can be low at times.    The following portions of the patient's history were reviewed and updated as appropriate: allergies, current medications, past family history, past medical history, past social history, past surgical history and problem list. Medications have been reconciled    Review of Systems   A 12 point comprehensive review of systems was negative except as noted.      Current Outpatient Medications   Medication Sig Dispense Refill     cholecalciferol, vitamin D3, (VITAMIN D3) 2,000 unit cap Take by mouth.       fluticasone (FLONASE) 50 mcg/actuation nasal spray 1 spray into each nostril daily as needed. 16 g 0     inFLIXimab (REMICADE) 100 mg injection Infuse into a venous catheter. As directed       lisinopril (PRINIVIL,ZESTRIL) 30 MG tablet Take 1.5 tablets (45 mg total) by mouth daily. (Patient taking differently: Take 30 mg by mouth daily .      ) 135 tablet 2     pantoprazole (PROTONIX) 40 MG tablet Take 1 tablet (40 mg total) by mouth 2 (two) times a day. 180 tablet 2     zinc 50 mg Tab Take by mouth.       No current facility-administered medications for this visit.        Objective:      /84   Pulse (!) 108   Temp 98.4  F (36.9  C) (Oral)   Wt (!) 235 lb (106.6 kg)   BMI 37.36 kg/m        General appearance: alert, appears stated age and cooperative  Head: Normocephalic, without obvious abnormality, atraumatic  Eyes:  conjunctivae/corneas clear. PERRL, EOM's intact.   Ears: normal TM's and external ear canals both ears  Nose: Nares normal. Septum midline. Mucosa normal. No drainage or sinus tenderness.  Throat: lips, mucosa, and tongue normal; teeth and gums normal  Neck: no adenopathy, supple, symmetrical, trachea midline and thyroid not enlarged, symmetric, no tenderness/mass/nodules  Lungs: clear to auscultation bilaterally  Heart: regular rate and rhythm, S1, S2 normal, no murmur, click, rub or gallop  Chest: There is some tenderness to palpation in the right posterior rib area where he had a previous fracture  There is no obvious crepitus or step-off  Extremities: extremities normal, atraumatic, no cyanosis or edema  Skin: Skin color, texture, turgor normal. No rashes or lesions  Lymph nodes: Cervical nodes normal.  Neurologic: Alert and oriented X 3         No results found for this or any previous visit (from the past 168 hour(s)).       This note has been dictated using voice recognition software. Any grammatical or context distortions are unintentional and inherent to the software

## 2021-07-01 ENCOUNTER — HOME INFUSION (PRE-WILLOW HOME INFUSION) (OUTPATIENT)
Dept: PHARMACY | Facility: CLINIC | Age: 61
End: 2021-07-01

## 2021-07-02 ENCOUNTER — DOCUMENTATION ONLY (OUTPATIENT)
Dept: PHARMACY | Facility: CLINIC | Age: 61
End: 2021-07-02

## 2021-07-02 NOTE — PROGRESS NOTES
Late Entry:  Inflectra Visit 7/1/21    Skilled Nurse visit in the  patient home to administer inflectra.  No recent elevated temperature, fever, chills, productive cough, coughing for 3 weeks or longer or hemoptysis, abnormal vital signs, night sweats, chest pain. No  decrease in your appetite, unexplained weight loss or fatigue.  No other new onset medical symptoms.  Current weight 235 lb per patient.  PIV placed left hand, 1 attempt.  Pre medicated  - NA. Labs drawn NA. Infusion completed without complication or reaction. Pt reports therapy is effective in managing symptoms related to therapy.    Leeanne Davison RN  [facility]  [email or web address]

## 2021-07-02 NOTE — PROGRESS NOTES
This is a recent snapshot of the patient's [facility] medical record.  For current drug dose and complete information and questions, call [phone] or In Basket pool, fv home infusion ([id number])  CSN Number:  [id number]

## 2021-07-09 NOTE — PROGRESS NOTES
This is a recent snapshot of the patient's [facility] medical record.  For current drug dose and complete information and questions, call [phone] or In Basket pool, fv home infusion ([id number])  CSN Number:  [id number]

## 2021-07-15 ENCOUNTER — TRANSFERRED RECORDS (OUTPATIENT)
Dept: HEALTH INFORMATION MANAGEMENT | Facility: CLINIC | Age: 61
End: 2021-07-15

## 2021-07-16 ENCOUNTER — TELEPHONE (OUTPATIENT)
Dept: RHEUMATOLOGY | Facility: CLINIC | Age: 61
End: 2021-07-16

## 2021-07-16 ENCOUNTER — TRANSFERRED RECORDS (OUTPATIENT)
Dept: HEALTH INFORMATION MANAGEMENT | Facility: CLINIC | Age: 61
End: 2021-07-16

## 2021-07-16 NOTE — TELEPHONE ENCOUNTER
Called and spoke with patient. Patient developed a flare of iritis. Patient said he saw Dr. Aguillon at [facility]. Dr. Galindo sent Dr. Segundo an email and is wondering what Dr. Segundo's thought are. Additionally patient said he tried to stretch out his Inflectra infusions to every 8 weeks however patient does not feel this is working. Patient would like to try to change frequency to every 7 weeks. I advised patient that a message would be sent to Dr. Segundo. Patient does not need a call back, he just wants Dr. Segundo and Dr. Aguillon to talk.    Skip ISSA RN....7/16/2021 1:39 PM

## 2021-07-16 NOTE — TELEPHONE ENCOUNTER
Reason for Call:  Other     Detailed comments: Would like to speak with you regarding health conditions. Did see his eye provider and they are looking for more clarification. [facility].    Phone Number Patient can be reached at: Home number on file [phone] (home)    Best Time:     Can we leave a detailed message on this number? YES    Call taken on 7/16/2021 at 8:11 AM by Batsheva Bonner

## 2021-07-30 ENCOUNTER — TRANSFERRED RECORDS (OUTPATIENT)
Dept: HEALTH INFORMATION MANAGEMENT | Facility: CLINIC | Age: 61
End: 2021-07-30

## 2021-07-30 NOTE — TELEPHONE ENCOUNTER
I have left a message with Dr. Galindo's staff with my number to call back at.     Alex Segundo MD  7/30/2021 3:41 PM

## 2021-08-02 NOTE — TELEPHONE ENCOUNTER
Late note: I spoke with Dr. Galindo on Friday, 7/30/2021.  Flare of inflammatory eye disease and already responding well to oral steroids.  He asked that Remicade infusion frequency not be extended in an effort to keep his eye disease controlled.  This is the first flare he has had in a couple years so hopefully will be controlled with this course of steroids.  If he has more frequent recurrence then DMARD change could be done by adding a conventional DMARD.    Alex Segundo MD  8/2/2021 4:27 PM

## 2021-08-11 ENCOUNTER — HOME INFUSION (PRE-WILLOW HOME INFUSION) (OUTPATIENT)
Dept: PHARMACY | Facility: CLINIC | Age: 61
End: 2021-08-11

## 2021-08-20 ENCOUNTER — DOCUMENTATION ONLY (OUTPATIENT)
Dept: PHARMACY | Facility: CLINIC | Age: 61
End: 2021-08-20

## 2021-08-20 ENCOUNTER — HOME INFUSION (PRE-WILLOW HOME INFUSION) (OUTPATIENT)
Dept: PHARMACY | Facility: CLINIC | Age: 61
End: 2021-08-20

## 2021-08-20 NOTE — PROGRESS NOTES
This is a recent snapshot of the patient's [facility] medical record.  For current drug dose and complete information and questions, call [phone] or In Basket pool, fv home infusion ([id number])  CSN Number:  [id number]

## 2021-08-20 NOTE — PROGRESS NOTES
Skilled Nurse visit in the  patient home to administer Inflectra.  No recent elevated temperature, fever, chills, productive cough, coughing for 3 weeks or longer or hemoptysis, abnormal vital signs, night sweats, chest pain. No  decrease in your appetite, unexplained weight loss or fatigue.  No other new onset medical symptoms.  Current weight 235 lbs.  PIV placed left hand, 1 attempt.  Infusion completed without complication or reaction. Pt reports therapy is helping in managing symptoms related to therapy.

## 2021-08-23 NOTE — PROGRESS NOTES
This is a recent snapshot of the patient's [facility] medical record.  For current drug dose and complete information and questions, call [phone] or In [facility] pool, fv home infusion ([id number])  CSN Number:  [id number]

## 2021-08-30 ENCOUNTER — OFFICE VISIT (OUTPATIENT)
Dept: RHEUMATOLOGY | Facility: CLINIC | Age: 61
End: 2021-08-30
Payer: COMMERCIAL

## 2021-08-30 ENCOUNTER — LAB (OUTPATIENT)
Dept: LAB | Facility: CLINIC | Age: 61
End: 2021-08-30
Payer: COMMERCIAL

## 2021-08-30 VITALS
SYSTOLIC BLOOD PRESSURE: 127 MMHG | BODY MASS INDEX: 38.63 KG/M2 | WEIGHT: 243 LBS | DIASTOLIC BLOOD PRESSURE: 81 MMHG | HEART RATE: 79 BPM

## 2021-08-30 DIAGNOSIS — Z79.899 HIGH RISK MEDICATIONS (NOT ANTICOAGULANTS) LONG-TERM USE: ICD-10-CM

## 2021-08-30 DIAGNOSIS — H20.9 IRITIS: ICD-10-CM

## 2021-08-30 DIAGNOSIS — M06.9 RHEUMATOID ARTHRITIS INVOLVING MULTIPLE SITES, UNSPECIFIED WHETHER RHEUMATOID FACTOR PRESENT (H): ICD-10-CM

## 2021-08-30 DIAGNOSIS — M06.9 RHEUMATOID ARTHRITIS INVOLVING MULTIPLE SITES, UNSPECIFIED WHETHER RHEUMATOID FACTOR PRESENT (H): Primary | ICD-10-CM

## 2021-08-30 LAB
ALBUMIN SERPL-MCNC: 3.5 G/DL (ref 3.4–5)
ALP SERPL-CCNC: 51 U/L (ref 40–150)
ALT SERPL W P-5'-P-CCNC: 30 U/L (ref 0–70)
AST SERPL W P-5'-P-CCNC: 14 U/L (ref 0–45)
BASOPHILS # BLD AUTO: 0 10E3/UL (ref 0–0.2)
BASOPHILS NFR BLD AUTO: 0 %
BILIRUB DIRECT SERPL-MCNC: <0.1 MG/DL (ref 0–0.2)
BILIRUB SERPL-MCNC: 0.2 MG/DL (ref 0.2–1.3)
CREAT SERPL-MCNC: 2 MG/DL (ref 0.66–1.25)
CRP SERPL-MCNC: <2.9 MG/L (ref 0–8)
EOSINOPHIL # BLD AUTO: 0.1 10E3/UL (ref 0–0.7)
EOSINOPHIL NFR BLD AUTO: 1 %
ERYTHROCYTE [DISTWIDTH] IN BLOOD BY AUTOMATED COUNT: 13.6 % (ref 10–15)
ERYTHROCYTE [SEDIMENTATION RATE] IN BLOOD BY WESTERGREN METHOD: 32 MM/HR (ref 0–20)
FASTING STATUS PATIENT QL REPORTED: YES
GFR SERPL CREATININE-BSD FRML MDRD: 35 ML/MIN/1.73M2
GLUCOSE BLD-MCNC: 103 MG/DL (ref 70–99)
HCT VFR BLD AUTO: 34.3 % (ref 40–53)
HGB BLD-MCNC: 11.8 G/DL (ref 13.3–17.7)
LYMPHOCYTES # BLD AUTO: 2.7 10E3/UL (ref 0.8–5.3)
LYMPHOCYTES NFR BLD AUTO: 35 %
MCH RBC QN AUTO: 32.8 PG (ref 26.5–33)
MCHC RBC AUTO-ENTMCNC: 34.4 G/DL (ref 31.5–36.5)
MCV RBC AUTO: 95 FL (ref 78–100)
MONOCYTES # BLD AUTO: 0.4 10E3/UL (ref 0–1.3)
MONOCYTES NFR BLD AUTO: 6 %
NEUTROPHILS # BLD AUTO: 4.4 10E3/UL (ref 1.6–8.3)
NEUTROPHILS NFR BLD AUTO: 58 %
PLATELET # BLD AUTO: 313 10E3/UL (ref 150–450)
PROT SERPL-MCNC: 7.1 G/DL (ref 6.8–8.8)
RBC # BLD AUTO: 3.6 10E6/UL (ref 4.4–5.9)
WBC # BLD AUTO: 7.6 10E3/UL (ref 4–11)

## 2021-08-30 PROCEDURE — 86481 TB AG RESPONSE T-CELL SUSP: CPT

## 2021-08-30 PROCEDURE — 85652 RBC SED RATE AUTOMATED: CPT

## 2021-08-30 PROCEDURE — 80076 HEPATIC FUNCTION PANEL: CPT

## 2021-08-30 PROCEDURE — 82565 ASSAY OF CREATININE: CPT

## 2021-08-30 PROCEDURE — 86140 C-REACTIVE PROTEIN: CPT

## 2021-08-30 PROCEDURE — 85025 COMPLETE CBC W/AUTO DIFF WBC: CPT

## 2021-08-30 PROCEDURE — 36415 COLL VENOUS BLD VENIPUNCTURE: CPT

## 2021-08-30 PROCEDURE — 99214 OFFICE O/P EST MOD 30 MIN: CPT | Performed by: INTERNAL MEDICINE

## 2021-08-30 PROCEDURE — 82947 ASSAY GLUCOSE BLOOD QUANT: CPT

## 2021-08-30 RX ORDER — PREDNISONE 10 MG/1
15 TABLET ORAL DAILY
COMMUNITY
Start: 2021-08-02 | End: 2022-03-31

## 2021-08-30 NOTE — PROGRESS NOTES
Rheumatology Clinic Visit      Prosper Miller MRN# [id number]   YOB: 1960 Age: 61 year old      Date of visit: 8/30/21   PCP: Momo Gooden at [facility]  Ophthalmology: Dr. Michael Jenkins at the [facility]    Chief Complaint   Patient presents with:  RECHECK    Assessment and Plan     1.  Rheumatoid arthritis: Reportedly dx'd in 2006. Previously followed by Dr. Phillips. Established care with me on 3/26/2018.  No active synovitis on exam when first evaluated by me. Records documents seropositive rheumatoid arthritis but I cannot find records for CCP or rheumatoid factor.  Previously on leflunomide and cyclosporine.  Arthritis and was recently on frequency of every 8 weeks.  However, he had is doing well with Infliximab 10 mg/kg every 7-9 weeks, because of iritis recently and is currently on prednisone for this.  Previously spoke with Dr. Jenkins, ophthalmology, and because this is the first flare in approximately 5 years it is reasonable to treat this as a flare as he is doing and then if needed consider adding another steroid sparing DMARD such as methotrexate, or that if not tolerated or not effective then consider changing infliximab to a different TNF inhibitor.  We had discussed giving his infliximab at every 7 weeks.  He notes that because of traveling, there is a point where he will need to get infliximab a few days early so I will change the order for infliximab to be received every 6-7 weeks.  Chronic illness, stable.    -  Infliximab 10 mg/kg IV every 6-7 weeks (receiving via home infusion; typically over 7 weeks, but if needed to get sooner at a 6-week interval than that is okay from a rheumatology perspective)  - Labs now: CBC, creatinine, hepatic panel, ESR, CRP, QuantiFERON-TB gold plus     2.  Iritis: Flare of iritis in 2021.  See #1.  Continue following with ophthalmology.  Chronic illness, progressive.      3.  Osteoarthritis of the bilateral first  CMC joints: Mild symptoms. Stable. Monitor.     4.  Nonradiating chronic lower back pain, history: Improved with at-home exercises; he has refused physical therapy in the past.  Not an issue today.     5.  Creatinine elevation: Recheck creatinine today    - COVID-19: has received the Pfizer COVID-19 vaccine on 4/22/2021 and 5/13/2021    A single additional dose of Pfizer COVID-19 vaccine or Moderna COVID-19 vaccine is recommended at least 28 days after the completion of the 2-dose mRNA vaccine series for patients receiving any immunosuppressive or immunomodulatory therapy. Attempts should be made to match the additional mRNA dose type to the type given in the mRNA primary series; however, if that is not feasible, a booster dose with the alternative mRNA vaccine is permitted.    Based on our current understanding (and this may change over time as we learn more), medications should be adjusted as noted below, if disease activity allows:  - NSAIDs and Acetaminophen: hold for 24 hours prior to vaccination if able to do so    Total minutes spent in evaluation with patient, documentation, , and review of pertinent studies and chart notes: 20     Mr. Miller verbalized agreement with and understanding of the rational for the diagnosis and treatment plan.  All questions were answered to best of my ability and the patient's satisfaction. Mr. Miller was advised to contact the clinic with any questions that may arise after the clinic visit.      Thank you for involving me in the care of the patient    Return to clinic: 6 months      HPI   Prosper Miller is a 61 year old male with a past medical history significant for hypertension, hyperlipidemia, obstructive sleep apnea, IgA nephropathy, rheumatoid arthritis, and iritis who presents for follow-up of rheumatoid arthritis and immunosuppressive management for iritis.     Today, 8/30/2021: Arthritis is doing well.  Iritis is improved and he is currently on prednisone  10 mg daily with plans to continue tapering.  This is the first flare of iritis he has had in about 5 years.  He is following with ophthalmology for prednisone tapering and iritis management.  No joint pain or swelling.  No morning stiffness or gelling phenomenon.  States that he would like to continue getting infliximab every 7 weeks in an effort to keep his iritis controlled, and this was also discussed with his ophthalmologist.  He notes that he will need to get infliximab about 1 week early at most because of travel plans.    Denies fevers, chills, nausea, vomiting, constipation. No abdominal pain. No chest pain/pressure, palpitations, or shortness of breath. No LE swelling. No neck pain. No oral or nasal sores.  No rash.     Tobacco: Former smoker  EtOH: Weekly  Drugs: None  Occupation:     ROS   12 point review of system was completed and negative except as noted in the HPI     Active Problem List     Patient Active Problem List   Diagnosis     Iritis     Rheumatoid arthritis of hand, unspecified laterality, unspecified rheumatoid factor presence     Rheumatoid arthritis involving multiple sites, unspecified rheumatoid factor presence     Past Medical History   History reviewed. No pertinent past medical history.  Past Surgical History     Past Surgical History:   Procedure Laterality Date     EYE SURGERY       HC KNEE SCOPE, DIAGNOSTIC      Description: Arthroscopy Knee Right;  Recorded: 04/30/2008;     JOINT REPLACEMENT       OTHER SURGICAL HISTORY Left     left knee arthroplasty     OTHER SURGICAL HISTORY Bilateral     Cataract surgery     Allergy   No Known Allergies  Current Medication List     Current Outpatient Medications   Medication Sig     Cholecalciferol (VITAMIN D3) 2000 UNITS CAPS      EQL NATURAL ZINC 50 MG TABS      fluticasone (FLONASE) 50 MCG/ACT spray USE ONE SPRAY IN EACH NOSTRIL TWICE DAILY     inFLIXimab (REMICADE) 100 MG injection      lisinopril (PRINIVIL,ZESTRIL) 30 MG  "tablet Take 45 mg by mouth     pantoprazole (PROTONIX) 40 MG EC tablet TAKE ONE TABLET BY MOUTH TWICE DAILY     predniSONE (DELTASONE) 10 MG tablet Take 10 mg by mouth daily     oxyCODONE IR (ROXICODONE) 5 MG tablet Take 1 tablet (5 mg) by mouth every 6 hours as needed for pain (Patient not taking: Reported on 3/8/2021)     No current facility-administered medications for this visit.     Social History   See HPI    Family History     Family History   Problem Relation Age of Onset     Cancer Father      Bone Cancer Father         Physical Exam     Temp Readings from Last 3 Encounters:   01/26/21 97.8  F (36.6  C) (Oral)   02/14/20 97  F (36.1  C) (Oral)   12/20/19 95.5  F (35.3  C) (Oral)     BP Readings from Last 5 Encounters:   08/30/21 127/81   01/26/21 (!) 145/86   03/09/20 (!) 145/85   02/24/20 132/78   02/14/20 124/82     Pulse Readings from Last 1 Encounters:   08/30/21 79     Resp Readings from Last 1 Encounters:   02/24/20 20     Estimated body mass index is 38.63 kg/m  as calculated from the following:    Height as of 1/26/21: 1.689 m (5' 6.5\").    Weight as of this encounter: 110.2 kg (243 lb).      GEN: NAD.  HEENT:  Anicteric, noninjected sclera. No obvious external lesions of the ear and nose. Hearing intact.  CV: S1, S2. RRR. No m/r/g  PULM: No increased work of breathing. CTA bilaterally   MSK: MCPs, PIPs, DIPs without swelling or tenderness to palpation.  Wrists without swelling or tenderness to palpation.  Elbows and shoulders without swelling or tenderness to palpation. Knees, ankles, and MTPs without swelling or tenderness to palpation.    SKIN: No rash or jaundice seen  PSYCH: Alert. Appropriate.        Labs / Imaging (select studies)       CBC  Recent Labs   Lab Test 03/12/21  1310 03/09/20  0812 09/16/19  0804 03/12/19  0849   WBC 3.8* 6.6 5.7 5.6   RBC 4.06* 4.76 4.50 4.88   HGB 12.9* 15.2 14.5 15.7   HCT 36.7* 44.5 42.3 44.5   MCV 90 94 94 91   RDW 12.8 14.0 12.7 13.1    275 302 343 "   MCH 31.8 31.9 32.2 32.2   MCHC 35.1 34.2 34.3 35.3   NEUTROPHIL  --  43.8 40.9 37.6   LYMPH  --  43.4 43.2 47.1   MONOCYTE  --  6.7 9.1 8.9   EOSINOPHIL  --  5.6 6.3 5.5   BASOPHIL  --  0.5 0.5 0.9   ANEU  --  2.9 2.3 2.1   ALYM  --  2.9 2.5 2.6   MICHAEL  --  0.4 0.5 0.5   AEOS  --  0.4 0.4 0.3   ABAS  --  0.0 0.0 0.1     CMP  Recent Labs   Lab Test 03/12/21  1310 03/09/20  0812 02/24/20  1634 09/16/19  0804 07/31/18  1555 03/26/18  1540 09/13/17  1542   NA  --   --  142  --  143  --  142   POTASSIUM  --   --  3.8  --  4.5  --  4.2   CHLORIDE  --   --  105  --  106  --  107   CO2  --   --  26  --  27  --  28   ANIONGAP  --   --  11  --  10  --  7   GLC  --   --  92  --  95  --  95   BUN  --   --  12  --  18  --  18   CR 1.52* 1.05 1.07 1.06 1.17   < > 1.10   GFRESTIMATED 49* 77 >60 76 >60   < > 69   GFRESTBLACK 57* 89 >60 88 >60   < > 83   ERNESTINA  --   --  9.2  --  9.9  --  9.5   BILITOTAL 0.3 0.3  --  0.3  --   --  0.4   ALBUMIN 3.2* 3.5  --  3.6  --   --  3.8   PROTTOTAL 6.9 7.7  --  7.7  --   --  7.7   ALKPHOS 70 69  --  72  --   --  66   AST 28 21  --  24  --   --  25   ALT 30 37  --  36  --   --  45    < > = values in this interval not displayed.     Calcium/VitaminD  Recent Labs   Lab Test 02/24/20  1634 07/31/18  1555 09/13/17  1542   ERNESTINA 9.2 9.9 9.5     ESR/CRP  Recent Labs   Lab Test 03/12/21  1310 03/09/20  0812 09/16/19  0804   SED 31* 15 30*   CRP 4.2 <2.9 3.9     Lipid Panel  Recent Labs   Lab Test 11/26/14  1022   CHOL 254*   TRIG 150*   HDL 77   *     Hepatitis B  Recent Labs   Lab Test 03/26/18  1540   HBCAB Nonreactive   HEPBANG Nonreactive     Hepatitis C  Recent Labs   Lab Test 03/26/18  1540   HCVAB Nonreactive     Tuberculosis Screening  Recent Labs   Lab Test 03/26/18  1541   TBRSLT Negative   TBAGN 0.00     Immunization History     Immunization History   Administered Date(s) Administered     COVID-19,PF,Pfizer 04/22/2021, 05/13/2021     Mantoux Tuberculin Skin Test 02/01/2007           Chart documentation done in part with Dragon Voice recognition Software. Although reviewed after completion, some word and grammatical error may remain.      Alex Segundo MD

## 2021-08-30 NOTE — Clinical Note
Joe Boone,  He will be traveling so I changed his infliximab order to be every 6-7 weeks.  He said that he needed to schedule an infusion a couple days early and I am okay with this if his insurance is also.  Typically I would like him on infusion every 7 weeks.   Thanks!  Alex Segundo

## 2021-08-31 LAB
GAMMA INTERFERON BACKGROUND BLD IA-ACNC: 0.1 IU/ML
M TB IFN-G BLD-IMP: NEGATIVE
M TB IFN-G CD4+ BCKGRND COR BLD-ACNC: 6.07 IU/ML
MITOGEN IGNF BCKGRD COR BLD-ACNC: 0.09 IU/ML
MITOGEN IGNF BCKGRD COR BLD-ACNC: 0.13 IU/ML
QUANTIFERON MITOGEN: 6.17 IU/ML
QUANTIFERON NIL TUBE: 0.1 IU/ML
QUANTIFERON TB1 TUBE: 0.19 IU/ML
QUANTIFERON TB2 TUBE: 0.23

## 2021-09-08 ENCOUNTER — HOME INFUSION (PRE-WILLOW HOME INFUSION) (OUTPATIENT)
Dept: PHARMACY | Facility: CLINIC | Age: 61
End: 2021-09-08

## 2021-09-10 NOTE — PROGRESS NOTES
This is a recent snapshot of the patient's [facility] medical record.  For current drug dose and complete information and questions, call [phone] or In Basket pool, fv home infusion ([id number])  CSN Number:  [id number]

## 2021-09-17 ENCOUNTER — TELEPHONE (OUTPATIENT)
Dept: RHEUMATOLOGY | Facility: CLINIC | Age: 61
End: 2021-09-17

## 2021-10-01 NOTE — TELEPHONE ENCOUNTER
Reminded Prosper Miller to have repeat labs again.  BMP. He says that he will go in for this.   Alex Segundo MD  10/1/2021 4:21 PM

## 2021-10-04 ENCOUNTER — HOME INFUSION (PRE-WILLOW HOME INFUSION) (OUTPATIENT)
Dept: PHARMACY | Facility: CLINIC | Age: 61
End: 2021-10-04

## 2021-10-08 ENCOUNTER — LAB REQUISITION (OUTPATIENT)
Dept: LAB | Facility: CLINIC | Age: 61
End: 2021-10-08
Payer: COMMERCIAL

## 2021-10-08 ENCOUNTER — HOME INFUSION (PRE-WILLOW HOME INFUSION) (OUTPATIENT)
Dept: PHARMACY | Facility: CLINIC | Age: 61
End: 2021-10-08

## 2021-10-08 ENCOUNTER — DOCUMENTATION ONLY (OUTPATIENT)
Dept: PHARMACY | Facility: CLINIC | Age: 61
End: 2021-10-08

## 2021-10-08 DIAGNOSIS — M06.9 RHEUMATOID ARTHRITIS, UNSPECIFIED (H): ICD-10-CM

## 2021-10-08 LAB
ALBUMIN SERPL-MCNC: 3.1 G/DL (ref 3.4–5)
ALP SERPL-CCNC: 53 U/L (ref 40–150)
ALT SERPL W P-5'-P-CCNC: 29 U/L (ref 0–70)
AST SERPL W P-5'-P-CCNC: 20 U/L (ref 0–45)
BASOPHILS # BLD AUTO: 0.1 10E3/UL (ref 0–0.2)
BASOPHILS NFR BLD AUTO: 1 %
BILIRUB DIRECT SERPL-MCNC: <0.1 MG/DL (ref 0–0.2)
BILIRUB SERPL-MCNC: 0.4 MG/DL (ref 0.2–1.3)
CREAT SERPL-MCNC: 1.5 MG/DL (ref 0.66–1.25)
CRP SERPL-MCNC: 3.6 MG/L (ref 0–8)
EOSINOPHIL # BLD AUTO: 0.1 10E3/UL (ref 0–0.7)
EOSINOPHIL NFR BLD AUTO: 1 %
ERYTHROCYTE [DISTWIDTH] IN BLOOD BY AUTOMATED COUNT: 13.6 % (ref 10–15)
ERYTHROCYTE [SEDIMENTATION RATE] IN BLOOD BY WESTERGREN METHOD: 25 MM/HR (ref 0–20)
GFR SERPL CREATININE-BSD FRML MDRD: 50 ML/MIN/1.73M2
HCT VFR BLD AUTO: 30.8 % (ref 40–53)
HGB BLD-MCNC: 10.8 G/DL (ref 13.3–17.7)
IMM GRANULOCYTES # BLD: 0 10E3/UL
IMM GRANULOCYTES NFR BLD: 1 %
LYMPHOCYTES # BLD AUTO: 1.3 10E3/UL (ref 0.8–5.3)
LYMPHOCYTES NFR BLD AUTO: 24 %
MCH RBC QN AUTO: 33 PG (ref 26.5–33)
MCHC RBC AUTO-ENTMCNC: 35.1 G/DL (ref 31.5–36.5)
MCV RBC AUTO: 94 FL (ref 78–100)
MONOCYTES # BLD AUTO: 0.2 10E3/UL (ref 0–1.3)
MONOCYTES NFR BLD AUTO: 4 %
NEUTROPHILS # BLD AUTO: 3.7 10E3/UL (ref 1.6–8.3)
NEUTROPHILS NFR BLD AUTO: 69 %
NRBC # BLD AUTO: 0 10E3/UL
NRBC BLD AUTO-RTO: 0 /100
PLATELET # BLD AUTO: 274 10E3/UL (ref 150–450)
PROT SERPL-MCNC: 6.4 G/DL (ref 6.8–8.8)
RBC # BLD AUTO: 3.27 10E6/UL (ref 4.4–5.9)
WBC # BLD AUTO: 5.4 10E3/UL (ref 4–11)

## 2021-10-08 PROCEDURE — 85025 COMPLETE CBC W/AUTO DIFF WBC: CPT | Performed by: INTERNAL MEDICINE

## 2021-10-08 PROCEDURE — 36415 COLL VENOUS BLD VENIPUNCTURE: CPT | Performed by: INTERNAL MEDICINE

## 2021-10-08 PROCEDURE — 80076 HEPATIC FUNCTION PANEL: CPT | Performed by: INTERNAL MEDICINE

## 2021-10-08 PROCEDURE — 85652 RBC SED RATE AUTOMATED: CPT | Performed by: INTERNAL MEDICINE

## 2021-10-08 PROCEDURE — 82565 ASSAY OF CREATININE: CPT | Performed by: INTERNAL MEDICINE

## 2021-10-08 PROCEDURE — 86140 C-REACTIVE PROTEIN: CPT | Performed by: INTERNAL MEDICINE

## 2021-10-08 NOTE — PROGRESS NOTES
Skilled Nurse visit in the  patient home to administer Inflectra (infliximab-dyyb) 1100 mg in 500 mL of sodium chloride 0.9% IV via CADD pump over approximately 2 hours.  No recent elevated temperature, fever, chills, productive cough, coughing for 3 weeks or longer or hemoptysis, abnormal vital signs, night sweats, chest pain. No  decrease in your appetite, unexplained weight loss or fatigue.  No other new onset medical symptoms.  Current weight 239 lb.  PIV placed L Hand, 1 attempt/s.  Pre medicated with n/a. Labs drawn CBC d/p, ESR, Hepatic panel, CRP-inflammation. Infusion completed without complication or reaction. Pt reports therapy is effective in managing symptoms related to therapy.    Nighat Holt RN  [facility]  [email or web address]  [phone]  Office Fax [phone]

## 2021-10-15 ENCOUNTER — HOME INFUSION (PRE-WILLOW HOME INFUSION) (OUTPATIENT)
Dept: PHARMACY | Facility: CLINIC | Age: 61
End: 2021-10-15

## 2021-10-15 NOTE — RESULT ENCOUNTER NOTE
RN: Please fax the lab results to Prosper Miller's PCP, Dr. Momo Hartley at [facility] [address] [facility]    I called and reviewed the labs with Prosper Miller.  He is not taking over-the-counter supplements.  He is not using NSAIDs.  Possibly pantoprazole or lisinopril related?  I again advised that he speak with his PCP for eval of the elevated creatinine.     Alex Segundo MD  10/15/2021 3:43 PM

## 2021-10-18 NOTE — PROGRESS NOTES
This is a recent snapshot of the patient's [facility] medical record.  For current drug dose and complete information and questions, call [phone] or In Basket pool, fv home infusion ([id number])  CSN Number:  [id number]

## 2021-10-19 ENCOUNTER — HOME INFUSION (PRE-WILLOW HOME INFUSION) (OUTPATIENT)
Dept: PHARMACY | Facility: CLINIC | Age: 61
End: 2021-10-19

## 2021-10-19 NOTE — PROGRESS NOTES
This is a recent snapshot of the patient's [facility] medical record.  For current drug dose and complete information and questions, call [phone] or In Basket pool, fv home infusion ([id number])  CSN Number:  [id number]

## 2021-10-20 NOTE — PROGRESS NOTES
This is a recent snapshot of the patient's [facility] medical record.  For current drug dose and complete information and questions, call [phone] or In Basket pool, fv home infusion ([id number])  CSN Number:  [id number]

## 2021-10-29 ENCOUNTER — TRANSFERRED RECORDS (OUTPATIENT)
Dept: HEALTH INFORMATION MANAGEMENT | Facility: CLINIC | Age: 61
End: 2021-10-29
Payer: COMMERCIAL

## 2021-10-29 NOTE — PROGRESS NOTES
This is a recent snapshot of the patient's [facility] medical record.  For current drug dose and complete information and questions, call [phone] or In Basket pool, fv home infusion ([id number])  CSN Number:  [id number]

## 2021-11-15 ENCOUNTER — TELEPHONE (OUTPATIENT)
Dept: FAMILY MEDICINE | Facility: CLINIC | Age: 61
End: 2021-11-15
Payer: COMMERCIAL

## 2021-11-15 NOTE — TELEPHONE ENCOUNTER
Patient has appointment scheduled for Friday at 10:20 but that is not going to work.  Patient is wondering if he can be seen any time this week for his elevated kidney numbers before seeing his kidney doctor.  Please call patient at [phone] if there are any other options or if his appointment on Friday is the earliest.

## 2021-11-16 NOTE — TELEPHONE ENCOUNTER
Please see if he can come in today at 3:00.  If that does not work I can look at tomorrow potentially.

## 2021-11-19 ENCOUNTER — HOME INFUSION (PRE-WILLOW HOME INFUSION) (OUTPATIENT)
Dept: PHARMACY | Facility: CLINIC | Age: 61
End: 2021-11-19
Payer: COMMERCIAL

## 2021-11-23 ENCOUNTER — HOME INFUSION (PRE-WILLOW HOME INFUSION) (OUTPATIENT)
Dept: PHARMACY | Facility: CLINIC | Age: 61
End: 2021-11-23
Payer: COMMERCIAL

## 2021-11-24 ENCOUNTER — DOCUMENTATION ONLY (OUTPATIENT)
Dept: PHARMACY | Facility: CLINIC | Age: 61
End: 2021-11-24
Payer: COMMERCIAL

## 2021-11-24 ENCOUNTER — TELEPHONE (OUTPATIENT)
Dept: FAMILY MEDICINE | Facility: CLINIC | Age: 61
End: 2021-11-24
Payer: COMMERCIAL

## 2021-11-24 ENCOUNTER — HOME INFUSION (PRE-WILLOW HOME INFUSION) (OUTPATIENT)
Dept: PHARMACY | Facility: CLINIC | Age: 61
End: 2021-11-24
Payer: COMMERCIAL

## 2021-11-24 NOTE — PROGRESS NOTES
Skilled Nurse visit in the  patient home to administer Inflectra 1100 mg every 6-7 weeks via CADD pump for 1 hour.  No recent elevated temperature, fever, chills, productive cough, coughing for 3 weeks or longer or hemoptysis, abnormal vital signs, night sweats, chest pain. No  decrease in your appetite, unexplained weight loss or fatigue.  No other new onset medical symptoms.  Current weight 235 lbs.  PIV placed left hand, x1 attempt. Infusion completed without complication or reaction. Pt reports therapy is effective in managing symptoms related to therapy.    Amari JIMENEZ RN CRNI  [phone]  [email or web address]

## 2021-11-24 NOTE — TELEPHONE ENCOUNTER
Patient completely out of prescription and leaving on vacation in a couple hours. Needs before he leaves      lisinopril (PRINIVIL,ZESTRIL) 30 MG tablet    [facility]  [place]  [phone]

## 2021-12-07 ENCOUNTER — OFFICE VISIT (OUTPATIENT)
Dept: FAMILY MEDICINE | Facility: CLINIC | Age: 61
End: 2021-12-07
Payer: COMMERCIAL

## 2021-12-07 VITALS
BODY MASS INDEX: 38.27 KG/M2 | DIASTOLIC BLOOD PRESSURE: 93 MMHG | HEIGHT: 67 IN | TEMPERATURE: 98.7 F | SYSTOLIC BLOOD PRESSURE: 130 MMHG | HEART RATE: 96 BPM | WEIGHT: 243.8 LBS | RESPIRATION RATE: 16 BRPM

## 2021-12-07 DIAGNOSIS — D12.6 ADENOMATOUS POLYP OF COLON, UNSPECIFIED PART OF COLON: ICD-10-CM

## 2021-12-07 DIAGNOSIS — Z00.00 ROUTINE PHYSICAL EXAMINATION: Primary | ICD-10-CM

## 2021-12-07 DIAGNOSIS — E78.00 PURE HYPERCHOLESTEROLEMIA: ICD-10-CM

## 2021-12-07 DIAGNOSIS — R19.5 LOOSE STOOLS: ICD-10-CM

## 2021-12-07 DIAGNOSIS — Z00.00 ROUTINE GENERAL MEDICAL EXAMINATION AT A HEALTH CARE FACILITY: ICD-10-CM

## 2021-12-07 DIAGNOSIS — I10 BENIGN ESSENTIAL HYPERTENSION: ICD-10-CM

## 2021-12-07 DIAGNOSIS — N18.31 CHRONIC KIDNEY DISEASE, STAGE 3A (H): ICD-10-CM

## 2021-12-07 DIAGNOSIS — M06.9 RHEUMATOID ARTHRITIS INVOLVING MULTIPLE SITES, UNSPECIFIED WHETHER RHEUMATOID FACTOR PRESENT (H): ICD-10-CM

## 2021-12-07 DIAGNOSIS — I10 ESSENTIAL (PRIMARY) HYPERTENSION: ICD-10-CM

## 2021-12-07 DIAGNOSIS — E66.01 MORBID OBESITY (H): ICD-10-CM

## 2021-12-07 LAB
ALBUMIN SERPL-MCNC: 3.9 G/DL (ref 3.5–5)
ALBUMIN UR-MCNC: 30 MG/DL
ALP SERPL-CCNC: 68 U/L (ref 45–120)
ALT SERPL W P-5'-P-CCNC: 26 U/L (ref 0–45)
ANION GAP SERPL CALCULATED.3IONS-SCNC: 15 MMOL/L (ref 5–18)
APPEARANCE UR: CLEAR
AST SERPL W P-5'-P-CCNC: 18 U/L (ref 0–40)
BACTERIA #/AREA URNS HPF: ABNORMAL /HPF
BILIRUB SERPL-MCNC: 0.5 MG/DL (ref 0–1)
BILIRUB UR QL STRIP: NEGATIVE
BUN SERPL-MCNC: 27 MG/DL (ref 8–22)
CALCIUM SERPL-MCNC: 10.2 MG/DL (ref 8.5–10.5)
CHLORIDE BLD-SCNC: 102 MMOL/L (ref 98–107)
CHOLEST SERPL-MCNC: 282 MG/DL
CO2 SERPL-SCNC: 23 MMOL/L (ref 22–31)
COLOR UR AUTO: YELLOW
CREAT SERPL-MCNC: 1.45 MG/DL (ref 0.7–1.3)
CREAT UR-MCNC: 121 MG/DL
ERYTHROCYTE [DISTWIDTH] IN BLOOD BY AUTOMATED COUNT: 11.8 % (ref 10–15)
FASTING STATUS PATIENT QL REPORTED: ABNORMAL
GFR SERPL CREATININE-BSD FRML MDRD: 52 ML/MIN/1.73M2
GLUCOSE BLD-MCNC: 77 MG/DL (ref 70–125)
GLUCOSE UR STRIP-MCNC: NEGATIVE MG/DL
HBA1C MFR BLD: 5.5 % (ref 0–5.6)
HCT VFR BLD AUTO: 45.6 % (ref 40–53)
HDLC SERPL-MCNC: 92 MG/DL
HGB BLD-MCNC: 14.8 G/DL (ref 13.3–17.7)
HGB UR QL STRIP: ABNORMAL
KETONES UR STRIP-MCNC: NEGATIVE MG/DL
LDLC SERPL CALC-MCNC: 161 MG/DL
LEUKOCYTE ESTERASE UR QL STRIP: NEGATIVE
MCH RBC QN AUTO: 31 PG (ref 26.5–33)
MCHC RBC AUTO-ENTMCNC: 32.5 G/DL (ref 31.5–36.5)
MCV RBC AUTO: 95 FL (ref 78–100)
MICROALBUMIN UR-MCNC: 19.12 MG/DL (ref 0–1.99)
MICROALBUMIN/CREAT UR: 158 MG/G CR
NITRATE UR QL: NEGATIVE
PH UR STRIP: 5.5 [PH] (ref 5–8)
PLATELET # BLD AUTO: 294 10E3/UL (ref 150–450)
POTASSIUM BLD-SCNC: 4.2 MMOL/L (ref 3.5–5)
PROT SERPL-MCNC: 7.6 G/DL (ref 6–8)
PSA SERPL-MCNC: 3.78 UG/L (ref 0–4.5)
RBC # BLD AUTO: 4.78 10E6/UL (ref 4.4–5.9)
RBC #/AREA URNS AUTO: ABNORMAL /HPF
SODIUM SERPL-SCNC: 140 MMOL/L (ref 136–145)
SP GR UR STRIP: 1.01 (ref 1–1.03)
SQUAMOUS #/AREA URNS AUTO: ABNORMAL /LPF
TRIGL SERPL-MCNC: 143 MG/DL
TSH SERPL DL<=0.005 MIU/L-ACNC: 1.75 UIU/ML (ref 0.3–5)
UROBILINOGEN UR STRIP-ACNC: 0.2 E.U./DL
WBC # BLD AUTO: 9.9 10E3/UL (ref 4–11)
WBC #/AREA URNS AUTO: ABNORMAL /HPF

## 2021-12-07 PROCEDURE — 99213 OFFICE O/P EST LOW 20 MIN: CPT | Mod: 25 | Performed by: FAMILY MEDICINE

## 2021-12-07 PROCEDURE — 84443 ASSAY THYROID STIM HORMONE: CPT | Performed by: FAMILY MEDICINE

## 2021-12-07 PROCEDURE — G0103 PSA SCREENING: HCPCS | Performed by: FAMILY MEDICINE

## 2021-12-07 PROCEDURE — 82306 VITAMIN D 25 HYDROXY: CPT | Performed by: FAMILY MEDICINE

## 2021-12-07 PROCEDURE — 82043 UR ALBUMIN QUANTITATIVE: CPT | Performed by: FAMILY MEDICINE

## 2021-12-07 PROCEDURE — 85027 COMPLETE CBC AUTOMATED: CPT | Performed by: FAMILY MEDICINE

## 2021-12-07 PROCEDURE — 80053 COMPREHEN METABOLIC PANEL: CPT | Performed by: FAMILY MEDICINE

## 2021-12-07 PROCEDURE — 83036 HEMOGLOBIN GLYCOSYLATED A1C: CPT | Performed by: FAMILY MEDICINE

## 2021-12-07 PROCEDURE — 81001 URINALYSIS AUTO W/SCOPE: CPT | Performed by: FAMILY MEDICINE

## 2021-12-07 PROCEDURE — 99396 PREV VISIT EST AGE 40-64: CPT | Performed by: FAMILY MEDICINE

## 2021-12-07 PROCEDURE — 36415 COLL VENOUS BLD VENIPUNCTURE: CPT | Performed by: FAMILY MEDICINE

## 2021-12-07 PROCEDURE — 80061 LIPID PANEL: CPT | Performed by: FAMILY MEDICINE

## 2021-12-07 ASSESSMENT — MIFFLIN-ST. JEOR: SCORE: 1861.56

## 2021-12-07 NOTE — PATIENT INSTRUCTIONS
Sam,    We will check your laboratory testing as discussed  Your colonoscopy will be due in March of 2022 as you do have a history of polyps.  They ([facility]) should reach out to you  You can consider vaccines including a tetanus booster, the flu shot, and the shingles vaccine  Follow-up with the kidney doctors for an evaluation  In the meantime, drink plenty of liquids and try to avoid medications like ibuprofen or Advil which can be hard on the kidneys  Continue to follow-up with your rheumatologist  I recommend Metamucil given your history of loose stools      Preventive Health Recommendations  Male Ages 50 - 64    Yearly exam:             See your health care provider every year in order to  o   Review health changes.   o   Discuss preventive care.    o   Review your medicines if your doctor has prescribed any.     Have a cholesterol test every 5 years, or more frequently if you are at risk for high cholesterol/heart disease.     Have a diabetes test (fasting glucose) every three years. If you are at risk for diabetes, you should have this test more often.     Have a colonoscopy at age 50, or have a yearly FIT test (stool test). These exams will check for colon cancer.      Talk with your health care provider about whether or not a prostate cancer screening test (PSA) is right for you.    You should be tested each year for STDs (sexually transmitted diseases), if you re at risk.     Shots: Get a flu shot each year. Get a tetanus shot every 10 years.     Nutrition:    Eat at least 5 servings of fruits and vegetables daily.     Eat whole-grain bread, whole-wheat pasta and brown rice instead of white grains and rice.     Get adequate Calcium and Vitamin D.     Lifestyle    Exercise for at least 150 minutes a week (30 minutes a day, 5 days a week). This will help you control your weight and prevent disease.     Limit alcohol to one drink per day.     No smoking.     Wear sunscreen to prevent skin cancer.     See your  dentist every six months for an exam and cleaning.     See your eye doctor every 1 to 2 years.

## 2021-12-07 NOTE — PROGRESS NOTES
SUBJECTIVE:   CC: Prosper Miller is an 61 year old male who presents for preventative health visit.     This is a 61-year-old male who presents to the clinic for complete physical examination. His medical history is notable for hypertension, hypercholesterolemia, gastroesophageal reflux disease, sleep apnea, and rheumatoid arthritis.    He continues to follow-up with rheumatology and is treated with Remicade. Requires treatment with prednisone as well. He has had episodes of pressure in his eye and has had known iritis. He will be following up with rheumatology.    Recent concern has been abnormal kidney function. A recent creatinine was elevated 1.50 with a GFR of 50.    His most recent colonoscopy was March 2019. He had advanced tubular adenoma at that time as well as another tubular adenoma and sessile serrated adenoma. Be due again in 2022.    He would like to get testing for vitamin D and thyroid today.     Review of systems is notable for loose stools. He denies dark and tarry stools or passage of bright red blood per rectum. He does take Protonix for reflux symptoms.      Patient has been advised of split billing requirements and indicates understanding: Yes  Healthy Habits:    Getting at least 3 servings of Calcium per day:  Yes    Bi-annual eye exam:  NO    Dental care twice a year:  Yes    Sleep apnea or symptoms of sleep apnea:  Sleep apnea    Diet:  Breakfast skipped    Frequency of exercise:  None    Taking medications regularly:  Yes    Medication side effects:  None    PHQ-2 Total Score:    Additional concerns today:  No      Today's PHQ-2 Score:   PHQ-2 ( 1999 Pfizer) 12/7/2021   Q1: Little interest or pleasure in doing things 0   Q2: Feeling down, depressed or hopeless 0   PHQ-2 Score 0   Q1: Little interest or pleasure in doing things Not at all   Q2: Feeling down, depressed or hopeless Not at all   PHQ-2 Score 0       Abuse: Current or Past(Physical, Sexual or Emotional)- No  Do you feel safe in  your environment? Yes    Have you ever done Advance Care Planning? (For example, a Health Directive, POLST, or a discussion with a medical provider or your loved ones about your wishes): Yes, patient states has an Advance Care Planning document and will bring a copy to the clinic.    Social History     Tobacco Use     Smoking status: Former Smoker     Smokeless tobacco: Never Used   Substance Use Topics     Alcohol use: Yes     Comment: weekly     If you drink alcohol do you typically have >3 drinks per day or >7 drinks per week? No    Alcohol Use 12/7/2021   Prescreen: >3 drinks/day or >7 drinks/week? No   No flowsheet data found.    Last PSA:   Prostate Specific Antigen Screen   Date Value Ref Range Status   11/26/2014 2.9 0.0 - 3.5 ng/mL Final       Reviewed orders with patient. Reviewed health maintenance and updated orders accordingly - Yes  Patient Active Problem List   Diagnosis     Iritis     Rheumatoid arthritis of hand, unspecified laterality, unspecified rheumatoid factor presence     Rheumatoid arthritis involving multiple sites, unspecified rheumatoid factor presence     Adenomatous colon polyp     JALEESA (obstructive sleep apnea)     Esophageal reflux     Benign essential hypertension     Acute glomerulonephritis with pathological lesion in kidney     Primary hypercholesterolemia     Morbid obesity (H)     Chronic kidney disease, stage 3a (H)     Past Surgical History:   Procedure Laterality Date     EYE SURGERY       HC KNEE SCOPE, DIAGNOSTIC      Description: Arthroscopy Knee Right;  Recorded: 04/30/2008;     JOINT REPLACEMENT       OTHER SURGICAL HISTORY Left     left knee arthroplasty     OTHER SURGICAL HISTORY Bilateral     Cataract surgery       Social History     Tobacco Use     Smoking status: Former Smoker     Smokeless tobacco: Never Used   Substance Use Topics     Alcohol use: Yes     Comment: weekly     Family History   Problem Relation Age of Onset     Cancer Father      Bone Cancer Father           Current Outpatient Medications   Medication Sig Dispense Refill     Cholecalciferol (VITAMIN D3) 2000 UNITS CAPS        EQL NATURAL ZINC 50 MG TABS        inFLIXimab (REMICADE) 100 MG injection        lisinopril (ZESTRIL) 30 MG tablet TAKE 1 & 1/2 TABLETS (45 MG TOTAL) BY MOUTH DAILY. 135 tablet 1     pantoprazole (PROTONIX) 40 MG EC tablet TAKE ONE TABLET BY MOUTH TWICE DAILY       predniSONE (DELTASONE) 10 MG tablet Take 15 mg by mouth daily 10 mg daily reported 1215/2021       adalimumab (HUMIRA *CF*) 40 MG/0.4ML pen kit Inject 0.4 mLs (40 mg) Subcutaneous every 14 days . Hold for signs of infection, then seek medical attention. 0.8 mL 4     No Known Allergies    Reviewed and updated as needed this visit by clinical staff  Tobacco  Allergies              Reviewed and updated as needed this visit by Provider               History reviewed. No pertinent past medical history.   Past Surgical History:   Procedure Laterality Date     EYE SURGERY       HC KNEE SCOPE, DIAGNOSTIC      Description: Arthroscopy Knee Right;  Recorded: 04/30/2008;     JOINT REPLACEMENT       OTHER SURGICAL HISTORY Left     left knee arthroplasty     OTHER SURGICAL HISTORY Bilateral     Cataract surgery       Review of Systems  CONSTITUTIONAL: NEGATIVE for fever, chills, change in weight  INTEGUMENTARY/SKIN: NEGATIVE for worrisome rashes, moles or lesions  EYES: NEGATIVE for vision changes or irritation  ENT: NEGATIVE for ear, mouth and throat problems  RESP: NEGATIVE for significant cough or SOB  CV: NEGATIVE for chest pain, palpitations or peripheral edema  GI: NEGATIVE for nausea, abdominal pain, heartburn, or change in bowel habits   male: negative for dysuria, hematuria, decreased urinary stream, erectile dysfunction, urethral discharge  MUSCULOSKELETAL: NEGATIVE for significant arthralgias or myalgia  NEURO: NEGATIVE for weakness, dizziness or paresthesias  PSYCHIATRIC: NEGATIVE for changes in mood or affect    OBJECTIVE:  "  BP (!) 130/93 (BP Location: Left arm, Patient Position: Sitting, Cuff Size: Adult Large)   Pulse 96   Temp 98.7  F (37.1  C) (Oral)   Resp 16   Ht 1.689 m (5' 6.5\")   Wt 110.6 kg (243 lb 12.8 oz)   BMI 38.76 kg/m      Physical Exam  GENERAL: healthy, alert and no distress  EYES: Eyes grossly normal to inspection, PERRL and conjunctivae and sclerae normal  HENT: ear canals and TM's normal, nose and mouth without ulcers or lesions  NECK: no adenopathy, no asymmetry, masses, or scars and thyroid normal to palpation  RESP: lungs clear to auscultation - no rales, rhonchi or wheezes  CV: regular rate and rhythm, normal S1 S2, no S3 or S4, no murmur, click or rub, no peripheral edema and peripheral pulses strong  ABDOMEN: soft, nontender  MS: no gross musculoskeletal defects noted, no edema  SKIN: no suspicious lesions or rashes  NEURO: Normal strength and tone, mentation intact and speech normal  PSYCH: mentation appears normal, affect normal/bright    Diagnostic Test Results:  Labs reviewed in Epic    ASSESSMENT/PLAN:   Prosper was seen today for physical.    Diagnoses and all orders for this visit:    Routine physical examination    Recommend remaining as physically active as possible    Check laboratory testing  -     PSA, screen; Future  -     PSA, screen    Recommend considering vaccines including tetanus, flu, and shingles    Rheumatoid arthritis involving multiple sites, unspecified whether rheumatoid factor present (H)    Continue plan per rheumatology which has been Remicade as well as prednisone  He has had regular laboratory testing    Adenomatous polyp of colon, unspecified part of colon    His most recent colonoscopy was in March of 2019. He is due in March of 2022 given an advanced adenoma    Essential (primary) hypertension    Inadequate control    Recommend working on dietary lifestyle changes  Continue lisinopril  Follow-up for recheck and consider an additional medication    Pure " "hypercholesterolemia    Check lipid cascade  Calculate the 10-year cardiovascular risk    -     Lipid panel reflex to direct LDL Fasting; Future  -     Lipid panel reflex to direct LDL Fasting; Future  -     Lipid panel reflex to direct LDL Fasting  -     Cancel: Lipid panel reflex to direct LDL Fasting    Chronic kidney disease, stage 3a (H)    Recommend avoiding NSAIDs  Recommend follow-up with nephrology    Loose stools    Recommend increased fiber in his diet  His colonoscopy is up-to-date      Morbid obesity (H)    Have reviewed comorbidities  Recommend working on weight loss    Other orders  -     Comprehensive metabolic panel; Future  -     TSH with free T4 reflex; Future  -     Vitamin D Deficiency; Future  -     CBC with platelets; Future  -     Albumin Random Urine Quantitative with Creat Ratio; Future  -     Adult Nephrology Referral; Future  -     Hemoglobin A1c; Future  -     Comprehensive metabolic panel  -     TSH with free T4 reflex  -     Vitamin D Deficiency  -     CBC with platelets  -     Albumin Random Urine Quantitative with Creat Ratio  -     Hemoglobin A1c  -     UA reflex to Microscopic and Culture; Future  -     UA reflex to Microscopic and Culture  -     Urine Microscopic        Patient has been advised of split billing requirements and indicates understanding: Yes  COUNSELING:   Reviewed preventive health counseling, as reflected in patient instructions       Regular exercise       Healthy diet/nutrition       Alcohol Use        Colon cancer screening       Prostate cancer screening    Estimated body mass index is 38.76 kg/m  as calculated from the following:    Height as of this encounter: 1.689 m (5' 6.5\").    Weight as of this encounter: 110.6 kg (243 lb 12.8 oz).     Weight management plan: Discussed healthy diet and exercise guidelines    He reports that he has quit smoking. He has never used smokeless tobacco.      Counseling Resources:  ATP IV Guidelines  Pooled Cohorts Equation " Calculator  FRAX Risk Assessment  ICSI Preventive Guidelines  Dietary Guidelines for Americans, 2010  USDA's MyPlate  ASA Prophylaxis  Lung CA Screening    Momo Hartley MD  [facility]

## 2021-12-08 LAB — DEPRECATED CALCIDIOL+CALCIFEROL SERPL-MC: 50 UG/L (ref 30–80)

## 2021-12-10 ENCOUNTER — TELEPHONE (OUTPATIENT)
Dept: MULTI SPECIALTY CLINIC | Facility: CLINIC | Age: 61
End: 2021-12-10
Payer: COMMERCIAL

## 2021-12-10 ENCOUNTER — TRANSFERRED RECORDS (OUTPATIENT)
Dept: HEALTH INFORMATION MANAGEMENT | Facility: CLINIC | Age: 61
End: 2021-12-10
Payer: COMMERCIAL

## 2021-12-10 DIAGNOSIS — N18.31 CHRONIC KIDNEY DISEASE, STAGE 3A (H): Primary | ICD-10-CM

## 2021-12-10 NOTE — TELEPHONE ENCOUNTER
[facility] Call Center    Phone Message    May a detailed message be left on voicemail: yes     Reason for Call: Order(s): Other:   Reason for requested: Lab Orders for patient appointment with Dr. Lea on 3/28/2022.  Labs to be done at [place] location.  Date needed: Before 3/1/2022  Provider name: Dr. Lea       Action Taken: Message routed to:  [facility] ([facility] Nephrology    Travel Screening: Not Applicable

## 2021-12-15 ENCOUNTER — OFFICE VISIT (OUTPATIENT)
Dept: RHEUMATOLOGY | Facility: CLINIC | Age: 61
End: 2021-12-15
Payer: COMMERCIAL

## 2021-12-15 ENCOUNTER — TELEPHONE (OUTPATIENT)
Dept: RHEUMATOLOGY | Facility: CLINIC | Age: 61
End: 2021-12-15

## 2021-12-15 ENCOUNTER — LAB (OUTPATIENT)
Dept: LAB | Facility: CLINIC | Age: 61
End: 2021-12-15

## 2021-12-15 VITALS
DIASTOLIC BLOOD PRESSURE: 76 MMHG | HEIGHT: 67 IN | OXYGEN SATURATION: 96 % | SYSTOLIC BLOOD PRESSURE: 113 MMHG | HEART RATE: 108 BPM | WEIGHT: 248.6 LBS | BODY MASS INDEX: 39.02 KG/M2

## 2021-12-15 DIAGNOSIS — H20.9 UVEITIS: ICD-10-CM

## 2021-12-15 DIAGNOSIS — R79.89 ELEVATED SERUM CREATININE: Primary | ICD-10-CM

## 2021-12-15 DIAGNOSIS — Z79.899 HIGH RISK MEDICATIONS (NOT ANTICOAGULANTS) LONG-TERM USE: ICD-10-CM

## 2021-12-15 DIAGNOSIS — M06.9 RHEUMATOID ARTHRITIS INVOLVING MULTIPLE SITES, UNSPECIFIED WHETHER RHEUMATOID FACTOR PRESENT (H): ICD-10-CM

## 2021-12-15 DIAGNOSIS — R79.89 ELEVATED SERUM CREATININE: ICD-10-CM

## 2021-12-15 PROCEDURE — 83516 IMMUNOASSAY NONANTIBODY: CPT | Mod: 90

## 2021-12-15 PROCEDURE — 86225 DNA ANTIBODY NATIVE: CPT

## 2021-12-15 PROCEDURE — 86039 ANTINUCLEAR ANTIBODIES (ANA): CPT | Performed by: INTERNAL MEDICINE

## 2021-12-15 PROCEDURE — 86200 CCP ANTIBODY: CPT | Performed by: INTERNAL MEDICINE

## 2021-12-15 PROCEDURE — 99214 OFFICE O/P EST MOD 30 MIN: CPT | Performed by: INTERNAL MEDICINE

## 2021-12-15 PROCEDURE — 99000 SPECIMEN HANDLING OFFICE-LAB: CPT

## 2021-12-15 PROCEDURE — 86038 ANTINUCLEAR ANTIBODIES: CPT | Performed by: INTERNAL MEDICINE

## 2021-12-15 PROCEDURE — 86431 RHEUMATOID FACTOR QUANT: CPT | Performed by: INTERNAL MEDICINE

## 2021-12-15 PROCEDURE — 86160 COMPLEMENT ANTIGEN: CPT

## 2021-12-15 PROCEDURE — 86235 NUCLEAR ANTIGEN ANTIBODY: CPT

## 2021-12-15 PROCEDURE — 36415 COLL VENOUS BLD VENIPUNCTURE: CPT | Performed by: INTERNAL MEDICINE

## 2021-12-15 ASSESSMENT — MIFFLIN-ST. JEOR: SCORE: 1883.33

## 2021-12-15 NOTE — NURSING NOTE
RAPID3 (0-30) Cumulative Score  1.0          RAPID3 Weighted Score (divide #4 by 3 and that is the weighted score)  0.3

## 2021-12-15 NOTE — PATIENT INSTRUCTIONS
RHEUMATOLOGY    Dr. Alex Segundo    [facility]  [address] [place] [address]  [place] [address]  Phone number: [phone]  Fax number: [phone]    Covid 19 vaccine scheduling phone number is [phone]    Thank you for choosing [facility]!    Jaja Sanchez CMA Rheumatology

## 2021-12-15 NOTE — PROGRESS NOTES
Rheumatology Clinic Visit      Prosper Miller MRN# [id number]   YOB: 1960 Age: 61 year old      Date of visit: 12/15/21   PCP: Momo Gooden at [facility]  Ophthalmology: Dr. Michael Jenkins at the [facility]    Chief Complaint   Patient presents with:  Rheumatoid Arthritis: Feeling good, eyes are bad. Both hands 3rd digits hurt.    Assessment and Plan     1.  Rheumatoid arthritis: Reportedly dx'd in 2006. Previously followed by Dr. Phillips. Established care with me on 3/26/2018.  No active synovitis on exam when first evaluated by me. Records documents seropositive rheumatoid arthritis but I cannot find records for CCP or rheumatoid factor - check these today..  Previously on leflunomide and cyclosporine.  Currently arthritis is doing well on Infliximab 10 mg/kg every 7 weeks, but iritis is not well controlled per Dr. Jenkins. Discussed adding MTX or changing bDMARD; because of Cr elevation will change bDMARD first.  Stop infliximab and start Humira.  Chronic illness, arthritis stable.    - Discontinue Infliximab 10 mg/kg IV every 7 weeks  - Start Humira 40mg SQ every 14 days  - Labs: RF, CCP     2.  Iritis: Flare of iritis in 2021.  See #1 about medication change.  Continue following with ophthalmology.  Chronic illness, iritis progressive.      3.  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 Stable. Monitor.     4.  Nonradiating chronic lower back pain, history: Improved with at-home exercises; he has refused physical therapy in the past.      5.  Creatinine elevation: planning to see nephrology as directed by Dr. Hartley. Also check C3, C4, HAYES, histone, dsDNA    - influenza: advised yearly vaccination  - TDAP: advised updating  - COVID-19: has received the Pfizer COVID-19 vaccine on 4/22/2021 and 5/13/2021 and 11/15/2021    Total minutes spent in evaluation with patient, documentation, , and review of pertinent studies and chart  notes: 24    Mr. Miller verbalized agreement with and understanding of the rational for the diagnosis and treatment plan.  All questions were answered to best of my ability and the patient's satisfaction. Mr. Miller was advised to contact the clinic with any questions that may arise after the clinic visit.      Thank you for involving me in the care of the patient    Return to clinic: 6 months      HPI   Prosper Miller is a 61 year old male with a past medical history significant for hypertension, hyperlipidemia, obstructive sleep apnea, IgA nephropathy, rheumatoid arthritis, and iritis who presents for follow-up of rheumatoid arthritis and immunosuppressive management for iritis.     Today, 12/16/2021: RA controlled; no peripheral joint pain except for 1st cmc oa with overuse. Morning stiffness <30 min. No gelling. Iritis still active and seen by Dr. Jenkins yesterday; requested note from Dr. Jenkins's office and it states that iritis still active and needs DMARD adjustment.  Low back pain still and says that he is exercising for this.     Denies fevers, chills, nausea, vomiting, constipation. No abdominal pain. No chest pain/pressure, palpitations, or shortness of breath. No LE swelling. No neck pain. No oral or nasal sores.  No rash.     Tobacco: Former smoker  EtOH: Weekly  Drugs: None  Occupation:     ROS   12 point review of system was completed and negative except as noted in the HPI     Active Problem List     Patient Active Problem List   Diagnosis     Iritis     Rheumatoid arthritis of hand, unspecified laterality, unspecified rheumatoid factor presence     Rheumatoid arthritis involving multiple sites, unspecified rheumatoid factor presence     Adenomatous colon polyp     Past Medical History   History reviewed. No pertinent past medical history.  Past Surgical History     Past Surgical History:   Procedure Laterality Date     EYE SURGERY       HC KNEE SCOPE, DIAGNOSTIC      Description:  "Arthroscopy Knee Right;  Recorded: 04/30/2008;     JOINT REPLACEMENT       OTHER SURGICAL HISTORY Left     left knee arthroplasty     OTHER SURGICAL HISTORY Bilateral     Cataract surgery     Allergy   No Known Allergies  Current Medication List     Current Outpatient Medications   Medication Sig     Cholecalciferol (VITAMIN D3) 2000 UNITS CAPS      EQL NATURAL ZINC 50 MG TABS      inFLIXimab (REMICADE) 100 MG injection      lisinopril (ZESTRIL) 30 MG tablet TAKE 1 & 1/2 TABLETS (45 MG TOTAL) BY MOUTH DAILY.     pantoprazole (PROTONIX) 40 MG EC tablet TAKE ONE TABLET BY MOUTH TWICE DAILY     predniSONE (DELTASONE) 10 MG tablet Take 15 mg by mouth daily 10 mg daily reported 1215/2021     No current facility-administered medications for this visit.     Social History   See HPI    Family History     Family History   Problem Relation Age of Onset     Cancer Father      Bone Cancer Father         Physical Exam     Temp Readings from Last 3 Encounters:   12/07/21 98.7  F (37.1  C) (Oral)   01/26/21 97.8  F (36.6  C) (Oral)   02/14/20 97  F (36.1  C) (Oral)     BP Readings from Last 5 Encounters:   12/15/21 113/76   12/07/21 (!) 130/93   08/30/21 127/81   01/26/21 (!) 145/86   03/09/20 (!) 145/85     Pulse Readings from Last 1 Encounters:   12/15/21 108     Resp Readings from Last 1 Encounters:   12/07/21 16     Estimated body mass index is 39.52 kg/m  as calculated from the following:    Height as of this encounter: 1.689 m (5' 6.5\").    Weight as of this encounter: 112.8 kg (248 lb 9.6 oz).      GEN: NAD.  HEENT:  Anicteric, noninjected sclera. No obvious external lesions of the ear and nose. Hearing intact.  CV: S1, S2. RRR. No m/r/g  PULM: No increased work of breathing. CTA bilaterally   MSK: MCPs, PIPs, DIPs without swelling or tenderness to palpation.  Wrists without swelling or tenderness to palpation.  Elbows and shoulders without swelling or tenderness to palpation. Knees, ankles, and MTPs without swelling or " tenderness to palpation.    SKIN: No rash or jaundice seen  PSYCH: Alert. Appropriate.        Labs / Imaging (select studies)       CBC  Recent Labs   Lab Test 12/07/21  1349 10/08/21  1330 08/30/21  0848 03/12/21  1310 03/09/20  0812 09/16/19  0804 03/12/19  0849   WBC 9.9 5.4 7.6   < > 6.6 5.7 5.6   RBC 4.78 3.27* 3.60*   < > 4.76 4.50 4.88   HGB 14.8 10.8* 11.8*   < > 15.2 14.5 15.7   HCT 45.6 30.8* 34.3*   < > 44.5 42.3 44.5   MCV 95 94 95   < > 94 94 91   RDW 11.8 13.6 13.6   < > 14.0 12.7 13.1    274 313   < > 275 302 343   MCH 31.0 33.0 32.8   < > 31.9 32.2 32.2   MCHC 32.5 35.1 34.4   < > 34.2 34.3 35.3   NEUTROPHIL  --  69 58  --  43.8 40.9 37.6   LYMPH  --  24 35  --  43.4 43.2 47.1   MONOCYTE  --  4 6  --  6.7 9.1 8.9   EOSINOPHIL  --  1 1  --  5.6 6.3 5.5   BASOPHIL  --  1 0  --  0.5 0.5 0.9   ANEU  --   --   --   --  2.9 2.3 2.1   ALYM  --   --   --   --  2.9 2.5 2.6   MICHAEL  --   --   --   --  0.4 0.5 0.5   AEOS  --   --   --   --  0.4 0.4 0.3   ABAS  --   --   --   --  0.0 0.0 0.1   ANEUTAUTO  --  3.7 4.4  --   --   --   --    ALYMPAUTO  --  1.3 2.7  --   --   --   --    AMONOAUTO  --  0.2 0.4  --   --   --   --    AEOSAUTO  --  0.1 0.1  --   --   --   --    ABSBASO  --  0.1 0.0  --   --   --   --     < > = values in this interval not displayed.     CMP  Recent Labs   Lab Test 12/07/21  1349 10/08/21  1330 08/30/21  0848 03/12/21  1310 03/09/20  0812 02/24/20  1634 03/12/19  0849 07/31/18  1555     --   --   --   --  142  --  143   POTASSIUM 4.2  --   --   --   --  3.8  --  4.5   CHLORIDE 102  --   --   --   --  105  --  106   CO2 23  --   --   --   --  26  --  27   ANIONGAP 15  --   --   --   --  11  --  10   GLC 77  --  103*  --   --  92  --  95   BUN 27*  --   --   --   --  12  --  18   CR 1.45* 1.50* 2.00* 1.52* 1.05 1.07   < > 1.17   GFRESTIMATED 52* 50* 35* 49* 77 >60   < > >60   GFRESTBLACK  --   --   --  57* 89 >60   < > >60   ERNESTINA 10.2  --   --   --   --  9.2  --  9.9   BILITOTAL  0.5 0.4 0.2 0.3 0.3  --    < >  --    ALBUMIN 3.9 3.1* 3.5 3.2* 3.5  --    < >  --    PROTTOTAL 7.6 6.4* 7.1 6.9 7.7  --    < >  --    ALKPHOS 68 53 51 70 69  --    < >  --    AST 18 20 14 28 21  --    < >  --    ALT 26 29 30 30 37  --    < >  --     < > = values in this interval not displayed.     Calcium/VitaminD  Recent Labs   Lab Test 12/07/21  1349 02/24/20  1634 07/31/18  1555   ERNESTINA 10.2 9.2 9.9   VITDT 50  --   --      ESR/CRP  Recent Labs   Lab Test 10/08/21  1330 08/30/21  0848 03/12/21  1310   SED 25* 32* 31*   CRP 3.6 <2.9 4.2     Lipid Panel  Recent Labs   Lab Test 12/07/21  1349 11/26/14  1022   CHOL 282* 254*   TRIG 143 150*   HDL 92 77   * 147*     Hepatitis B  Recent Labs   Lab Test 03/26/18  1540   HBCAB Nonreactive   HEPBANG Nonreactive     Hepatitis C  Recent Labs   Lab Test 03/26/18  1540   HCVAB Nonreactive     Tuberculosis Screening  Recent Labs   Lab Test 08/30/21  0848 03/26/18  1541   TBRES Negative  --    TBRSLT  --  Negative   TBAGN  --  0.00         Immunization History     Immunization History   Administered Date(s) Administered     COVID-19,PF,Pfizer (12+ Yrs) 04/22/2021, 05/13/2021, 11/15/2021     HepA-Adult 04/30/2008, 11/11/2008     Influenza Vaccine, 6+MO IM (QUADRIVALENT W/PRESERVATIVES) 11/11/2008, 12/18/2009, 09/18/2020     Mantoux Tuberculin Skin Test 02/01/2007     Tdap (Adacel,Boostrix) 03/09/2011          Chart documentation done in part with Dragon Voice recognition Software. Although reviewed after completion, some word and grammatical error may remain.      Alex Segundo MD

## 2021-12-15 NOTE — TELEPHONE ENCOUNTER
RN: Please call to notify Prosper Miller that I received his ophthalmologist clinic note, who did recommend changing Remicade to an alternative DMARD.  I have discontinued the Remicade order and have sent in a prescription for Humira.  He will be notified by the pharmacy liaison after the insurance prior authorization is completed for Humira.  Humira will come from a specialty pharmacy.    Alex Segundo MD  12/15/2021 3:52 PM

## 2021-12-16 ENCOUNTER — TELEPHONE (OUTPATIENT)
Dept: RHEUMATOLOGY | Facility: CLINIC | Age: 61
End: 2021-12-16
Payer: COMMERCIAL

## 2021-12-16 ENCOUNTER — HOME INFUSION (PRE-WILLOW HOME INFUSION) (OUTPATIENT)
Dept: PHARMACY | Facility: CLINIC | Age: 61
End: 2021-12-16
Payer: COMMERCIAL

## 2021-12-16 LAB
ANA PAT SER IF-IMP: ABNORMAL
ANA SER QL IF: ABNORMAL
ANA TITR SER IF: ABNORMAL {TITER}
C3 SERPL-MCNC: 145 MG/DL (ref 81–157)
C4 SERPL-MCNC: 33 MG/DL (ref 13–39)
DSDNA AB SER-ACNC: 23 IU/ML
ENA SM IGG SER IA-ACNC: <1.6 U/ML
ENA SM IGG SER IA-ACNC: NEGATIVE
ENA SS-A AB SER IA-ACNC: <0.5 U/ML
ENA SS-A AB SER IA-ACNC: NEGATIVE
ENA SS-B IGG SER IA-ACNC: <0.6 U/ML
ENA SS-B IGG SER IA-ACNC: NEGATIVE
RHEUMATOID FACT SER NEPH-ACNC: 10 IU/ML
U1 SNRNP IGG SER IA-ACNC: <1.1 U/ML
U1 SNRNP IGG SER IA-ACNC: NEGATIVE

## 2021-12-16 NOTE — TELEPHONE ENCOUNTER
Patient has commercial insurance so he does qualify for copay assistance. I will call him to discuss once the PA is complete.

## 2021-12-16 NOTE — TELEPHONE ENCOUNTER
Called patient and reviewed Dr. Segundo's message below. Patient verbalized understanding and has no questions. Patient is interested in discussing a Humira coupon card. He says right now he is virtually paying nothing.    Skip ISSA RN....12/16/2021 10:33 AM

## 2021-12-19 PROBLEM — K21.9 ESOPHAGEAL REFLUX: Status: ACTIVE | Noted: 2021-12-19

## 2021-12-19 PROBLEM — N18.31 CHRONIC KIDNEY DISEASE, STAGE 3A (H): Status: ACTIVE | Noted: 2021-12-19

## 2021-12-19 PROBLEM — E78.00 PRIMARY HYPERCHOLESTEROLEMIA: Status: ACTIVE | Noted: 2021-12-19

## 2021-12-19 PROBLEM — N00.9 ACUTE GLOMERULONEPHRITIS WITH PATHOLOGICAL LESION IN KIDNEY: Status: ACTIVE | Noted: 2021-12-19

## 2021-12-19 PROBLEM — I10 BENIGN ESSENTIAL HYPERTENSION: Status: ACTIVE | Noted: 2021-12-19

## 2021-12-19 PROBLEM — E66.01 MORBID OBESITY (H): Status: ACTIVE | Noted: 2021-12-19

## 2021-12-19 PROBLEM — G47.33 OSA (OBSTRUCTIVE SLEEP APNEA): Status: ACTIVE | Noted: 2021-12-19

## 2021-12-19 LAB — HISTONE IGG SER IA-ACNC: 0.2 UNITS

## 2021-12-21 ENCOUNTER — HOME INFUSION (PRE-WILLOW HOME INFUSION) (OUTPATIENT)
Dept: PHARMACY | Facility: CLINIC | Age: 61
End: 2021-12-21
Payer: COMMERCIAL

## 2021-12-21 LAB — CCP AB SER IA-ACNC: 12 U/ML

## 2022-01-02 ENCOUNTER — TELEPHONE (OUTPATIENT)
Dept: RHEUMATOLOGY | Facility: CLINIC | Age: 62
End: 2022-01-02
Payer: COMMERCIAL

## 2022-01-02 DIAGNOSIS — H20.9 UVEITIS: ICD-10-CM

## 2022-01-02 DIAGNOSIS — M06.9 RHEUMATOID ARTHRITIS INVOLVING MULTIPLE SITES, UNSPECIFIED WHETHER RHEUMATOID FACTOR PRESENT (H): ICD-10-CM

## 2022-01-05 NOTE — TELEPHONE ENCOUNTER
Prior Authorization Approval    Authorization Effective Date: 12/4/2021  Authorization Expiration Date: 1/4/2024  Medication: humira   Approved Dose/Quantity: 2/28ds  Reference #: [id number]   Insurance Company: [employer] - Phone [phone] Fax [phone]  Expected CoPay: $500     CoPay Card Available: Yes    Foundation Assistance Needed: na  Which Pharmacy is filling the prescription (Not needed for infusion/clinic administered): [facility] - [place] [address]  Pharmacy Notified: Yes  Patient Notified: Yes

## 2022-01-06 NOTE — PROGRESS NOTES
This is a recent snapshot of the patient's [facility] medical record.  For current drug dose and complete information and questions, call [phone] or In Basket pool, [facility] home infusion ([id number])  CSN Number:  [id number]

## 2022-01-12 DIAGNOSIS — I10 ESSENTIAL (PRIMARY) HYPERTENSION: ICD-10-CM

## 2022-01-12 DIAGNOSIS — K21.9 GASTROESOPHAGEAL REFLUX DISEASE WITHOUT ESOPHAGITIS: Primary | ICD-10-CM

## 2022-01-12 RX ORDER — PANTOPRAZOLE SODIUM 40 MG/1
40 TABLET, DELAYED RELEASE ORAL 2 TIMES DAILY
Qty: 180 TABLET | Refills: 0 | Status: SHIPPED | OUTPATIENT
Start: 2022-01-12 | End: 2022-07-11

## 2022-01-12 RX ORDER — LISINOPRIL 30 MG/1
TABLET ORAL
Qty: 135 TABLET | Refills: 0 | Status: SHIPPED | OUTPATIENT
Start: 2022-01-12 | End: 2022-07-11

## 2022-01-12 NOTE — TELEPHONE ENCOUNTER
Reason for Call:  Medication or medication refill:    Do you use a [facility]?  Name of the pharmacy and phone number for the current request:  [facility] - [place], [place] - [address]  [phone]    Name of the medication requested:  - pantoprazole (PROTONIX) 40 MG EC tablet  - lisinopril (ZESTRIL) 30 MG tablet     Other request: Patient stated he has a new insurance and he is supposed to have his refills go to [facility]. I asked patient if it's [facility] or [facility] like [facility], etc. Pt wasn't sure and to send it to  Mail.     Patient stated that he has enough of the lisinopril but wanted provider to send rx to the pharmacy so he doesn't have to call again when it's due for a refill.     Patient would like a call to let him know once rx's are sent so he can know when to expect getting the medications in the mail.    Can we leave a detailed message on this number? YES    Phone number patient can be reached at: Cell number on file:    Telephone Information:   Mobile [phone]       Best Time: Any time    Call taken on 1/12/2022 at 8:31 AM by Gayatri Talbot

## 2022-01-14 ENCOUNTER — TRANSFERRED RECORDS (OUTPATIENT)
Dept: HEALTH INFORMATION MANAGEMENT | Facility: CLINIC | Age: 62
End: 2022-01-14
Payer: COMMERCIAL

## 2022-02-17 PROBLEM — M06.9 RHEUMATOID ARTHRITIS INVOLVING MULTIPLE SITES (H): Status: ACTIVE | Noted: 2019-03-12

## 2022-02-25 ENCOUNTER — TRANSFERRED RECORDS (OUTPATIENT)
Dept: HEALTH INFORMATION MANAGEMENT | Facility: CLINIC | Age: 62
End: 2022-02-25
Payer: COMMERCIAL

## 2022-03-24 NOTE — PROGRESS NOTES
This is a recent snapshot of the patient's [facility] medical record.  For current drug dose and complete information and questions, call [phone] or In Basket pool, [facility] home infusion ([id number])  CSN Number:  [id number]

## 2022-03-30 NOTE — PROGRESS NOTES
This is a recent snapshot of the patient's [facility] medical record.  For current drug dose and complete information and questions, call [phone] or In Basket pool, [facility] home infusion ([id number])  CSN Number:  [id number]

## 2022-03-30 NOTE — PROGRESS NOTES
This is a recent snapshot of the patient's [facility] medical record.  For current drug dose and complete information and questions, call [phone] or In Basket pool, fv home infusion ([id number])  CSN Number:  [id number]

## 2022-03-31 ENCOUNTER — OFFICE VISIT (OUTPATIENT)
Dept: RHEUMATOLOGY | Facility: CLINIC | Age: 62
End: 2022-03-31
Payer: COMMERCIAL

## 2022-03-31 VITALS
DIASTOLIC BLOOD PRESSURE: 85 MMHG | HEIGHT: 67 IN | SYSTOLIC BLOOD PRESSURE: 132 MMHG | WEIGHT: 244.6 LBS | BODY MASS INDEX: 38.39 KG/M2 | OXYGEN SATURATION: 94 % | HEART RATE: 94 BPM

## 2022-03-31 DIAGNOSIS — M70.50 PES ANSERINE BURSITIS: ICD-10-CM

## 2022-03-31 DIAGNOSIS — H20.9 UVEITIS: ICD-10-CM

## 2022-03-31 DIAGNOSIS — M06.9 RHEUMATOID ARTHRITIS INVOLVING MULTIPLE SITES, UNSPECIFIED WHETHER RHEUMATOID FACTOR PRESENT (H): Primary | ICD-10-CM

## 2022-03-31 DIAGNOSIS — Z79.899 HIGH RISK MEDICATIONS (NOT ANTICOAGULANTS) LONG-TERM USE: ICD-10-CM

## 2022-03-31 LAB
ALBUMIN SERPL-MCNC: 3.5 G/DL (ref 3.4–5)
ALP SERPL-CCNC: 63 U/L (ref 40–150)
ALT SERPL W P-5'-P-CCNC: 45 U/L (ref 0–70)
AST SERPL W P-5'-P-CCNC: 25 U/L (ref 0–45)
BASOPHILS # BLD AUTO: 0 10E3/UL (ref 0–0.2)
BASOPHILS NFR BLD AUTO: 1 %
BILIRUB DIRECT SERPL-MCNC: 0.1 MG/DL (ref 0–0.2)
BILIRUB SERPL-MCNC: 0.4 MG/DL (ref 0.2–1.3)
CREAT SERPL-MCNC: 1.25 MG/DL (ref 0.66–1.25)
CRP SERPL-MCNC: 4.4 MG/L (ref 0–8)
EOSINOPHIL # BLD AUTO: 0.2 10E3/UL (ref 0–0.7)
EOSINOPHIL NFR BLD AUTO: 3 %
ERYTHROCYTE [DISTWIDTH] IN BLOOD BY AUTOMATED COUNT: 13.4 % (ref 10–15)
ERYTHROCYTE [SEDIMENTATION RATE] IN BLOOD BY WESTERGREN METHOD: 27 MM/HR (ref 0–20)
GFR SERPL CREATININE-BSD FRML MDRD: 66 ML/MIN/1.73M2
HCT VFR BLD AUTO: 39.3 % (ref 40–53)
HGB BLD-MCNC: 13.2 G/DL (ref 13.3–17.7)
LYMPHOCYTES # BLD AUTO: 2.8 10E3/UL (ref 0.8–5.3)
LYMPHOCYTES NFR BLD AUTO: 44 %
MCH RBC QN AUTO: 31.4 PG (ref 26.5–33)
MCHC RBC AUTO-ENTMCNC: 33.6 G/DL (ref 31.5–36.5)
MCV RBC AUTO: 93 FL (ref 78–100)
MONOCYTES # BLD AUTO: 0.6 10E3/UL (ref 0–1.3)
MONOCYTES NFR BLD AUTO: 10 %
NEUTROPHILS # BLD AUTO: 2.7 10E3/UL (ref 1.6–8.3)
NEUTROPHILS NFR BLD AUTO: 42 %
PLATELET # BLD AUTO: 347 10E3/UL (ref 150–450)
PROT SERPL-MCNC: 7.6 G/DL (ref 6.8–8.8)
RBC # BLD AUTO: 4.21 10E6/UL (ref 4.4–5.9)
WBC # BLD AUTO: 6.3 10E3/UL (ref 4–11)

## 2022-03-31 PROCEDURE — 99214 OFFICE O/P EST MOD 30 MIN: CPT | Performed by: INTERNAL MEDICINE

## 2022-03-31 PROCEDURE — 85025 COMPLETE CBC W/AUTO DIFF WBC: CPT | Performed by: INTERNAL MEDICINE

## 2022-03-31 PROCEDURE — 85652 RBC SED RATE AUTOMATED: CPT | Performed by: INTERNAL MEDICINE

## 2022-03-31 PROCEDURE — 86140 C-REACTIVE PROTEIN: CPT | Performed by: INTERNAL MEDICINE

## 2022-03-31 PROCEDURE — 82565 ASSAY OF CREATININE: CPT | Performed by: INTERNAL MEDICINE

## 2022-03-31 PROCEDURE — 36415 COLL VENOUS BLD VENIPUNCTURE: CPT | Performed by: INTERNAL MEDICINE

## 2022-03-31 PROCEDURE — 80076 HEPATIC FUNCTION PANEL: CPT | Performed by: INTERNAL MEDICINE

## 2022-03-31 NOTE — PROGRESS NOTES
Rheumatology Clinic Visit      Prosper Miller MRN# [id number]   YOB: 1960 Age: 61 year old      Date of visit: 3/31/22   PCP: Momo Gooden at [facility]  Ophthalmology: Dr. Michael Jenkins at the [facility]    Chief Complaint   Patient presents with:  Rheumatoid Arthritis: As time goes on he has a harder and harder time    Assessment and Plan     1.  Rheumatoid arthritis: Reportedly dx'd in 2006. Previously followed by Dr. Phillips. Established care with me on 3/26/2018.  No active synovitis on exam when first evaluated by me. Records documents seropositive rheumatoid arthritis but I cannot find records for CCP or rheumatoid factor - check these today..  Previously on leflunomide, cyclosporine, infliximab (10 mg/kg IV every 7 weeks that was effective for arthritis but iritis was not controlled).  Currently on Humira 40 mg SQ every 14 days and doing well with regard to RA and iritis.  Chronic illness, stable.    - Continue Humira 40mg SQ every 14 days  - Labs today: CBC, Creatinine, Hepatic Panel, ESR, CRP     2.  Iritis: Flare of iritis in 2021.    Continue following with ophthalmology.  Previously was doing well with infliximab but required a change to Humira.  Now doing well per patient report.   Chronic illness, stable.        3.  Osteoarthritis of the bilateral first CMC joints: Reviewed the difference between osteoarthritis and rheumatoid arthritis.  He declined hand therapy referral.  Discussed option for steroid injections if needed in the future.    4.  Nonradiating chronic lower back pain, history: Improved with at-home exercises; he has refused physical therapy in the past.      5.  Creatinine elevation: planning to see nephrology as directed by Dr. Hartley.    6.  Right knee osteoarthritis; left pes anserine bursitis: History of left TKA.  Reviewed the diagnosis and treatment options for the bursitis; he will monitor for now and avoid aggravating  activities.  He says that he plans to have right TKA in the future.    7.  Vaccinations: Vaccinations reviewed with Mr. Miller.  Risks and benefits of vaccinations were discussed.    - Influenza: encouraged yearly vaccination  - [id number]: refused by patient  - [id number]: refused by patient  - Shingrix: Refused by patient  - Tetanus: Refused by patient  - COVID-19: has received the Pfizer COVID-19 vaccine on 4/22/2021 and 5/13/2021 and 11/15/2021.  4th dose of the mRNA COVID-19 vaccination is recommended to be received 3 months after the third mRNA COVID-19 vaccination was received.    Total minutes spent in evaluation with patient, documentation, , and review of pertinent studies and chart notes: 22     Mr. Miller verbalized agreement with and understanding of the rational for the diagnosis and treatment plan.  All questions were answered to best of my ability and the patient's satisfaction. Mr. Miller was advised to contact the clinic with any questions that may arise after the clinic visit.      Thank you for involving me in the care of the patient    Return to clinic: 3-4 months      HPI   Prosper Miller is a 61 year old male with a past medical history significant for hypertension, hyperlipidemia, obstructive sleep apnea, IgA nephropathy, left TKA, rheumatoid arthritis, and iritis who presents for follow-up of rheumatoid arthritis and immunosuppressive management for iritis.     2/25/2022 ophthalmology note by Dr. Michael Jenkins documents that the eye disease is now quiescent and will follow-up in 6 weeks.    Today, 3/31/2022: RA is doing well.  Iritis controlled.  Tolerating Humira well.  Only joint pain is at the bilateral first CMC joints that is worse with activity and improves with rest.  Occasional pain of the left knee at the pes anserine bursa.  Right knee pain worse with activity and improves with rest.  No joint swelling.    Denies fevers, chills, nausea, vomiting, constipation. No  abdominal pain. No chest pain/pressure, palpitations, or shortness of breath. No LE swelling. No neck pain. No oral or nasal sores.  No rash.     Tobacco: Former smoker  EtOH: Weekly  Drugs: None  Occupation:     ROS   12 point review of system was completed and negative except as noted in the HPI     Active Problem List     Patient Active Problem List   Diagnosis     Iritis     Rheumatoid arthritis of hand, unspecified laterality, unspecified rheumatoid factor presence     Rheumatoid arthritis involving multiple sites, unspecified rheumatoid factor presence     Adenomatous colon polyp     JALEESA (obstructive sleep apnea)     Esophageal reflux     Benign essential hypertension     Acute glomerulonephritis with pathological lesion in kidney     Primary hypercholesterolemia     Morbid obesity (H)     Chronic kidney disease, stage 3a (H)     Past Medical History   History reviewed. No pertinent past medical history.  Past Surgical History     Past Surgical History:   Procedure Laterality Date     EYE SURGERY       HC KNEE SCOPE, DIAGNOSTIC      Description: Arthroscopy Knee Right;  Recorded: 04/30/2008;     JOINT REPLACEMENT       OTHER SURGICAL HISTORY Left     left knee arthroplasty     OTHER SURGICAL HISTORY Bilateral     Cataract surgery     Allergy   No Known Allergies  Current Medication List     Current Outpatient Medications   Medication Sig     adalimumab (HUMIRA *CF*) 40 MG/0.4ML pen kit Inject 0.4 mLs (40 mg) Subcutaneous every 14 days . Hold for signs of infection, then seek medical attention.     Cholecalciferol (VITAMIN D3) 2000 UNITS CAPS      EQL NATURAL ZINC 50 MG TABS      lisinopril (ZESTRIL) 30 MG tablet TAKE 1 & 1/2 TABLETS (45 MG TOTAL) BY MOUTH DAILY.     pantoprazole (PROTONIX) 40 MG EC tablet Take 1 tablet (40 mg) by mouth 2 times daily     inFLIXimab (REMICADE) 100 MG injection  (Patient not taking: Reported on 3/31/2022)     predniSONE (DELTASONE) 10 MG tablet Take 15 mg by mouth  "daily 10 mg daily reported 1215/2021 (Patient not taking: Reported on 3/31/2022)     No current facility-administered medications for this visit.     Social History   See HPI    Family History     Family History   Problem Relation Age of Onset     Cancer Father      Bone Cancer Father         Physical Exam     Temp Readings from Last 3 Encounters:   12/07/21 98.7  F (37.1  C) (Oral)   01/26/21 97.8  F (36.6  C) (Oral)   02/14/20 97  F (36.1  C) (Oral)     BP Readings from Last 5 Encounters:   03/31/22 132/85   12/15/21 113/76   12/07/21 (!) 130/93   08/30/21 127/81   01/26/21 (!) 145/86     Pulse Readings from Last 1 Encounters:   03/31/22 94     Resp Readings from Last 1 Encounters:   12/07/21 16     Estimated body mass index is 38.89 kg/m  as calculated from the following:    Height as of this encounter: 1.689 m (5' 6.5\").    Weight as of this encounter: 110.9 kg (244 lb 9.6 oz).      GEN: NAD.  HEENT:  Anicteric, noninjected sclera. No obvious external lesions of the ear and nose. Hearing intact.  CV: S1, S2. RRR. No m/r/g  PULM: No increased work of breathing. CTA bilaterally   MSK: MCPs, PIPs, DIPs without swelling or tenderness to palpation.  Wrists without swelling or tenderness to palpation.  Elbows and shoulders without swelling or tenderness to palpation.  Right knee with crepitation and mild medial joint line tenderness.  Left knee mildly tender to palpation over the pes anserine bursa.  Ankles and MTPs without swelling or tenderness to palpation.    SKIN: No rash or jaundice seen  PSYCH: Alert. Appropriate.        Labs / Imaging (select studies)     RF/CCP  Recent Labs   Lab Test 12/15/21  0908   CCPIGG 12.0*   RHF 10     CBC  Recent Labs   Lab Test 12/07/21  1349 10/08/21  1330 08/30/21  0848 03/12/21  1310 03/09/20  0812 09/16/19  0804 03/12/19  0849   WBC 9.9 5.4 7.6   < > 6.6 5.7 5.6   RBC 4.78 3.27* 3.60*   < > 4.76 4.50 4.88   HGB 14.8 10.8* 11.8*   < > 15.2 14.5 15.7   HCT 45.6 30.8* 34.3*   < > " 44.5 42.3 44.5   MCV 95 94 95   < > 94 94 91   RDW 11.8 13.6 13.6   < > 14.0 12.7 13.1    274 313   < > 275 302 343   MCH 31.0 33.0 32.8   < > 31.9 32.2 32.2   MCHC 32.5 35.1 34.4   < > 34.2 34.3 35.3   NEUTROPHIL  --  69 58  --  43.8 40.9 37.6   LYMPH  --  24 35  --  43.4 43.2 47.1   MONOCYTE  --  4 6  --  6.7 9.1 8.9   EOSINOPHIL  --  1 1  --  5.6 6.3 5.5   BASOPHIL  --  1 0  --  0.5 0.5 0.9   ANEU  --   --   --   --  2.9 2.3 2.1   ALYM  --   --   --   --  2.9 2.5 2.6   MICHAEL  --   --   --   --  0.4 0.5 0.5   AEOS  --   --   --   --  0.4 0.4 0.3   ABAS  --   --   --   --  0.0 0.0 0.1   ANEUTAUTO  --  3.7 4.4  --   --   --   --    ALYMPAUTO  --  1.3 2.7  --   --   --   --    AMONOAUTO  --  0.2 0.4  --   --   --   --    AEOSAUTO  --  0.1 0.1  --   --   --   --    ABSBASO  --  0.1 0.0  --   --   --   --     < > = values in this interval not displayed.     CMP  Recent Labs   Lab Test 12/07/21  1349 10/08/21  1330 08/30/21  0848 03/12/21  1310 03/09/20  0812 02/24/20  1634 03/12/19  0849 07/31/18  1555     --   --   --   --  142  --  143   POTASSIUM 4.2  --   --   --   --  3.8  --  4.5   CHLORIDE 102  --   --   --   --  105  --  106   CO2 23  --   --   --   --  26  --  27   ANIONGAP 15  --   --   --   --  11  --  10   GLC 77  --  103*  --   --  92  --  95   BUN 27*  --   --   --   --  12  --  18   CR 1.45* 1.50* 2.00* 1.52* 1.05 1.07   < > 1.17   GFRESTIMATED 52* 50* 35* 49* 77 >60   < > >60   GFRESTBLACK  --   --   --  57* 89 >60   < > >60   ERNESTINA 10.2  --   --   --   --  9.2  --  9.9   BILITOTAL 0.5 0.4 0.2 0.3 0.3  --    < >  --    ALBUMIN 3.9 3.1* 3.5 3.2* 3.5  --    < >  --    PROTTOTAL 7.6 6.4* 7.1 6.9 7.7  --    < >  --    ALKPHOS 68 53 51 70 69  --    < >  --    AST 18 20 14 28 21  --    < >  --    ALT 26 29 30 30 37  --    < >  --     < > = values in this interval not displayed.     Calcium/VitaminD  Recent Labs   Lab Test 12/07/21  1349 02/24/20  1634 07/31/18  1555   ERNESTINA 10.2 9.2 9.9   VITDT 50  --    --      ESR/CRP  Recent Labs   Lab Test 10/08/21  1330 08/30/21  0848 03/12/21  1310   SED 25* 32* 31*   CRP 3.6 <2.9 4.2     Lipid Panel  Recent Labs   Lab Test 12/07/21  1349 11/26/14  1022   CHOL 282* 254*   TRIG 143 150*   HDL 92 77   * 147*     Hepatitis B  Recent Labs   Lab Test 03/26/18  1540   HBCAB Nonreactive   HEPBANG Nonreactive     Hepatitis C  Recent Labs   Lab Test 03/26/18  1540   HCVAB Nonreactive       Tuberculosis Screening  Recent Labs   Lab Test 08/30/21  0848 03/26/18  1541   TBRES Negative  --    TBRSLT  --  Negative   TBAGN  --  0.00     Immunization History     Immunization History   Administered Date(s) Administered     COVID-19,PF,Pfizer (12+ Yrs) 04/22/2021, 05/13/2021, 11/15/2021     HepA-Adult 04/30/2008, 11/11/2008     Influenza Vaccine, 6+MO IM (QUADRIVALENT W/PRESERVATIVES) 11/11/2008, 12/18/2009, 09/18/2020     Mantoux Tuberculin Skin Test 02/01/2007     Tdap (Adacel,Boostrix) 03/09/2011          Chart documentation done in part with Dragon Voice recognition Software. Although reviewed after completion, some word and grammatical error may remain.      Alex Segundo MD

## 2022-03-31 NOTE — PATIENT INSTRUCTIONS
RHEUMATOLOGY    Dr. Alex Segundo    [address] [place] [address]  Phone number: [phone]  Fax number: [phone]      Thank you for choosing [facility]!    Jaja Sanchez CMA Rheumatology

## 2022-04-01 NOTE — RESULT ENCOUNTER NOTE
RN: Please call to notify Prosper Kwonpan that the hemoglobin is at 13.2 that is just below the normal range, reduced from labs 3 months ago but improved compared to labs prior to that.  Kidney function is improved, with a creatinine of 1.25. Other labs are stable.    Alex Segundo MD  4/1/2022 3:29 PM

## 2022-04-19 NOTE — PROGRESS NOTES
This is a recent snapshot of the patient's [facility] medical record.  For current drug dose and complete information and questions, call [phone] or In Basket pool, fv home infusion ([id number])  CSN Number:  [id number]

## 2022-04-25 NOTE — PROGRESS NOTES
This is a recent snapshot of the patient's [facility] medical record.  For current drug dose and complete information and questions, call [phone] or In Basket pool, fv home infusion ([id number])  CSN Number:  [id number]

## 2022-05-17 ENCOUNTER — TRANSFERRED RECORDS (OUTPATIENT)
Dept: HEALTH INFORMATION MANAGEMENT | Facility: CLINIC | Age: 62
End: 2022-05-17
Payer: COMMERCIAL

## 2022-06-27 ENCOUNTER — TELEPHONE (OUTPATIENT)
Dept: RHEUMATOLOGY | Facility: CLINIC | Age: 62
End: 2022-06-27

## 2022-06-27 NOTE — TELEPHONE ENCOUNTER
Spoke with patient. He used to be on remicade back in January switched to humira. When he was on remicade he had a knee replacement and now he is going to have the other knee replaced and he was wondering if he has to stop the humira and for how long. Patient is on 40mg humira every two weeks. He is wondering if he does have to adjust his humira for his knee surgery how soon before the surgery he would have to stop the humira and how soon after the surgery he can restart the humira. Forwarding to physician to review and advise.     Sonia LANGLEY RN, Specialty Clinic 06/27/22 8:43 AM

## 2022-06-30 NOTE — TELEPHONE ENCOUNTER
Patient received message from provider and agrees with plan.    Pamela Rojas RN  [facility] Nurse Advisor

## 2022-06-30 NOTE — TELEPHONE ENCOUNTER
RN: Please call to notify Prosper Miller that Humira should not be taken at least 2 weeks prior to the  surgery; and restarted no sooner than 2 weeks after the surgery is completed and only in the absence of infection, absence of delayed wound healing, and only after given approval to restart by the surgeon.      Alex Segundo MD  6/30/2022

## 2022-07-11 DIAGNOSIS — I10 ESSENTIAL (PRIMARY) HYPERTENSION: ICD-10-CM

## 2022-07-11 DIAGNOSIS — K21.9 GASTROESOPHAGEAL REFLUX DISEASE WITHOUT ESOPHAGITIS: ICD-10-CM

## 2022-07-11 RX ORDER — LISINOPRIL 30 MG/1
TABLET ORAL
Qty: 135 TABLET | Refills: 1 | Status: SHIPPED | OUTPATIENT
Start: 2022-07-11 | End: 2023-03-21 | Stop reason: DRUGHIGH

## 2022-07-11 RX ORDER — PANTOPRAZOLE SODIUM 40 MG/1
TABLET, DELAYED RELEASE ORAL
Qty: 180 TABLET | Refills: 1 | Status: SHIPPED | OUTPATIENT
Start: 2022-07-11 | End: 2023-07-13

## 2022-07-12 NOTE — TELEPHONE ENCOUNTER
"Last Written Prescription Date:  1/12/22  Last Fill Quantity: 135,  # refills: 0   Last office visit provider:  12/7/21     Requested Prescriptions   Pending Prescriptions Disp Refills     lisinopril (ZESTRIL) 30 MG tablet [Pharmacy Med Name: LISINOPRIL 30MG TABS] 135 tablet 0     Sig: TAKE ONE AND ONE-HALF TABLETS BY MOUTH DAILY       ACE Inhibitors (Including Combos) Protocol Passed - 7/11/2022  8:26 AM        Passed - Blood pressure under 140/90 in past 12 months     BP Readings from Last 3 Encounters:   03/31/22 132/85   12/15/21 113/76   12/07/21 (!) 130/93                 Passed - Recent (12 mo) or future (30 days) visit within the authorizing provider's specialty     Patient has had an office visit with the authorizing provider or a provider within the authorizing providers department within the previous 12 mos or has a future within next 30 days. See \"Patient Info\" tab in inbasket, or \"Choose Columns\" in Meds & Orders section of the refill encounter.              Passed - Medication is active on med list        Passed - Patient is age 18 or older        Passed - Normal serum creatinine on file in past 12 months     Recent Labs   Lab Test 03/31/22  0804   CR 1.25       Ok to refill medication if creatinine is low          Passed - Normal serum potassium on file in past 12 months     Recent Labs   Lab Test 12/07/21  1349   POTASSIUM 4.2                pantoprazole (PROTONIX) 40 MG EC tablet [Pharmacy Med Name: PANTOPRAZOLE SODIUM 40MG TBEC] 180 tablet 0     Sig: TAKE ONE TABLET BY MOUTH TWICE A DAY       PPI Protocol Passed - 7/11/2022  8:26 AM        Passed - Not on Clopidogrel (unless Pantoprazole ordered)        Passed - No diagnosis of osteoporosis on record        Passed - Recent (12 mo) or future (30 days) visit within the authorizing provider's specialty     Patient has had an office visit with the authorizing provider or a provider within the authorizing providers department within the previous 12 mos " "or has a future within next 30 days. See \"Patient Info\" tab in inbasket, or \"Choose Columns\" in Meds & Orders section of the refill encounter.              Passed - Medication is active on med list        Passed - Patient is age 18 or older             Germania Noble RN 07/11/22 7:11 PM  "

## 2022-08-01 ENCOUNTER — ANCILLARY PROCEDURE (OUTPATIENT)
Dept: GENERAL RADIOLOGY | Facility: CLINIC | Age: 62
End: 2022-08-01
Attending: INTERNAL MEDICINE
Payer: COMMERCIAL

## 2022-08-01 ENCOUNTER — OFFICE VISIT (OUTPATIENT)
Dept: RHEUMATOLOGY | Facility: CLINIC | Age: 62
End: 2022-08-01
Payer: COMMERCIAL

## 2022-08-01 ENCOUNTER — TELEPHONE (OUTPATIENT)
Dept: RHEUMATOLOGY | Facility: CLINIC | Age: 62
End: 2022-08-01

## 2022-08-01 VITALS
SYSTOLIC BLOOD PRESSURE: 158 MMHG | BODY MASS INDEX: 37.36 KG/M2 | HEART RATE: 92 BPM | OXYGEN SATURATION: 96 % | TEMPERATURE: 97.8 F | WEIGHT: 235 LBS | DIASTOLIC BLOOD PRESSURE: 82 MMHG

## 2022-08-01 DIAGNOSIS — M17.11 PRIMARY OSTEOARTHRITIS OF RIGHT KNEE: ICD-10-CM

## 2022-08-01 DIAGNOSIS — M19.041 PRIMARY OSTEOARTHRITIS OF BOTH HANDS: ICD-10-CM

## 2022-08-01 DIAGNOSIS — H20.9 UVEITIS: ICD-10-CM

## 2022-08-01 DIAGNOSIS — M06.9 RHEUMATOID ARTHRITIS INVOLVING MULTIPLE SITES, UNSPECIFIED WHETHER RHEUMATOID FACTOR PRESENT (H): ICD-10-CM

## 2022-08-01 DIAGNOSIS — M19.042 PRIMARY OSTEOARTHRITIS OF BOTH HANDS: ICD-10-CM

## 2022-08-01 DIAGNOSIS — M06.9 RHEUMATOID ARTHRITIS INVOLVING MULTIPLE SITES, UNSPECIFIED WHETHER RHEUMATOID FACTOR PRESENT (H): Primary | ICD-10-CM

## 2022-08-01 LAB
ALBUMIN MFR UR ELPH: 12.2 MG/DL
ALBUMIN SERPL-MCNC: 3.6 G/DL (ref 3.4–5)
ALBUMIN UR-MCNC: NEGATIVE MG/DL
ALP SERPL-CCNC: 74 U/L (ref 40–150)
ALT SERPL W P-5'-P-CCNC: 43 U/L (ref 0–70)
ANION GAP SERPL CALCULATED.3IONS-SCNC: 5 MMOL/L (ref 3–14)
APPEARANCE UR: CLEAR
AST SERPL W P-5'-P-CCNC: 25 U/L (ref 0–45)
BASOPHILS # BLD AUTO: 0 10E3/UL (ref 0–0.2)
BASOPHILS NFR BLD AUTO: 1 %
BILIRUB SERPL-MCNC: 0.3 MG/DL (ref 0.2–1.3)
BILIRUB UR QL STRIP: NEGATIVE
BUN SERPL-MCNC: 17 MG/DL (ref 7–30)
CALCIUM SERPL-MCNC: 9.5 MG/DL (ref 8.5–10.1)
CHLORIDE BLD-SCNC: 109 MMOL/L (ref 94–109)
CO2 SERPL-SCNC: 25 MMOL/L (ref 20–32)
COLOR UR AUTO: YELLOW
CREAT SERPL-MCNC: 1.19 MG/DL (ref 0.66–1.25)
CREAT UR-MCNC: 74.8 MG/DL
CRP SERPL-MCNC: 3 MG/L (ref 0–8)
EOSINOPHIL # BLD AUTO: 0.3 10E3/UL (ref 0–0.7)
EOSINOPHIL NFR BLD AUTO: 5 %
ERYTHROCYTE [DISTWIDTH] IN BLOOD BY AUTOMATED COUNT: 13.3 % (ref 10–15)
ERYTHROCYTE [SEDIMENTATION RATE] IN BLOOD BY WESTERGREN METHOD: 20 MM/HR (ref 0–20)
GFR SERPL CREATININE-BSD FRML MDRD: 69 ML/MIN/1.73M2
GLUCOSE BLD-MCNC: 96 MG/DL (ref 70–99)
GLUCOSE UR STRIP-MCNC: NEGATIVE MG/DL
HCT VFR BLD AUTO: 39.7 % (ref 40–53)
HGB BLD-MCNC: 13.4 G/DL (ref 13.3–17.7)
HGB UR QL STRIP: ABNORMAL
KETONES UR STRIP-MCNC: NEGATIVE MG/DL
LEUKOCYTE ESTERASE UR QL STRIP: NEGATIVE
LYMPHOCYTES # BLD AUTO: 2.9 10E3/UL (ref 0.8–5.3)
LYMPHOCYTES NFR BLD AUTO: 50 %
MCH RBC QN AUTO: 30.8 PG (ref 26.5–33)
MCHC RBC AUTO-ENTMCNC: 33.8 G/DL (ref 31.5–36.5)
MCV RBC AUTO: 91 FL (ref 78–100)
MONOCYTES # BLD AUTO: 0.6 10E3/UL (ref 0–1.3)
MONOCYTES NFR BLD AUTO: 11 %
NEUTROPHILS # BLD AUTO: 1.9 10E3/UL (ref 1.6–8.3)
NEUTROPHILS NFR BLD AUTO: 34 %
NITRATE UR QL: NEGATIVE
PH UR STRIP: 5.5 [PH] (ref 5–7)
PLATELET # BLD AUTO: 347 10E3/UL (ref 150–450)
POTASSIUM BLD-SCNC: 5 MMOL/L (ref 3.4–5.3)
PROT SERPL-MCNC: 7.7 G/DL (ref 6.8–8.8)
PROT/CREAT 24H UR: 0.16 MG/MG CR (ref 0–0.2)
RBC # BLD AUTO: 4.35 10E6/UL (ref 4.4–5.9)
RBC #/AREA URNS AUTO: NORMAL /HPF
SODIUM SERPL-SCNC: 139 MMOL/L (ref 133–144)
SP GR UR STRIP: 1.01 (ref 1–1.03)
UROBILINOGEN UR STRIP-ACNC: 0.2 E.U./DL
WBC # BLD AUTO: 5.8 10E3/UL (ref 4–11)
WBC #/AREA URNS AUTO: NORMAL /HPF

## 2022-08-01 PROCEDURE — 85025 COMPLETE CBC W/AUTO DIFF WBC: CPT | Performed by: INTERNAL MEDICINE

## 2022-08-01 PROCEDURE — 84156 ASSAY OF PROTEIN URINE: CPT | Performed by: INTERNAL MEDICINE

## 2022-08-01 PROCEDURE — 81001 URINALYSIS AUTO W/SCOPE: CPT | Performed by: INTERNAL MEDICINE

## 2022-08-01 PROCEDURE — 86160 COMPLEMENT ANTIGEN: CPT | Performed by: INTERNAL MEDICINE

## 2022-08-01 PROCEDURE — 85652 RBC SED RATE AUTOMATED: CPT | Performed by: INTERNAL MEDICINE

## 2022-08-01 PROCEDURE — 86225 DNA ANTIBODY NATIVE: CPT | Performed by: INTERNAL MEDICINE

## 2022-08-01 PROCEDURE — 36415 COLL VENOUS BLD VENIPUNCTURE: CPT | Performed by: INTERNAL MEDICINE

## 2022-08-01 PROCEDURE — 73130 X-RAY EXAM OF HAND: CPT | Mod: TC | Performed by: RADIOLOGY

## 2022-08-01 PROCEDURE — 99214 OFFICE O/P EST MOD 30 MIN: CPT | Performed by: INTERNAL MEDICINE

## 2022-08-01 PROCEDURE — 86140 C-REACTIVE PROTEIN: CPT | Performed by: INTERNAL MEDICINE

## 2022-08-01 PROCEDURE — 80053 COMPREHEN METABOLIC PANEL: CPT | Performed by: INTERNAL MEDICINE

## 2022-08-01 NOTE — NURSING NOTE
RAPID3 (0-30) Cumulative Score  0.3          RAPID3 Weighted Score (divide #4 by 3 and that is the weighted score)  0.06

## 2022-08-01 NOTE — PATIENT INSTRUCTIONS
Perioperative DMARD management in anticipation of total knee arthroplasty: Humira should not be taken at least 2 weeks prior to the surgery; and restarted no sooner than 2 weeks after the surgery is completed and only in the absence of infection, absence of delayed wound healing, and only after given approval to restart by the surgeon.    For the hand tremor: I believe that this is an essential tremor. Please discuss possible treatments with your primary care provider    Please call to reschedule with nephrology regarding the elevated creatinine: [phone]    Please call to schedule with hand therapy regarding hand osteoarthritis: [phone]

## 2022-08-01 NOTE — TELEPHONE ENCOUNTER
RN: Please call to notify Prosper Angela that x-ray of the hand shows severe left first carpometacarpal joint degenerative changes, that is the osteoarthritis we had discussed today at the base of the thumb.  Otherwise there are mild to moderate degenerative changes at the joints next of the fingernail and the joints proximal to those (DIPs and PIPs).  Also incidentally found are tiny metallic foreign bodies in the first webspace (between the thumb and index finger) of the left hand. The plan for hand therapy remains.     Alex Segundo MD  8/1/2022

## 2022-08-01 NOTE — NURSING NOTE
RAPID3 (0-30) Cumulative Score  5.3          RAPID3 Weighted Score (divide #4 by 3 and that is the weighted score)  1.76

## 2022-08-01 NOTE — PROGRESS NOTES
Rheumatology Clinic Visit      Prosper Miller MRN# [id number]   YOB: 1960 Age: 62 year old      Date of visit: 8/01/22   PCP: Momo Gooden at [facility]  Ophthalmology: Dr. Michael Jenkins at the [facility]    Chief Complaint   Patient presents with:  RECHECK: Follow up RA-discuss new sx of RA. Having hand pain    Assessment and Plan     1.  Rheumatoid arthritis: Reportedly dx'd in 2006. Previously followed by Dr. Phillips. Established care with me on 3/26/2018.  No active synovitis on exam when first evaluated by me. Records documents seropositive rheumatoid arthritis but I cannot find records for CCP or rheumatoid factor - check these today..  Previously on leflunomide, cyclosporine, infliximab (10 mg/kg IV every 7 weeks that was effective for arthritis but iritis was not controlled).  Currently on Humira 40 mg SQ every 14 days and doing well with regard to RA and iritis.  Chronic illness, stable.    - Continue Humira 40mg SQ every 14 days     2.  Iritis: Flare of iritis in 2021.    Continue following with ophthalmology.  Previously was doing well with infliximab but he lost benefit over time; changed to Humira and has been doing well since.   Chronic illness, stable.      3.  Osteoarthritis of the bilateral first CMC joints, and at the PIPs and DIPs: Reviewed the difference between osteoarthritis and rheumatoid arthritis today.   - X-ray: bilateral hands  - Hand therapy referral    4.  Nonradiating chronic lower back pain, history: Improved with at-home exercises; he has refused physical therapy in the past.  Not an issue at this time.    5. Hx of creatinine elevation: planning to see nephrology as directed by Dr. Hartley.  Most recent creatinine has normalized.  Recheck labs today.  - Labs: CBC, CMP, ESR, CRP, C3, C4, dsDNA, UA, Uprotein:creatinine    6.  Right knee osteoarthritis: He says that he plans to have right TKA in the future.  We reviewed the  perioperative DMARD management. Humira should not be taken at least 2 weeks prior to the  surgery; and restarted no sooner than 2 weeks after the surgery is completed and only in the absence of infection, absence of delayed wound healing, and only after given approval to restart by the surgeon.      7.  Vaccinations: Vaccinations reviewed with Mr. Miller.  Risks and benefits of vaccinations were discussed.    - Influenza: encouraged yearly vaccination  - [id number]: refused by patient  previously, not discussed today  - [id number]: refused by patient  previously, not discussed today  - Shingrix: Refused by patient  previously, not discussed today  - Tetanus: Refused by patient previously, not discussed today  - COVID-19: has received the Pfizer COVID-19 vaccine on 4/22/2021 and 5/13/2021 and 11/15/2021.  A 4th dose (1st booster) of the mRNA COVID-19 vaccination is recommended to be received 3 months after the third mRNA COVID-19 vaccination was received.  A 5th mRNA vaccine (2nd booster) may be received at least 4 months after the 4th dose.     8.  Essential tremor: Reviewed suspected diagnosis.  Advised that he discuss with his PCP.    Total minutes spent in evaluation with patient, documentation, , and review of pertinent studies and chart notes: 27    Mr. Miller verbalized agreement with and understanding of the rational for the diagnosis and treatment plan.  All questions were answered to best of my ability and the patient's satisfaction. Mr. Miller was advised to contact the clinic with any questions that may arise after the clinic visit.      Thank you for involving me in the care of the patient    Return to clinic: 3-4 months      HPI   Prosper Miller is a 62 year old male with a past medical history significant for hypertension, hyperlipidemia, obstructive sleep apnea, IgA nephropathy, left TKA, rheumatoid arthritis, and iritis who presents for follow-up of rheumatoid arthritis and  immunosuppressive management for iritis.     2/25/2022 ophthalmology note by Dr. Michael Jenkins documents that the eye disease is now quiescent and will follow-up in 6 weeks.    3/31/2022: RA is doing well.  Iritis controlled.  Tolerating Humira well.  Only joint pain is at the bilateral first CMC joints that is worse with activity and improves with rest.  Occasional pain of the left knee at the pes anserine bursa.  Right knee pain worse with activity and improves with rest.  No joint swelling.    Today, 8/1/2022: hands shake. Anxiety.  Hands hurt at the bilateral 1st CMC joints that is worse with self-palpation; and pain along the entire right 3rd finger that is on and off throughout the day and he notes hx of injury to the right 3rd finger.  Knees bother him; hx of left TKA and planning for right TKA.  Tolerating Humira well.  Uveitis remains controlled but he says that every now and then he feels like there might be inflammation that is going to start; he with follow-up with his ophthalmologist    Denies fevers, chills, nausea, vomiting, constipation. No abdominal pain. No chest pain/pressure, palpitations, or shortness of breath. No LE swelling. No neck pain. No oral or nasal sores.  No rash.     Tobacco: Former smoker  EtOH: Weekly  Drugs: None  Occupation:     ROS   12 point review of system was completed and negative except as noted in the HPI     Active Problem List     Patient Active Problem List   Diagnosis     Iritis     Rheumatoid arthritis of hand, unspecified laterality, unspecified rheumatoid factor presence     Rheumatoid arthritis involving multiple sites, unspecified rheumatoid factor presence     Adenomatous colon polyp     JALEESA (obstructive sleep apnea)     Esophageal reflux     Benign essential hypertension     Acute glomerulonephritis with pathological lesion in kidney     Primary hypercholesterolemia     Morbid obesity (H)     Chronic kidney disease, stage 3a (H)     Past Medical  "History   No past medical history on file.  Past Surgical History     Past Surgical History:   Procedure Laterality Date     EYE SURGERY       HC KNEE SCOPE, DIAGNOSTIC      Description: Arthroscopy Knee Right;  Recorded: 04/30/2008;     JOINT REPLACEMENT       OTHER SURGICAL HISTORY Left     left knee arthroplasty     OTHER SURGICAL HISTORY Bilateral     Cataract surgery     Allergy   No Known Allergies  Current Medication List     Current Outpatient Medications   Medication Sig     adalimumab (HUMIRA *CF*) 40 MG/0.4ML pen kit Inject 0.4 mLs (40 mg) Subcutaneous every 14 days . Hold for signs of infection, then seek medical attention.     Cholecalciferol (VITAMIN D3) 2000 UNITS CAPS      EQL NATURAL ZINC 50 MG TABS      lisinopril (ZESTRIL) 30 MG tablet TAKE ONE AND ONE-HALF TABLETS BY MOUTH DAILY     pantoprazole (PROTONIX) 40 MG EC tablet TAKE ONE TABLET BY MOUTH TWICE A DAY     No current facility-administered medications for this visit.     Social History   See HPI    Family History     Family History   Problem Relation Age of Onset     Cancer Father      Bone Cancer Father         Physical Exam     Temp Readings from Last 3 Encounters:   08/01/22 97.8  F (36.6  C) (Oral)   12/07/21 98.7  F (37.1  C) (Oral)   01/26/21 97.8  F (36.6  C) (Oral)     BP Readings from Last 5 Encounters:   08/01/22 (!) 152/101   03/31/22 132/85   12/15/21 113/76   12/07/21 (!) 130/93   08/30/21 127/81     Pulse Readings from Last 1 Encounters:   08/01/22 92     Resp Readings from Last 1 Encounters:   12/07/21 16     Estimated body mass index is 37.36 kg/m  as calculated from the following:    Height as of 3/31/22: 1.689 m (5' 6.5\").    Weight as of this encounter: 106.6 kg (235 lb).      GEN: NAD.  HEENT:  Anicteric, noninjected sclera. No obvious external lesions of the ear and nose. Hearing intact.  CV: S1, S2. RRR. No m/r/g  PULM: No increased work of breathing. CTA bilaterally   MSK: MCPs, PIPs, DIPs without swelling or " tenderness to palpation, except for mild tenderness palpation of the right third PIP was without increased warmth or overlying erythema.  No synovitis of the right third PIP.  Squaring and mild tenderness to palpation of the bilateral first CMC joints.  Wrists without swelling or tenderness to palpation.  Elbows and shoulders without swelling or tenderness to palpation.  Right knee with crepitation and mild medial joint line tenderness.  Left knee mildly tender to palpation over the pes anserine bursa.  Ankles and MTPs without swelling or tenderness to palpation.    SKIN: No rash or jaundice seen  PSYCH: Alert. Appropriate.        Labs / Imaging (select studies)     RF/CCP  Recent Labs   Lab Test 12/15/21  0908   CCPIGG 12.0*   RHF 10     HAYES  Recent Labs   Lab Test 12/15/21  0908   WESLEY Borderline Positive*   ANAP1 Homogeneous   ANAT1 1:80     RNP/Sm/SSA/SSB  Recent Labs   Lab Test 12/15/21  0908   RNPIGG Negative   SMIGG Negative   SSAIGG Negative   SSBIGG Negative     dsDNA  Recent Labs   Lab Test 12/15/21  0908   DNA 23.0*     C3/C4  Recent Labs   Lab Test 12/15/21  0908   N9PCXFI 145   K2GQZEJ 33     Histone Antibody  Recent Labs   Lab Test 12/15/21  0908   HSTO 0.2     CBC  Recent Labs   Lab Test 03/31/22  0805 12/07/21  1349 10/08/21  1330 08/30/21  0848 03/12/21  1310 03/09/20  0812 09/16/19  0804 03/12/19  0849   WBC 6.3 9.9 5.4 7.6   < > 6.6 5.7 5.6   RBC 4.21* 4.78 3.27* 3.60*   < > 4.76 4.50 4.88   HGB 13.2* 14.8 10.8* 11.8*   < > 15.2 14.5 15.7   HCT 39.3* 45.6 30.8* 34.3*   < > 44.5 42.3 44.5   MCV 93 95 94 95   < > 94 94 91   RDW 13.4 11.8 13.6 13.6   < > 14.0 12.7 13.1    294 274 313   < > 275 302 343   MCH 31.4 31.0 33.0 32.8   < > 31.9 32.2 32.2   MCHC 33.6 32.5 35.1 34.4   < > 34.2 34.3 35.3   NEUTROPHIL 42  --  69 58  --  43.8 40.9 37.6   LYMPH 44  --  24 35  --  43.4 43.2 47.1   MONOCYTE 10  --  4 6  --  6.7 9.1 8.9   EOSINOPHIL 3  --  1 1  --  5.6 6.3 5.5   BASOPHIL 1  --  1 0  --  0.5  0.5 0.9   ANEU  --   --   --   --   --  2.9 2.3 2.1   ALYM  --   --   --   --   --  2.9 2.5 2.6   MICHAEL  --   --   --   --   --  0.4 0.5 0.5   AEOS  --   --   --   --   --  0.4 0.4 0.3   ABAS  --   --   --   --   --  0.0 0.0 0.1   ANEUTAUTO 2.7  --  3.7 4.4  --   --   --   --    ALYMPAUTO 2.8  --  1.3 2.7  --   --   --   --    AMONOAUTO 0.6  --  0.2 0.4  --   --   --   --    AEOSAUTO 0.2  --  0.1 0.1  --   --   --   --    ABSBASO 0.0  --  0.1 0.0  --   --   --   --     < > = values in this interval not displayed.     [facility]  Recent Labs   Lab Test 03/31/22  0804 12/07/21  1349 10/08/21  1330 08/30/21  0848 03/12/21  1310 03/09/20  0812 02/24/20  1634 09/16/19  0804 03/12/19  0849 07/31/18  1555 03/26/18  1540 09/13/17  1542   NA  --  140  --   --   --   --  142  --   --  143  --  142   POTASSIUM  --  4.2  --   --   --   --  3.8  --   --  4.5  --  4.2   CHLORIDE  --  102  --   --   --   --  105  --   --  106  --  107   CO2  --  23  --   --   --   --  26  --   --  27  --  28   ANIONGAP  --  15  --   --   --   --  11  --   --  10  --  7   GLC  --  77  --  103*  --   --  92  --   --  95  --  95   BUN  --  27*  --   --   --   --  12  --   --  18  --  18   CR 1.25 1.45* 1.50* 2.00* 1.52* 1.05 1.07 1.06   < > 1.17   < > 1.10   GFRESTIMATED 66 52* 50* 35* 49* 77 >60 76   < > >60   < > 69   GFRESTBLACK  --   --   --   --  57* 89 >60 88   < > >60   < > 83   ERNESTINA  --  10.2  --   --   --   --  9.2  --   --  9.9  --  9.5   BILITOTAL 0.4 0.5 0.4 0.2 0.3 0.3  --  0.3   < >  --    < > 0.4   ALBUMIN 3.5 3.9 3.1* 3.5 3.2* 3.5  --  3.6   < >  --    < > 3.8   PROTTOTAL 7.6 7.6 6.4* 7.1 6.9 7.7  --  7.7   < >  --    < > 7.7   ALKPHOS 63 68 53 51 70 69  --  72   < >  --    < > 66   AST 25 18 20 14 28 21  --  24   < >  --    < > 25   ALT 45 26 29 30 30 37  --  36   < >  --    < > 45    < > = values in this interval not displayed.     HgA1c  Recent Labs   Lab Test 12/07/21  1349   A1C 5.5     Calcium/VitaminD  Recent Labs   Lab Test  12/07/21  1349 02/24/20  1634 07/31/18  1555   ERNESTINA 10.2 9.2 9.9   VITDT 50  --   --      ESR/CRP  Recent Labs   Lab Test 03/31/22  0805 03/31/22  0804 10/08/21  1330 08/30/21  0848   SED 27*  --  25* 32*   CRP  --  4.4 3.6 <2.9     TSH/T4  Recent Labs   Lab Test 12/07/21  1349 02/24/20  1634   TSH 1.75 1.67     Lipid Panel  Recent Labs   Lab Test 12/07/21  1349 11/26/14  1022   CHOL 282* 254*   TRIG 143 150*   HDL 92 77   * 147*     Hepatitis B  Recent Labs   Lab Test 03/26/18  1540   HBCAB Nonreactive   HEPBANG Nonreactive     Hepatitis C  Recent Labs   Lab Test 03/26/18  1540   HCVAB Nonreactive     Tuberculosis Screening  Recent Labs   Lab Test 08/30/21  0848 03/26/18  1541   TBRES Negative  --    TBRSLT  --  Negative   TBAGN  --  0.00     UA  Recent Labs   Lab Test 12/07/21  1401   COLOR Yellow   APPEARANCE Clear   URINEGLC Negative   URINEBILI Negative   SG 1.015   URINEPH 5.5   PROTEIN 30 *   UROBILINOGEN 0.2   NITRITE Negative   UBLD Moderate*   LEUKEST Negative   WBCU None Seen   RBCU 2-5*   BACTERIA None Seen     Urine Microscopic  Recent Labs   Lab Test 12/07/21  1401   WBCU None Seen   RBCU 2-5*   BACTERIA None Seen     Urine Protein  Recent Labs   Lab Test 12/07/21  1349   UCRR 121       Immunization History     Immunization History   Administered Date(s) Administered     COVID-19,PF,Pfizer (12+ Yrs) 04/22/2021, 05/13/2021, 11/15/2021     HepA-Adult 04/30/2008, 11/11/2008     Influenza Vaccine, 6+MO IM (QUADRIVALENT W/PRESERVATIVES) 11/11/2008, 12/18/2009, 09/18/2020     Mantoux Tuberculin Skin Test 02/01/2007     Tdap (Adacel,Boostrix) 03/09/2011          Chart documentation done in part with Dragon Voice recognition Software. Although reviewed after completion, some word and grammatical error may remain.      Alex Segundo MD

## 2022-08-02 LAB
C3 SERPL-MCNC: 145 MG/DL (ref 81–157)
C4 SERPL-MCNC: 30 MG/DL (ref 13–39)
DSDNA AB SER-ACNC: 19 IU/ML

## 2022-08-04 ENCOUNTER — TELEPHONE (OUTPATIENT)
Dept: NEPHROLOGY | Facility: CLINIC | Age: 62
End: 2022-08-04

## 2022-08-04 DIAGNOSIS — I10 BENIGN ESSENTIAL HYPERTENSION: Primary | ICD-10-CM

## 2022-08-04 NOTE — TELEPHONE ENCOUNTER
[facility] Call Center    Phone Message    May a detailed message be left on voicemail: yes     Reason for Call: Order(s): Other:   Reason for requested: labs  Date needed: before 01/01/23  Provider name: Venu    Please reach out to patient when lab orders are placed into system.      Action Taken: Other: Nephrology    Travel Screening: Not Applicable

## 2022-08-07 NOTE — RESULT ENCOUNTER NOTE
RN: Please call to notify Prosper Miller that the labs show a low positive double-stranded DNA antibody, but normal complement C3 and C4, normal inflammatory markers ESR and CRP, and no increased protein in the urine.  Some of these labs were rechecked because of the creatinine elevation previously, but the creatinine is now normal.  Overall rheumatology labs are good.    Alex Segundo MD  8/7/2022

## 2022-08-09 ENCOUNTER — TRANSFERRED RECORDS (OUTPATIENT)
Dept: HEALTH INFORMATION MANAGEMENT | Facility: CLINIC | Age: 62
End: 2022-08-09

## 2022-08-15 ENCOUNTER — OFFICE VISIT (OUTPATIENT)
Dept: FAMILY MEDICINE | Facility: CLINIC | Age: 62
End: 2022-08-15
Payer: COMMERCIAL

## 2022-08-15 ENCOUNTER — TELEPHONE (OUTPATIENT)
Dept: RHEUMATOLOGY | Facility: CLINIC | Age: 62
End: 2022-08-15

## 2022-08-15 VITALS
SYSTOLIC BLOOD PRESSURE: 147 MMHG | DIASTOLIC BLOOD PRESSURE: 91 MMHG | BODY MASS INDEX: 35.94 KG/M2 | HEART RATE: 77 BPM | OXYGEN SATURATION: 99 % | WEIGHT: 229 LBS | HEIGHT: 67 IN

## 2022-08-15 DIAGNOSIS — E66.01 MORBID OBESITY (H): ICD-10-CM

## 2022-08-15 DIAGNOSIS — M79.672 LEFT FOOT PAIN: Primary | ICD-10-CM

## 2022-08-15 DIAGNOSIS — N18.31 CHRONIC KIDNEY DISEASE, STAGE 3A (H): ICD-10-CM

## 2022-08-15 PROBLEM — Z22.322 CARRIER OR SUSPECTED CARRIER OF METHICILLIN RESISTANT STAPHYLOCOCCUS AUREUS: Status: ACTIVE | Noted: 2022-08-15

## 2022-08-15 PROBLEM — D12.5 BENIGN NEOPLASM OF SIGMOID COLON: Status: ACTIVE | Noted: 2019-03-26

## 2022-08-15 PROBLEM — K57.30 DIVERTICULAR DISEASE OF LARGE INTESTINE: Status: ACTIVE | Noted: 2019-03-22

## 2022-08-15 LAB — URATE SERPL-MCNC: 8.5 MG/DL (ref 3.4–7)

## 2022-08-15 PROCEDURE — 99214 OFFICE O/P EST MOD 30 MIN: CPT | Performed by: FAMILY MEDICINE

## 2022-08-15 PROCEDURE — 36415 COLL VENOUS BLD VENIPUNCTURE: CPT | Performed by: FAMILY MEDICINE

## 2022-08-15 PROCEDURE — 84550 ASSAY OF BLOOD/URIC ACID: CPT | Performed by: FAMILY MEDICINE

## 2022-08-15 ASSESSMENT — ENCOUNTER SYMPTOMS: BACK PAIN: 1

## 2022-08-15 NOTE — TELEPHONE ENCOUNTER
Reason for Call:  Other call back    Detailed comments: patient called and would like to let Kathie Li and RN know that for the last 2 days left foot pain.  Poss Gout.  Would like a callback.  Thank you.    Phone Number Patient can be reached at: Home number on file [phone] (home)    Best Time: any    Can we leave a detailed message on this number? YES    Call taken on 8/15/2022 at 7:11 AM by Evie Sawyer

## 2022-08-15 NOTE — TELEPHONE ENCOUNTER
Returned call, pt has spoke to PMD who states they informed him he has plantar fascitis. He asked what can be done for this and advised of frozen water bottle, cork ball to roll out foot, good shoes (new balance, hoka, haflingers) and maybe a custom insert. Pt verified understanding.    Kaur NARANJO RN Specialty Triage 8/15/2022 3:37 PM

## 2022-08-15 NOTE — PROGRESS NOTES
"  Assessment & Plan     ICD-10-CM    1. Left foot pain  M79.672 Uric acid     XR Foot Left G/E 3 Views   2. Morbid obesity (H)  E66.01    3. Chronic kidney disease, stage 3a (H)  N18.31      Medical decision making: Patient has had left foot pain since that was worse on Saturday.  2 weeks ago, he had stepped out of the boat with a jump.  However he did not experience pain at that time.  Exam today shows likely plantar fasciitis.  Cannot rule out gout or any hairline fracture.  We will do x-ray and uric acid today.  Patient education material printed and given to the patient about symptom care.  Follow-up if not improving and consider referral to podiatry for definitive measurement.  Best practice advisory mentions morbid obesity.  Patient has been working on weight loss and is down by 15 pounds.  Continue on weight loss.  Best practice advisory mentions CKD stage III A.  Patient is scheduled to see nephrology.    X-ray personally reviewed by me at the time of documentation and I see a small bone fragment of cuboid.  I will call radiology to discuss this before reaching out to the patient.     BMI:   Estimated body mass index is 36.41 kg/m  as calculated from the following:    Height as of this encounter: 1.689 m (5' 6.5\").    Weight as of this encounter: 103.9 kg (229 lb).   Weight management plan: Patient was referred to their PCP to discuss a diet and exercise plan.    MEDICATIONS:  Continue current medications without change  Work on weight loss  See Patient Instructions    Return in about 4 months (around 12/15/2022) for Routine preventive.    Moshe Castillo MD  [facility]    Jase Vargas is a 62 year old accompanied by his spouse, presenting for the following health issues:  Arthritis (Left foot possible gout, X3 days. ) and Back Pain (Back pain for weeks )      Arthritis    Back Pain     History of Present Illness       Reason for visit:  Left foot hurts  Symptom onset:  " "1-3 days ago  Symptoms include:  Can t walk on it  Symptom intensity:  Severe  Symptom progression:  Staying the same  Had these symptoms before:  No    He eats 0-1 servings of fruits and vegetables daily.He consumes 1 sweetened beverage(s) daily.He exercises with enough effort to increase his heart rate 9 or less minutes per day.  He exercises with enough effort to increase his heart rate 3 or less days per week.   He is taking medications regularly.       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         Review of Systems   Musculoskeletal: Positive for arthritis and back pain.            Objective    BP (!) 147/91 (BP Location: Left arm, Patient Position: Sitting, Cuff Size: Thigh)   Pulse 77   Ht 1.689 m (5' 6.5\")   Wt 103.9 kg (229 lb)   SpO2 99%   BMI 36.41 kg/m    Body mass index is 36.41 kg/m .  Physical Exam   GENERAL: healthy, alert and no distress  MS: normal muscle tone, no edema and tenderness to palpation at the insertion of plantar fascia on the left " foot.    X-ray and lab pending                .  ..

## 2022-08-16 ENCOUNTER — TELEPHONE (OUTPATIENT)
Dept: FAMILY MEDICINE | Facility: CLINIC | Age: 62
End: 2022-08-16

## 2022-08-16 DIAGNOSIS — M79.672 LEFT FOOT PAIN: Primary | ICD-10-CM

## 2022-08-16 RX ORDER — PREDNISONE 20 MG/1
TABLET ORAL
Qty: 8 TABLET | Refills: 0 | Status: SHIPPED | OUTPATIENT
Start: 2022-08-16 | End: 2022-12-07

## 2022-08-16 NOTE — TELEPHONE ENCOUNTER
----- Message from Moshe Castillo MD sent at 8/16/2022  9:35 AM CDT -----  Please inform patient that his uric acid level came back high.     Gout cannot be ruled out although he did not have any physical signs with redness or swelling during clinical exam.    I will send a prescription of prednisone 40 mg daily for 3 days and then 20 mg for the next 2 days to see if that provides relief.  He should also talk to his rheumatologist regarding elevated uric acid level    Moshe Castillo MD

## 2022-08-16 NOTE — TELEPHONE ENCOUNTER
Left message to call back for: Results  Information to relay to patient: LMTCB, Please see message below from provider      Letter mailed

## 2022-08-16 NOTE — LETTER
August 16, 2022      Sam Miller  [address] [place] [id number]        Dear ,    We are writing to inform you of your test results.    x-ray of left foot does not show any fracture or arthritis        Resulted Orders   XR Foot Left G/E 3 Views    Narrative    EXAM: XR FOOT LEFT G/E 3 VIEWS  LOCATION: [facility]  DATE/TIME: 8/15/2022 10:14 AM    INDICATION:  Left foot pain  COMPARISON: None.      Impression    IMPRESSION: No fracture. No degenerative changes.       If you have any questions or concerns, please call the clinic at the number listed above.       Sincerely,

## 2022-08-16 NOTE — TELEPHONE ENCOUNTER
----- Message from Moshe Castillo MD sent at 8/15/2022  3:28 PM CDT -----  Please inform patient that x-ray of left foot does not show any fracture or arthritis    Moshe Castillo MD

## 2022-08-16 NOTE — LETTER
August 16, 2022      Sam Miller  [address] [place] [id number]        Dear ,    We are writing to inform you of your test results.    Gout cannot be ruled out although he did not have any physical signs with redness or swelling during clinical exam.     I will send a prescription of prednisone 40 mg daily for 3 days and then 20 mg for the next 2 days to see if that provides relief.   He should also talk to his rheumatologist regarding elevated uric acid level       Resulted Orders   Uric acid   Result Value Ref Range    Uric Acid 8.5 (H) 3.4 - 7.0 mg/dL       If you have any questions or concerns, please call the clinic at the number listed above.       Sincerely,

## 2022-08-16 NOTE — TELEPHONE ENCOUNTER
Patient returned call.  I relayed doctors results.  Patient verbalized understanding with no questions

## 2022-08-17 NOTE — TELEPHONE ENCOUNTER
Called pt back he wanted to let us know that his uric acid is elevated. He is taking prednisone. If he does not answer okay to leave .    Kaur NARANJO RN Specialty Triage 8/17/2022 12:09 PM

## 2022-08-18 ENCOUNTER — VIRTUAL VISIT (OUTPATIENT)
Dept: RHEUMATOLOGY | Facility: CLINIC | Age: 62
End: 2022-08-18
Payer: COMMERCIAL

## 2022-08-18 DIAGNOSIS — M72.2 PLANTAR FASCIITIS OF LEFT FOOT: ICD-10-CM

## 2022-08-18 DIAGNOSIS — E79.0 HYPERURICEMIA: Primary | ICD-10-CM

## 2022-08-18 PROCEDURE — 99214 OFFICE O/P EST MOD 30 MIN: CPT | Mod: GT | Performed by: INTERNAL MEDICINE

## 2022-08-18 NOTE — PROGRESS NOTES
Prosper Miller  is a 62 year old year old who is being evaluated via a billable video visit.      How would you like to obtain your AVS? MyChart  If the video visit is dropped, the invitation should be resent by: Text to cell phone: [phone]   Will anyone else be joining your video visit? No     Rheumatology Video Visit      Prosper Miller MRN# [id number]   YOB: 1960 Age: 62 year old      Date of visit: 8/18/22   PCP: Momo Gooden at [facility]  Ophthalmology: Dr. Michael Jenkins at the [facility]    Chief Complaint   Patient presents with:  Rheumatoid Arthritis: Took 40 mg of prednisone this morning    Assessment and Plan     1.  Rheumatoid arthritis: Reportedly dx'd in 2006. Previously followed by Dr. Phillips. Established care with me on 3/26/2018.  No active synovitis on exam when first evaluated by me. Records documents seropositive rheumatoid arthritis but I cannot find records for CCP or rheumatoid factor - check these today..  Previously on leflunomide, cyclosporine, infliximab (10 mg/kg IV every 7 weeks that was effective for arthritis but iritis was not controlled).  Currently on Humira 40 mg SQ every 14 days and doing well with regard to RA and iritis.      - Continue Humira 40mg SQ every 14 days     2.  Iritis: Flare of iritis in 2021.    Continue following with ophthalmology.  Previously was doing well with infliximab but he lost benefit over time; changed to Humira and has been doing well since.   Chronic illness, stable.      3.  Osteoarthritis of the bilateral first CMC joints, and at the PIPs and DIPs: Reviewed the difference between osteoarthritis and rheumatoid arthritis today.   - X-ray: bilateral hands  - Hand therapy referral    4.  Nonradiating chronic lower back pain, history: Improved with at-home exercises; he has refused physical therapy in the past.  Not an issue at this time.    5. Hx of creatinine elevation: planning to see nephrology  as directed by Dr. Hartley.  Most recent creatinine has normalized.    6.  Right knee osteoarthritis: He says that he plans to have right TKA in the future.  We reviewed the perioperative DMARD management. Humira should not be taken at least 2 weeks prior to the  surgery; and restarted no sooner than 2 weeks after the surgery is completed and only in the absence of infection, absence of delayed wound healing, and only after given approval to restart by the surgeon.      7.  Left plantar fasciitis: He reports that after standing on the edge of a ladder he ended up with left plantar mid-foot pain that is worse with the first few steps and improves the more he walks, less pain when sitting.  No swelling, increased warmth, or overlying erythema of the left foot when this occurred, making gout much less likely.  He was found to have hyperuricemia that prompted today's visit to question if this was gout but his flare is not consistent with gout.  He was advised by Dr. Castillo to use prednisone 40 mg daily x3 days, then 20 mg daily x2 days but he has not noticed any change in symptoms after taking this for 1 day so he plans to discontinue it if no improvement after another day of taking prednisone.  We reviewed the diagnosis of planter fasciitis and the importance of stretching exercises and appropriate shoewear.  If no improvement after 3-4 weeks and advised to see podiatry.  Check uric acid in the future.  - Lab: Uric acid    8.  Vaccinations: Vaccinations reviewed with Mr. Miller.  Risks and benefits of vaccinations were discussed.    - Influenza: encouraged yearly vaccination  - [id number]: refused by patient  previously, not discussed today  - [id number]: refused by patient  previously, not discussed today  - Shingrix: Refused by patient  previously, not discussed today  - Tetanus: Refused by patient previously, not discussed today  - COVID-19: has received the Pfizer COVID-19 vaccine on 4/22/2021 and 5/13/2021  and 11/15/2021.  A 4th dose (1st booster) of the mRNA COVID-19 vaccination is recommended to be received 3 months after the third mRNA COVID-19 vaccination was received.  A 5th mRNA vaccine (2nd booster) may be received at least 4 months after the 4th dose.     9. Elevated blood pressure:  Sam to follow up with Primary Care provider regarding elevated blood pressure.     Total minutes spent in evaluation with patient, documentation, , and review of pertinent studies and chart notes: 32    Mr. Miller verbalized agreement with and understanding of the rational for the diagnosis and treatment plan.  All questions were answered to best of my ability and the patient's satisfaction. Mr. Miller was advised to contact the clinic with any questions that may arise after the clinic visit.      Thank you for involving me in the care of the patient    Return to clinic: December, as already scheduled      HPI   Prosper Miller is a 62 year old male with a past medical history significant for hypertension, hyperlipidemia, obstructive sleep apnea, IgA nephropathy, left TKA, rheumatoid arthritis, and iritis who presents for rheumatology follow-up.    2/25/2022 ophthalmology note by Dr. Michael Jenkins documents that the eye disease is now quiescent and will follow-up in 6 weeks.    3/31/2022: RA is doing well.  Iritis controlled.  Tolerating Humira well.  Only joint pain is at the bilateral first CMC joints that is worse with activity and improves with rest.  Occasional pain of the left knee at the pes anserine bursa.  Right knee pain worse with activity and improves with rest.  No joint swelling.    8/1/2022: hands shake. Anxiety.  Hands hurt at the bilateral 1st CMC joints that is worse with self-palpation; and pain along the entire right 3rd finger that is on and off throughout the day and he notes hx of injury to the right 3rd finger.  Knees bother him; hx of left TKA and planning for right TKA.  Tolerating Humira  well.  Uveitis remains controlled but he says that every now and then he feels like there might be inflammation that is going to start; he with follow-up with his ophthalmologist    Today, 8/18/2022: Reports that he was standing on the edge of a ladder and his left plantar midfoot began to hurt, making ambulation more difficult.  There was no swelling, increased warmth, or overlying erythema of the affected area.  He was seen by Dr. Castillo who diagnosed Planter fasciitis.  He was found to have hyperuricemia so was told to take prednisone 40 mg daily x3 days, then 20 mg daily x2 days and after 1 day of taking prednisone he has not felt any better.  Foot pain is worse with the first few steps after any period of inactivity, such as with the first few steps in the morning after waking up or after sitting in a chair for a long period of time.  Otherwise doing well.    Denies fevers, chills, nausea, vomiting, constipation. No abdominal pain. No chest pain/pressure, palpitations, or shortness of breath. No LE swelling. No neck pain. No oral or nasal sores.  No rash.     Tobacco: Former smoker  EtOH: Weekly  Drugs: None  Occupation:     ROS   12 point review of system was completed and negative except as noted in the HPI     Active Problem List     Patient Active Problem List   Diagnosis     Iritis     Rheumatoid arthritis of hand, unspecified laterality, unspecified rheumatoid factor presence     Rheumatoid arthritis involving multiple sites, unspecified rheumatoid factor presence     Adenomatous colon polyp     JALEESA (obstructive sleep apnea)     Esophageal reflux     Benign essential hypertension     Acute glomerulonephritis with pathological lesion in kidney     Primary hypercholesterolemia     Morbid obesity (H)     Chronic kidney disease, stage 3a (H)     Benign neoplasm of sigmoid colon     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 No pertinent past medical history.  Past Surgical History     Past Surgical History:   Procedure Laterality Date     EYE SURGERY       HC KNEE SCOPE, DIAGNOSTIC      Description: Arthroscopy Knee Right;  Recorded: 04/30/2008;     JOINT REPLACEMENT       OTHER SURGICAL HISTORY Left     left knee arthroplasty     OTHER SURGICAL HISTORY Bilateral     Cataract surgery     Allergy   No Known Allergies  Current Medication List     Current Outpatient Medications   Medication Sig     adalimumab (HUMIRA *CF*) 40 MG/0.4ML pen kit Inject 0.4 mLs (40 mg) Subcutaneous every 14 days . Hold for signs of infection, then seek medical attention.     Cholecalciferol (VITAMIN D3) 2000 UNITS CAPS      EQL NATURAL ZINC 50 MG TABS      lisinopril (ZESTRIL) 30 MG tablet TAKE ONE AND ONE-HALF TABLETS BY MOUTH DAILY     pantoprazole (PROTONIX) 40 MG EC tablet TAKE ONE TABLET BY MOUTH TWICE A DAY     predniSONE (DELTASONE) 20 MG tablet Take 40 mg that is 2 tablets daily for 3 days and then 1 tablet for the next 2 days.     No current facility-administered medications for this visit.     Social History   See HPI    Family History     Family History   Problem Relation Age of Onset     Cancer Father      Bone Cancer Father         Physical Exam     Temp Readings from Last 3 Encounters:   08/01/22 97.8  F (36.6  C) (Oral)   12/07/21 98.7  F (37.1  C) (Oral)   01/26/21 97.8  F (36.6  C) (Oral)     BP Readings from Last 5 Encounters:   08/15/22 (!) 147/91   08/01/22 (!) 158/82   03/31/22 132/85   12/15/21 113/76   12/07/21 (!) 130/93     Pulse Readings from Last 1 Encounters:   08/15/22 77     Resp Readings from Last 1 Encounters:   12/07/21 16     Estimated body mass index is 36.41 kg/m  as calculated from the following:    Height as of 8/15/22: 1.689 m (5' 6.5\").    Weight as of 8/15/22: 103.9 kg (229 lb).    GEN: NAD.   HEENT:  Anicteric, noninjected sclera. No obvious external lesions of " the ear and nose. Hearing intact.  PULM: No increased work of breathing  SKIN: No rash or jaundice seen  PSYCH: Alert. Appropriate.     Labs / Imaging (select studies)     Uric Acid  Recent Labs   Lab Test 08/15/22  1026   URIC 8.5*     Immunization History     Immunization History   Administered Date(s) Administered     COVID-19,PF,Pfizer (12+ Yrs) 04/22/2021, 05/13/2021, 11/15/2021     HepA-Adult 04/30/2008, 11/11/2008     Influenza Vaccine, 6+MO IM (QUADRIVALENT W/PRESERVATIVES) 11/11/2008, 12/18/2009, 09/18/2020     Mantoux Tuberculin Skin Test 02/01/2007     Tdap (Adacel,Boostrix) 03/09/2011          Chart documentation done in part with Dragon Voice recognition Software. Although reviewed after completion, some word and grammatical error may remain.      Video-Visit Details    Type of service:  Video Visit    Video Start Time: 2:56 PM    Video End Time:3:20 PM    Originating Location (pt. Location): Home, [place]    Distant Location (provider location):  [place]    Platform used for Video Visit: Jennifer Segundo MD

## 2022-08-18 NOTE — PATIENT INSTRUCTIONS
RHEUMATOLOGY    Dr. Alex Segundo    [address] [place] [address]  Phone number: [phone]  Fax number: [phone]      Thank you for choosing [facility]!    Jaja Sanchez CMA Rheumatology

## 2022-08-18 NOTE — TELEPHONE ENCOUNTER
Called pt and his flight leaves at 5am. Unable to accept.    Kaur NARANJO RN Specialty Triage 8/18/2022 9:29 AM

## 2022-08-18 NOTE — TELEPHONE ENCOUNTER
Need to discuss with patient.  Is he able to have a video or in-office visit tomorrow, 8/19/2022, at 7:20 AM?  I have this time on hold with his MRN.    Alex Segundo MD  8/18/2022

## 2022-09-20 ENCOUNTER — HOSPITAL ENCOUNTER (EMERGENCY)
Facility: CLINIC | Age: 62
Discharge: HOME | End: 2022-09-20
Attending: PHYSICIAN ASSISTANT | Admitting: PHYSICIAN ASSISTANT
Payer: COMMERCIAL

## 2022-09-20 ENCOUNTER — NURSE TRIAGE (OUTPATIENT)
Dept: NURSING | Facility: CLINIC | Age: 62
End: 2022-09-20

## 2022-09-20 VITALS
TEMPERATURE: 98.1 F | OXYGEN SATURATION: 95 % | HEART RATE: 93 BPM | RESPIRATION RATE: 19 BRPM | DIASTOLIC BLOOD PRESSURE: 91 MMHG | SYSTOLIC BLOOD PRESSURE: 146 MMHG

## 2022-09-20 DIAGNOSIS — L23.7 CONTACT DERMATITIS DUE TO POISON SUMAC: ICD-10-CM

## 2022-09-20 DIAGNOSIS — H57.89 PERIORBITAL SWELLING: ICD-10-CM

## 2022-09-20 PROCEDURE — G0463 HOSPITAL OUTPT CLINIC VISIT: HCPCS | Performed by: PHYSICIAN ASSISTANT

## 2022-09-20 PROCEDURE — 99213 OFFICE O/P EST LOW 20 MIN: CPT | Performed by: PHYSICIAN ASSISTANT

## 2022-09-20 RX ORDER — PREDNISONE 10 MG/1
TABLET ORAL
Qty: 32 TABLET | Refills: 0 | Status: SHIPPED | OUTPATIENT
Start: 2022-09-20 | End: 2022-09-30

## 2022-09-20 ASSESSMENT — ENCOUNTER SYMPTOMS: CONSTITUTIONAL NEGATIVE: 1

## 2022-09-20 NOTE — TELEPHONE ENCOUNTER
Patient calling in today stating unconfortable itching everywhere. Red rash on both legs, Swelling in groin, left eye is swollen around  Thinks its poison sumac. Tried hibiclens shower.   Started 2 days ago.   40 mg of steroids. he had at home.   Per RN protocol patient should be seen now  [facility] hours and location reviewed. Home care suggestions reviewed with caller per RN protocol.   Ilene Thorpe RN, BSN Care Connection Triage Nurse      Reason for Disposition    Severe poison ivy, oak, or sumac reaction in the past, and face or genitals involved    Additional Information    Negative: Patient sounds very sick or weak to the triager    Negative: Difficulty breathing or severe coughing following exposure to burning weeds    Negative: Doesn't match the SYMPTOMS of poison ivy, oak, or sumac    Protocols used: POISON IVY - OAK - SUMAC-A-OH

## 2022-09-20 NOTE — ED PROVIDER NOTES
History     Chief Complaint   Patient presents with     Rash     Posion Alisha rash. Has spread to groin, left eye, leg.      HPI  Prosper Miller is a 62 year old male who presents with complaints of pruritic rash to left lower leg, bilateral arms, left side of face around left eye, and to right lower leg as well as to his groin for the past 2 days.  Patient states he thinks he was exposed to poison sumac prior to his symptoms starting.  Patient was working in tall grass getting his lift out of the water.  States he has history of similar rash in the past in response to poison sumac.  Denies any other associated symptoms; denies difficulties breathing, swallowing, or the sensation of his throat closing/swelling.  Patient denies any other known new exposures.  His left eye is itchy with periorbital swelling and redness present.  No drainage.  Denies associated infectious symptoms; states she otherwise feels well.  Denies fevers, chills, cough, sore throat, sinus pressure, nasal congestion, nausea, vomiting, diarrhea, or abdominal pain.  Patient has tried a Hibiclens shower at home.  He had 40 mg of prednisone that he took today.      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  Created by Conversion       JALEESA (obstructive sleep apnea) 12/19/2021     Priority: Medium     Formatting of this note might be different from the original.  Created by Conversion       Esophageal reflux 12/19/2021     Priority: Medium     Formatting of this note might be different from the original.  Created by Conversion       Benign essential hypertension 12/19/2021     Priority: Medium     Formatting of this note might be different from the original.  Created by Conversion       Acute glomerulonephritis  with pathological lesion in kidney 12/19/2021     Priority: Medium     Formatting of this note might be different from the original.  Created by Conversion    Replacement Utility updated for latest IMO load       Primary hypercholesterolemia 12/19/2021     Priority: Medium     Formatting of this note might be different from the original.  Created by Conversion       Morbid obesity (H) 12/19/2021     Priority: Medium     Chronic kidney disease, stage 3a (H) 12/19/2021     Priority: Medium     Adenomatous colon polyp 12/07/2021     Priority: Medium     Benign neoplasm of sigmoid colon 03/26/2019     Priority: Medium     Diverticular disease of large intestine 03/22/2019     Priority: Medium     Rheumatoid arthritis involving multiple sites, unspecified rheumatoid factor presence 03/12/2019     Priority: Medium     IMO Regulatory Load OCT 2020       Iritis 11/30/2016     Priority: Medium     Rheumatoid arthritis of hand, unspecified laterality, unspecified rheumatoid factor presence 11/30/2016     Priority: Medium     IMO Regulatory Load OCT 2020          Past Medical History:    No past medical history on file.    Past Surgical History:    Past Surgical History:   Procedure Laterality Date     EYE SURGERY       HC KNEE SCOPE, DIAGNOSTIC      Description: Arthroscopy Knee Right;  Recorded: 04/30/2008;     JOINT REPLACEMENT       OTHER SURGICAL HISTORY Left     left knee arthroplasty     OTHER SURGICAL HISTORY Bilateral     Cataract surgery       Family History:    Family History   Problem Relation Age of Onset     Cancer Father      Bone Cancer Father        Social History:  Marital Status:   [2]  Social History     Tobacco Use     Smoking status: Former Smoker     Smokeless tobacco: Never Used   Substance Use Topics     Alcohol use: Yes     Comment: weekly     Drug use: No        Medications:    amoxicillin-clavulanate (AUGMENTIN) 875-125 MG tablet  predniSONE (DELTASONE) 10 MG tablet  adalimumab (HUMIRA *CF*)  40 MG/0.4ML pen kit  Cholecalciferol (VITAMIN D3) 2000 UNITS CAPS  EQL NATURAL ZINC 50 MG TABS  lisinopril (ZESTRIL) 30 MG tablet  pantoprazole (PROTONIX) 40 MG EC tablet  predniSONE (DELTASONE) 20 MG tablet          Review of Systems   Constitutional: Negative.    Eyes:        Left periorbital swelling and erythema   Skin: Positive for rash.   All other systems reviewed and are negative.      Physical Exam   BP: (!) 146/91  Pulse: 93  Temp: 98.1  F (36.7  C)  Resp: 19  SpO2: 95 %      Physical Exam  Constitutional:       General: He is not in acute distress.     Appearance: Normal appearance. He is well-developed. He is not ill-appearing, toxic-appearing or diaphoretic.   HENT:      Head: Normocephalic and atraumatic.   Eyes:      Extraocular Movements: Extraocular movements intact.      Conjunctiva/sclera: Conjunctivae normal.      Comments: Left periorbital swelling, edema, and erythema   Cardiovascular:      Pulses: Normal pulses.   Pulmonary:      Effort: Pulmonary effort is normal.   Musculoskeletal:         General: Normal range of motion.      Cervical back: Neck supple.   Skin:     General: Skin is warm and dry.      Capillary Refill: Capillary refill takes less than 2 seconds.      Findings: Rash present.      Comments: Erythematous vesicular rash to left lateral lower leg, right lateral lower leg, and to the periorbital region of the face   Neurological:      Mental Status: He is alert.      Sensory: Sensation is intact.      Motor: Motor function is intact.         ED Course                 Procedures    No results found for this or any previous visit (from the past 24 hour(s)).    Medications - No data to display    Assessments & Plan (with Medical Decision Making)     Pt is a 62 year old male who presents with complaints of pruritic rash to left lower leg, bilateral arms, left side of face around left eye, and to right lower leg as well as to his groin for the past 2 days.  Patient states he thinks he  was exposed to poison sumac prior to his symptoms starting.  Patient was working in tall grass getting his lift out of the water.  States he has history of similar rash in the past in response to poison sumac.      Pt is afebrile on arrival.  Exam as above.  History and exam consistent with contact dermatitis due to poison sumac.  Will start prednisone burst and taper.  Left periorbital swelling and erythema are likely in response to this reaction as well however will also place on antibiotics for coverage of possible periorbital cellulitis.  No evidence of orbital cellulitis.  Return precautions were reviewed.  Hand-outs were provided.    Patient was sent with Augmentin and prednisone burst and taper and was instructed to follow-up with PCP in 3-5 days for continued care and management or sooner if new or worsening symptoms.  He is to return to the ED for persistent and/or worsening symptoms.  Patient expressed understanding of the diagnosis and plan and was discharged home in good condition.    I have reviewed the nursing notes.    I have reviewed the findings, diagnosis, plan and need for follow up with the patient.    Discharge Medication List as of 9/20/2022  4:42 PM      START taking these medications    Details   amoxicillin-clavulanate (AUGMENTIN) 875-125 MG tablet Take 1 tablet by mouth 2 times daily for 7 days, Disp-14 tablet, R-0, E-Prescribe      !! predniSONE (DELTASONE) 10 MG tablet Take 4 tablets daily for 5 days,  take 2 tablets daily for 3 days, take 1 tablet daily for 3 days, take half a tablet for 3 days., Disp-32 tablet, R-0, E-Prescribe       !! - Potential duplicate medications found. Please discuss with provider.          Final diagnoses:   Contact dermatitis due to poison sumac   Periorbital swelling       9/20/2022   [facility] EMERGENCY DEPT      Disclaimer:  This note consists of symbols derived from keyboarding, dictation and/or voice recognition software.  As a result,  there may be errors in the script that have gone undetected.  Please consider this when interpreting information found in this chart.     Aide Comer PA-C  09/20/22 1799

## 2022-10-04 ENCOUNTER — OFFICE VISIT (OUTPATIENT)
Dept: FAMILY MEDICINE | Facility: CLINIC | Age: 62
End: 2022-10-04
Payer: COMMERCIAL

## 2022-10-04 VITALS
TEMPERATURE: 97.3 F | WEIGHT: 235.2 LBS | BODY MASS INDEX: 37.39 KG/M2 | HEART RATE: 101 BPM | SYSTOLIC BLOOD PRESSURE: 112 MMHG | DIASTOLIC BLOOD PRESSURE: 70 MMHG | RESPIRATION RATE: 16 BRPM

## 2022-10-04 DIAGNOSIS — E78.00 PRIMARY HYPERCHOLESTEROLEMIA: ICD-10-CM

## 2022-10-04 DIAGNOSIS — M06.9 RHEUMATOID ARTHRITIS OF HAND, UNSPECIFIED LATERALITY, UNSPECIFIED WHETHER RHEUMATOID FACTOR PRESENT: ICD-10-CM

## 2022-10-04 DIAGNOSIS — N18.31 CHRONIC KIDNEY DISEASE, STAGE 3A (H): ICD-10-CM

## 2022-10-04 DIAGNOSIS — Z01.818 PRE-OP EXAM: Primary | ICD-10-CM

## 2022-10-04 DIAGNOSIS — G47.33 OSA (OBSTRUCTIVE SLEEP APNEA): ICD-10-CM

## 2022-10-04 DIAGNOSIS — D12.6 ADENOMATOUS POLYP OF COLON, UNSPECIFIED PART OF COLON: ICD-10-CM

## 2022-10-04 DIAGNOSIS — I10 BENIGN ESSENTIAL HYPERTENSION: ICD-10-CM

## 2022-10-04 DIAGNOSIS — M16.11 PRIMARY OSTEOARTHRITIS OF RIGHT HIP: ICD-10-CM

## 2022-10-04 LAB
ANION GAP SERPL CALCULATED.3IONS-SCNC: 11 MMOL/L (ref 7–15)
ATRIAL RATE - MUSE: 86 BPM
BUN SERPL-MCNC: 29.8 MG/DL (ref 8–23)
CALCIUM SERPL-MCNC: 9.3 MG/DL (ref 8.8–10.2)
CHLORIDE SERPL-SCNC: 105 MMOL/L (ref 98–107)
CREAT SERPL-MCNC: 1.68 MG/DL (ref 0.67–1.17)
DEPRECATED HCO3 PLAS-SCNC: 24 MMOL/L (ref 22–29)
DIASTOLIC BLOOD PRESSURE - MUSE: NORMAL MMHG
ERYTHROCYTE [DISTWIDTH] IN BLOOD BY AUTOMATED COUNT: 14.2 % (ref 10–15)
GFR SERPL CREATININE-BSD FRML MDRD: 46 ML/MIN/1.73M2
GLUCOSE SERPL-MCNC: 105 MG/DL (ref 70–99)
HCT VFR BLD AUTO: 41 % (ref 40–53)
HGB BLD-MCNC: 13.8 G/DL (ref 13.3–17.7)
INTERPRETATION ECG - MUSE: NORMAL
MCH RBC QN AUTO: 31.9 PG (ref 26.5–33)
MCHC RBC AUTO-ENTMCNC: 33.7 G/DL (ref 31.5–36.5)
MCV RBC AUTO: 95 FL (ref 78–100)
P AXIS - MUSE: 24 DEGREES
PLATELET # BLD AUTO: 299 10E3/UL (ref 150–450)
POTASSIUM SERPL-SCNC: 4.3 MMOL/L (ref 3.4–5.3)
PR INTERVAL - MUSE: 128 MS
QRS DURATION - MUSE: 82 MS
QT - MUSE: 352 MS
QTC - MUSE: 421 MS
R AXIS - MUSE: -1 DEGREES
RBC # BLD AUTO: 4.33 10E6/UL (ref 4.4–5.9)
SODIUM SERPL-SCNC: 140 MMOL/L (ref 136–145)
SYSTOLIC BLOOD PRESSURE - MUSE: NORMAL MMHG
T AXIS - MUSE: 45 DEGREES
VENTRICULAR RATE- MUSE: 86 BPM
WBC # BLD AUTO: 10 10E3/UL (ref 4–11)

## 2022-10-04 PROCEDURE — 93010 ELECTROCARDIOGRAM REPORT: CPT | Performed by: INTERNAL MEDICINE

## 2022-10-04 PROCEDURE — 85027 COMPLETE CBC AUTOMATED: CPT | Performed by: FAMILY MEDICINE

## 2022-10-04 PROCEDURE — 36415 COLL VENOUS BLD VENIPUNCTURE: CPT | Performed by: FAMILY MEDICINE

## 2022-10-04 PROCEDURE — 80048 BASIC METABOLIC PNL TOTAL CA: CPT | Performed by: FAMILY MEDICINE

## 2022-10-04 PROCEDURE — 93005 ELECTROCARDIOGRAM TRACING: CPT | Performed by: FAMILY MEDICINE

## 2022-10-04 PROCEDURE — 99214 OFFICE O/P EST MOD 30 MIN: CPT | Performed by: FAMILY MEDICINE

## 2022-10-04 NOTE — PATIENT INSTRUCTIONS
Preparing for Your Surgery  Getting started  A nurse will call you to review your health history and instructions. They will give you an arrival time based on your scheduled surgery time. Please be ready to share:    Your doctor's clinic name and phone number    Your medical, surgical and anesthesia history    A list of allergies and sensitivities    A list of medicines, including herbal treatments and over-the-counter drugs    Whether the patient has a legal guardian (ask how to send us the papers in advance)  Please tell us if you're pregnant--or if there's any chance you might be pregnant. Some surgeries may injure a fetus (unborn baby), so they require a pregnancy test. Surgeries that are safe for a fetus don't always need a test, and you can choose whether to have one.   If you have a child who's having surgery, please ask for a copy of Preparing for Your Child's Surgery.    Preparing for surgery    Within 10 to 30 days of surgery: Have a pre-op exam (sometimes called an H&P, or History and Physical). This can be done at a clinic or pre-operative center.  ? If you're having a , you may not need this exam. Talk to your care team.    At your pre-op exam, talk to your care team about all medicines you take. If you need to stop any medicines before surgery, ask when to start taking them again.  ? We do this for your safety. Many medicines can make you bleed too much during surgery. Some change how well surgery (anesthesia) drugs work.    Call your insurance company to let them know you're having surgery. (If you don't have insurance, call [phone].)    Call your clinic if there's any change in your health. This includes signs of a cold or flu (sore throat, runny nose, cough, rash, fever). It also includes a scrape or scratch near the surgery site.    If you have questions on the day of surgery, call your hospital or surgery center.  COVID testing  You may need to be tested for COVID-19 before having  surgery. If so, we will give you instructions (or click here).  Eating and drinking guidelines  For your safety: Unless your surgeon tells you otherwise, follow the guidelines below.    Eat and drink as usual until 8 hours before surgery. After that, no food or milk.    Drink clear liquids until 2 hours before surgery. These are liquids you can see through, like water, Gatorade and Propel Water. You may also have black coffee and tea (no cream or milk).    Nothing by mouth within 2 hours of surgery. This includes gum, candy and breath mints.    If you drink alcohol: Stop drinking it the night before surgery.    If your care team tells you to take medicine on the morning of surgery, it's okay to take it with a sip of water.  Preventing infection    Shower or bathe the night before and morning of your surgery. Follow the instructions your clinic gave you. (If no instructions, use regular soap.)    Don't shave or clip hair near your surgery site. We'll remove the hair if needed.    Don't smoke or vape the morning of surgery. You may chew nicotine gum up to 2 hours before surgery. A nicotine patch is okay.  ? Note: Some surgeries require you to completely quit smoking and nicotine. Check with your surgeon.    Your care team will make every effort to keep you safe from infection. We will:  ? Clean our hands often with soap and water (or an alcohol-based hand rub).  ? Clean the skin at your surgery site with a special soap that kills germs.  ? Give you a special gown to keep you warm. (Cold raises the risk of infection.)  ? Wear special hair covers, masks, gowns and gloves during surgery.  ? Give antibiotic medicine, if prescribed. Not all surgeries need antibiotics.  What to bring on the day of surgery    Photo ID and insurance card    Copy of your health care directive, if you have one    Glasses and hearing aides (bring cases)  ? You can't wear contacts during surgery    Inhaler and eye drops, if you use them (tell us  about these when you arrive)    CPAP machine or breathing device, if you use them    A few personal items, if spending the night    If you have . . .  ? A pacemaker, ICD (cardiac defibrillator) or other implant: Bring the ID card.  ? An implanted stimulator: Bring the remote control.  ? A legal guardian: Bring a copy of the certified (court-stamped) guardianship papers.  Please remove any jewelry, including body piercings. Leave jewelry and other valuables at home.  If you're going home the day of surgery    You must have a responsible adult drive you home. They should stay with you overnight as well.    If you don't have someone to stay with you, and you aren't safe to go home alone, we may keep you overnight. Insurance often won't pay for this.  After surgery  If it's hard to control your pain or you need more pain medicine, please call your surgeon's office.  Questions?   If you have any questions for your care team, list them here: _________________________________________________________________________________________________________________________________________________________________________ ____________________________________ ____________________________________ ____________________________________  For informational purposes only. Not to replace the advice of your health care provider. Copyright   2003, 2019 [facility]. All rights reserved. Clinically reviewed by Reena Carrion MD. Oceen [id number] - REV 07/22.

## 2022-10-04 NOTE — PROGRESS NOTES
[facility]  [address] [facility] [address]  Phone: [phone]  Fax: [phone]  Primary Provider: Mauro Roland   Pre-op Performing Provider: MAURO ROLAND      PREOPERATIVE EVALUATION:  Today's date: 10/4/2022    Prosper Miller is a 62 year old male who presents for a preoperative evaluation.    Surgical Information:  Surgery/Procedure: Right total Knee Arthroplasty   Surgery Location: [facility]   Surgeon: Dr. Sarah   Surgery Date: 10/10/2022  Time of Surgery: 9;00 am   Where patient plans to recover: At home with family  Fax number for surgical facility: Will forward note    Type of Anesthesia Anticipated: General    Assessment & Plan     The proposed surgical procedure is considered INTERMEDIATE risk.    Pre-op exam  Primary osteoarthritis of right hip    Patient is approved for surgery  Hemoglobin is 13.8 with a white count of 10,000 and platelet count of [id number]  Electrolytes are normal with a sodium of 140 and potassium of 4.3.  GFR is is low at 46  Recommend holding NSAIDs and OTC supplements morning prior to surgery  He may hold lisinopril on the morning of surgery  Follow-up as recommended by orthopedics    - EKG 12-lead, tracing only    Benign essential hypertension    Currently stable  He has been treated with lisinopril    - Basic metabolic panel  - CBC with platelets  - Basic metabolic panel  - CBC with platelets    JALEESA (obstructive sleep apnea)    Continue CPAP    Chronic kidney disease, stage 3a (H)    Recommend avoiding NSAIDs given his history  Follow-up with nephrology as planned    Rheumatoid arthritis of hand, unspecified laterality, unspecified whether rheumatoid factor present (H)    Currently treated with Humira  Understands he will not be treated with Humira 2 weeks prior or 2 weeks after his surgery    Adenomatous polyp of colon, unspecified part of colon      Primary  hypercholesterolemia    Continue to monitor cholesterol           Risks and Recommendations:  The patient has the following additional risks and recommendations for perioperative complications:   - No identified additional risk factors other than previously addressed    Medication Instructions:  Patient is to take all scheduled medications on the day of surgery    RECOMMENDATION:  APPROVAL GIVEN to proceed with proposed procedure, without further diagnostic evaluation.    Review of external notes as documented above                   Subjective     HPI related to upcoming procedure:     This is a pleasant 62-year-old male who presents to clinic for preoperative evaluation.  He has osteoarthritis of the right knee which has not responded to conservative therapy.  As a result, he is a candidate for right total knee arthroplasty.  He has had previous left knee arthroplasty.    His medical history is notable for hypertension, hypercholesterolemia, gastroesophageal reflux disease, sleep apnea, and rheumatoid arthritis.     He continues to follow-up with rheumatology and is treated with Humira.      He has a known history of chronic kidney disease which has been stable.  He is taking lisinopril for hypertension.  He takes Protonix for reflux symptoms.      Preop Questions 10/4/2022   1. Have you ever had a heart attack or stroke? No   2. Have you ever had surgery on your heart or blood vessels, such as a stent placement, a coronary artery bypass, or surgery on an artery in your head, neck, heart, or legs? No   3. Do you have chest pain with activity? No   4. Do you have a history of  heart failure? No   5. Do you currently have a cold, bronchitis or symptoms of other infection? No   6. Do you have a cough, shortness of breath, or wheezing? No   7. Do you or anyone in your family have previous history of blood clots? No   8. Do you or does anyone in your family have a serious bleeding problem such as prolonged bleeding  following surgeries or cuts? No   9. Have you ever had problems with anemia or been told to take iron pills? No   10. Have you had any abnormal blood loss such as black, tarry or bloody stools? No   11. Have you ever had a blood transfusion? No   12. Are you willing to have a blood transfusion if it is medically needed before, during, or after your surgery? Yes   13. Have you or any of your relatives ever had problems with anesthesia? No   14. Do you have sleep apnea, excessive snoring or daytime drowsiness? YES - Uses CPAP   14a. Do you have a CPAP machine? Yes   15. Do you have any artifical heart valves or other implanted medical devices like a pacemaker, defibrillator, or continuous glucose monitor? No   16. Do you have artificial joints? YES - history of left TKA   17. Are you allergic to latex? No       Health Care Directive:  Patient does not have a Health Care Directive or Living Will: Discussed advance care planning with patient; information given to patient to review.    Preoperative Review of :   reviewed - no record of controlled substances prescribed.      Status of Chronic Conditions:  See problem list for active medical problems.  Problems all longstanding and stable, except as noted/documented.  See ROS for pertinent symptoms related to these conditions.      Review of Systems  Constitutional, neuro, ENT, endocrine, pulmonary, cardiac, gastrointestinal, genitourinary, musculoskeletal, integument and psychiatric systems are negative, except as otherwise noted.    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  Created by Conversion       JALEESA (obstructive sleep apnea) 12/19/2021     Priority: Medium     Formatting of this note might be different from the original.  Created by Conversion       Esophageal reflux  12/19/2021     Priority: Medium     Formatting of this note might be different from the original.  Created by Conversion       Benign essential hypertension 12/19/2021     Priority: Medium     Formatting of this note might be different from the original.  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  Created by Conversion    Replacement Utility updated for latest IMO load       Primary hypercholesterolemia 12/19/2021     Priority: Medium     Formatting of this note might be different from the original.  Created by Conversion       Morbid obesity (H) 12/19/2021     Priority: Medium     Chronic kidney disease, stage 3a (H) 12/19/2021     Priority: Medium     Adenomatous colon polyp 12/07/2021     Priority: Medium     Benign neoplasm of sigmoid colon 03/26/2019     Priority: Medium     Diverticular disease of large intestine 03/22/2019     Priority: Medium     Rheumatoid arthritis involving multiple sites, unspecified rheumatoid factor presence 03/12/2019     Priority: Medium     IMO Regulatory Load OCT 2020       Iritis 11/30/2016     Priority: Medium     Rheumatoid arthritis of hand, unspecified laterality, unspecified rheumatoid factor presence 11/30/2016     Priority: Medium     IMO Regulatory Load OCT 2020        No past medical history on file.  Past Surgical History:   Procedure Laterality Date     EYE SURGERY       HC KNEE SCOPE, DIAGNOSTIC      Description: Arthroscopy Knee Right;  Recorded: 04/30/2008;     JOINT REPLACEMENT       OTHER SURGICAL HISTORY Left     left knee arthroplasty     OTHER SURGICAL HISTORY Bilateral     Cataract surgery     Current Outpatient Medications   Medication Sig Dispense Refill     adalimumab (HUMIRA *CF*) 40 MG/0.4ML pen kit Inject 0.4 mLs (40 mg) Subcutaneous every 14 days . Hold for signs of infection, then seek medical attention. 0.8 mL  4     lisinopril (ZESTRIL) 30 MG tablet TAKE ONE AND ONE-HALF TABLETS BY MOUTH DAILY 135 tablet 1     pantoprazole (PROTONIX) 40 MG EC tablet TAKE ONE TABLET BY MOUTH TWICE A  tablet 1     predniSONE (DELTASONE) 20 MG tablet Take 40 mg that is 2 tablets daily for 3 days and then 1 tablet for the next 2 days. 8 tablet 0     Cholecalciferol (VITAMIN D3) 2000 UNITS CAPS        EQL NATURAL ZINC 50 MG TABS          No Known Allergies     Social History     Tobacco Use     Smoking status: Former Smoker     Smokeless tobacco: Never Used   Substance Use Topics     Alcohol use: Yes     Comment: weekly     Family History   Problem Relation Age of Onset     Cancer Father      Bone Cancer Father      History   Drug Use No         Objective     /70 (BP Location: Left arm, Patient Position: Sitting, Cuff Size: Adult Regular)   Pulse 101   Temp 97.3  F (36.3  C) (Oral)   Resp 16   Wt 106.7 kg (235 lb 3.2 oz)   BMI 37.39 kg/m      Physical Exam    GENERAL APPEARANCE: healthy, alert and no distress     EYES: EOMI,  PERRL     NECK: no adenopathy, no asymmetry, masses, or scars and thyroid normal to palpation     RESP: lungs clear to auscultation - no rales, rhonchi or wheezes     CV: regular rates and rhythm, normal S1 S2, no S3 or S4 and no murmur, click or rub     MS: extremities normal- no gross deformities noted, no evidence of inflammation in joints, FROM in all extremities.     SKIN: no suspicious lesions or rashes     NEURO: Normal strength and tone, sensory exam grossly normal, mentation intact and speech normal     PSYCH: mentation appears normal. and affect normal/bright     LYMPHATICS: No cervical adenopathy    Recent Labs   Lab Test 08/01/22  0803 03/31/22  0805 03/31/22  0804 12/07/21  1349   HGB 13.4 13.2*  --  14.8    347  --  294     --   --  140   POTASSIUM 5.0  --   --  4.2   CR 1.19  --  1.25 1.45*   A1C  --   --   --  5.5        Diagnostics:  Recent Results (from the past 168  hour(s))   EKG 12-lead, tracing only    Collection Time: 10/04/22  1:39 PM   Result Value Ref Range    Systolic Blood Pressure  mmHg    Diastolic Blood Pressure  mmHg    Ventricular Rate 86 BPM    Atrial Rate 86 BPM    CT Interval 128 ms    QRS Duration 82 ms     ms    QTc 421 ms    P Axis 24 degrees    R AXIS -1 degrees    T Axis 45 degrees    Interpretation ECG       Sinus rhythm  Normal ECG  When compared with ECG of 31-JUL-2018 15:29,  No significant change was found  Confirmed by KOBE LEE MD LOC: ([id number]) on 10/4/2022 2:16:21 PM     Basic metabolic panel    Collection Time: 10/04/22  2:34 PM   Result Value Ref Range    Sodium 140 136 - 145 mmol/L    Potassium 4.3 3.4 - 5.3 mmol/L    Chloride 105 98 - 107 mmol/L    Carbon Dioxide (CO2) 24 22 - 29 mmol/L    Anion Gap 11 7 - 15 mmol/L    Urea Nitrogen 29.8 (H) 8.0 - 23.0 mg/dL    Creatinine 1.68 (H) 0.67 - 1.17 mg/dL    Calcium 9.3 8.8 - 10.2 mg/dL    Glucose 105 (H) 70 - 99 mg/dL    GFR Estimate 46 (L) >60 mL/min/1.73m2   CBC with platelets    Collection Time: 10/04/22  2:34 PM   Result Value Ref Range    WBC Count 10.0 4.0 - 11.0 10e3/uL    RBC Count 4.33 (L) 4.40 - 5.90 10e6/uL    Hemoglobin 13.8 13.3 - 17.7 g/dL    Hematocrit 41.0 40.0 - 53.0 %    MCV 95 78 - 100 fL    MCH 31.9 26.5 - 33.0 pg    MCHC 33.7 31.5 - 36.5 g/dL    RDW 14.2 10.0 - 15.0 %    Platelet Count 299 150 - 450 10e3/uL      EKG: appears normal, NSR, normal axis, normal intervals, no acute ST/T changes c/w ischemia, no LVH by voltage criteria, unchanged from previous tracings    Revised Cardiac Risk Index (RCRI):  The patient has the following serious cardiovascular risks for perioperative complications:   - No serious cardiac risks = 0 points     RCRI Interpretation: 0 points: Class I (very low risk - 0.4% complication rate)           Signed Electronically by: Momo Hartley MD  Copy of this evaluation report is provided to requesting physician.

## 2022-10-04 NOTE — LETTER
October 6, 2022      Sam Miller  [address] [facility] [address]        Dear Sam,    Your kidney profile again shows reduced kidney function.  As we discussed, make sure to drink plenty of water and avoid medications that hard in your kidneys such as ibuprofen or Advil.  I recommend follow-up with the kidney specialist as planned.    Your blood counts are stable.    Good luck with your upcoming surgery.     Momo Hartley MD      Resulted Orders   Basic metabolic panel   Result Value Ref Range    Sodium 140 136 - 145 mmol/L    Potassium 4.3 3.4 - 5.3 mmol/L    Chloride 105 98 - 107 mmol/L    Carbon Dioxide (CO2) 24 22 - 29 mmol/L    Anion Gap 11 7 - 15 mmol/L    Urea Nitrogen 29.8 (H) 8.0 - 23.0 mg/dL    Creatinine 1.68 (H) 0.67 - 1.17 mg/dL    Calcium 9.3 8.8 - 10.2 mg/dL    Glucose 105 (H) 70 - 99 mg/dL    GFR Estimate 46 (L) >60 mL/min/1.73m2      Comment:      Effective December 21, 2021 eGFRcr in adults is calculated using the 2021 CKD-EPI creatinine equation which includes age and gender (Mehdi et al., NEJ, DOI: 10.1056/DHSMrd8357165)   CBC with platelets   Result Value Ref Range    WBC Count 10.0 4.0 - 11.0 10e3/uL    RBC Count 4.33 (L) 4.40 - 5.90 10e6/uL    Hemoglobin 13.8 13.3 - 17.7 g/dL    Hematocrit 41.0 40.0 - 53.0 %    MCV 95 78 - 100 fL    MCH 31.9 26.5 - 33.0 pg    MCHC 33.7 31.5 - 36.5 g/dL    RDW 14.2 10.0 - 15.0 %    Platelet Count 299 150 - 450 10e3/uL       If you have any questions or concerns, please call the clinic at the number listed above.       Sincerely,      Momo Hartley MD

## 2022-10-06 ENCOUNTER — TELEPHONE (OUTPATIENT)
Dept: FAMILY MEDICINE | Facility: CLINIC | Age: 62
End: 2022-10-06

## 2022-10-06 NOTE — TELEPHONE ENCOUNTER
Please reach out to patient with a phone call when pre op is signed off on. Patient stated he got a call from [facility] and they stated they cannot move forward with surgery until the visit is signed off on.   Please sign off on pre-op visit for next Monday's surgery. And let patient know with a phone call.

## 2022-10-10 ENCOUNTER — TRANSFERRED RECORDS (OUTPATIENT)
Dept: HEALTH INFORMATION MANAGEMENT | Facility: CLINIC | Age: 62
End: 2022-10-10

## 2022-10-25 ENCOUNTER — TRANSFERRED RECORDS (OUTPATIENT)
Dept: HEALTH INFORMATION MANAGEMENT | Facility: CLINIC | Age: 62
End: 2022-10-25

## 2022-11-16 ENCOUNTER — TRANSFERRED RECORDS (OUTPATIENT)
Dept: HEALTH INFORMATION MANAGEMENT | Facility: CLINIC | Age: 62
End: 2022-11-16

## 2022-12-07 ENCOUNTER — OFFICE VISIT (OUTPATIENT)
Dept: FAMILY MEDICINE | Facility: CLINIC | Age: 62
End: 2022-12-07
Payer: COMMERCIAL

## 2022-12-07 VITALS
WEIGHT: 244.3 LBS | SYSTOLIC BLOOD PRESSURE: 142 MMHG | HEART RATE: 83 BPM | DIASTOLIC BLOOD PRESSURE: 80 MMHG | HEIGHT: 66 IN | BODY MASS INDEX: 39.26 KG/M2 | TEMPERATURE: 97.5 F | RESPIRATION RATE: 17 BRPM | OXYGEN SATURATION: 97 %

## 2022-12-07 DIAGNOSIS — H61.21 IMPACTED CERUMEN OF RIGHT EAR: ICD-10-CM

## 2022-12-07 DIAGNOSIS — J01.90 ACUTE SINUSITIS WITH SYMPTOMS > 10 DAYS: Primary | ICD-10-CM

## 2022-12-07 DIAGNOSIS — H69.93 DYSFUNCTION OF BOTH EUSTACHIAN TUBES: ICD-10-CM

## 2022-12-07 PROCEDURE — 69209 REMOVE IMPACTED EAR WAX UNI: CPT | Mod: RT | Performed by: PHYSICIAN ASSISTANT

## 2022-12-07 PROCEDURE — 99213 OFFICE O/P EST LOW 20 MIN: CPT | Mod: 25 | Performed by: PHYSICIAN ASSISTANT

## 2022-12-07 RX ORDER — FLUTICASONE PROPIONATE 50 MCG
1 SPRAY, SUSPENSION (ML) NASAL DAILY
Qty: 16 G | Refills: 1 | Status: SHIPPED | OUTPATIENT
Start: 2022-12-07 | End: 2023-08-16

## 2022-12-07 NOTE — PATIENT INSTRUCTIONS
-Ear pressure/pain is primarily a problem of drainage. You can best help your body clear the sinus secretions and pressure by opening up the natural passageways which are often blocked by viral colds and allergies.   A short course of a nasal decongestant spray (Afrin or Neosinephrine) is one of the most effective tools in opening sinus drainage passageways. Their use should be restricted to 3 days though due to the high risk of nasal addiction. Oral medications such as phenylephrine (Sudafed PE) or pseudoephedrine (Sudafed RX) are often helpful but they can cause elevations in blood pressure and insomnia.     Augmentin antibiotics  Monitor for any worsening - if increased swelling/redness or spreading redness near the eye, or if fevers/chills/feeling worse please be seen urgently in the ER

## 2022-12-07 NOTE — PROGRESS NOTES
Assessment & Plan     ASSESSMENT/PLAN:      ICD-10-CM    1. Acute sinusitis with symptoms > 10 days  J01.90 fluticasone (FLONASE) 50 MCG/ACT nasal spray     amoxicillin-clavulanate (AUGMENTIN) 875-125 MG tablet      2. Impacted cerumen of right ear  H61.21 TX REMOVAL IMPACTED CERUMEN IRRIGATION/LVG UNILAT      3. Dysfunction of both eustachian tubes  H69.83         Patient has upcoming preventive visit next week, can recheck then - including blood pressure - will treat for sinusitis. Red flag signs to be seen urgently were discussed. He also needs refill of flonase, discussed neti pot as well.    Patient Instructions   -Ear pressure/pain is primarily a problem of drainage. You can best help your body clear the sinus secretions and pressure by opening up the natural passageways which are often blocked by viral colds and allergies.   A short course of a nasal decongestant spray (Afrin or Neosinephrine) is one of the most effective tools in opening sinus drainage passageways. Their use should be restricted to 3 days though due to the high risk of nasal addiction. Oral medications such as phenylephrine (Sudafed PE) or pseudoephedrine (Sudafed RX) are often helpful but they can cause elevations in blood pressure and insomnia.     Augmentin antibiotics  Monitor for any worsening - if increased swelling/redness or spreading redness near the eye, or if fevers/chills/feeling worse please be seen urgently in the ER    Return in about 5 days (around 12/12/2022).    IVÁN Vergara [facility] CINDY Vargas is a 62 year old, presenting for the following health issues:  Ear Problem and Facial Swelling      HPI     Concern - Pain and pressure in right ear over the last month, has progressively been getting worse. He now is having facial swelling  Onset: 1 month  Description: Pain and pressure in both ears  Intensity: moderate  Progression of Symptoms:  progressively getting worse  Accompanying  "Signs & Symptoms: Facial swelling  Previous history of similar problem: Yes he has had sinus issues for awhile  Precipitating factors:        Worsened by: None  Alleviating factors:        Improved by: Nothing  Therapies tried and outcome: Sudafed, cough drops, vicks vapor rub with no improvement    Often has sinus issues each fall, possibly allergies    Review of Systems   Other than noted above, general, HEENT, respiratory, cardiac, MS, and gastrointestinal systems are negative.       Objective    BP (!) 142/80   Pulse 83   Temp 97.5  F (36.4  C) (Tympanic)   Resp 17   Ht 1.689 m (5' 6.5\")   Wt 110.8 kg (244 lb 4.8 oz)   SpO2 97%   BMI 38.85 kg/m    Body mass index is 38.85 kg/m .  Physical Exam   GENERAL: healthy, alert and no distress  EYES: Eyes grossly normal to inspection, PERRL and conjunctivae and sclerae normal POSITIVE right upper eyelid slightly puffy, no edema or deep erythema, no spreading erythema. No crusting or discharge.  HENT: ear canals and TM's normal - prior to flushing, right cerumen impaction, nose and mouth without ulcers or lesions  NECK: no adenopathy, no asymmetry, masses, or scars and thyroid normal to palpation  RESP: lungs clear to auscultation - no rales, rhonchi or wheezes  CV: regular rate and rhythm, normal S1 S2, no S3 or S4, no murmur, click or rub, no peripheral edema and peripheral pulses strong  ABDOMEN: soft, nontender, no hepatosplenomegaly, no masses and bowel sounds normal  MS: no gross musculoskeletal defects noted, no edema    Right cerumen impaction successfully irrigated by myself            "

## 2022-12-12 ENCOUNTER — OFFICE VISIT (OUTPATIENT)
Dept: FAMILY MEDICINE | Facility: CLINIC | Age: 62
End: 2022-12-12
Payer: COMMERCIAL

## 2022-12-12 VITALS
RESPIRATION RATE: 16 BRPM | HEART RATE: 71 BPM | SYSTOLIC BLOOD PRESSURE: 131 MMHG | DIASTOLIC BLOOD PRESSURE: 83 MMHG | BODY MASS INDEX: 38.08 KG/M2 | HEIGHT: 67 IN | TEMPERATURE: 97.1 F | WEIGHT: 242.6 LBS

## 2022-12-12 DIAGNOSIS — D12.6 ADENOMATOUS POLYP OF COLON, UNSPECIFIED PART OF COLON: ICD-10-CM

## 2022-12-12 DIAGNOSIS — M06.9 RHEUMATOID ARTHRITIS OF HAND, UNSPECIFIED LATERALITY, UNSPECIFIED WHETHER RHEUMATOID FACTOR PRESENT: ICD-10-CM

## 2022-12-12 DIAGNOSIS — E78.00 PRIMARY HYPERCHOLESTEROLEMIA: ICD-10-CM

## 2022-12-12 DIAGNOSIS — Z00.00 ROUTINE PHYSICAL EXAMINATION: Primary | ICD-10-CM

## 2022-12-12 DIAGNOSIS — K21.9 GASTROESOPHAGEAL REFLUX DISEASE WITHOUT ESOPHAGITIS: ICD-10-CM

## 2022-12-12 DIAGNOSIS — H93.13 TINNITUS, BILATERAL: ICD-10-CM

## 2022-12-12 DIAGNOSIS — G47.33 OSA (OBSTRUCTIVE SLEEP APNEA): ICD-10-CM

## 2022-12-12 DIAGNOSIS — I10 BENIGN ESSENTIAL HYPERTENSION: ICD-10-CM

## 2022-12-12 DIAGNOSIS — N18.31 CHRONIC KIDNEY DISEASE, STAGE 3A (H): ICD-10-CM

## 2022-12-12 DIAGNOSIS — Z00.00 ROUTINE GENERAL MEDICAL EXAMINATION AT A HEALTH CARE FACILITY: ICD-10-CM

## 2022-12-12 LAB
CHOLEST SERPL-MCNC: 245 MG/DL
HDLC SERPL-MCNC: 62 MG/DL
LDLC SERPL CALC-MCNC: 161 MG/DL
NONHDLC SERPL-MCNC: 183 MG/DL
PSA SERPL-MCNC: 4.13 NG/ML (ref 0–4.5)
TRIGL SERPL-MCNC: 111 MG/DL

## 2022-12-12 PROCEDURE — G0103 PSA SCREENING: HCPCS | Performed by: FAMILY MEDICINE

## 2022-12-12 PROCEDURE — 36415 COLL VENOUS BLD VENIPUNCTURE: CPT | Performed by: FAMILY MEDICINE

## 2022-12-12 PROCEDURE — 99396 PREV VISIT EST AGE 40-64: CPT | Performed by: FAMILY MEDICINE

## 2022-12-12 PROCEDURE — 80061 LIPID PANEL: CPT | Performed by: FAMILY MEDICINE

## 2022-12-12 ASSESSMENT — ENCOUNTER SYMPTOMS
ABDOMINAL PAIN: 0
HEMATOCHEZIA: 0
SORE THROAT: 0
SHORTNESS OF BREATH: 0
COUGH: 0
HEARTBURN: 0
JOINT SWELLING: 0
CONSTIPATION: 0
HEADACHES: 0
ARTHRALGIAS: 0
CHILLS: 0
DYSURIA: 0
WEAKNESS: 0
FEVER: 0
DIARRHEA: 0
FREQUENCY: 0
PARESTHESIAS: 0
MYALGIAS: 0
NAUSEA: 0
PALPITATIONS: 0
HEMATURIA: 0
NERVOUS/ANXIOUS: 0
EYE PAIN: 0

## 2022-12-12 NOTE — PROGRESS NOTES
SUBJECTIVE:   CC: Sam is an 62 year old who presents for preventative health visit.     This is a pleasant 62-year-old male who presents to clinic for complete physical examination.    His medical history is notable for hypertension, hypercholesterolemia, gastroesophageal reflux disease, sleep apnea, and rheumatoid arthritis.    He continues to follow-up with Dr. Segundo for rheumatoid arthritis and is treated with Humira.    Given chronic kidney disease he will be following up with a kidney specialist in January.  His kidney function normalized earlier in the year but his most recent GFR was low again at 46.  He has testing planned for later date.    He is up-to-date on his colonoscopy which he had in September 2022.  He did have a tubular adenoma and will be due in September of 2027.    Last week he was diagnosed with a sinus infection and treated with Augmentin as well as Flonase.  He states this typically will happen in the fall.  He has been taking lisinopril and his blood pressure is better today than at the last visit.    He underwent a right total knee arthroplasty in October of this year.  He has generally been doing well.    His last total cholesterol 282 with an LDL of 161.    He has experienced some ringing in both ears, right greater than left.  He denies significant hearing loss at this time.      Patient has been advised of split billing requirements and indicates understanding: Yes  Healthy Habits:     Getting at least 3 servings of Calcium per day:  Yes    Bi-annual eye exam:  Yes    Dental care twice a year:  Yes    Sleep apnea or symptoms of sleep apnea:  Sleep apnea    Diet:  Regular (no restrictions)    Frequency of exercise:  2-3 days/week    Duration of exercise:  15-30 minutes    Taking medications regularly:  Yes    Medication side effects:  Not applicable    PHQ-2 Total Score: 0    Additional concerns today:  No        Today's PHQ-2 Score:   PHQ-2 ( 1999 Pfizer) 12/12/2022   Q1: Little  interest or pleasure in doing things 0   Q2: Feeling down, depressed or hopeless 0   PHQ-2 Score 0   Q1: Little interest or pleasure in doing things Not at all   Q2: Feeling down, depressed or hopeless Not at all   PHQ-2 Score 0           Social History     Tobacco Use     Smoking status: Former     Smokeless tobacco: Never   Substance Use Topics     Alcohol use: Yes     Comment: weekly     If you drink alcohol do you typically have >3 drinks per day or >7 drinks per week? No    Alcohol Use 12/12/2022   Prescreen: >3 drinks/day or >7 drinks/week? No   Prescreen: >3 drinks/day or >7 drinks/week? -   No flowsheet data found.    Last PSA:   Prostate Specific Antigen Screen   Date Value Ref Range Status   12/07/2021 3.78 0.00 - 4.50 ug/L Final       Reviewed orders with patient. Reviewed health maintenance and updated orders accordingly - Yes  Patient Active Problem List   Diagnosis     Iritis     Rheumatoid arthritis of hand, unspecified laterality, unspecified rheumatoid factor presence     Rheumatoid arthritis involving multiple sites, unspecified rheumatoid factor presence     Adenomatous colon polyp     JALEESA (obstructive sleep apnea)     Esophageal reflux     Benign essential hypertension     Acute glomerulonephritis with pathological lesion in kidney     Primary hypercholesterolemia     Morbid obesity (H)     Chronic kidney disease, stage 3a (H)     Benign neoplasm of sigmoid colon     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     Diverticular disease of large intestine     Past Surgical History:   Procedure Laterality Date     EYE SURGERY       HC KNEE SCOPE, DIAGNOSTIC      Description: Arthroscopy Knee Right;  Recorded: 04/30/2008;     JOINT REPLACEMENT       OTHER SURGICAL HISTORY Left     left knee arthroplasty     OTHER SURGICAL HISTORY Bilateral     Cataract surgery       Social History     Tobacco Use     Smoking status: Former     Smokeless tobacco: Never   Substance Use  Topics     Alcohol use: Yes     Comment: weekly     Family History   Problem Relation Age of Onset     Cancer Father      Bone Cancer Father          Current Outpatient Medications   Medication Sig Dispense Refill     adalimumab (HUMIRA *CF*) 40 MG/0.4ML pen kit Inject 0.4 mLs (40 mg) Subcutaneous every 14 days . Hold for signs of infection, then seek medical attention. 0.8 mL 4     amoxicillin-clavulanate (AUGMENTIN) 875-125 MG tablet Take 1 tablet by mouth 2 times daily for 10 days 20 tablet 0     Cholecalciferol (VITAMIN D3) 2000 UNITS CAPS        EQL NATURAL ZINC 50 MG TABS        fluticasone (FLONASE) 50 MCG/ACT nasal spray Spray 1 spray into both nostrils daily 16 g 1     lisinopril (ZESTRIL) 30 MG tablet TAKE ONE AND ONE-HALF TABLETS BY MOUTH DAILY 135 tablet 1     pantoprazole (PROTONIX) 40 MG EC tablet TAKE ONE TABLET BY MOUTH TWICE A  tablet 1     No Known Allergies    Reviewed and updated as needed this visit by clinical staff   Tobacco  Allergies  Meds              Reviewed and updated as needed this visit by Provider                 History reviewed. No pertinent past medical history.   Past Surgical History:   Procedure Laterality Date     EYE SURGERY       HC KNEE SCOPE, DIAGNOSTIC      Description: Arthroscopy Knee Right;  Recorded: 04/30/2008;     JOINT REPLACEMENT       OTHER SURGICAL HISTORY Left     left knee arthroplasty     OTHER SURGICAL HISTORY Bilateral     Cataract surgery       Review of Systems   Constitutional: Negative for chills and fever.   HENT: Positive for ear pain and hearing loss. Negative for congestion and sore throat.    Eyes: Negative for pain and visual disturbance.   Respiratory: Negative for cough and shortness of breath.    Cardiovascular: Negative for chest pain, palpitations and peripheral edema.   Gastrointestinal: Negative for abdominal pain, constipation, diarrhea, heartburn, hematochezia and nausea.   Genitourinary: Negative for dysuria, frequency, genital  "sores, hematuria, impotence and penile discharge.   Musculoskeletal: Negative for arthralgias, joint swelling and myalgias.   Skin: Negative for rash.   Neurological: Negative for weakness, headaches and paresthesias.   Psychiatric/Behavioral: Negative for mood changes. The patient is not nervous/anxious.      CONSTITUTIONAL: NEGATIVE for fever, chills, change in weight  INTEGUMENTARY/SKIN: NEGATIVE for worrisome rashes, moles or lesions  EYES: NEGATIVE for vision changes or irritation  ENT: NEGATIVE for ear, mouth and throat problems  RESP: NEGATIVE for significant cough or SOB  CV: NEGATIVE for chest pain, palpitations or peripheral edema  GI: NEGATIVE for nausea, abdominal pain, heartburn, or change in bowel habits   male: negative for dysuria, hematuria, decreased urinary stream, erectile dysfunction, urethral discharge  MUSCULOSKELETAL:Joint pain related to a month later tomorrow is for I think it is okay I think with 1 1 today and tomorrow heart so I think will do except for the one exception is for for like MMR and varicella you cannot do it before you for 12 months But I Think in This Case When They and is here so I that again  NEURO: NEGATIVE for weakness, dizziness or paresthesias  PSYCHIATRIC: NEGATIVE for changes in mood or affect    OBJECTIVE:   /83 (BP Location: Left arm, Patient Position: Sitting, Cuff Size: Adult Large)   Pulse 71   Temp 97.1  F (36.2  C) (Oral)   Resp 16   Ht 1.702 m (5' 7\")   Wt 110 kg (242 lb 9.6 oz)   BMI 38.00 kg/m      Physical Exam  GENERAL: healthy, alert and no distress  EYES: Eyes grossly normal to inspection, PERRL and conjunctivae and sclerae normal  HENT: ear canals and TM's normal, nose and mouth without ulcers or lesions  NECK: no adenopathy, no asymmetry, masses, or scars and thyroid normal to palpation  RESP: lungs clear to auscultation - no rales, rhonchi or wheezes  CV: regular rate and rhythm, normal S1 S2, no S3 or S4, no murmur, click or rub, no " peripheral edema and peripheral pulses strong  MS: no gross musculoskeletal defects noted, no edema  SKIN: no suspicious lesions or rashes  NEURO: Normal strength and tone, mentation intact and speech normal  PSYCH: mentation appears normal, affect normal/bright    Diagnostic Test Results:  Labs reviewed in Epic    ASSESSMENT/PLAN:   Prosper was seen today for physical.    Diagnoses and all orders for this visit:    Routine physical examination    Recommend increasing physical activity as he is able though this has been a bit of a challenge following his recent knee replacement  Recommend limiting carbohydrates in his diet  Check a PSA  He declines a digital rectal examination today    -     PSA, screen; Future  -     PSA, screen    He is a candidate for multiple vaccines including tetanus, pneumonia, shingles, COVID, and influenza  He will review this with rheumatology prior to getting vaccines    Benign essential hypertension    Continue lisinopril    Primary hypercholesterolemia    Check a lipid cascade  Calculate the 10-year cardiovascular risk    -     Lipid panel reflex to direct LDL Non-fasting; Future  -     Lipid panel reflex to direct LDL Non-fasting    JALEESA (obstructive sleep apnea)    Treated with CPAP    Adenomatous polyp of colon, unspecified part of colon    Reviewed his colonoscopy from September of 2022.  He is due in 2027 given a history of adenomatous colon polyp    Gastroesophageal reflux disease without esophagitis    He will continue pantoprazole  He has had breakthrough symptoms when trying to wean this medication    Rheumatoid arthritis of hand, unspecified laterality, unspecified whether rheumatoid factor present (H)    Continue to follow-up with Dr. Sanya Elam    Chronic kidney disease, stage 3a (H)    Follow-up with nephrology is planned    Tinnitus, bilateral    Offered referral to audiology and ENT  He will think about this            Patient has been advised of split  billing requirements and indicates understanding: Yes      COUNSELING:   Reviewed preventive health counseling, as reflected in patient instructions       Regular exercise       Healthy diet/nutrition       Alcohol Use        Colorectal cancer screening       Prostate cancer screening        He reports that he has quit smoking. He has never used smokeless tobacco.            Momo Hartley MD  [facility]

## 2022-12-12 NOTE — PATIENT INSTRUCTIONS
Sam,    Follow-up with Dr. Segundo as planned  Also, you will follow-up with the kidney doctor in January  We discussed vaccines including a tetanus booster, pneumonia shot, shingles shot, COVID booster, and influenza vaccine  Increase physical activity as you are able  Make sure to limit carbohydrates in your diet and work on weight loss    Momo Hartley MD        Preventive Health Recommendations  Male Ages 50 - 64    Yearly exam:             See your health care provider every year in order to  o   Review health changes.   o   Discuss preventive care.    o   Review your medicines if your doctor has prescribed any.   Have a cholesterol test every 5 years, or more frequently if you are at risk for high cholesterol/heart disease.   Have a diabetes test (fasting glucose) every three years. If you are at risk for diabetes, you should have this test more often.   Have a colonoscopy at age 50, or have a yearly FIT test (stool test). These exams will check for colon cancer.    Talk with your health care provider about whether or not a prostate cancer screening test (PSA) is right for you.  You should be tested each year for STDs (sexually transmitted diseases), if you re at risk.     Shots: Get a flu shot each year. Get a tetanus shot every 10 years.     Nutrition:  Eat at least 5 servings of fruits and vegetables daily.   Eat whole-grain bread, whole-wheat pasta and brown rice instead of white grains and rice.   Get adequate Calcium and Vitamin D.     Lifestyle  Exercise for at least 150 minutes a week (30 minutes a day, 5 days a week). This will help you control your weight and prevent disease.   Limit alcohol to one drink per day.   No smoking.   Wear sunscreen to prevent skin cancer.   See your dentist every six months for an exam and cleaning.   See your eye doctor every 1 to 2 years.

## 2022-12-12 NOTE — LETTER
January 7, 2023      Sam COLLIER Angela  [address] [facility] [id number]        Hi Sam,      Your cholesterol numbers are elevated. In calculating your 10 year cardiovascular risk you do meet the threshold for a cholesterol lowering medication like atorvastatin/Lipitor. Please let me know if you would like to consider that option. I do want you to work on your diet and exercise as you are able. Please let me know your thoughts about starting a medication. Either way you can recheck in 3 months. We can discuss this in greater detail if needed.    Momo Hartley MD        The 10-year ASCVD risk score (Oliver BHATT, et al., 2019) is: 12.5%    Values used to calculate the score:      Age: 62 years      Sex: Male      Is Non- : No      Diabetic: No      Tobacco smoker: No      Systolic Blood Pressure: 131 mmHg      Is BP treated: Yes      HDL Cholesterol: 62 mg/dL      Total Cholesterol: 245 mg/dL      Resulted Orders   Lipid panel reflex to direct LDL Non-fasting   Result Value Ref Range    Cholesterol 245 (H) <200 mg/dL    Triglycerides 111 <150 mg/dL    Direct Measure HDL 62 >=40 mg/dL    LDL Cholesterol Calculated 161 (H) <=100 mg/dL    Non HDL Cholesterol 183 (H) <130 mg/dL    Narrative    Cholesterol  Desirable:  <200 mg/dL    Triglycerides  Normal:  Less than 150 mg/dL  Borderline High:  150-199 mg/dL  High:  200-499 mg/dL  Very High:  Greater than or equal to 500 mg/dL    Direct Measure HDL  Female:  Greater than or equal to 50 mg/dL   Male:  Greater than or equal to 40 mg/dL    LDL Cholesterol  Desirable:  <100mg/dL  Above Desirable:  100-129 mg/dL   Borderline High:  130-159 mg/dL   High:  160-189 mg/dL   Very High:  >= 190 mg/dL    Non HDL Cholesterol  Desirable:  130 mg/dL  Above Desirable:  130-159 mg/dL  Borderline High:  160-189 mg/dL  High:  190-219 mg/dL  Very High:  Greater than or equal to 220 mg/dL   PSA, screen   Result Value Ref Range    Prostate Specific Antigen  Screen 4.13 0.00 - 4.50 ng/mL    Narrative    This result is obtained using the Roche Elecsys total PSA method on the raphael e801 immunoassay analyzer. Results obtained with different assay methods or kits cannot be used interchangeably.       If you have any questions or concerns, please call the clinic at the number listed above.       Sincerely,      Momo Hartley MD

## 2022-12-12 NOTE — LETTER
January 7, 2023      Sam Miller  [address] [place] [id number]        Dear Sam,    Your prostate test is within normal limits. I recommend rechecking in one year.    Momo Hartley MD          Resulted Orders   PSA, screen   Result Value Ref Range    Prostate Specific Antigen Screen 4.13 0.00 - 4.50 ng/mL    Narrative    This result is obtained using the Roche Elecsys total PSA method on the raphael e801 immunoassay analyzer. Results obtained with different assay methods or kits cannot be used interchangeably.       If you have any questions or concerns, please call the clinic at the number listed above.       Sincerely,      Momo Hartley MD

## 2022-12-27 ENCOUNTER — TRANSFERRED RECORDS (OUTPATIENT)
Dept: HEALTH INFORMATION MANAGEMENT | Facility: CLINIC | Age: 62
End: 2022-12-27

## 2023-01-06 ENCOUNTER — LAB (OUTPATIENT)
Dept: LAB | Facility: CLINIC | Age: 63
End: 2023-01-06
Payer: COMMERCIAL

## 2023-01-06 DIAGNOSIS — I10 BENIGN ESSENTIAL HYPERTENSION: ICD-10-CM

## 2023-01-06 DIAGNOSIS — N18.31 CHRONIC KIDNEY DISEASE, STAGE 3A (H): ICD-10-CM

## 2023-01-06 DIAGNOSIS — E79.0 HYPERURICEMIA: ICD-10-CM

## 2023-01-06 LAB
ALBUMIN MFR UR ELPH: 19.5 MG/DL (ref 1–14)
ALBUMIN SERPL BCG-MCNC: 3.9 G/DL (ref 3.5–5.2)
ALBUMIN UR-MCNC: ABNORMAL MG/DL
ANION GAP SERPL CALCULATED.3IONS-SCNC: 14 MMOL/L (ref 7–15)
APPEARANCE UR: CLEAR
BACTERIA #/AREA URNS HPF: ABNORMAL /HPF
BILIRUB UR QL STRIP: NEGATIVE
BUN SERPL-MCNC: 20.4 MG/DL (ref 8–23)
CALCIUM SERPL-MCNC: 9 MG/DL (ref 8.8–10.2)
CHLORIDE SERPL-SCNC: 109 MMOL/L (ref 98–107)
COLOR UR AUTO: YELLOW
CREAT SERPL-MCNC: 1.28 MG/DL (ref 0.67–1.17)
CREAT UR-MCNC: 107 MG/DL
DEPRECATED CALCIDIOL+CALCIFEROL SERPL-MC: 58 UG/L (ref 20–75)
DEPRECATED HCO3 PLAS-SCNC: 19 MMOL/L (ref 22–29)
ERYTHROCYTE [DISTWIDTH] IN BLOOD BY AUTOMATED COUNT: 12.7 % (ref 10–15)
GFR SERPL CREATININE-BSD FRML MDRD: 63 ML/MIN/1.73M2
GLUCOSE SERPL-MCNC: 99 MG/DL (ref 70–99)
GLUCOSE UR STRIP-MCNC: NEGATIVE MG/DL
HCT VFR BLD AUTO: 39.2 % (ref 40–53)
HGB BLD-MCNC: 13.4 G/DL (ref 13.3–17.7)
HGB UR QL STRIP: ABNORMAL
KETONES UR STRIP-MCNC: NEGATIVE MG/DL
LEUKOCYTE ESTERASE UR QL STRIP: NEGATIVE
MCH RBC QN AUTO: 31.4 PG (ref 26.5–33)
MCHC RBC AUTO-ENTMCNC: 34.2 G/DL (ref 31.5–36.5)
MCV RBC AUTO: 92 FL (ref 78–100)
NITRATE UR QL: NEGATIVE
PH UR STRIP: 5.5 [PH] (ref 5–8)
PHOSPHATE SERPL-MCNC: 2.6 MG/DL (ref 2.5–4.5)
PLATELET # BLD AUTO: 299 10E3/UL (ref 150–450)
POTASSIUM SERPL-SCNC: 4.1 MMOL/L (ref 3.4–5.3)
PROT/CREAT 24H UR: 0.18 MG/MG CR (ref 0–0.2)
PTH-INTACT SERPL-MCNC: 36 PG/ML (ref 15–65)
RBC # BLD AUTO: 4.27 10E6/UL (ref 4.4–5.9)
RBC #/AREA URNS AUTO: ABNORMAL /HPF
SODIUM SERPL-SCNC: 142 MMOL/L (ref 136–145)
SP GR UR STRIP: 1.02 (ref 1–1.03)
SQUAMOUS #/AREA URNS AUTO: ABNORMAL /LPF
URATE SERPL-MCNC: 8.1 MG/DL (ref 3.4–7)
UROBILINOGEN UR STRIP-ACNC: 0.2 E.U./DL
WBC # BLD AUTO: 5 10E3/UL (ref 4–11)
WBC #/AREA URNS AUTO: ABNORMAL /HPF

## 2023-01-06 PROCEDURE — 84156 ASSAY OF PROTEIN URINE: CPT

## 2023-01-06 PROCEDURE — 84550 ASSAY OF BLOOD/URIC ACID: CPT

## 2023-01-06 PROCEDURE — 80069 RENAL FUNCTION PANEL: CPT

## 2023-01-06 PROCEDURE — 82610 CYSTATIN C: CPT

## 2023-01-06 PROCEDURE — 36415 COLL VENOUS BLD VENIPUNCTURE: CPT

## 2023-01-06 PROCEDURE — 82306 VITAMIN D 25 HYDROXY: CPT

## 2023-01-06 PROCEDURE — 83970 ASSAY OF PARATHORMONE: CPT

## 2023-01-06 PROCEDURE — 81001 URINALYSIS AUTO W/SCOPE: CPT

## 2023-01-06 PROCEDURE — 85027 COMPLETE CBC AUTOMATED: CPT

## 2023-01-08 NOTE — PROGRESS NOTES
Nephrology Clinic Visit  Prosper Miller MRN: [id number] YOB: 1960  Primary Care Provider: Momo Hartley  History Obtained From: Patient  External Document Review: Primary Care Provider  ----------------------------------------------------------------------------------------------------------------------  Visit 1/10/2023:  -BP Control: Doesn't have a cuff at home. Will get one and montior his home readings. He is on Lsiniopril 20mg or 30mg (will update me when he gets home).  -RA history and control: Was on Remicaid in the past. He's been on treatment for RA for at least 20 years or so. He had a flare just over a year ago and was on Prednisone and Humira. Cyclosproine exposure is remote and no longer on.   -NSAID use: Not recently, He was taking NSAIDs routinely after his knee surgery 10/2022. Maybe some around 8/2018.   -Urinary retention symptoms: Very rarely getting up overnight to urinate. Strong urinary stream. No straining.   -Hx of Kidney stones: None that he knows of  -Family history of kidney disease: None that he knows of   -10/4/2022: Increased creatinine. He had knee surgery on the 6th but can't remember anything prior to that hurting his kidneys. He wasn't taking any pain medications at that time. He had a rash about a month prior to that on his legs. He was treated with Prednisone and over the counter itching cream.   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 He was sent to urology at this time. He had a cystoscopy. They thought no issues at this time. Thinks this ws before 2016 (had a few RBCs at that time in his urine). Remote history of smoking but quit about 25 years ago or so. No family history of bladder cancer.   -While in [place] 8/2022 he had some viral illness. Felt overall quite ill and had fever, fatigue. He reports not really taking any medications.      Objective:  PAST MEDICAL HISTORY:  No past medical history on file.  Rheumatoid Arthritis  HTN    PAST SURGICAL HISTORY:  Past Surgical History:   Procedure Laterality Date     EYE SURGERY       HC KNEE SCOPE, DIAGNOSTIC      Description: Arthroscopy Knee Right;  Recorded: 04/30/2008;     JOINT REPLACEMENT       OTHER SURGICAL HISTORY Left     left knee arthroplasty     OTHER SURGICAL HISTORY Bilateral     Cataract surgery       MEDICATIONS:  Current Outpatient Medications   Medication Instructions     adalimumab (HUMIRA *CF*) 40 mg, Subcutaneous, EVERY 14 DAYS, . Hold for signs of infection, then seek medical attention.     Cholecalciferol (VITAMIN D3) 2000 UNITS CAPS Oral     EQL NATURAL ZINC 50 MG TABS Oral     fluticasone (FLONASE) 50 MCG/ACT nasal spray 1 spray, Both Nostrils, DAILY     lisinopril (ZESTRIL) 30 MG tablet TAKE ONE AND ONE-HALF TABLETS BY MOUTH DAILY     pantoprazole (PROTONIX) 40 MG EC tablet TAKE ONE TABLET BY MOUTH TWICE A DAY       FAMILY MEDICAL HISTORY:   Family History   Problem Relation Age of Onset     Cancer Father      Bone Cancer Father        PHYSICAL EXAM:   BP (!) 155/99   Pulse 96   Wt 111.1 kg (245 lb)   BMI 38.37 kg/m    GENERAL APPEARANCE: alert and no distress  EYES: nonicteric  HENT: mouth without ulcers or lesions  RESP: lungs clear to auscultation   CV: regular rhythm, normal rate, no rub  ABDOMEN: soft, nontender, normal bowel sounds, no HSM   Extremities: Slight LE edema.  MS: no evidence of inflammation in joints, no muscle tenderness  SKIN: no rash  NEURO: mentation intact and speech normal  PSYCH: affect normal    LABS REVIEWED BY ME:   ANEMIA  Recent Labs   Lab Test 01/06/23  0747 10/04/22  1434 08/01/22  0803 03/31/22  0805   HGB 13.4 13.8 13.4 13.2*       BMP  Recent Labs   Lab Test 01/06/23  0747 10/04/22  1434 08/01/22  0803 03/31/22  0804 12/07/21  1349 10/08/21  1330    140 139  --  140  --    POTASSIUM 4.1 4.3 5.0  --  4.2  --    CHLORIDE 109* 105  109  --  102  --    CO2 19* 24 25  --  23  --    ANIONGAP 14 11 5  --  15  --    BUN 20.4 29.8* 17  --  27*  --    CR 1.28* 1.68* 1.19 1.25 1.45* 1.50*   GFRESTIMATED 63 46* 69 66 52* 50*   PROTTOTAL  --   --  7.7 7.6 7.6 6.4*       CBC  Recent Labs   Lab Test 01/06/23  0747 10/04/22  1434 08/01/22  0803 03/31/22  0805   HGB 13.4 13.8 13.4 13.2*   WBC 5.0 10.0 5.8 6.3   HCT 39.2* 41.0 39.7* 39.3*   MCV 92 95 91 93    299 347 347       DIABETES  Recent Labs   Lab Test 12/07/21  1349   A1C 5.5       HYPONATREMIANo lab results found.    Invalid input(s): UOSM, OSM    MBD  Recent Labs   Lab Test 01/06/23  0747 10/04/22  1434 08/01/22  0803 03/31/22  0804 12/07/21  1349   ERNESTINA 9.0 9.3 9.5  --  10.2   ALBUMIN 3.9  --  3.6 3.5 3.9   PHOS 2.6  --   --   --   --    PTHI 36  --   --   --   --         URINE STUDIES  Recent Labs   Lab Test 01/06/23  0805 08/01/22  0803 12/07/21  1401   COLOR Yellow Yellow Yellow   APPEARANCE Clear Clear Clear   URINEGLC Negative Negative Negative   URINEBILI Negative Negative Negative   URINEKETONE Negative Negative Negative   SG 1.020 1.010 1.015   UBLD Trace* Small* Moderate*   URINEPH 5.5 5.5 5.5   PROTEIN Trace* Negative 30*   UROBILINOGEN 0.2 0.2 0.2   NITRITE Negative Negative Negative   LEUKEST Negative Negative Negative   RBCU 0-2 0-2 2-5*   WBCU None Seen 0-5 None Seen     No lab results found.    ADDITIONAL LABS ORDERED/REVIEWED BY ME:  Renal US    Assessment/Plan  CKD Stage G3aA1  Established care with [facility] 1/10/2023    Onset of CKD appears to be around 3/12/2021 when creatinine was 1.52 from previous basline of 1.0-1.1 (I can see creatinine values dating back to 3/26/2018 when his creatinine was 1.01). I reviewed notes around 8/2021 and don't see obvious cause for his increase in creatinine. Peak was 2.0 on 8/30/2021 and subsequently downtrended back to the 1.1-1.3 range throughout much of 2022. 10/4/2022 creatinine back up to 1.68 but this improved to 1.28 on  1/6/2023 prior to establishing care with me. 1/6/2023 UA w/o hematuria or pyuria and UPCR at this time with 0.16g/g.     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 Very unlikely diagnosis in this patient's case     Plan:  -Renal US with PVR to eval for obstruction/masses/stones  -Cystatin-C to confirm CKD G3aA1 (per KDIGO Guidelines) - will try to add on to labs from 4 days ago otherwise obtain with next routine lab draw.  -If eGFR < 45 consider addition of SGLT2i (or if albuminuria >200mg/g) per EMPA-KIDNEY enrollment criteria  -Avoid NSAIDs/Nephrotoxic agents  -Optimize BP control. Need home readings and to optimize Lisinopril dosing.     Anemia of Renal Disease  Hemoglobin: 13.8  Ferritin: N/A  TSAT:N/A    Plan:  -No need for EPO at this time      Metabolic Acidosis of Renal Disease  Bicarb: 19    Plan:  -Follow up trend and consider NaHCO if remains < 22.      Mineral Bone Disease  PTH: 35 1/6/2023  Phos: 2.6 1/6/2023  Calcium: 9 1/6/2023  Vitamin D: 58 1/6/2023    Plan:  -No need for phos binder    Addendum 2/6/2023:  -Reviewed patient's Bp trends. Vast majority above goal. 140-150's/90's. Need to uptitrate Bp regimen.   --First step: Lisinopril to max of 40mg qDay from 30mg qDay. New prescription sent.  --Next step will be diuretic vs CCB. Will have him send me readings 2 weeks after adjusting Lisinopril dose.     Return to clinic: 3 months    Jeff Villa MD   of Medicine  Division of Nephrology and Hypertension  [facility]    55 minutes spent on the date of the encounter doing chart review, history and exam, documentation and further activities as noted above

## 2023-01-09 ENCOUNTER — VIRTUAL VISIT (OUTPATIENT)
Dept: RHEUMATOLOGY | Facility: CLINIC | Age: 63
End: 2023-01-09
Payer: COMMERCIAL

## 2023-01-09 DIAGNOSIS — M06.9 RHEUMATOID ARTHRITIS INVOLVING MULTIPLE SITES, UNSPECIFIED WHETHER RHEUMATOID FACTOR PRESENT (H): Primary | ICD-10-CM

## 2023-01-09 DIAGNOSIS — H20.9 UVEITIS: ICD-10-CM

## 2023-01-09 PROCEDURE — 99214 OFFICE O/P EST MOD 30 MIN: CPT | Mod: GT | Performed by: INTERNAL MEDICINE

## 2023-01-09 NOTE — PATIENT INSTRUCTIONS
RHEUMATOLOGY    Dr. Alex Segundo    [facility]  [address] [place] [address]  [place] [address]  Phone number: [phone]  Fax number: [phone]    You may schedule your FLU shot by calling [phone] or if you would like to get your shot at a [facility] pharmacy you may schedule online at [email or web address].    Thank you for choosing [facility]!    Jaja Sanchez CMA Rheumatology

## 2023-01-09 NOTE — PROGRESS NOTES
Prosper Miller  is a 62 year old year old who is being evaluated via a billable video visit.      How would you like to obtain your AVS? MyChart  If the video visit is dropped, the invitation should be resent by: Text to cell phone: [phone]   Will anyone else be joining your video visit? No     Rheumatology Video Visit      Prosper Miller MRN# [id number]   YOB: 1960 Age: 62 year old      Date of visit: 1/09/23   PCP: Momo Gooden at [facility]  Ophthalmology: Dr. Michael Jenkins at the [facility]    Chief Complaint   Patient presents with:  Rheumatoid Arthritis    Assessment and Plan     1.  Rheumatoid arthritis: Reportedly dx'd in 2006. Previously followed by Dr. Phillips. Established care with me on 3/26/2018.  No active synovitis on exam when first evaluated by me. Records documents seropositive rheumatoid arthritis but I cannot find records for CCP or rheumatoid factor - check these today..  Previously on leflunomide, cyclosporine, infliximab (10 mg/kg IV every 7 weeks that was effective for arthritis but iritis was not controlled).  Currently on Humira 40 mg SQ every 14 days and doing well with regard to RA and iritis.      - Continue Humira 40mg SQ every 14 days  - Labs in 6 months: CBC, Creatinine, Hepatic Panel, ESR, CRP     2.  Iritis: Flare of iritis in 2021.    Continue following with ophthalmology.  Previously was doing well with infliximab but it lost benefit over time; changed to Humira and has been doing well since.   Chronic illness, stable.      3.  Osteoarthritis of the bilateral first CMC joints, and at the PIPs and DIPs    4.  Nonradiating chronic lower back pain, history: Improved with at-home exercises; he has refused physical therapy in the past.  Not an issue at this time.    5. Hx of creatinine elevation: Seeing nephrology tomorrow     7.  Left plantar fasciitis: He reports that after standing on the edge of a ladder he ended up with left  plantar mid-foot pain that is worse with the first few steps and improves the more he walks, less pain when sitting.  No swelling, increased warmth, or overlying erythema of the left foot when this occurred, making gout much less likely.  He was found to have hyperuricemia that prompted today's visit to question if this was gout but his flare is not consistent with gout.  He was advised by Dr. Castillo to use prednisone 40 mg daily x3 days, then 20 mg daily x2 days but this prednisone did not help.  We previously reviewed the diagnosis of planter fasciitis and the importance of stretching exercises and appropriate shoewear.  Doing well at this time.    8.  Vaccinations: Vaccinations reviewed with Mr. Miller.  Risks and benefits of vaccinations were discussed.  Advised  the  Shingrix, tetanus, and COVID-19 vaccinations by 2-4 weeks from each other  - Influenza: encouraged yearly vaccination  - Pneumococcal vaccine: Advised receiving  - Shingrix: Advised receiving  - Tetanus: Advised receiving  - COVID-19: Advised updating    9.  Intermittent watery eyes: Possibly reflex to dry eyes.  No sandpaper feeling in the eyes.  Advised that he may try using preservative-free artificial tears when symptomatic    Total minutes spent in evaluation with patient, documentation, , and review of pertinent studies and chart notes: 20    Mr. Miller verbalized agreement with and understanding of the rational for the diagnosis and treatment plan.  All questions were answered to best of my ability and the patient's satisfaction. Mr. Miller was advised to contact the clinic with any questions that may arise after the clinic visit.      Thank you for involving me in the care of the patient    Return to clinic: 6 months      HPI   Prosper Miller is a 62 year old male with a past medical history significant for hypertension, hyperlipidemia, obstructive sleep apnea, IgA nephropathy, left TKA, right TKA (10/2022),  rheumatoid arthritis, and iritis who presents for rheumatology follow-up.    2/25/2022 ophthalmology note by Dr. Michael Jenkins documents that the eye disease is now quiescent and will follow-up in 6 weeks.    3/31/2022: RA is doing well.  Iritis controlled.  Tolerating Humira well.  Only joint pain is at the bilateral first CMC joints that is worse with activity and improves with rest.  Occasional pain of the left knee at the pes anserine bursa.  Right knee pain worse with activity and improves with rest.  No joint swelling.    8/1/2022: hands shake. Anxiety.  Hands hurt at the bilateral 1st CMC joints that is worse with self-palpation; and pain along the entire right 3rd finger that is on and off throughout the day and he notes hx of injury to the right 3rd finger.  Knees bother him; hx of left TKA and planning for right TKA.  Tolerating Humira well.  Uveitis remains controlled but he says that every now and then he feels like there might be inflammation that is going to start; he with follow-up with his ophthalmologist    8/18/2022: Reports that he was standing on the edge of a ladder and his left plantar midfoot began to hurt, making ambulation more difficult.  There was no swelling, increased warmth, or overlying erythema of the affected area.  He was seen by Dr. Castillo who diagnosed Planter fasciitis.  He was found to have hyperuricemia so was told to take prednisone 40 mg daily x3 days, then 20 mg daily x2 days and after 1 day of taking prednisone he has not felt any better.  Foot pain is worse with the first few steps after any period of inactivity, such as with the first few steps in the morning after waking up or after sitting in a chair for a long period of time.  Otherwise doing well.    Today, 1/9/2023: joints are okay.  Occasional random 3rd finger ache that goes away quickly and is not associated with swelling or redness. Left eye has watered, for couple hours for a day or two.     Denies fevers,  chills, nausea, vomiting, constipation. No abdominal pain. No chest pain/pressure, palpitations, or shortness of breath. No LE swelling. No neck pain. No oral or nasal sores.  No rash.     Tobacco: Former smoker  EtOH: Weekly  Drugs: None  Occupation:     ROS   12 point review of system was completed and negative except as noted in the HPI     Active Problem List     Patient Active Problem List   Diagnosis     Iritis     Rheumatoid arthritis of hand, unspecified laterality, unspecified rheumatoid factor presence     Rheumatoid arthritis involving multiple sites, unspecified rheumatoid factor presence     Adenomatous colon polyp     JALEESA (obstructive sleep apnea)     Esophageal reflux     Benign essential hypertension     Acute glomerulonephritis with pathological lesion in kidney     Primary hypercholesterolemia     Morbid obesity (H)     Chronic kidney disease, stage 3a (H)     Benign neoplasm of sigmoid colon     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 No pertinent past medical history.  Past Surgical History     Past Surgical History:   Procedure Laterality Date     EYE SURGERY       HC KNEE SCOPE, DIAGNOSTIC      Description: Arthroscopy Knee Right;  Recorded: 04/30/2008;     JOINT REPLACEMENT       OTHER SURGICAL HISTORY Left     left knee arthroplasty     OTHER SURGICAL HISTORY Bilateral     Cataract surgery     Allergy   No Known Allergies  Current Medication List     Current Outpatient Medications   Medication Sig     adalimumab (HUMIRA *CF*) 40 MG/0.4ML pen kit Inject 0.4 mLs (40 mg) Subcutaneous every 14 days . Hold for signs of infection, then seek medical attention.     Cholecalciferol (VITAMIN D3) 2000 UNITS CAPS      EQL NATURAL ZINC 50 MG TABS      fluticasone (FLONASE) 50 MCG/ACT nasal spray Spray 1 spray into both nostrils daily     lisinopril (ZESTRIL) 30 MG  "tablet TAKE ONE AND ONE-HALF TABLETS BY MOUTH DAILY     pantoprazole (PROTONIX) 40 MG EC tablet TAKE ONE TABLET BY MOUTH TWICE A DAY     No current facility-administered medications for this visit.     Social History   See HPI    Family History     Family History   Problem Relation Age of Onset     Cancer Father      Bone Cancer Father         Physical Exam     Temp Readings from Last 3 Encounters:   12/12/22 97.1  F (36.2  C) (Oral)   12/07/22 97.5  F (36.4  C) (Tympanic)   10/04/22 97.3  F (36.3  C) (Oral)     BP Readings from Last 5 Encounters:   12/12/22 131/83   12/07/22 (!) 142/80   10/04/22 112/70   09/20/22 (!) 146/91   08/15/22 (!) 147/91     Pulse Readings from Last 1 Encounters:   12/12/22 71     Resp Readings from Last 1 Encounters:   12/12/22 16     Estimated body mass index is 38 kg/m  as calculated from the following:    Height as of 12/12/22: 1.702 m (5' 7\").    Weight as of 12/12/22: 110 kg (242 lb 9.6 oz).    GEN: NAD.   HEENT:  Anicteric, noninjected sclera. No obvious external lesions of the ear and nose. Hearing intact.  PULM: No increased work of breathing  MSK: Hands and wrists without swelling.  SKIN: No rash or jaundice seen  PSYCH: Alert. Appropriate.     Labs / Imaging (select studies)     CBC  Recent Labs   Lab Test 01/06/23  0747 10/04/22  1434 08/01/22  0803 03/31/22  0805 12/07/21  1349 10/08/21  1330 03/12/21  1310 03/09/20  0812 09/16/19  0804 03/12/19  0849   WBC 5.0 10.0 5.8 6.3   < > 5.4   < > 6.6 5.7 5.6   RBC 4.27* 4.33* 4.35* 4.21*   < > 3.27*   < > 4.76 4.50 4.88   HGB 13.4 13.8 13.4 13.2*   < > 10.8*   < > 15.2 14.5 15.7   HCT 39.2* 41.0 39.7* 39.3*   < > 30.8*   < > 44.5 42.3 44.5   MCV 92 95 91 93   < > 94   < > 94 94 91   RDW 12.7 14.2 13.3 13.4   < > 13.6   < > 14.0 12.7 13.1    299 347 347   < > 274   < > 275 302 343   MCH 31.4 31.9 30.8 31.4   < > 33.0   < > 31.9 32.2 32.2   MCHC 34.2 33.7 33.8 33.6   < > 35.1   < > 34.2 34.3 35.3   NEUTROPHIL  --   --  34 42  -- "  69   < > 43.8 40.9 37.6   LYMPH  --   --  50 44  --  24   < > 43.4 43.2 47.1   MONOCYTE  --   --  11 10  --  4   < > 6.7 9.1 8.9   EOSINOPHIL  --   --  5 3  --  1   < > 5.6 6.3 5.5   BASOPHIL  --   --  1 1  --  1   < > 0.5 0.5 0.9   ANEU  --   --   --   --   --   --   --  2.9 2.3 2.1   ALYM  --   --   --   --   --   --   --  2.9 2.5 2.6   MICHAEL  --   --   --   --   --   --   --  0.4 0.5 0.5   AEOS  --   --   --   --   --   --   --  0.4 0.4 0.3   ABAS  --   --   --   --   --   --   --  0.0 0.0 0.1    < > = values in this interval not displayed.     CMP  Recent Labs   Lab Test 01/06/23  0747 10/04/22  1434 08/01/22  0803 03/31/22  0804 12/07/21  1349 08/30/21  0848 03/12/21  1310 03/09/20  0812 02/24/20  1634    140 139  --  140  --   --   --  142   POTASSIUM 4.1 4.3 5.0  --  4.2  --   --   --  3.8   CHLORIDE 109* 105 109  --  102  --   --   --  105   CO2 19* 24 25  --  23  --   --   --  26   ANIONGAP 14 11 5  --  15  --   --   --  11   GLC 99 105* 96  --  77   < >  --   --  92   BUN 20.4 29.8* 17  --  27*  --   --   --  12   CR 1.28* 1.68* 1.19 1.25 1.45*   < > 1.52* 1.05 1.07   GFRESTIMATED 63 46* 69 66 52*   < > 49* 77 >60   GFRESTBLACK  --   --   --   --   --   --  57* 89 >60   ERNESTINA 9.0 9.3 9.5  --  10.2  --   --   --  9.2   BILITOTAL  --   --  0.3 0.4 0.5   < > 0.3 0.3  --    ALBUMIN 3.9  --  3.6 3.5 3.9   < > 3.2* 3.5  --    PROTTOTAL  --   --  7.7 7.6 7.6   < > 6.9 7.7  --    ALKPHOS  --   --  74 63 68   < > 70 69  --    AST  --   --  25 25 18   < > 28 21  --    ALT  --   --  43 45 26   < > 30 37  --     < > = values in this interval not displayed.     Uric Acid  Recent Labs   Lab Test 01/06/23  0747 08/15/22  1026   URIC 8.1* 8.5*       Immunization History     Immunization History   Administered Date(s) Administered     COVID-19 Vaccine 12+ (Pfizer) 04/22/2021, 05/13/2021, 11/15/2021     HepA-Adult 04/30/2008, 11/11/2008     Influenza Vaccine, 6+MO IM (QUADRIVALENT W/PRESERVATIVES) 11/11/2008,  12/18/2009, 09/18/2020     Mantoux Tuberculin Skin Test 02/01/2007     Tdap (Adacel,Boostrix) 03/09/2011          Chart documentation done in part with Dragon Voice recognition Software. Although reviewed after completion, some word and grammatical error may remain.      Video-Visit Details    Type of service:  Video Visit    Video Start Time: 7:20 AM    Video End Time: 7:30 AM    Originating Location (pt. Location): Home, [place]    Distant Location (provider location):  Off site, [place]    Platform used for Video Visit: Jennifer Segundo MD

## 2023-01-10 ENCOUNTER — PATIENT OUTREACH (OUTPATIENT)
Dept: NEPHROLOGY | Facility: CLINIC | Age: 63
End: 2023-01-10

## 2023-01-10 ENCOUNTER — HOSPITAL ENCOUNTER (OUTPATIENT)
Dept: ULTRASOUND IMAGING | Facility: HOSPITAL | Age: 63
Discharge: HOME | End: 2023-01-10
Attending: STUDENT IN AN ORGANIZED HEALTH CARE EDUCATION/TRAINING PROGRAM
Payer: COMMERCIAL

## 2023-01-10 ENCOUNTER — OFFICE VISIT (OUTPATIENT)
Dept: NEPHROLOGY | Facility: CLINIC | Age: 63
End: 2023-01-10
Payer: COMMERCIAL

## 2023-01-10 VITALS
HEART RATE: 96 BPM | WEIGHT: 245 LBS | BODY MASS INDEX: 38.37 KG/M2 | DIASTOLIC BLOOD PRESSURE: 99 MMHG | SYSTOLIC BLOOD PRESSURE: 155 MMHG

## 2023-01-10 DIAGNOSIS — N18.31 CHRONIC KIDNEY DISEASE, STAGE 3A (H): ICD-10-CM

## 2023-01-10 DIAGNOSIS — N18.31 CHRONIC KIDNEY DISEASE, STAGE 3A (H): Primary | ICD-10-CM

## 2023-01-10 LAB
CYSTATIN C (ROCHE): 1.6 MG/L (ref 0.6–1)
GFR SERPL CREATININE-BSD FRML MDRD: 41 ML/MIN/1.73M2

## 2023-01-10 PROCEDURE — 76770 US EXAM ABDO BACK WALL COMP: CPT

## 2023-01-10 PROCEDURE — 99204 OFFICE O/P NEW MOD 45 MIN: CPT | Performed by: STUDENT IN AN ORGANIZED HEALTH CARE EDUCATION/TRAINING PROGRAM

## 2023-01-10 PROCEDURE — 76857 US EXAM PELVIC LIMITED: CPT

## 2023-01-10 NOTE — PATIENT INSTRUCTIONS
"It was a pleasure to see you in nephrology clinic today. I've included a brief summary of our discussion and care plan from today's visit below.  _______________________________________________________________________    My recommendations are summarized as follows:  -Keep a Blood Pressure log. Please make sure that you are using a validated blood pressure device (check \"www.validatebp.org\").  -Avoid all NSAID's. Examples include Ibuprofen (Advil, Motrin), naprosyn (Aleve), celebrex among others. Acetaminophen (Tylenol) is ok with maximum dose in 24 hours of 3000mg.  -Healthy lifestyle measures will keep your kidney's functioning at their current best. This includes regular exercise, maintaining a healthy body weight and smoking cessation.   -Please check your blood pressure daily x 2 weeks and send me home readings for review (call or sign up for WeGreek)  -I am trying to add another measure of kidney function to the blood you left in lab 4 days ago. This is called Cystatin-C. If we are unable to add this on to the sample they already have I will place a standing order and this can be obtained next routine blood draw.   -I have ordered a kidney ultrasound. Please have this done prior to our next appointment.     Please return to Nephrology Clinic in 3 months to review your progress.     Who do I call with any questions after my visit?  There are multiple ways to contact your nephrology care team:    -To schedule or reschedule an appointment, please call [phone].  -Reach out via WeGreek. These messages are answered by your nurse or doctor during business hours and typically in 1-2 days. WeGreek messages are best for quick questions/clarifications/updates. Frequently, your doctor or nurse will recommend setting up a follow up appointment to address any significant questions/concerns.  -For urgent questions after business hours, you may reach the on-call Nephrology Fellow by contacting the [facility] "  at [phone].    To schedule imaging:   -Please call [phone]     To schedule your lab appointment at [facility]  -Please call [phone]    Sincerely,    Dr. Jeff Villa   of Medicine  Division of Nephrology and Hypertension  [facility]

## 2023-01-10 NOTE — TELEPHONE ENCOUNTER
"Nephrology Note: Nursing Outreach Encounter    REASON FOR CALL:                                                      REASON FOR CALL: No chief complaint on file.                                           SITUATION/BACKROUND:                                                    Patient is being treated for CKD Stage 3.    Contacted patient regarding his question of recieving a list of valid BP cuffs. Patient to track BP's for   Patient Journey Status:  N/A    ASSESSMENT:                                                      The after visit summary from visit today 1/10/23 re read to the patient to validate the instructions from Dr Villa.  -Keep a Blood Pressure log. Please make sure that you are using a validated blood pressure device (check \"www.validatebp.org\").        PLAN:                                                      Education:  Method:  general discussion/verbal explanation  Discussed habit change regarding disease management/lifestyle changes and keep a blood pressure log  Education material provided and patient was given an opportunity to ask questions.      Follow Up:   Patient to follow up as scheduled at next apt     Patient verbalized understanding and will follow up as recommended.    MESSI DUBOSE RN          "

## 2023-01-10 NOTE — LETTER
1/10/2023       RE: Prosper Miller  [address] [facility] [place] [facility] [address]     Dear Colleague,    Thank you for referring your patient, Prosper Miller, to the [facility]. Please see a copy of my visit note below.        Nephrology Clinic Visit  Prosper Miller MRN: [id number] YOB: 1960  Primary Care Provider: Momo Hartley  History Obtained From: Patient  External Document Review: Primary Care Provider  ----------------------------------------------------------------------------------------------------------------------  Visit 1/10/2023:  -BP Control: Doesn't have a cuff at home. Will get one and montior his home readings. He is on Lsiniopril 20mg or 30mg (will update me when he gets home).  -RA history and control: Was on Remicaid in the past. He's been on treatment for RA for at least 20 years or so. He had a flare just over a year ago and was on Prednisone and Humira. Cyclosproine exposure is remote and no longer on.   -NSAID use: Not recently, He was taking NSAIDs routinely after his knee surgery 10/2022. Maybe some around 8/2018.   -Urinary retention symptoms: Very rarely getting up overnight to urinate. Strong urinary stream. No straining.   -Hx of Kidney stones: None that he knows of  -Family history of kidney disease: None that he knows of   -10/4/2022: Increased creatinine. He had knee surgery on the 6th but can't remember anything prior to that hurting his kidneys. He wasn't taking any pain medications at that time. He had a rash about a month prior to that on his legs. He was treated with Prednisone and over the counter itching cream.   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 He was sent to urology at this time. He had a cystoscopy. They thought no issues at this time. Thinks this ws before  2016 (had a few RBCs at that time in his urine). Remote history of smoking but quit about 25 years ago or so. No family history of bladder cancer.   -While in [place] 8/2022 he had some viral illness. Felt overall quite ill and had fever, fatigue. He reports not really taking any medications.     Objective:  PAST MEDICAL HISTORY:  No past medical history on file.  Rheumatoid Arthritis  HTN    PAST SURGICAL HISTORY:  Past Surgical History:   Procedure Laterality Date     EYE SURGERY       HC KNEE SCOPE, DIAGNOSTIC      Description: Arthroscopy Knee Right;  Recorded: 04/30/2008;     JOINT REPLACEMENT       OTHER SURGICAL HISTORY Left     left knee arthroplasty     OTHER SURGICAL HISTORY Bilateral     Cataract surgery       MEDICATIONS:  Current Outpatient Medications   Medication Instructions     adalimumab (HUMIRA *CF*) 40 mg, Subcutaneous, EVERY 14 DAYS, . Hold for signs of infection, then seek medical attention.     Cholecalciferol (VITAMIN D3) 2000 UNITS CAPS Oral     EQL NATURAL ZINC 50 MG TABS Oral     fluticasone (FLONASE) 50 MCG/ACT nasal spray 1 spray, Both Nostrils, DAILY     lisinopril (ZESTRIL) 30 MG tablet TAKE ONE AND ONE-HALF TABLETS BY MOUTH DAILY     pantoprazole (PROTONIX) 40 MG EC tablet TAKE ONE TABLET BY MOUTH TWICE A DAY       FAMILY MEDICAL HISTORY:   Family History   Problem Relation Age of Onset     Cancer Father      Bone Cancer Father        PHYSICAL EXAM:   BP (!) 155/99   Pulse 96   Wt 111.1 kg (245 lb)   BMI 38.37 kg/m    GENERAL APPEARANCE: alert and no distress  EYES: nonicteric  HENT: mouth without ulcers or lesions  RESP: lungs clear to auscultation   CV: regular rhythm, normal rate, no rub  ABDOMEN: soft, nontender, normal bowel sounds, no HSM   Extremities: Slight LE edema.  MS: no evidence of inflammation in joints, no muscle tenderness  SKIN: no rash  NEURO: mentation intact and speech normal  PSYCH: affect normal    LABS REVIEWED BY ME:   ANEMIA  Recent Labs   Lab Test  01/06/23  0747 10/04/22  1434 08/01/22  0803 03/31/22  0805   HGB 13.4 13.8 13.4 13.2*       BMP  Recent Labs   Lab Test 01/06/23  0747 10/04/22  1434 08/01/22  0803 03/31/22  0804 12/07/21  1349 10/08/21  1330    140 139  --  140  --    POTASSIUM 4.1 4.3 5.0  --  4.2  --    CHLORIDE 109* 105 109  --  102  --    CO2 19* 24 25  --  23  --    ANIONGAP 14 11 5  --  15  --    BUN 20.4 29.8* 17  --  27*  --    CR 1.28* 1.68* 1.19 1.25 1.45* 1.50*   GFRESTIMATED 63 46* 69 66 52* 50*   PROTTOTAL  --   --  7.7 7.6 7.6 6.4*       CBC  Recent Labs   Lab Test 01/06/23  0747 10/04/22  1434 08/01/22  0803 03/31/22  0805   HGB 13.4 13.8 13.4 13.2*   WBC 5.0 10.0 5.8 6.3   HCT 39.2* 41.0 39.7* 39.3*   MCV 92 95 91 93    299 347 347       DIABETES  Recent Labs   Lab Test 12/07/21  1349   A1C 5.5       HYPONATREMIANo lab results found.    Invalid input(s): UOSM, OSM    MBD  Recent Labs   Lab Test 01/06/23 0747 10/04/22  1434 08/01/22  0803 03/31/22  0804 12/07/21  1349   ERNESTINA 9.0 9.3 9.5  --  10.2   ALBUMIN 3.9  --  3.6 3.5 3.9   PHOS 2.6  --   --   --   --    PTHI 36  --   --   --   --         URINE STUDIES  Recent Labs   Lab Test 01/06/23  0805 08/01/22  0803 12/07/21  1401   COLOR Yellow Yellow Yellow   APPEARANCE Clear Clear Clear   URINEGLC Negative Negative Negative   URINEBILI Negative Negative Negative   URINEKETONE Negative Negative Negative   SG 1.020 1.010 1.015   UBLD Trace* Small* Moderate*   URINEPH 5.5 5.5 5.5   PROTEIN Trace* Negative 30*   UROBILINOGEN 0.2 0.2 0.2   NITRITE Negative Negative Negative   LEUKEST Negative Negative Negative   RBCU 0-2 0-2 2-5*   WBCU None Seen 0-5 None Seen     No lab results found.    ADDITIONAL LABS ORDERED/REVIEWED BY ME:  Renal US    Assessment/Plan  CKD Stage G3aA1  Established care with U nella [facility] Nephro 1/10/2023    Onset of CKD appears to be around 3/12/2021 when creatinine was 1.52 from previous basline of 1.0-1.1 (I can see creatinine values dating back to 3/26/2018  when his creatinine was 1.01). I reviewed notes around 8/2021 and don't see obvious cause for his increase in creatinine. Peak was 2.0 on 8/30/2021 and subsequently downtrended back to the 1.1-1.3 range throughout much of 2022. 10/4/2022 creatinine back up to 1.68 but this improved to 1.28 on 1/6/2023 prior to establishing care with me. 1/6/2023 UA w/o hematuria or pyuria and UPCR at this time with 0.16g/g.     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 Very unlikely diagnosis in this patient's case     Plan:  -Renal US with PVR to eval for obstruction/masses/stones  -Cystatin-C to confirm CKD G3aA1 (per KDIGO Guidelines) - will try to add on to labs from 4 days ago otherwise obtain with next routine lab draw.  -If eGFR < 45 consider addition of SGLT2i (or if albuminuria >200mg/g) per EMPA-KIDNEY enrollment criteria  -Avoid NSAIDs/Nephrotoxic agents  -Optimize BP control. Need home readings and to optimize Lisinopril dosing.     Anemia of Renal Disease  Hemoglobin: 13.8  Ferritin: N/A  TSAT:N/A    Plan:  -No need for EPO at this time      Metabolic Acidosis of Renal Disease  Bicarb: 19    Plan:  -Follow up trend and consider NaHCO if remains < 22.      Mineral Bone Disease  PTH: 35 1/6/2023  Phos: 2.6 1/6/2023  Calcium: 9 1/6/2023  Vitamin D: 58 1/6/2023    Plan:  -No need for phos binder    Return to clinic: 3 months    Jeff Villa MD   of Medicine  Division of Nephrology and Hypertension  [facility]    55 minutes spent on the date of the encounter doing chart review, history and exam, documentation and further activities as noted above

## 2023-02-06 ENCOUNTER — TELEPHONE (OUTPATIENT)
Dept: NEPHROLOGY | Facility: CLINIC | Age: 63
End: 2023-02-06
Payer: COMMERCIAL

## 2023-02-06 RX ORDER — LISINOPRIL 40 MG/1
40 TABLET ORAL DAILY
Qty: 90 TABLET | Refills: 3 | Status: SHIPPED | OUTPATIENT
Start: 2023-02-06 | End: 2023-05-01

## 2023-02-06 NOTE — TELEPHONE ENCOUNTER
Returned patient's call regarding BP's. Patient is also requesting results of renal US. Per Dr Villa:  Renal US was normal. No concerning findings, kidney stones, etc.   Patient gave a list of BP readings from over the last 2 weeks.  1/23     152/94  1/24     149/89  1/25     158/99  1/26     177/104  1/27     151/98  1/28     143/96  1/29     147/87  1/30     147/99  1/31     131/88  2/1       145/84  2/2       129/84  2/3       157/84  2/4       134/93  2/5       154/96    Per patient he states his wife packs his pill case and noticed he was taking 30 lisinopril instead of the prescribed 45 mg lisinopril. Patient states he hasn't started taking 45 mg since noticing the mistake. Routing to Dr Villa for review.

## 2023-02-06 NOTE — TELEPHONE ENCOUNTER
Patient contacted regarding Dr Villa's recommendations. Per Dr Villa:  We should certainly increase Lisinopril to 40mg 1x per day. I've sent a new prescription for this as 45mg 1x per day is a somewhat unusual dose (although if he prefers to finish his home supply he can take 1.5 30mg pills 1x per day until his supply is gone).     He should again send us readings in 2 weeks and if his BP is still uncontrolled we will start him on Nifedipine or Chlorthalidone (a calcium channel blocker or a diuretic).     Our goal blood pressure for him is essentially <135/<85 on nearly all readings.    Patient stated an understanding to take 40 mg tablets once daily. Patient stated he will call in 2 weeks with updated BP's.  ELEANOR Rowe

## 2023-02-06 NOTE — TELEPHONE ENCOUNTER
[facility]    Phone Message    May a detailed message be left on voicemail: yes     Reason for Call: Other: . pt is calling to provider his BP readings for the last 2 weeks, he also hasn't heard back about his US results from 1/10/23- please give Sam a call- thank you    Action Taken: Message routed to:  [facility] neph    Travel Screening: Not Applicable

## 2023-02-20 ENCOUNTER — NURSE TRIAGE (OUTPATIENT)
Dept: NURSING | Facility: CLINIC | Age: 63
End: 2023-02-20
Payer: COMMERCIAL

## 2023-02-20 NOTE — TELEPHONE ENCOUNTER
Nurse Triage SBAR    Is this a 2nd Level Triage? NO    Situation: Patient is calling in a record of his BP for his Nephrology provider- Dr Villa    Background: States he has spoken to [facility] in the past    Assessment: BP for the last 2 weeks   2/7 126/83  2/8 128/83  2/9 144/82  2/10 133/89  2/11 119/90  2/12 139/93  2/13 138/90  2/14 135/93  2/15 132/90  2/16 135/84  2/17 140/85  2/18 128/86  2/19 134/88  2/20 138/84    Protocol Recommended Disposition:   Home Care    Recommendation: advised the readings will be sent to Nephrology       Reason for Disposition    Information only question and nurse able to answer     Only wants a record of BP sent to his Nephrology team    Additional Information    Negative: Nursing judgment    Negative: Nursing judgment    Negative: Nursing judgment    Negative: Nursing judgment    Protocols used: INFORMATION ONLY CALL - NO TRIAGE-A-[place]

## 2023-03-20 ENCOUNTER — TELEPHONE (OUTPATIENT)
Dept: FAMILY MEDICINE | Facility: CLINIC | Age: 63
End: 2023-03-20
Payer: COMMERCIAL

## 2023-03-20 NOTE — TELEPHONE ENCOUNTER
Reason for Call:  Appointment Request    Patient requesting this type of appt:  Office visit for anxiety & depression    Requested provider: Momo Hartley    Reason patient unable to be scheduled: Not within requested timeframe    When does patient want to be seen/preferred time: asap    Comments: Sam's wife Marium calling to make an appointment with Dr. Hartley for Sam to be seen for anxiety/depression - requesting medication &/or referral, Marium is not sure which would be best but states he needs to be seen as soon as possible.    Okay to leave a detailed message?: Yes at Cell number on file:    Telephone Information:   Mobile [phone]       Call taken on 3/20/2023 at 5:30 PM by Jaci Garcia

## 2023-03-21 ENCOUNTER — OFFICE VISIT (OUTPATIENT)
Dept: FAMILY MEDICINE | Facility: CLINIC | Age: 63
End: 2023-03-21
Payer: COMMERCIAL

## 2023-03-21 VITALS
RESPIRATION RATE: 18 BRPM | OXYGEN SATURATION: 96 % | DIASTOLIC BLOOD PRESSURE: 98 MMHG | SYSTOLIC BLOOD PRESSURE: 149 MMHG | BODY MASS INDEX: 38.12 KG/M2 | TEMPERATURE: 98.7 F | WEIGHT: 243.4 LBS | HEART RATE: 109 BPM

## 2023-03-21 DIAGNOSIS — F39 MOOD DISORDER (H): Primary | ICD-10-CM

## 2023-03-21 DIAGNOSIS — E78.00 PRIMARY HYPERCHOLESTEROLEMIA: ICD-10-CM

## 2023-03-21 DIAGNOSIS — I10 BENIGN ESSENTIAL HYPERTENSION: ICD-10-CM

## 2023-03-21 DIAGNOSIS — E66.01 MORBID OBESITY (H): ICD-10-CM

## 2023-03-21 PROCEDURE — 99214 OFFICE O/P EST MOD 30 MIN: CPT | Mod: 25 | Performed by: FAMILY MEDICINE

## 2023-03-21 PROCEDURE — 90715 TDAP VACCINE 7 YRS/> IM: CPT | Performed by: FAMILY MEDICINE

## 2023-03-21 PROCEDURE — 90471 IMMUNIZATION ADMIN: CPT | Performed by: FAMILY MEDICINE

## 2023-03-21 RX ORDER — ESCITALOPRAM OXALATE 5 MG/1
5 TABLET ORAL DAILY
Qty: 60 TABLET | Refills: 3 | Status: SHIPPED | OUTPATIENT
Start: 2023-03-21 | End: 2023-11-28

## 2023-03-21 ASSESSMENT — ANXIETY QUESTIONNAIRES
4. TROUBLE RELAXING: SEVERAL DAYS
7. FEELING AFRAID AS IF SOMETHING AWFUL MIGHT HAPPEN: NOT AT ALL
GAD7 TOTAL SCORE: 6
GAD7 TOTAL SCORE: 6
2. NOT BEING ABLE TO STOP OR CONTROL WORRYING: SEVERAL DAYS
3. WORRYING TOO MUCH ABOUT DIFFERENT THINGS: SEVERAL DAYS
GAD7 TOTAL SCORE: 6
7. FEELING AFRAID AS IF SOMETHING AWFUL MIGHT HAPPEN: NOT AT ALL
6. BECOMING EASILY ANNOYED OR IRRITABLE: SEVERAL DAYS
1. FEELING NERVOUS, ANXIOUS, OR ON EDGE: MORE THAN HALF THE DAYS
8. IF YOU CHECKED OFF ANY PROBLEMS, HOW DIFFICULT HAVE THESE MADE IT FOR YOU TO DO YOUR WORK, TAKE CARE OF THINGS AT HOME, OR GET ALONG WITH OTHER PEOPLE?: SOMEWHAT DIFFICULT
IF YOU CHECKED OFF ANY PROBLEMS ON THIS QUESTIONNAIRE, HOW DIFFICULT HAVE THESE PROBLEMS MADE IT FOR YOU TO DO YOUR WORK, TAKE CARE OF THINGS AT HOME, OR GET ALONG WITH OTHER PEOPLE: SOMEWHAT DIFFICULT
5. BEING SO RESTLESS THAT IT IS HARD TO SIT STILL: NOT AT ALL

## 2023-03-21 ASSESSMENT — PATIENT HEALTH QUESTIONNAIRE - PHQ9
SUM OF ALL RESPONSES TO PHQ QUESTIONS 1-9: 5
10. IF YOU CHECKED OFF ANY PROBLEMS, HOW DIFFICULT HAVE THESE PROBLEMS MADE IT FOR YOU TO DO YOUR WORK, TAKE CARE OF THINGS AT HOME, OR GET ALONG WITH OTHER PEOPLE: NOT DIFFICULT AT ALL
SUM OF ALL RESPONSES TO PHQ QUESTIONS 1-9: 5

## 2023-03-21 NOTE — PATIENT INSTRUCTIONS
Sam,    I sent a prescription for Lexapro to your pharmacy  I do recommend follow-up with a counselor  You received the tetanus booster today  You can consider the Shingrix/shingles vaccine as well as the pneumonia vaccine in the future  Please provide an update in 3-4 weeks    Momo Hartley MD

## 2023-03-21 NOTE — PROGRESS NOTES
"  Assessment & Plan     Mood disorder (H)    PHQ-9 score is 5  CAMRON-7 score is 6  Recommend follow-up with a counselor  Reviewed medication options.  Will start Lexapro 5 mg daily  Reviewed potential side effects and time to reach therapeutic effect  Recommend providing an update 3-4 weeks    - escitalopram (LEXAPRO) 5 MG tablet  Dispense: 60 tablet; Refill: 3  - Adult Mental Health  Referral    Benign essential hypertension    Blood pressure is elevated today  Recommend improved blood pressure control  He will continue lisinopril  Follow-up to recheck blood pressure    Primary hypercholesterolemia    Reviewed his elevated cholesterol readings  Recommend improving diet and excise and recheck in 3 months    Morbid obesity (H)    Have have reviewed comorbidities  Recommend working on weight loss    Spent 30 minutes including time for chart preparation and review as well as time with patient and his wife Marium and reviewing the treatment plan including medication review    Review of external notes as documented elsewhere in note         BMI:   Estimated body mass index is 38.12 kg/m  as calculated from the following:    Height as of 12/12/22: 1.702 m (5' 7\").    Weight as of this encounter: 110.4 kg (243 lb 6.4 oz).           Momo Hartley MD  [facility]    Jase Vargas is a 62 year old, presenting for the following health issues:  Mental Health Problem (Anxiety and drepression )    This is a pleasant 62-year-old female who presents to the clinic with his wife Marium to review concerns about depression and anxiety.  He has been more irritable and essentially has had a short fuse.  There are times with things can build up and he will explode.  He often will then go on a tirade.  He feels quite anxious and often feels like he is on edge.  This has been going on for a long period of time.  After experiencing these episodes he will be exhausted.  He would like to consider " options and is willing to consider a counselor and would also like to consider medication.  Has no thoughts of self-harm.    His medical history is notable for hypertension, hypercholesterolemia, gastroesophageal reflux disease, sleep apnea, and rheumatoid arthritis.     He continues to follow-up with Dr. Segundo for rheumatoid arthritis and is treated with Humira.     Given chronic kidney disease he has had follow-up with a kidney specialist.  His kidney function normalized earlier in the year but his most recent GFR was low again at 46.      He is up-to-date on his colonoscopy which he had in September 2022.  He did have a tubular adenoma and will be due in September of 2027.       Mental Health Problem    History of Present Illness       Mental Health Follow-up:  Patient presents to follow-up on Depression & Anxiety.Patient's depression since last visit has been:  No change  The patient is not having other symptoms associated with depression.  Patient's anxiety since last visit has been:  No change  The patient is not having other symptoms associated with anxiety.  Any significant life events: job concerns, financial concerns and health concerns  Patient is feeling anxious or having panic attacks.  Patient has no concerns about alcohol or drug use.    He eats 0-1 servings of fruits and vegetables daily.He consumes 0 sweetened beverage(s) daily.He exercises with enough effort to increase his heart rate 9 or less minutes per day.  He exercises with enough effort to increase his heart rate 3 or less days per week.   He is taking medications regularly.    Today's PHQ-9         PHQ-9 Total Score: 5    PHQ-9 Q9 Thoughts of better off dead/self-harm past 2 weeks :   Not at all    How difficult have these problems made it for you to do your work, take care of things at home, or get along with other people: Not difficult at all  Today's CAMRON-7 Score: 6             Review of Systems         Objective    BP (!) 149/98 (BP  Location: Left arm, Patient Position: Sitting, Cuff Size: Adult Large)   Pulse 109   Temp 98.7  F (37.1  C) (Oral)   Resp 18   Wt 110.4 kg (243 lb 6.4 oz)   SpO2 96%   BMI 38.12 kg/m    Body mass index is 38.12 kg/m .  Physical Exam   GENERAL: healthy, alert and no distress  EYES: Eyes grossly normal to inspection, PERRL and conjunctivae and sclerae normal  PSYCH: mentation appears normal, judgement and insight intact, appearance well groomed and affect is sad                    Patient answers are not available for this visit.

## 2023-04-03 ENCOUNTER — TELEPHONE (OUTPATIENT)
Dept: FAMILY MEDICINE | Facility: CLINIC | Age: 63
End: 2023-04-03
Payer: COMMERCIAL

## 2023-04-03 DIAGNOSIS — Z23 IMMUNIZATION DUE: Primary | ICD-10-CM

## 2023-04-06 ENCOUNTER — ALLIED HEALTH/NURSE VISIT (OUTPATIENT)
Dept: FAMILY MEDICINE | Facility: CLINIC | Age: 63
End: 2023-04-06
Payer: COMMERCIAL

## 2023-04-06 ENCOUNTER — LAB (OUTPATIENT)
Dept: LAB | Facility: CLINIC | Age: 63
End: 2023-04-06
Payer: COMMERCIAL

## 2023-04-06 DIAGNOSIS — M06.9 RHEUMATOID ARTHRITIS INVOLVING MULTIPLE SITES, UNSPECIFIED WHETHER RHEUMATOID FACTOR PRESENT (H): ICD-10-CM

## 2023-04-06 DIAGNOSIS — N18.31 CHRONIC KIDNEY DISEASE, STAGE 3A (H): ICD-10-CM

## 2023-04-06 DIAGNOSIS — Z23 IMMUNIZATION DUE: ICD-10-CM

## 2023-04-06 LAB
ALBUMIN SERPL BCG-MCNC: 4 G/DL (ref 3.5–5.2)
ALP SERPL-CCNC: 69 U/L (ref 40–129)
ALT SERPL W P-5'-P-CCNC: 24 U/L (ref 10–50)
ANION GAP SERPL CALCULATED.3IONS-SCNC: 12 MMOL/L (ref 7–15)
AST SERPL W P-5'-P-CCNC: 29 U/L (ref 10–50)
BASOPHILS # BLD AUTO: 0.1 10E3/UL (ref 0–0.2)
BASOPHILS NFR BLD AUTO: 1 %
BILIRUB DIRECT SERPL-MCNC: <0.2 MG/DL (ref 0–0.3)
BILIRUB SERPL-MCNC: 0.4 MG/DL
BUN SERPL-MCNC: 18.8 MG/DL (ref 8–23)
CALCIUM SERPL-MCNC: 9.7 MG/DL (ref 8.8–10.2)
CHLORIDE SERPL-SCNC: 106 MMOL/L (ref 98–107)
CREAT SERPL-MCNC: 1.37 MG/DL (ref 0.67–1.17)
CRP SERPL-MCNC: <3 MG/L
DEPRECATED HCO3 PLAS-SCNC: 22 MMOL/L (ref 22–29)
EOSINOPHIL # BLD AUTO: 0.3 10E3/UL (ref 0–0.7)
EOSINOPHIL NFR BLD AUTO: 5 %
ERYTHROCYTE [DISTWIDTH] IN BLOOD BY AUTOMATED COUNT: 13.3 % (ref 10–15)
ERYTHROCYTE [SEDIMENTATION RATE] IN BLOOD BY WESTERGREN METHOD: 17 MM/HR (ref 0–15)
GFR SERPL CREATININE-BSD FRML MDRD: 58 ML/MIN/1.73M2
GLUCOSE SERPL-MCNC: 113 MG/DL (ref 70–99)
HCT VFR BLD AUTO: 42.4 % (ref 40–53)
HGB BLD-MCNC: 14.6 G/DL (ref 13.3–17.7)
IMM GRANULOCYTES # BLD: 0 10E3/UL
IMM GRANULOCYTES NFR BLD: 0 %
LYMPHOCYTES # BLD AUTO: 2.6 10E3/UL (ref 0.8–5.3)
LYMPHOCYTES NFR BLD AUTO: 42 %
MCH RBC QN AUTO: 31.2 PG (ref 26.5–33)
MCHC RBC AUTO-ENTMCNC: 34.4 G/DL (ref 31.5–36.5)
MCV RBC AUTO: 91 FL (ref 78–100)
MONOCYTES # BLD AUTO: 0.4 10E3/UL (ref 0–1.3)
MONOCYTES NFR BLD AUTO: 7 %
NEUTROPHILS # BLD AUTO: 2.7 10E3/UL (ref 1.6–8.3)
NEUTROPHILS NFR BLD AUTO: 45 %
PLATELET # BLD AUTO: 278 10E3/UL (ref 150–450)
POTASSIUM SERPL-SCNC: 4.2 MMOL/L (ref 3.4–5.3)
PROT SERPL-MCNC: 7 G/DL (ref 6.4–8.3)
RBC # BLD AUTO: 4.68 10E6/UL (ref 4.4–5.9)
SODIUM SERPL-SCNC: 140 MMOL/L (ref 136–145)
WBC # BLD AUTO: 6.1 10E3/UL (ref 4–11)

## 2023-04-06 PROCEDURE — 90750 HZV VACC RECOMBINANT IM: CPT

## 2023-04-06 PROCEDURE — 86140 C-REACTIVE PROTEIN: CPT

## 2023-04-06 PROCEDURE — 36415 COLL VENOUS BLD VENIPUNCTURE: CPT

## 2023-04-06 PROCEDURE — 85025 COMPLETE CBC W/AUTO DIFF WBC: CPT

## 2023-04-06 PROCEDURE — 80053 COMPREHEN METABOLIC PANEL: CPT

## 2023-04-06 PROCEDURE — 82248 BILIRUBIN DIRECT: CPT

## 2023-04-06 PROCEDURE — 85652 RBC SED RATE AUTOMATED: CPT

## 2023-04-06 PROCEDURE — 90471 IMMUNIZATION ADMIN: CPT

## 2023-04-08 NOTE — PROGRESS NOTES
Nephrology Clinic Visit  Prosper Miller MRN: [id number] YOB: 1960  Primary Care Provider: Momo Hartley  History Obtained From: Patient  External Document Review: Primary Care Provider  ----------------------------------------------------------------------------------------------------------------------  Visit 4/11/2023:  -Bp Control: 156/95 in office today. Hasn't really been checking at home.   --BP regimen: Lisinopril 40mg qDay (Consider adjusting to Losartan for Uricosuric effect)  -Renal US 1/10/2023 w/ 10.6/9.7cm, no hydro, no masses, L cyst, PVR of only 11.2cc's  -Bicarb Trend: 22 from 19  -Creatinine trend: 1.37 from 1.28  -Elevated Uric Acid/Hx of Gout: Previously Uric acid 8.1, no issues with gout attacks ever. We discussed possible medicaitons and lifestyle adjustments to reduce his Uric acid and reduce risk of gout attacks.     Visit 1/10/2023:  -BP Control: Doesn't have a cuff at home. Will get one and montior his home readings. He is on Lsiniopril 20mg or 30mg (will update me when he gets home).  -RA history and control: Was on Remicaid in the past. He's been on treatment for RA for at least 20 years or so. He had a flare just over a year ago and was on Prednisone and Humira. Cyclosproine exposure is remote and no longer on.   -NSAID use: Not recently, He was taking NSAIDs routinely after his knee surgery 10/2022. Maybe some around 8/2018.   -Urinary retention symptoms: Very rarely getting up overnight to urinate. Strong urinary stream. No straining.   -Hx of Kidney stones: None that he knows of  -Family history of kidney disease: None that he knows of   -10/4/2022: Increased creatinine. He had knee surgery on the 6th but can't remember anything prior to that hurting his kidneys. He wasn't taking any pain medications at that time. He had a rash about a month prior to that on his legs. He was treated with Prednisone and over the counter itching cream.   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 He was sent to urology at this time. He had a cystoscopy. They thought no issues at this time. Thinks this ws before 2016 (had a few RBCs at that time in his urine). Remote history of smoking but quit about 25 years ago or so. No family history of bladder cancer.   -While in [place] 8/2022 he had some viral illness. Felt overall quite ill and had fever, fatigue. He reports not really taking any medications.     Objective:  PAST MEDICAL HISTORY:  No past medical history on file.  Rheumatoid Arthritis  HTN    PAST SURGICAL HISTORY:  Past Surgical History:   Procedure Laterality Date     EYE SURGERY       HC KNEE SCOPE, DIAGNOSTIC      Description: Arthroscopy Knee Right;  Recorded: 04/30/2008;     JOINT REPLACEMENT       OTHER SURGICAL HISTORY Left     left knee arthroplasty     OTHER SURGICAL HISTORY Bilateral     Cataract surgery       MEDICATIONS:  Current Outpatient Medications   Medication Instructions     adalimumab (HUMIRA *CF*) 40 mg, Subcutaneous, EVERY 14 DAYS, . Hold for signs of infection, then seek medical attention.     Cholecalciferol (VITAMIN D3) 2000 UNITS CAPS Oral     EQL NATURAL ZINC 50 MG TABS Oral     fluticasone (FLONASE) 50 MCG/ACT nasal spray 1 spray, Both Nostrils, DAILY     lisinopril (ZESTRIL) 30 MG tablet TAKE ONE AND ONE-HALF TABLETS BY MOUTH DAILY     pantoprazole (PROTONIX) 40 MG EC tablet TAKE ONE TABLET BY MOUTH TWICE A DAY       FAMILY MEDICAL HISTORY:   Family History   Problem Relation Age of Onset     Cancer Father      Bone Cancer Father        PHYSICAL EXAM:   BP (!) 156/95   Pulse 73   Wt 108.9 kg (240 lb)   BMI 37.59 kg/m    GENERAL APPEARANCE: alert and no distress  EYES: nonicteric  HENT: mouth without ulcers or lesions  RESP: lungs clear to auscultation   CV: regular rhythm, normal rate, no rub  ABDOMEN: soft, nontender, normal bowel sounds, no HSM    Extremities: Slight LE edema.  MS: no evidence of inflammation in joints, no muscle tenderness  SKIN: no rash  NEURO: mentation intact and speech normal  PSYCH: affect normal    LABS REVIEWED BY ME:   ANEMIA  Recent Labs   Lab Test 04/06/23  0832 01/06/23  0747 10/04/22  1434 08/01/22  0803   HGB 14.6 13.4 13.8 13.4       BMP  Recent Labs   Lab Test 04/06/23  0832 01/06/23  0747 10/04/22  1434 08/01/22  0803 03/31/22  0804 12/07/21  1349    142 140 139  --  140   POTASSIUM 4.2 4.1 4.3 5.0  --  4.2   CHLORIDE 106 109* 105 109  --  102   CO2 22 19* 24 25  --  23   ANIONGAP 12 14 11 5  --  15   BUN 18.8 20.4 29.8* 17  --  27*   CR 1.37* 1.28* 1.68* 1.19 1.25 1.45*   GFRESTIMATED 58* 63 46* 69 66 52*   PROTTOTAL 7.0  --   --  7.7 7.6 7.6       CBC  Recent Labs   Lab Test 04/06/23  0832 01/06/23  0747 10/04/22  1434 08/01/22  0803   HGB 14.6 13.4 13.8 13.4   WBC 6.1 5.0 10.0 5.8   HCT 42.4 39.2* 41.0 39.7*   MCV 91 92 95 91    299 299 347       DIABETES  Recent Labs   Lab Test 12/07/21  1349   A1C 5.5       HYPONATREMIANo lab results found.    Invalid input(s): UOSM, OSM    MBD  Recent Labs   Lab Test 04/06/23  0832 01/06/23  0747 10/04/22  1434 08/01/22  0803 03/31/22  0804   ERNESTINA 9.7 9.0 9.3 9.5  --    ALBUMIN 4.0 3.9  --  3.6 3.5   PHOS  --  2.6  --   --   --    PTHI  --  36  --   --   --         URINE STUDIES  Recent Labs   Lab Test 01/06/23  0805 08/01/22  0803 12/07/21  1401   COLOR Yellow Yellow Yellow   APPEARANCE Clear Clear Clear   URINEGLC Negative Negative Negative   URINEBILI Negative Negative Negative   URINEKETONE Negative Negative Negative   SG 1.020 1.010 1.015   UBLD Trace* Small* Moderate*   URINEPH 5.5 5.5 5.5   PROTEIN Trace* Negative 30*   UROBILINOGEN 0.2 0.2 0.2   NITRITE Negative Negative Negative   LEUKEST Negative Negative Negative   RBCU 0-2 0-2 2-5*   WBCU None Seen 0-5 None Seen     No lab results found.    ADDITIONAL LABS ORDERED/REVIEWED BY ME:  See  "below    Assessment/Plan  CKD Stage G3aA1  Established care with [facility] 1/10/2023    Onset of CKD appears to be around 3/12/2021 when creatinine was 1.52 from previous basline of 1.0-1.1 (I can see creatinine values dating back to 3/26/2018 when his creatinine was 1.01). I reviewed notes around 8/2021 and don't see obvious cause for his increase in creatinine. Peak was 2.0 on 8/30/2021 and subsequently downtrended back to the 1.1-1.3 range throughout much of 2022. 10/4/2022 creatinine back up to 1.68 but this improved to 1.28 on 1/6/2023 prior to establishing care with me. 1/6/2023 UA w/o hematuria or pyuria and UPCR at this time with 0.16g/g.     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 Very unlikely diagnosis in this patient's case     Plan:  -Send me home readings in 1 week. Likely to add on nifedipine given HTN in office, but want to confirm elevated Bps at home. Continue Lisinopril 40mg qDay  -If eGFR < 45 consider addition of SGLT2i (or if albuminuria >200mg/g) per EMPA-KIDNEY enrollment criteria.  -Avoid NSAIDs/Nephrotoxic agents  --We discussed pill burden and the need to strongly consider adding on these additional agents to help reduce risk of poor nephro/cardiac outcome. He is somewhat frustrated with needing to add on more medications but ultimately was agreeable if needed. We outlined plan to get BP under optimal control as priority 1, next get a statin on board, next get a SGLT2i on board (if eGFR drifts below 45 or he develops proteinuria)  -Added on a Magnesium after discussing that he gets frequent \"Lui Horses\". PPI is risk factor for hypoMag    Lipid Management  -With CKD G3a/G3b we should consider statin for primary cardiac provention    Plan:  -Discussed statin. Once BP optimized add on Atorvastatin 20mg qDay    Anemia of Renal " Disease  Hemoglobin: 13.8  Ferritin: N/A  TSAT:N/A    Plan:  -No need for EPO at this time      Metabolic Acidosis of Renal Disease  Bicarb: 22 (4/6/2023)    Plan:  -No need for NaHCO at this time      Mineral Bone Disease  PTH: 35 1/6/2023  Phos: 2.6 1/6/2023  Calcium: 9 1/6/2023  Vitamin D: 58 1/6/2023    Plan:  -No need for phos binder    Return to clinic: 4 months    Jeff Villa MD   of Medicine  Division of Nephrology and Hypertension  [facility]    45 minutes spent on the date of the encounter doing chart review, history and exam, documentation and further activities as noted above

## 2023-04-11 ENCOUNTER — DOCUMENTATION ONLY (OUTPATIENT)
Dept: NEPHROLOGY | Facility: CLINIC | Age: 63
End: 2023-04-11

## 2023-04-11 ENCOUNTER — OFFICE VISIT (OUTPATIENT)
Dept: NEPHROLOGY | Facility: CLINIC | Age: 63
End: 2023-04-11
Payer: COMMERCIAL

## 2023-04-11 VITALS
DIASTOLIC BLOOD PRESSURE: 95 MMHG | WEIGHT: 240 LBS | HEART RATE: 73 BPM | BODY MASS INDEX: 37.59 KG/M2 | SYSTOLIC BLOOD PRESSURE: 156 MMHG

## 2023-04-11 DIAGNOSIS — R25.2 CRAMPING OF FEET: Primary | ICD-10-CM

## 2023-04-11 DIAGNOSIS — N18.31 CHRONIC KIDNEY DISEASE, STAGE 3A (H): Primary | ICD-10-CM

## 2023-04-11 DIAGNOSIS — N18.31 CHRONIC KIDNEY DISEASE, STAGE 3A (H): ICD-10-CM

## 2023-04-11 PROCEDURE — 99215 OFFICE O/P EST HI 40 MIN: CPT | Performed by: STUDENT IN AN ORGANIZED HEALTH CARE EDUCATION/TRAINING PROGRAM

## 2023-04-11 NOTE — PATIENT INSTRUCTIONS
"It was a pleasure to see you in nephrology clinic today. I've included a brief summary of our discussion and care plan from today's visit below.  _______________________________________________________________________    My recommendations are summarized as follows:  -Keep a Blood Pressure log. Please make sure that you are using a validated blood pressure device (check \"www.validatebp.org\").  -Avoid all NSAID's. Examples include Ibuprofen (Advil, Motrin), naprosyn (Aleve), celebrex among others. Acetaminophen (Tylenol) is ok with maximum dose in 24 hours of 3000mg.  -Healthy lifestyle measures will keep your kidney's functioning at their current best. This includes regular exercise, maintaining a healthy body weight and smoking cessation.   -Aim for about 60 ounces of H2O per day. Consider a teaspoon of pickle juice if mitzi horse is bad (swish and spit this out after 20 seconds).   -Please take your blood pressure 1x per day for the next week and send me those readings. We will then discuss adding on a medication called Nifedipine if we need to  -We will discuss adding on a statin medication in the future  -We will discuss adding on Jardiance in the future to protect your kidneys and heart.     Please return to Nephrology Clinic in 4 months to review your progress.     Who do I call with any questions after my visit?  There are multiple ways to contact your nephrology care team:    -To schedule or reschedule an appointment, please call [phone].  -Reach out via Hookflash. These messages are answered by your nurse or doctor during business hours and typically in 1-2 days. Hookflash messages are best for quick questions/clarifications/updates. Frequently, your doctor or nurse will recommend setting up a follow up appointment to address any significant questions/concerns.  -For urgent questions after business hours, you may reach the on-call Nephrology Fellow by contacting the [facility]  at " [phone].    To schedule imaging:   -Please call [phone]     To schedule your lab appointment at [facility]  -Please call [phone]    Sincerely,    Dr. Jeff Villa   of Medicine  Division of Nephrology and Hypertension  [facility]

## 2023-04-11 NOTE — LETTER
4/11/2023       RE: Prosper Miller  [address] [facility] [id number]     Dear Colleague,    Thank you for referring your patient, Prosper Miller, to the [facility]. Please see a copy of my visit note below.        Nephrology Clinic Visit  Prosper Miller MRN: [id number] YOB: 1960  Primary Care Provider: Momo Hartley  History Obtained From: Patient  External Document Review: Primary Care Provider  ----------------------------------------------------------------------------------------------------------------------  Visit 4/11/2023:  -Bp Control: 156/95 in office today. Hasn't really been checking at home.   --BP regimen: Lisinopril 40mg qDay (Consider adjusting to Losartan for Uricosuric effect)  -Renal US 1/10/2023 w/ 10.6/9.7cm, no hydro, no masses, L cyst, PVR of only 11.2cc's  -Bicarb Trend: 22 from 19  -Creatinine trend: 1.37 from 1.28  -Elevated Uric Acid/Hx of Gout: Previously Uric acid 8.1, no issues with gout attacks ever. We discussed possible medicaitons and lifestyle adjustments to reduce his Uric acid and reduce risk of gout attacks.     Visit 1/10/2023:  -BP Control: Doesn't have a cuff at home. Will get one and montior his home readings. He is on Lsiniopril 20mg or 30mg (will update me when he gets home).  -RA history and control: Was on Remicaid in the past. He's been on treatment for RA for at least 20 years or so. He had a flare just over a year ago and was on Prednisone and Humira. Cyclosproine exposure is remote and no longer on.   -NSAID use: Not recently, He was taking NSAIDs routinely after his knee surgery 10/2022. Maybe some around 8/2018.   -Urinary retention symptoms: Very rarely getting up overnight to urinate. Strong urinary stream. No straining.   -Hx of Kidney stones: None that he knows of  -Family history of kidney disease: None that he knows of   -10/4/2022: Increased  creatinine. He had knee surgery on the 6th but can't remember anything prior to that hurting his kidneys. He wasn't taking any pain medications at that time. He had a rash about a month prior to that on his legs. He was treated with Prednisone and over the counter itching cream.   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 He was sent to urology at this time. He had a cystoscopy. They thought no issues at this time. Thinks this ws before 2016 (had a few RBCs at that time in his urine). Remote history of smoking but quit about 25 years ago or so. No family history of bladder cancer.   -While in [place] 8/2022 he had some viral illness. Felt overall quite ill and had fever, fatigue. He reports not really taking any medications.     Objective:  PAST MEDICAL HISTORY:  No past medical history on file.  Rheumatoid Arthritis  HTN    PAST SURGICAL HISTORY:  Past Surgical History:   Procedure Laterality Date    EYE SURGERY      HC KNEE SCOPE, DIAGNOSTIC      Description: Arthroscopy Knee Right;  Recorded: 04/30/2008;    JOINT REPLACEMENT      OTHER SURGICAL HISTORY Left     left knee arthroplasty    OTHER SURGICAL HISTORY Bilateral     Cataract surgery       MEDICATIONS:  Current Outpatient Medications   Medication Instructions    adalimumab (HUMIRA *CF*) 40 mg, Subcutaneous, EVERY 14 DAYS, . Hold for signs of infection, then seek medical attention.    Cholecalciferol (VITAMIN D3) 2000 UNITS CAPS Oral    EQL NATURAL ZINC 50 MG TABS Oral    fluticasone (FLONASE) 50 MCG/ACT nasal spray 1 spray, Both Nostrils, DAILY    lisinopril (ZESTRIL) 30 MG tablet TAKE ONE AND ONE-HALF TABLETS BY MOUTH DAILY    pantoprazole (PROTONIX) 40 MG EC tablet TAKE ONE TABLET BY MOUTH TWICE A DAY       FAMILY MEDICAL HISTORY:   Family History   Problem Relation Age of Onset    Cancer Father     Bone Cancer Father        PHYSICAL EXAM:   BP (!) 156/95    Pulse 73   Wt 108.9 kg (240 lb)   BMI 37.59 kg/m    GENERAL APPEARANCE: alert and no distress  EYES: nonicteric  HENT: mouth without ulcers or lesions  RESP: lungs clear to auscultation   CV: regular rhythm, normal rate, no rub  ABDOMEN: soft, nontender, normal bowel sounds, no HSM   Extremities: Slight LE edema.  MS: no evidence of inflammation in joints, no muscle tenderness  SKIN: no rash  NEURO: mentation intact and speech normal  PSYCH: affect normal    LABS REVIEWED BY ME:   ANEMIA  Recent Labs   Lab Test 04/06/23  0832 01/06/23  0747 10/04/22  1434 08/01/22  0803   HGB 14.6 13.4 13.8 13.4       BMP  Recent Labs   Lab Test 04/06/23  0832 01/06/23  0747 10/04/22  1434 08/01/22  0803 03/31/22  0804 12/07/21  1349    142 140 139  --  140   POTASSIUM 4.2 4.1 4.3 5.0  --  4.2   CHLORIDE 106 109* 105 109  --  102   CO2 22 19* 24 25  --  23   ANIONGAP 12 14 11 5  --  15   BUN 18.8 20.4 29.8* 17  --  27*   CR 1.37* 1.28* 1.68* 1.19 1.25 1.45*   GFRESTIMATED 58* 63 46* 69 66 52*   PROTTOTAL 7.0  --   --  7.7 7.6 7.6       CBC  Recent Labs   Lab Test 04/06/23  0832 01/06/23  0747 10/04/22  1434 08/01/22  0803   HGB 14.6 13.4 13.8 13.4   WBC 6.1 5.0 10.0 5.8   HCT 42.4 39.2* 41.0 39.7*   MCV 91 92 95 91    299 299 347       DIABETES  Recent Labs   Lab Test 12/07/21  1349   A1C 5.5       HYPONATREMIANo lab results found.    Invalid input(s): UOSM, OSM    MBD  Recent Labs   Lab Test 04/06/23  0832 01/06/23  0747 10/04/22  1434 08/01/22  0803 03/31/22  0804   ERNESTINA 9.7 9.0 9.3 9.5  --    ALBUMIN 4.0 3.9  --  3.6 3.5   PHOS  --  2.6  --   --   --    PTHI  --  36  --   --   --         URINE STUDIES  Recent Labs   Lab Test 01/06/23  0805 08/01/22  0803 12/07/21  1401   COLOR Yellow Yellow Yellow   APPEARANCE Clear Clear Clear   URINEGLC Negative Negative Negative   URINEBILI Negative Negative Negative   URINEKETONE Negative Negative Negative   SG 1.020 1.010 1.015   UBLD Trace* Small* Moderate*   URINEPH 5.5 5.5  5.5   PROTEIN Trace* Negative 30*   UROBILINOGEN 0.2 0.2 0.2   NITRITE Negative Negative Negative   LEUKEST Negative Negative Negative   RBCU 0-2 0-2 2-5*   WBCU None Seen 0-5 None Seen     No lab results found.    ADDITIONAL LABS ORDERED/REVIEWED BY ME:  See below    Assessment/Plan  CKD Stage G3aA1  Established care with [facility] 1/10/2023    Onset of CKD appears to be around 3/12/2021 when creatinine was 1.52 from previous basline of 1.0-1.1 (I can see creatinine values dating back to 3/26/2018 when his creatinine was 1.01). I reviewed notes around 8/2021 and don't see obvious cause for his increase in creatinine. Peak was 2.0 on 8/30/2021 and subsequently downtrended back to the 1.1-1.3 range throughout much of 2022. 10/4/2022 creatinine back up to 1.68 but this improved to 1.28 on 1/6/2023 prior to establishing care with me. 1/6/2023 UA w/o hematuria or pyuria and UPCR at this time with 0.16g/g.     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 Very unlikely diagnosis in this patient's case     Plan:  -Send me home readings in 1 week. Likely to add on nifedipine given HTN in office, but want to confirm elevated Bps at home. Continue Lisinopril 40mg qDay  -If eGFR < 45 consider addition of SGLT2i (or if albuminuria >200mg/g) per EMPA-KIDNEY enrollment criteria.  -Avoid NSAIDs/Nephrotoxic agents  --We discussed pill burden and the need to strongly consider adding on these additional agents to help reduce risk of poor nephro/cardiac outcome. He is somewhat frustrated with needing to add on more medications but ultimately was agreeable if needed. We outlined plan to get BP under optimal control as priority 1, next get a statin on board, next get a SGLT2i on board (if eGFR drifts below 45 or he develops proteinuria)  -Added on a Magnesium after discussing that he gets  "frequent \"[employer]\". PPI is risk factor for hypoMag    Lipid Management  -With CKD G3a/G3b we should consider statin for primary cardiac provention    Plan:  -Discussed statin. Once BP optimized add on Atorvastatin 20mg qDay    Anemia of Renal Disease  Hemoglobin: 13.8  Ferritin: N/A  TSAT:N/A    Plan:  -No need for EPO at this time      Metabolic Acidosis of Renal Disease  Bicarb: 22 (4/6/2023)    Plan:  -No need for NaHCO at this time      Mineral Bone Disease  PTH: 35 1/6/2023  Phos: 2.6 1/6/2023  Calcium: 9 1/6/2023  Vitamin D: 58 1/6/2023    Plan:  -No need for phos binder    Return to clinic: 4 months    Jeff Villa MD   of Medicine  Division of Nephrology and Hypertension  [facility]    45 minutes spent on the date of the encounter doing chart review, history and exam, documentation and further activities as noted above      Again, thank you for allowing me to participate in the care of your patient.      Sincerely,    Jeff Villa MD      "

## 2023-04-18 ENCOUNTER — TELEPHONE (OUTPATIENT)
Dept: NEPHROLOGY | Facility: CLINIC | Age: 63
End: 2023-04-18
Payer: COMMERCIAL

## 2023-04-18 NOTE — TELEPHONE ENCOUNTER
[facility] Call Center    Phone Message    May a detailed message be left on voicemail: yes     Reason for Call: Patient is calling to pass on BP readings:    133/80  148/87  149/87  130/84  136/85  138/91  141/88    Please reach out to patient to discuss. Thank you    Action Taken: Message routed to:  [facility] ([facility] NEPH    Travel Screening: Not Applicable

## 2023-04-19 NOTE — TELEPHONE ENCOUNTER
Spoke with patient regarding a change in medication. Dr Villa request to prescribe Nifedipine 30 mg daily for patient's blood presure if patient agrees.    Patient requests to think about the change and will call back in 24 hours with an answer. Patient informed to call [phone].  ELEANOR Rowe

## 2023-04-20 NOTE — TELEPHONE ENCOUNTER
Spoke with patient regarding the start of Nifedipine 30 mg day in addition to his Lisinopirl per Dr Villa. Patient stated he is in concurrence to start the medication.  Patient stated he will start taking his BP's daily again a few days after starting the Nifedipine. Patient informed he will be reached with an update after starting the medication for an update on BP's and for patient to report any concern. Patient indicated an understanding.   Informing Dr Villa.  ELEANOR Rowe

## 2023-05-01 ENCOUNTER — TELEPHONE (OUTPATIENT)
Dept: NEPHROLOGY | Facility: CLINIC | Age: 63
End: 2023-05-01
Payer: COMMERCIAL

## 2023-05-01 DIAGNOSIS — N18.31 CHRONIC KIDNEY DISEASE, STAGE 3A (H): ICD-10-CM

## 2023-05-01 DIAGNOSIS — I10 BENIGN ESSENTIAL HYPERTENSION: Primary | ICD-10-CM

## 2023-05-01 RX ORDER — LISINOPRIL 40 MG/1
40 TABLET ORAL DAILY
Qty: 90 TABLET | Refills: 3 | Status: SHIPPED | OUTPATIENT
Start: 2023-05-01 | End: 2024-05-07

## 2023-05-01 RX ORDER — NIFEDIPINE 30 MG
30 TABLET, EXTENDED RELEASE ORAL DAILY
Qty: 30 TABLET | Refills: 0 | Status: SHIPPED | OUTPATIENT
Start: 2023-05-01 | End: 2023-06-01

## 2023-05-01 NOTE — TELEPHONE ENCOUNTER
Patient called regarding not receiving Nifedipine 30 mg tablet PO daily. Per Dr Villa Nifedipine to be added to lisinopril. Lisinopril  40 mg daily refilled per patient request. Order placed for Nifedipine per Dr Villa.  ELEANOR Rowe

## 2023-05-15 ENCOUNTER — TELEPHONE (OUTPATIENT)
Dept: NEPHROLOGY | Facility: CLINIC | Age: 63
End: 2023-05-15
Payer: COMMERCIAL

## 2023-05-15 NOTE — TELEPHONE ENCOUNTER
Received patient call regarding an update in BP's. Patient reports the following post starting Nifedipine 30 mg daily started on taking on 5/6/23.  Taken in the morning prior to medication:  5/7 140/86  5/8 147/83  5/9 135/97  5/10 135/90  Taken in the evening post taking Nifedipine 30 mg which patient states he takes in the afternoon  5/11 111/82  5/12 112/81  5/13 130/81  Requested patient take BP's post medication for consistency. Patient states he will call in a week for more BP updates.  Routing to Dr Villa to review.  ELEANOR Rowe

## 2023-05-22 ENCOUNTER — TELEPHONE (OUTPATIENT)
Dept: NEPHROLOGY | Facility: CLINIC | Age: 63
End: 2023-05-22
Payer: COMMERCIAL

## 2023-05-22 NOTE — TELEPHONE ENCOUNTER
Spoke with patient regarding updated BP's.  Per pt Bp's are taking after taking morning medications.  5/15 106/84  5/16 98/71  5/17 128/77  5/18 122/64  5/19 120/78  5/20 117/72  5/21 129/73  Patient is requesting to go back to 30 mg Lisinopril daily. Currently at 40 mg daily.  Routing to Dr Villa.  ELEANOR Rowe

## 2023-05-24 ENCOUNTER — TELEPHONE (OUTPATIENT)
Dept: PHARMACY | Facility: OTHER | Age: 63
End: 2023-05-24
Payer: COMMERCIAL

## 2023-05-24 NOTE — TELEPHONE ENCOUNTER
5-24-23      [facility] called patient - he is not interested and opted out of [facility]tm in 2023.    Yefri Dow Rph.  Medication Therapy Management Provider  [phone]

## 2023-05-31 DIAGNOSIS — I10 BENIGN ESSENTIAL HYPERTENSION: ICD-10-CM

## 2023-06-01 RX ORDER — NIFEDIPINE 30 MG
30 TABLET, EXTENDED RELEASE ORAL DAILY
Qty: 90 TABLET | Refills: 3 | Status: SHIPPED | OUTPATIENT
Start: 2023-06-01 | End: 2024-07-30

## 2023-06-01 RX ORDER — NIFEDIPINE 30 MG
30 TABLET, EXTENDED RELEASE ORAL DAILY
Qty: 30 TABLET | Refills: 0 | Status: SHIPPED | OUTPATIENT
Start: 2023-06-01 | End: 2023-06-01

## 2023-06-01 NOTE — TELEPHONE ENCOUNTER
Reason for call:  Other   Patient called regarding (reason for call): infusion     Additional comments: Patient calling wondering if he should go to his infusion appointment next week or should put it off. Please call to advise.     Phone number to reach patient:  Home number on file [phone] (home)    Best Time:  Any     Can we leave a detailed message on this number?  YES    Travel screening: Not Applicable     Cibinqo Counseling: I discussed with the patient the risks of Cibinqo therapy including but not limited to common cold, nausea, headache, cold sores, increased blood CPK levels, dizziness, UTIs, fatigue, acne, and vomitting. Live vaccines should be avoided.  This medication has been linked to serious infections; higher rate of mortality; malignancy and lymphoproliferative disorders; major adverse cardiovascular events; thrombosis; thrombocytopenia and lymphopenia; lipid elevations; and retinal detachment.

## 2023-07-13 DIAGNOSIS — K21.9 GASTROESOPHAGEAL REFLUX DISEASE WITHOUT ESOPHAGITIS: ICD-10-CM

## 2023-07-13 RX ORDER — PANTOPRAZOLE SODIUM 40 MG/1
TABLET, DELAYED RELEASE ORAL
Qty: 180 TABLET | Refills: 1 | Status: SHIPPED | OUTPATIENT
Start: 2023-07-13 | End: 2024-05-08

## 2023-07-13 NOTE — TELEPHONE ENCOUNTER
"Routing refill request to provider for review/approval because:  A break in medication    Last Written Prescription Date:  07/11/2022  Last Fill Quantity: 180,  # refills: 1   Last office visit provider:  03/21/2023     Requested Prescriptions   Pending Prescriptions Disp Refills     pantoprazole (PROTONIX) 40 MG EC tablet [Pharmacy Med Name: PANTOPRAZOLE SODIUM 40MG TBEC] 180 tablet 1     Sig: TAKE ONE TABLET BY MOUTH TWICE A DAY       PPI Protocol Passed - 7/13/2023  2:53 PM        Passed - Not on Clopidogrel (unless Pantoprazole ordered)        Passed - No diagnosis of osteoporosis on record        Passed - Recent (12 mo) or future (30 days) visit within the authorizing provider's specialty     Patient has had an office visit with the authorizing provider or a provider within the authorizing providers department within the previous 12 mos or has a future within next 30 days. See \"Patient Info\" tab in inbasket, or \"Choose Columns\" in Meds & Orders section of the refill encounter.              Passed - Medication is active on med list        Passed - Patient is age 18 or older             Chace Toth RN 07/13/23 2:53 PM  "

## 2023-07-24 DIAGNOSIS — M06.9 RHEUMATOID ARTHRITIS INVOLVING MULTIPLE SITES, UNSPECIFIED WHETHER RHEUMATOID FACTOR PRESENT (H): ICD-10-CM

## 2023-07-24 DIAGNOSIS — H20.9 UVEITIS: ICD-10-CM

## 2023-07-24 NOTE — TELEPHONE ENCOUNTER
Medication:   Humira  Last written on:   01/09/2023  Quantity:   0.8ml    Refills:   6    Last office visit:   01/09/2023  Next office visit:   08/08/2023  Last labs:   04/06/2023

## 2023-08-03 ENCOUNTER — LAB (OUTPATIENT)
Dept: LAB | Facility: CLINIC | Age: 63
End: 2023-08-03
Payer: COMMERCIAL

## 2023-08-03 DIAGNOSIS — R25.2 CRAMPING OF FEET: ICD-10-CM

## 2023-08-03 DIAGNOSIS — N18.31 CHRONIC KIDNEY DISEASE, STAGE 3A (H): ICD-10-CM

## 2023-08-03 LAB
ALBUMIN MFR UR ELPH: 15.2 MG/DL
ALBUMIN SERPL BCG-MCNC: 4 G/DL (ref 3.5–5.2)
ANION GAP SERPL CALCULATED.3IONS-SCNC: 14 MMOL/L (ref 7–15)
BUN SERPL-MCNC: 26.9 MG/DL (ref 8–23)
CALCIUM SERPL-MCNC: 9.3 MG/DL (ref 8.8–10.2)
CHLORIDE SERPL-SCNC: 106 MMOL/L (ref 98–107)
CREAT SERPL-MCNC: 1.36 MG/DL (ref 0.67–1.17)
CREAT UR-MCNC: 121.9 MG/DL
DEPRECATED HCO3 PLAS-SCNC: 20 MMOL/L (ref 22–29)
GFR SERPL CREATININE-BSD FRML MDRD: 58 ML/MIN/1.73M2
GLUCOSE SERPL-MCNC: 101 MG/DL (ref 70–99)
HGB BLD-MCNC: 13.5 G/DL (ref 13.3–17.7)
MAGNESIUM SERPL-MCNC: 1 MG/DL (ref 1.7–2.3)
PHOSPHATE SERPL-MCNC: 3 MG/DL (ref 2.5–4.5)
POTASSIUM SERPL-SCNC: 4.3 MMOL/L (ref 3.4–5.3)
PROT/CREAT 24H UR: 0.12 MG/MG CR (ref 0–0.2)
PTH-INTACT SERPL-MCNC: 50 PG/ML (ref 15–65)
SODIUM SERPL-SCNC: 140 MMOL/L (ref 136–145)

## 2023-08-03 PROCEDURE — 83970 ASSAY OF PARATHORMONE: CPT

## 2023-08-03 PROCEDURE — 85018 HEMOGLOBIN: CPT

## 2023-08-03 PROCEDURE — 84156 ASSAY OF PROTEIN URINE: CPT

## 2023-08-03 PROCEDURE — 36415 COLL VENOUS BLD VENIPUNCTURE: CPT

## 2023-08-03 PROCEDURE — 83735 ASSAY OF MAGNESIUM: CPT

## 2023-08-03 PROCEDURE — 80069 RENAL FUNCTION PANEL: CPT

## 2023-08-07 NOTE — PROGRESS NOTES
Nephrology Clinic Visit  Prosper Miller MRN: [id number] YOB: 1960  Primary Care Provider: Momo Hartley  History Obtained From: Patient  External Document Review: Primary Care Provider  ----------------------------------------------------------------------------------------------------------------------  Visit 8/8/2023:  -BP Control: 100/68 today in the office. Hasn't really been checking at home. Monitor for Orthostatic hypotension symptoms.   --BP regimen: Lisinopril 40mg qDay (Consider adjusting to Losartan for Uricosuric effect), Nifedipine 30mg qDay  ---Taking his Nifedipine at night. Lisinopril in the morning.   -Creatinine trend: 1.36 (8/3/2023) from 1.37 (4/6/2023)  -Magnesium: 1.0 on 8/3/2023  -Having some cramping in his toes still.   -Has chronic loose stools. Has tried Metamucil in the past without improvement.     Visit 4/11/2023:  -Bp Control: 156/95 in office today. Hasn't really been checking at home.   --BP regimen: Lisinopril 40mg qDay (Consider adjusting to Losartan for Uricosuric effect)  -Renal US 1/10/2023 w/ 10.6/9.7cm, no hydro, no masses, L cyst, PVR of only 11.2cc's  -Bicarb Trend: 22 from 19  -Creatinine trend: 1.37 from 1.28  -Elevated Uric Acid/Hx of Gout: Previously Uric acid 8.1, no issues with gout attacks ever. We discussed possible medicaitons and lifestyle adjustments to reduce his Uric acid and reduce risk of gout attacks.     Visit 1/10/2023:  -BP Control: Doesn't have a cuff at home. Will get one and montior his home readings. He is on Lsiniopril 20mg or 30mg (will update me when he gets home).  -RA history and control: Was on Remicaid in the past. He's been on treatment for RA for at least 20 years or so. He had a flare just over a year ago and was on Prednisone and Humira. Cyclosproine exposure is remote and no longer on.   -NSAID use: Not recently, He was taking NSAIDs routinely after his knee surgery 10/2022. Maybe some around 8/2018.   -Urinary  retention symptoms: Very rarely getting up overnight to urinate. Strong urinary stream. No straining.   -Hx of Kidney stones: None that he knows of  -Family history of kidney disease: None that he knows of   -10/4/2022: Increased creatinine. He had knee surgery on the 6th but can't remember anything prior to that hurting his kidneys. He wasn't taking any pain medications at that time. He had a rash about a month prior to that on his legs. He was treated with Prednisone and over the counter itching cream.   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 He was sent to urology at this time. He had a cystoscopy. They thought no issues at this time. Thinks this ws before 2016 (had a few RBCs at that time in his urine). Remote history of smoking but quit about 25 years ago or so. No family history of bladder cancer.   -While in [place] 8/2022 he had some viral illness. Felt overall quite ill and had fever, fatigue. He reports not really taking any medications.     Objective:  PAST MEDICAL HISTORY:  No past medical history on file.  Rheumatoid Arthritis  HTN    PAST SURGICAL HISTORY:  Past Surgical History:   Procedure Laterality Date    EYE SURGERY      HC KNEE SCOPE, DIAGNOSTIC      Description: Arthroscopy Knee Right;  Recorded: 04/30/2008;    JOINT REPLACEMENT      OTHER SURGICAL HISTORY Left     left knee arthroplasty    OTHER SURGICAL HISTORY Bilateral     Cataract surgery       MEDICATIONS:  Current Outpatient Medications   Medication Instructions    adalimumab (HUMIRA *CF*) 40 mg, Subcutaneous, EVERY 14 DAYS, . Hold for signs of infection, then seek medical attention.    Cholecalciferol (VITAMIN D3) 2000 UNITS CAPS Oral    EQL NATURAL ZINC 50 MG TABS Oral    fluticasone (FLONASE) 50 MCG/ACT nasal spray 1 spray, Both Nostrils, DAILY    lisinopril (ZESTRIL) 30 MG tablet TAKE ONE AND ONE-HALF TABLETS BY MOUTH DAILY     pantoprazole (PROTONIX) 40 MG EC tablet TAKE ONE TABLET BY MOUTH TWICE A DAY       FAMILY MEDICAL HISTORY:   Family History   Problem Relation Age of Onset    Cancer Father     Bone Cancer Father        PHYSICAL EXAM:   /68   Pulse 90   Wt 109.3 kg (241 lb)   BMI 37.75 kg/m    GENERAL APPEARANCE: alert and no distress  EYES: nonicteric  ABDOMEN: soft, nontender, normal bowel sounds, no HSM   Extremities: No LE edema  MS: no evidence of inflammation in joints, no muscle tenderness  SKIN: no rash  NEURO: mentation intact and speech normal  PSYCH: affect normal    LABS REVIEWED BY ME:   ANEMIA  Recent Labs   Lab Test 08/03/23  0759 04/06/23  0832 01/06/23  0747 10/04/22  1434   HGB 13.5 14.6 13.4 13.8       BMP  Recent Labs   Lab Test 08/03/23  0759 04/06/23  0832 01/06/23  0747 10/04/22  1434 08/01/22  0803 03/31/22  0804 12/07/21  1349    140 142 140 139  --  140   POTASSIUM 4.3 4.2 4.1 4.3 5.0  --  4.2   CHLORIDE 106 106 109* 105 109  --  102   CO2 20* 22 19* 24 25  --  23   ANIONGAP 14 12 14 11 5  --  15   BUN 26.9* 18.8 20.4 29.8* 17  --  27*   CR 1.36* 1.37* 1.28* 1.68* 1.19 1.25 1.45*   GFRESTIMATED 58* 58* 63 46* 69 66 52*   MAG 1.0*  --   --   --   --   --   --    PROTTOTAL  --  7.0  --   --  7.7 7.6 7.6       CBC  Recent Labs   Lab Test 08/03/23  0759 04/06/23  0832 01/06/23  0747 10/04/22  1434 08/01/22  0803   HGB 13.5 14.6 13.4 13.8 13.4   WBC  --  6.1 5.0 10.0 5.8   HCT  --  42.4 39.2* 41.0 39.7*   MCV  --  91 92 95 91   PLT  --  278 299 299 347       DIABETES  Recent Labs   Lab Test 12/07/21  1349   A1C 5.5       HYPONATREMIANo lab results found.    Invalid input(s): UOSM, OSM    MBD  Recent Labs   Lab Test 08/03/23  0759 04/06/23  0832 01/06/23  0747 10/04/22  1434 08/01/22  0803   ERNESTINA 9.3 9.7 9.0 9.3 9.5   ALBUMIN 4.0 4.0 3.9  --  3.6   PHOS 3.0  --  2.6  --   --    PTHI 50  --  36  --   --         URINE STUDIES  Recent Labs   Lab Test 01/06/23  0805 08/01/22  0803 12/07/21  1401   COLOR  Yellow Yellow Yellow   APPEARANCE Clear Clear Clear   URINEGLC Negative Negative Negative   URINEBILI Negative Negative Negative   URINEKETONE Negative Negative Negative   SG 1.020 1.010 1.015   UBLD Trace* Small* Moderate*   URINEPH 5.5 5.5 5.5   PROTEIN Trace* Negative 30*   UROBILINOGEN 0.2 0.2 0.2   NITRITE Negative Negative Negative   LEUKEST Negative Negative Negative   RBCU 0-2 0-2 2-5*   WBCU None Seen 0-5 None Seen     No lab results found.    ADDITIONAL LABS ORDERED/REVIEWED BY ME:  See below    Assessment/Plan  CKD Stage G3aA1  Established care with [facility] 1/10/2023    Onset of CKD appears to be around 3/12/2021 when creatinine was 1.52 from previous basline of 1.0-1.1 (I can see creatinine values dating back to 3/26/2018 when his creatinine was 1.01). I reviewed notes around 8/2021 and don't see obvious cause for his increase in creatinine. Peak was 2.0 on 8/30/2021 and subsequently downtrended back to the 1.1-1.3 range throughout much of 2022. 10/4/2022 creatinine back up to 1.68 but this improved to 1.28 on 1/6/2023 prior to establishing care with me. 1/6/2023 UA w/o hematuria or pyuria and UPCR at this time with 0.16g/g.     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 Very unlikely diagnosis in this patient's case     Plan:  -Continue Lisinopril 40mg qDay. Suspect Lexapro is helping somewhat with BP. May be able to get him off Nifedipine in the near future (BP was 100/68 today in the office). Continue Lisinopril and Nifedipine for now. Advised to watch for orthostatic symptoms.   -If eGFR < 45 consider addition of SGLT2i (or if albuminuria >200mg/g) per EMPA-KIDNEY enrollment criteria.  -Avoid NSAIDs/Nephrotoxic agents  -Recommended Slow-Mag 1 pill per day for Mag of 1.0. Watch for worsening of GI symptoms with addition of magnesium.     Lipid  Management  -With CKD G3a/G3b we should consider statin for primary cardiac prevention    Plan:  -Discussed statin. He would like to hold off on this for now.     Anemia of Renal Disease  Hemoglobin: 13.5 (8/3/2023)  Ferritin: N/A  TSAT:N/A    Plan:  -No need for EPO at this time      Metabolic Acidosis of Renal Disease  Bicarb: 20 (8/3/2023)    Plan:  -No need for NaHCO at this time but watch trend. KDIGO guidelines will be updated soon and likely advise against NaHCO supplementation. He also has pill burden concerns.      Mineral Bone Disease  PTH: 35 1/6/2023  Phos: 2.6 1/6/2023  Calcium: 9 1/6/2023  Vitamin D: 58 1/6/2023    Plan:  -No need for phos binder    Return to clinic: 6 months    Jeff Villa MD   of Medicine  Division of Nephrology and Hypertension  [facility]    25 minutes spent on the date of the encounter doing chart review, history and exam, documentation and further activities as noted above

## 2023-08-08 ENCOUNTER — OFFICE VISIT (OUTPATIENT)
Dept: NEPHROLOGY | Facility: CLINIC | Age: 63
End: 2023-08-08
Payer: COMMERCIAL

## 2023-08-08 ENCOUNTER — OFFICE VISIT (OUTPATIENT)
Dept: RHEUMATOLOGY | Facility: CLINIC | Age: 63
End: 2023-08-08
Payer: COMMERCIAL

## 2023-08-08 VITALS
DIASTOLIC BLOOD PRESSURE: 68 MMHG | SYSTOLIC BLOOD PRESSURE: 100 MMHG | HEART RATE: 90 BPM | BODY MASS INDEX: 37.75 KG/M2 | WEIGHT: 241 LBS

## 2023-08-08 VITALS
WEIGHT: 241.4 LBS | BODY MASS INDEX: 37.81 KG/M2 | DIASTOLIC BLOOD PRESSURE: 68 MMHG | SYSTOLIC BLOOD PRESSURE: 100 MMHG | OXYGEN SATURATION: 94 % | HEART RATE: 90 BPM | RESPIRATION RATE: 18 BRPM

## 2023-08-08 DIAGNOSIS — N18.31 CHRONIC KIDNEY DISEASE, STAGE 3A (H): Primary | ICD-10-CM

## 2023-08-08 DIAGNOSIS — H20.9 UVEITIS: ICD-10-CM

## 2023-08-08 DIAGNOSIS — Z23 NEED FOR VACCINATION: ICD-10-CM

## 2023-08-08 DIAGNOSIS — M06.9 RHEUMATOID ARTHRITIS INVOLVING MULTIPLE SITES, UNSPECIFIED WHETHER RHEUMATOID FACTOR PRESENT (H): Primary | ICD-10-CM

## 2023-08-08 PROCEDURE — 99214 OFFICE O/P EST MOD 30 MIN: CPT | Mod: 25 | Performed by: INTERNAL MEDICINE

## 2023-08-08 PROCEDURE — 90750 HZV VACC RECOMBINANT IM: CPT | Performed by: INTERNAL MEDICINE

## 2023-08-08 PROCEDURE — 99214 OFFICE O/P EST MOD 30 MIN: CPT | Performed by: STUDENT IN AN ORGANIZED HEALTH CARE EDUCATION/TRAINING PROGRAM

## 2023-08-08 PROCEDURE — 90471 IMMUNIZATION ADMIN: CPT | Performed by: INTERNAL MEDICINE

## 2023-08-08 NOTE — PATIENT INSTRUCTIONS
"It was a pleasure to see you in nephrology clinic today. I've included a brief summary of our discussion and care plan from today's visit below.  _______________________________________________________________________    My recommendations are summarized as follows:  -Keep a Blood Pressure log. Please make sure that you are using a validated blood pressure device (check \"www.validatebp.org\").  -Avoid all NSAID's. Examples include Ibuprofen (Advil, Motrin), naprosyn (Aleve), celebrex among others. Acetaminophen (Tylenol) is ok with maximum dose in 24 hours of 3000mg.  -Healthy lifestyle measures will keep your kidney's functioning at their current best. This includes regular exercise, maintaining a healthy body weight and smoking cessation.   -Your magnesium levels are a bit low. I recommend 1 Magnesium Chloride (Slow-Mag) pill per day. Your proton pump inhibitor medication is causing more magnesium wasting in your GI tract. Watch for worsening of diarrhea with this medication.   -Kidney function looks stable. No changes needed today from my standpoint.     Please return to Nephrology Clinic in 6 months to review your progress.     Who do I call with any questions after my visit?  There are multiple ways to contact your nephrology care team:    -To schedule or reschedule an appointment, please call [phone].  -Reach out via [employer]. These messages are answered by your nurse or doctor during business hours and typically in 1-2 days. [employer] messages are best for quick questions/clarifications/updates. Frequently, your doctor or nurse will recommend setting up a follow up appointment to address any significant questions/concerns.  -For urgent questions after business hours, you may reach the on-call Nephrology Fellow by contacting the [facility]  at [phone].    To schedule imaging:   -Please call [phone]     To schedule your lab appointment at the [facility]:  -Please call " [phone]    Sincerely,    Dr. Jeff Villa   of Medicine  Division of Nephrology and Hypertension  [facility]

## 2023-08-08 NOTE — PATIENT INSTRUCTIONS
RHEUMATOLOGY    [facility]  [address] [place] [address]  [place] [address]    Phone number: [phone]  Fax number: [phone]    If you need a medication refill, please contact us as you may need lab work and/or a follow up visit prior to your refill.      Thank you for choosing [facility]!    Jaja Sanchez CMA Rheumatology

## 2023-08-08 NOTE — LETTER
8/8/2023       RE: Prosper Miller  [address] [facility] [address]     Dear Colleague,    Thank you for referring your patient, Prosper Miller, to the [facility]. Please see a copy of my visit note below.        Nephrology Clinic Visit  Prosper Miller MRN: [id number] YOB: 1960  Primary Care Provider: Momo Hartley  History Obtained From: Patient  External Document Review: Primary Care Provider  ----------------------------------------------------------------------------------------------------------------------  Visit 8/8/2023:  -BP Control: 100/68 today in the office. Hasn't really been checking at home. Monitor for Orthostatic hypotension symptoms.   --BP regimen: Lisinopril 40mg qDay (Consider adjusting to Losartan for Uricosuric effect), Nifedipine 30mg qDay  ---Taking his Nifedipine at night. Lisinopril in the morning.   -Creatinine trend: 1.36 (8/3/2023) from 1.37 (4/6/2023)  -Magnesium: 1.0 on 8/3/2023  -Having some cramping in his toes still.   -Has chronic loose stools. Has tried Metamucil in the past without improvement.     Visit 4/11/2023:  -Bp Control: 156/95 in office today. Hasn't really been checking at home.   --BP regimen: Lisinopril 40mg qDay (Consider adjusting to Losartan for Uricosuric effect)  -Renal US 1/10/2023 w/ 10.6/9.7cm, no hydro, no masses, L cyst, PVR of only 11.2cc's  -Bicarb Trend: 22 from 19  -Creatinine trend: 1.37 from 1.28  -Elevated Uric Acid/Hx of Gout: Previously Uric acid 8.1, no issues with gout attacks ever. We discussed possible medicaitons and lifestyle adjustments to reduce his Uric acid and reduce risk of gout attacks.     Visit 1/10/2023:  -BP Control: Doesn't have a cuff at home. Will get one and montior his home readings. He is on Lsiniopril 20mg or 30mg (will update me when he gets home).  -RA history and control: Was on Remicaid in the past. He's  been on treatment for RA for at least 20 years or so. He had a flare just over a year ago and was on Prednisone and Humira. Cyclosproine exposure is remote and no longer on.   -NSAID use: Not recently, He was taking NSAIDs routinely after his knee surgery 10/2022. Maybe some around 8/2018.   -Urinary retention symptoms: Very rarely getting up overnight to urinate. Strong urinary stream. No straining.   -Hx of Kidney stones: None that he knows of  -Family history of kidney disease: None that he knows of   -10/4/2022: Increased creatinine. He had knee surgery on the 6th but can't remember anything prior to that hurting his kidneys. He wasn't taking any pain medications at that time. He had a rash about a month prior to that on his legs. He was treated with Prednisone and over the counter itching cream.   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 He was sent to urology at this time. He had a cystoscopy. They thought no issues at this time. Thinks this ws before 2016 (had a few RBCs at that time in his urine). Remote history of smoking but quit about 25 years ago or so. No family history of bladder cancer.   -While in [place] 8/2022 he had some viral illness. Felt overall quite ill and had fever, fatigue. He reports not really taking any medications.     Objective:  PAST MEDICAL HISTORY:  No past medical history on file.  Rheumatoid Arthritis  HTN    PAST SURGICAL HISTORY:  Past Surgical History:   Procedure Laterality Date    EYE SURGERY      HC KNEE SCOPE, DIAGNOSTIC      Description: Arthroscopy Knee Right;  Recorded: 04/30/2008;    JOINT REPLACEMENT      OTHER SURGICAL HISTORY Left     left knee arthroplasty    OTHER SURGICAL HISTORY Bilateral     Cataract surgery       MEDICATIONS:  Current Outpatient Medications   Medication Instructions    adalimumab (HUMIRA *CF*) 40 mg, Subcutaneous, EVERY 14 DAYS, . Hold for  signs of infection, then seek medical attention.    Cholecalciferol (VITAMIN D3) 2000 UNITS CAPS Oral    EQL NATURAL ZINC 50 MG TABS Oral    fluticasone (FLONASE) 50 MCG/ACT nasal spray 1 spray, Both Nostrils, DAILY    lisinopril (ZESTRIL) 30 MG tablet TAKE ONE AND ONE-HALF TABLETS BY MOUTH DAILY    pantoprazole (PROTONIX) 40 MG EC tablet TAKE ONE TABLET BY MOUTH TWICE A DAY       FAMILY MEDICAL HISTORY:   Family History   Problem Relation Age of Onset    Cancer Father     Bone Cancer Father        PHYSICAL EXAM:   /68   Pulse 90   Wt 109.3 kg (241 lb)   BMI 37.75 kg/m    GENERAL APPEARANCE: alert and no distress  EYES: nonicteric  ABDOMEN: soft, nontender, normal bowel sounds, no HSM   Extremities: No LE edema  MS: no evidence of inflammation in joints, no muscle tenderness  SKIN: no rash  NEURO: mentation intact and speech normal  PSYCH: affect normal    LABS REVIEWED BY ME:   ANEMIA  Recent Labs   Lab Test 08/03/23  0759 04/06/23  0832 01/06/23  0747 10/04/22  1434   HGB 13.5 14.6 13.4 13.8       BMP  Recent Labs   Lab Test 08/03/23  0759 04/06/23  0832 01/06/23  0747 10/04/22  1434 08/01/22  0803 03/31/22  0804 12/07/21  1349    140 142 140 139  --  140   POTASSIUM 4.3 4.2 4.1 4.3 5.0  --  4.2   CHLORIDE 106 106 109* 105 109  --  102   CO2 20* 22 19* 24 25  --  23   ANIONGAP 14 12 14 11 5  --  15   BUN 26.9* 18.8 20.4 29.8* 17  --  27*   CR 1.36* 1.37* 1.28* 1.68* 1.19 1.25 1.45*   GFRESTIMATED 58* 58* 63 46* 69 66 52*   MAG 1.0*  --   --   --   --   --   --    PROTTOTAL  --  7.0  --   --  7.7 7.6 7.6       CBC  Recent Labs   Lab Test 08/03/23  0759 04/06/23  0832 01/06/23  0747 10/04/22  1434 08/01/22  0803   HGB 13.5 14.6 13.4 13.8 13.4   WBC  --  6.1 5.0 10.0 5.8   HCT  --  42.4 39.2* 41.0 39.7*   MCV  --  91 92 95 91   PLT  --  278 299 299 347       DIABETES  Recent Labs   Lab Test 12/07/21  1349   A1C 5.5       HYPONATREMIANo lab results found.    Invalid input(s): UOSM, OSM    MBD  Recent  Labs   Lab Test 08/03/23  0759 04/06/23  0832 01/06/23  0747 10/04/22  1434 08/01/22  0803   ERNESTINA 9.3 9.7 9.0 9.3 9.5   ALBUMIN 4.0 4.0 3.9  --  3.6   PHOS 3.0  --  2.6  --   --    PTHI 50  --  36  --   --         URINE STUDIES  Recent Labs   Lab Test 01/06/23  0805 08/01/22  0803 12/07/21  1401   COLOR Yellow Yellow Yellow   APPEARANCE Clear Clear Clear   URINEGLC Negative Negative Negative   URINEBILI Negative Negative Negative   URINEKETONE Negative Negative Negative   SG 1.020 1.010 1.015   UBLD Trace* Small* Moderate*   URINEPH 5.5 5.5 5.5   PROTEIN Trace* Negative 30*   UROBILINOGEN 0.2 0.2 0.2   NITRITE Negative Negative Negative   LEUKEST Negative Negative Negative   RBCU 0-2 0-2 2-5*   WBCU None Seen 0-5 None Seen     No lab results found.    ADDITIONAL LABS ORDERED/REVIEWED BY ME:  See below    Assessment/Plan  CKD Stage G3aA1  Established care with [facility] 1/10/2023    Onset of CKD appears to be around 3/12/2021 when creatinine was 1.52 from previous basline of 1.0-1.1 (I can see creatinine values dating back to 3/26/2018 when his creatinine was 1.01). I reviewed notes around 8/2021 and don't see obvious cause for his increase in creatinine. Peak was 2.0 on 8/30/2021 and subsequently downtrended back to the 1.1-1.3 range throughout much of 2022. 10/4/2022 creatinine back up to 1.68 but this improved to 1.28 on 1/6/2023 prior to establishing care with me. 1/6/2023 UA w/o hematuria or pyuria and UPCR at this time with 0.16g/g.     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 Very unlikely diagnosis in this patient's case     Plan:  -Continue Lisinopril 40mg qDay. Suspect Lexapro is helping somewhat with BP. May be able to get him off Nifedipine in the near future (BP was 100/68 today in the office). Continue Lisinopril and Nifedipine for now. Advised to watch  for orthostatic symptoms.   -If eGFR < 45 consider addition of SGLT2i (or if albuminuria >200mg/g) per EMPA-KIDNEY enrollment criteria.  -Avoid NSAIDs/Nephrotoxic agents  -Recommended Slow-Mag 1 pill per day for Mag of 1.0. Watch for worsening of GI symptoms with addition of magnesium.     Lipid Management  -With CKD G3a/G3b we should consider statin for primary cardiac prevention    Plan:  -Discussed statin. He would like to hold off on this for now.     Anemia of Renal Disease  Hemoglobin: 13.5 (8/3/2023)  Ferritin: N/A  TSAT:N/A    Plan:  -No need for EPO at this time      Metabolic Acidosis of Renal Disease  Bicarb: 20 (8/3/2023)    Plan:  -No need for NaHCO at this time but watch trend. KDIGO guidelines will be updated soon and likely advise against NaHCO supplementation. He also has pill burden concerns.      Mineral Bone Disease  PTH: 35 1/6/2023  Phos: 2.6 1/6/2023  Calcium: 9 1/6/2023  Vitamin D: 58 1/6/2023    Plan:  -No need for phos binder    Return to clinic: 6 months    Jeff Villa MD   of Medicine  Division of Nephrology and Hypertension  [facility]    25 minutes spent on the date of the encounter doing chart review, history and exam, documentation and further activities as noted above      Again, thank you for allowing me to participate in the care of your patient.      Sincerely,    Jeff Villa MD

## 2023-08-08 NOTE — PROGRESS NOTES
Rheumatology Clinic Visit      Prosper Miller MRN# [id number]   YOB: 1960 Age: 63 year old      Date of visit: 8/08/23   PCP: Momo Gooden at [facility]    Chief Complaint   Patient presents with:  Rheumatoid Arthritis    Assessment and Plan     1.  Rheumatoid arthritis: Reportedly dx'd in 2006. Previously followed by Dr. Phillips. Established care with me on 3/26/2018.  No active synovitis on exam when first evaluated by me. Records documents seropositive rheumatoid arthritis but I cannot find records for CCP or rheumatoid factor - check these today..  Previously on leflunomide, cyclosporine, infliximab (10 mg/kg IV every 7 weeks that was effective for arthritis but iritis was not controlled).  Currently on Humira 40 mg SQ every 14 days and doing well with regard to RA and iritis.      - Continue Humira 40mg SQ every 14 days  - No rheumatology labs prior to 6-month rheumatology follow-up appointment     2.  Iritis: Flare of iritis in 2021.    Continue following with ophthalmology.  Previously was doing well with infliximab but it lost benefit over time; changed to Humira and has been doing well since.   Chronic illness, stable.      3.  Osteoarthritis of the bilateral first CMC joints, and at the PIPs and DIPs    4.  History of nonradiating chronic lower back pain, history: Improved with at-home exercises; he has refused physical therapy in the past.  Not an issue at this time.    5.  CKD: Following with nephrology and has an appointment later this morning with Dr. Villa    7.  History of left plantar fasciitis: He reports that after standing on the edge of a ladder he ended up with left plantar mid-foot pain that is worse with the first few steps and improves the more he walks, less pain when sitting.  No swelling, increased warmth, or overlying erythema of the left foot when this occurred, making gout much less likely.  He was found to have hyperuricemia that prompted  today's visit to question if this was gout but his flare is not consistent with gout.  He was advised by Dr. Castillo to use prednisone 40 mg daily x3 days, then 20 mg daily x2 days but this prednisone did not help.  We previously reviewed the diagnosis of planter fasciitis and the importance of stretching exercises and appropriate shoewear.  Doing well at this time.    8.  Intermittent pain of the right first MTP: Degenerative in nature.  Worse this summer since wearing flip-flops.  Advised stiff soled shoes to prevent repetitive articulation of that joint and he says he will try this.  We also discussed the possibility of chronic gouty deposition but the flares being every 2 days for no more than 2 hours and related to activity, and absence of swelling, increased warmth, and overlying erythema or not consistent with gout.  He will try a change of shoewear.    9.  Vaccinations: Vaccinations reviewed with Mr. Miller.  Risks and benefits of vaccinations were discussed.    - Influenza: encouraged yearly vaccination  - Pneumococcal vaccine: Advised vaccination  - Shingrix: Second dose today  - COVID-19: Advised updating    10. Elevated blood pressure:  Sam to follow up with Primary Care provider regarding elevated blood pressure.     Total minutes spent in evaluation with patient, documentation, , and review of pertinent studies and chart notes: 20    Mr. Miller verbalized agreement with and understanding of the rational for the diagnosis and treatment plan.  All questions were answered to best of my ability and the patient's satisfaction. Mr. Miller was advised to contact the clinic with any questions that may arise after the clinic visit.      Thank you for involving me in the care of the patient    Return to clinic: 6 months      HPI   Prosper Miller is a 63 year old male with a past medical history significant for hypertension, hyperlipidemia, obstructive sleep apnea, IgA nephropathy, left TKA,  right TKA (10/2022), rheumatoid arthritis, and iritis who presents for rheumatology follow-up.    2/25/2022 ophthalmology note by Dr. Michael Jenkins documents that the eye disease is now quiescent and will follow-up in 6 weeks.    3/31/2022: RA is doing well.  Iritis controlled.  Tolerating Humira well.  Only joint pain is at the bilateral first CMC joints that is worse with activity and improves with rest.  Occasional pain of the left knee at the pes anserine bursa.  Right knee pain worse with activity and improves with rest.  No joint swelling.    8/1/2022: hands shake. Anxiety.  Hands hurt at the bilateral 1st CMC joints that is worse with self-palpation; and pain along the entire right 3rd finger that is on and off throughout the day and he notes hx of injury to the right 3rd finger.  Knees bother him; hx of left TKA and planning for right TKA.  Tolerating Humira well.  Uveitis remains controlled but he says that every now and then he feels like there might be inflammation that is going to start; he with follow-up with his ophthalmologist    8/18/2022: Reports that he was standing on the edge of a ladder and his left plantar midfoot began to hurt, making ambulation more difficult.  There was no swelling, increased warmth, or overlying erythema of the affected area.  He was seen by Dr. Castillo who diagnosed Planter fasciitis.  He was found to have hyperuricemia so was told to take prednisone 40 mg daily x3 days, then 20 mg daily x2 days and after 1 day of taking prednisone he has not felt any better.  Foot pain is worse with the first few steps after any period of inactivity, such as with the first few steps in the morning after waking up or after sitting in a chair for a long period of time.  Otherwise doing well.    1/9/2023: joints are okay.  Occasional random 3rd finger ache that goes away quickly and is not associated with swelling or redness. Left eye has watered, for couple hours for a day or two.      Today, 8/8/2023: Intermittent pain of the right second MTP, occurring approximately once every 2 days and resolving within 1-2 hours, typically activity related, and has only occurred since he has changed his shoe wear this summer to flip-flops.  When he has pain of the right second MTP he has no associated swelling, increased warmth, or overlying erythema.  Other joints are doing well.  Morning stiffness for no more than 10 minutes.  Uveitis controlled.  Planning to see nephrology later this morning.    Denies fevers, chills, nausea, vomiting, constipation. No abdominal pain. No chest pain/pressure, palpitations, or shortness of breath. No LE swelling. No neck pain. No oral or nasal sores.  No rash.     Tobacco: Former smoker  EtOH: Weekly  Drugs: None  Occupation:     ROS   12 point review of system was completed and negative except as noted in the HPI     Active Problem List     Patient Active Problem List   Diagnosis    Iritis    Rheumatoid arthritis of hand, unspecified laterality, unspecified rheumatoid factor presence    Rheumatoid arthritis involving multiple sites, unspecified rheumatoid factor presence    Adenomatous colon polyp    JALEESA (obstructive sleep apnea)    Esophageal reflux    Benign essential hypertension    Acute glomerulonephritis with pathological lesion in kidney    Primary hypercholesterolemia    Morbid obesity (H)    Chronic kidney disease, stage 3a (H)    Benign neoplasm of sigmoid colon    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 No pertinent past medical history.  Past Surgical History     Past Surgical History:   Procedure Laterality Date    EYE SURGERY      HC KNEE SCOPE, DIAGNOSTIC      Description: Arthroscopy Knee Right;  Recorded: 04/30/2008;    JOINT REPLACEMENT      OTHER SURGICAL HISTORY Left     left knee arthroplasty    OTHER SURGICAL  "HISTORY Bilateral     Cataract surgery     Allergy   No Known Allergies  Current Medication List     Current Outpatient Medications   Medication Sig    adalimumab (HUMIRA *CF*) 40 MG/0.4ML pen kit Inject 0.4 mLs (40 mg) Subcutaneous every 14 days . Hold for signs of infection, then seek medical attention.    escitalopram (LEXAPRO) 5 MG tablet Take 1 tablet (5 mg) by mouth daily    fluticasone (FLONASE) 50 MCG/ACT nasal spray Spray 1 spray into both nostrils daily    lisinopril (ZESTRIL) 40 MG tablet Take 1 tablet (40 mg) by mouth daily    NIFEdipine ER (ADALAT CC) 30 MG 24 hr tablet Take 1 tablet (30 mg) by mouth daily for 360 days    pantoprazole (PROTONIX) 40 MG EC tablet TAKE ONE TABLET BY MOUTH TWICE A DAY    Cholecalciferol (VITAMIN D3) 2000 UNITS CAPS     EQL NATURAL ZINC 50 MG TABS      No current facility-administered medications for this visit.     Social History   See HPI    Family History     Family History   Problem Relation Age of Onset    Cancer Father     Bone Cancer Father         Physical Exam     Temp Readings from Last 3 Encounters:   03/21/23 98.7  F (37.1  C) (Oral)   12/12/22 97.1  F (36.2  C) (Oral)   12/07/22 97.5  F (36.4  C) (Tympanic)     BP Readings from Last 5 Encounters:   08/08/23 (!) 145/83   04/11/23 (!) 156/95   03/21/23 (!) 149/98   01/10/23 (!) 155/99   12/12/22 131/83     Pulse Readings from Last 1 Encounters:   08/08/23 90     Resp Readings from Last 1 Encounters:   08/08/23 18     Estimated body mass index is 37.81 kg/m  as calculated from the following:    Height as of 12/12/22: 1.702 m (5' 7\").    Weight as of this encounter: 109.5 kg (241 lb 6.4 oz).    GEN: NAD.   HEENT:  Anicteric, noninjected sclera. No obvious external lesions of the ear and nose. Hearing intact.  CV: S1, S2. RRR. No m/r/g  PULM: No increased work of breathing. CTA bilaterally   MSK: MCPs, PIPs, DIPs without swelling or tenderness to palpation.  Wrists without swelling or tenderness to palpation.  Elbows " and shoulders without swelling or tenderness to palpation. Knees, ankles, and MTPs without swelling or tenderness to palpation.    SKIN: No rash or jaundice seen  PSYCH: Alert. Appropriate.      Labs / Imaging (select studies)     RF/CCP  Recent Labs   Lab Test 12/15/21  0908   CCPIGG 12.0*   RHF 10     CBC  Recent Labs   Lab Test 08/03/23  0759 04/06/23  0832 01/06/23  0747 10/04/22  1434 08/01/22  0803 03/31/22  0805 03/12/21  1310 03/09/20  0812 09/16/19  0804 03/12/19  0849   WBC  --  6.1 5.0 10.0 5.8 6.3   < > 6.6 5.7 5.6   RBC  --  4.68 4.27* 4.33* 4.35* 4.21*   < > 4.76 4.50 4.88   HGB 13.5 14.6 13.4 13.8 13.4 13.2*   < > 15.2 14.5 15.7   HCT  --  42.4 39.2* 41.0 39.7* 39.3*   < > 44.5 42.3 44.5   MCV  --  91 92 95 91 93   < > 94 94 91   RDW  --  13.3 12.7 14.2 13.3 13.4   < > 14.0 12.7 13.1   PLT  --  278 299 299 347 347   < > 275 302 343   MCH  --  31.2 31.4 31.9 30.8 31.4   < > 31.9 32.2 32.2   MCHC  --  34.4 34.2 33.7 33.8 33.6   < > 34.2 34.3 35.3   NEUTROPHIL  --  45  --   --  34 42   < > 43.8 40.9 37.6   LYMPH  --  42  --   --  50 44   < > 43.4 43.2 47.1   MONOCYTE  --  7  --   --  11 10   < > 6.7 9.1 8.9   EOSINOPHIL  --  5  --   --  5 3   < > 5.6 6.3 5.5   BASOPHIL  --  1  --   --  1 1   < > 0.5 0.5 0.9   ANEU  --   --   --   --   --   --   --  2.9 2.3 2.1   ALYM  --   --   --   --   --   --   --  2.9 2.5 2.6   MICHAEL  --   --   --   --   --   --   --  0.4 0.5 0.5   AEOS  --   --   --   --   --   --   --  0.4 0.4 0.3   ABAS  --   --   --   --   --   --   --  0.0 0.0 0.1   ANEUTAUTO  --  2.7  --   --  1.9 2.7   < >  --   --   --    ALYMPAUTO  --  2.6  --   --  2.9 2.8   < >  --   --   --    AMONOAUTO  --  0.4  --   --  0.6 0.6   < >  --   --   --    AEOSAUTO  --  0.3  --   --  0.3 0.2   < >  --   --   --    ABSBASO  --  0.1  --   --  0.0 0.0   < >  --   --   --     < > = values in this interval not displayed.     CMP  Recent Labs   Lab Test 08/03/23  0759 04/06/23  0832 01/06/23  0747 10/04/22  1438  08/01/22  0803 03/31/22  0804 08/30/21  0848 03/12/21  1310 03/09/20  0812 02/24/20  1634    140 142   < > 139  --    < >  --   --  142   POTASSIUM 4.3 4.2 4.1   < > 5.0  --    < >  --   --  3.8   CHLORIDE 106 106 109*   < > 109  --    < >  --   --  105   CO2 20* 22 19*   < > 25  --    < >  --   --  26   ANIONGAP 14 12 14   < > 5  --    < >  --   --  11   * 113* 99   < > 96  --    < >  --   --  92   BUN 26.9* 18.8 20.4   < > 17  --    < >  --   --  12   CR 1.36* 1.37* 1.28*   < > 1.19 1.25   < > 1.52* 1.05 1.07   GFRESTIMATED 58* 58* 63   < > 69 66   < > 49* 77 >60   GFRESTBLACK  --   --   --   --   --   --   --  57* 89 >60   ERNESTINA 9.3 9.7 9.0   < > 9.5  --    < >  --   --  9.2   BILITOTAL  --  0.4  --   --  0.3 0.4   < > 0.3 0.3  --    ALBUMIN 4.0 4.0 3.9  --  3.6 3.5   < > 3.2* 3.5  --    PROTTOTAL  --  7.0  --   --  7.7 7.6   < > 6.9 7.7  --    ALKPHOS  --  69  --   --  74 63   < > 70 69  --    AST  --  29  --   --  25 25   < > 28 21  --    ALT  --  24  --   --  43 45   < > 30 37  --     < > = values in this interval not displayed.     Calcium/VitaminD  Recent Labs   Lab Test 08/03/23  0759 04/06/23  0832 01/06/23  0747 08/01/22  0803 12/07/21  1349   ERNESTINA 9.3 9.7 9.0   < > 10.2   VITDT  --   --  58  --  50    < > = values in this interval not displayed.     ESR/CRP  Recent Labs   Lab Test 04/06/23  0832 08/01/22  0803 03/31/22  0805 03/31/22  0804 10/08/21  1330   SED 17* 20 27*  --  25*   CRP  --  3.0  --  4.4 3.6   CRPI <3.00  --   --   --   --      Hepatitis B  Recent Labs   Lab Test 03/26/18  1540   HBCAB Nonreactive   HEPBANG Nonreactive     Hepatitis C  Recent Labs   Lab Test 03/26/18  1540   HCVAB Nonreactive     Tuberculosis Screening  Recent Labs   Lab Test 08/30/21  0848 03/26/18  1541   TBRES Negative  --    TBRSLT  --  Negative   TBAGN  --  0.00     Uric Acid  Recent Labs   Lab Test 01/06/23  0747 08/15/22  1026   URIC 8.1* 8.5*     Immunization History     Immunization History    Administered Date(s) Administered    COVID-19 MONOVALENT 12+ (Pfizer) 04/22/2021, 05/13/2021, 11/15/2021    Hepatitis A (ADULT 19+) 04/30/2008, 11/11/2008    Influenza Vaccine, 6+MO IM (QUADRIVALENT W/PRESERVATIVES) 11/11/2008, 12/18/2009, 09/18/2020    Mantoux Tuberculin Skin Test 02/01/2007    TDAP (Adacel,Boostrix) 03/09/2011, 03/21/2023    Zoster recombinant adjuvanted (SHINGRIX) 04/06/2023          Chart documentation done in part with Dragon Voice recognition Software. Although reviewed after completion, some word and grammatical error may remain.    Alex Segundo MD

## 2023-08-08 NOTE — NURSING NOTE
Blood pressure rechecked after visit    100/68  Jaja Sanchez CMA Rheumatology  8/8/2023

## 2023-08-16 DIAGNOSIS — J01.90 ACUTE SINUSITIS WITH SYMPTOMS > 10 DAYS: ICD-10-CM

## 2023-08-16 RX ORDER — FLUTICASONE PROPIONATE 50 MCG
1 SPRAY, SUSPENSION (ML) NASAL DAILY
Qty: 16 ML | Refills: 11 | Status: SHIPPED | OUTPATIENT
Start: 2023-08-16

## 2023-11-13 ENCOUNTER — PATIENT OUTREACH (OUTPATIENT)
Dept: CARE COORDINATION | Facility: CLINIC | Age: 63
End: 2023-11-13
Payer: COMMERCIAL

## 2023-11-27 ENCOUNTER — PATIENT OUTREACH (OUTPATIENT)
Dept: CARE COORDINATION | Facility: CLINIC | Age: 63
End: 2023-11-27
Payer: COMMERCIAL

## 2023-11-28 DIAGNOSIS — F39 MOOD DISORDER (H): ICD-10-CM

## 2023-11-28 RX ORDER — ESCITALOPRAM OXALATE 5 MG/1
5 TABLET ORAL DAILY
Qty: 60 TABLET | Refills: 1 | Status: SHIPPED | OUTPATIENT
Start: 2023-11-28

## 2023-12-28 ENCOUNTER — PATIENT OUTREACH (OUTPATIENT)
Dept: CARE COORDINATION | Facility: CLINIC | Age: 63
End: 2023-12-28
Payer: COMMERCIAL

## 2024-01-11 ENCOUNTER — PATIENT OUTREACH (OUTPATIENT)
Dept: CARE COORDINATION | Facility: CLINIC | Age: 64
End: 2024-01-11
Payer: COMMERCIAL

## 2024-02-01 ENCOUNTER — LAB (OUTPATIENT)
Dept: LAB | Facility: CLINIC | Age: 64
End: 2024-02-01
Payer: COMMERCIAL

## 2024-02-01 DIAGNOSIS — N18.31 CHRONIC KIDNEY DISEASE, STAGE 3A (H): ICD-10-CM

## 2024-02-01 LAB
ALBUMIN SERPL BCG-MCNC: 4.1 G/DL (ref 3.5–5.2)
ANION GAP SERPL CALCULATED.3IONS-SCNC: 12 MMOL/L (ref 7–15)
BUN SERPL-MCNC: 26.7 MG/DL (ref 8–23)
CALCIUM SERPL-MCNC: 9.5 MG/DL (ref 8.8–10.2)
CHLORIDE SERPL-SCNC: 101 MMOL/L (ref 98–107)
CREAT SERPL-MCNC: 1.53 MG/DL (ref 0.67–1.17)
DEPRECATED HCO3 PLAS-SCNC: 25 MMOL/L (ref 22–29)
EGFRCR SERPLBLD CKD-EPI 2021: 51 ML/MIN/1.73M2
GLUCOSE SERPL-MCNC: 96 MG/DL (ref 70–99)
MAGNESIUM SERPL-MCNC: 1 MG/DL (ref 1.7–2.3)
PHOSPHATE SERPL-MCNC: 3 MG/DL (ref 2.5–4.5)
POTASSIUM SERPL-SCNC: 4.1 MMOL/L (ref 3.4–5.3)
PTH-INTACT SERPL-MCNC: 46 PG/ML (ref 15–65)
SODIUM SERPL-SCNC: 138 MMOL/L (ref 135–145)

## 2024-02-01 PROCEDURE — 80069 RENAL FUNCTION PANEL: CPT

## 2024-02-01 PROCEDURE — 83970 ASSAY OF PARATHORMONE: CPT

## 2024-02-01 PROCEDURE — 36415 COLL VENOUS BLD VENIPUNCTURE: CPT

## 2024-02-01 PROCEDURE — 83735 ASSAY OF MAGNESIUM: CPT

## 2024-02-03 NOTE — PROGRESS NOTES
Nephrology Clinic Visit  Prosper Miller MRN: [id number] YOB: 1960  Primary Care Provider: Momo Hartley  History Obtained From: Patient  External Document Review: Primary Care Provider  ----------------------------------------------------------------------------------------------------------------------  Visit 2/6/2024:  -BP Control: 122/76 (in office), 110's.  Checks a few times per week.   --BP regimen: Lisinopril 40mg qDay (Consider adjusting to Losartan for Uricosuric effect), Nifedipine 30mg qDay  ---Taking his Nifedipine at night. Lisinopril in the morning.   -Creatinine trend: 1.53 (2/1/2024) from 1.36 (8/3/2023) from 1.37 (4/6/2023)  -Discussed statin initiation. He wants to hold off on this. Wife's mom went on a cholesterol medicine and she had SE from this.   -He reports history of chronic loose stools. Symptoms are worse if he has alcohol. Reports this has been going on for years.   -He had COVID in March of 2020  -No history of gout flares. Has had elevated uric acid in the past.     Visit 8/8/2023:  -BP Control: 100/68 today in the office. Hasn't really been checking at home. Monitor for Orthostatic hypotension symptoms.   --BP regimen: Lisinopril 40mg qDay (Consider adjusting to Losartan for Uricosuric effect), Nifedipine 30mg qDay  ---Taking his Nifedipine at night. Lisinopril in the morning.   -Creatinine trend: 1.36 (8/3/2023) from 1.37 (4/6/2023)  -Magnesium: 1.0 on 8/3/2023  -Having some cramping in his toes still.   -Has chronic loose stools. Has tried Metamucil in the past without improvement.     Visit 4/11/2023:  -Bp Control: 156/95 in office today. Hasn't really been checking at home.   --BP regimen: Lisinopril 40mg qDay (Consider adjusting to Losartan for Uricosuric effect)  -Renal US 1/10/2023 w/ 10.6/9.7cm, no hydro, no masses, L cyst, PVR of only 11.2cc's  -Bicarb Trend: 22 from 19  -Creatinine trend: 1.37 from 1.28  -Elevated Uric Acid/Hx of Gout: Previously  Uric acid 8.1, no issues with gout attacks ever. We discussed possible medicaitons and lifestyle adjustments to reduce his Uric acid and reduce risk of gout attacks.     Visit 1/10/2023:  -BP Control: Doesn't have a cuff at home. Will get one and montior his home readings. He is on Lsiniopril 20mg or 30mg (will update me when he gets home).  -RA history and control: Was on Remicaid in the past. He's been on treatment for RA for at least 20 years or so. He had a flare just over a year ago and was on Prednisone and Humira. Cyclosproine exposure is remote and no longer on.   -NSAID use: Not recently, He was taking NSAIDs routinely after his knee surgery 10/2022. Maybe some around 8/2018.   -Urinary retention symptoms: Very rarely getting up overnight to urinate. Strong urinary stream. No straining.   -Hx of Kidney stones: None that he knows of  -Family history of kidney disease: None that he knows of   -10/4/2022: Increased creatinine. He had knee surgery on the 6th but can't remember anything prior to that hurting his kidneys. He wasn't taking any pain medications at that time. He had a rash about a month prior to that on his legs. He was treated with Prednisone and over the counter itching cream.   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 He was sent to urology at this time. He had a cystoscopy. They thought no issues at this time. Thinks this ws before 2016 (had a few RBCs at that time in his urine). Remote history of smoking but quit about 25 years ago or so. No family history of bladder cancer.   -While in [place] 8/2022 he had some viral illness. Felt overall quite ill and had fever, fatigue. He reports not really taking any medications.     Objective:  PAST MEDICAL HISTORY:  No past medical history on file.  Rheumatoid Arthritis  HTN    PAST SURGICAL HISTORY:  Past Surgical History:   Procedure Laterality Date     EYE SURGERY       KNEE SCOPE, DIAGNOSTIC      Description: Arthroscopy Knee Right;  Recorded: 04/30/2008;    JOINT REPLACEMENT      OTHER SURGICAL HISTORY Left     left knee arthroplasty    OTHER SURGICAL HISTORY Bilateral     Cataract surgery       MEDICATIONS:  Current Outpatient Medications   Medication Instructions    adalimumab (HUMIRA *CF*) 40 mg, Subcutaneous, EVERY 14 DAYS, . Hold for signs of infection, then seek medical attention.    Cholecalciferol (VITAMIN D3) 2000 UNITS CAPS Oral    EQL NATURAL ZINC 50 MG TABS Oral    fluticasone (FLONASE) 50 MCG/ACT nasal spray 1 spray, Both Nostrils, DAILY    lisinopril (ZESTRIL) 30 MG tablet TAKE ONE AND ONE-HALF TABLETS BY MOUTH DAILY    pantoprazole (PROTONIX) 40 MG EC tablet TAKE ONE TABLET BY MOUTH TWICE A DAY       FAMILY MEDICAL HISTORY:   Family History   Problem Relation Age of Onset    Cancer Father     Bone Cancer Father        PHYSICAL EXAM:   /76   Pulse 78   Wt 108.9 kg (240 lb)   BMI 37.59 kg/m    GENERAL APPEARANCE: alert and no distress  EYES: nonicteric  ABDOMEN: soft, nontender, normal bowel sounds, no HSM   Extremities: No LE edema  MS: no evidence of inflammation in joints, no muscle tenderness  SKIN: no rash  NEURO: mentation intact and speech normal  PSYCH: affect normal    LABS REVIEWED BY ME:   ANEMIA  Recent Labs   Lab Test 08/03/23  0759 04/06/23  0832 01/06/23  0747 10/04/22  1434   HGB 13.5 14.6 13.4 13.8       BMP  Recent Labs   Lab Test 02/01/24  0851 08/03/23  0759 04/06/23  0832 01/06/23  0747 10/04/22  1434 08/01/22  0803 03/31/22  0804 12/07/21  1349    140 140 142   < > 139  --  140   POTASSIUM 4.1 4.3 4.2 4.1   < > 5.0  --  4.2   CHLORIDE 101 106 106 109*   < > 109  --  102   CO2 25 20* 22 19*   < > 25  --  23   ANIONGAP 12 14 12 14   < > 5  --  15   BUN 26.7* 26.9* 18.8 20.4   < > 17  --  27*   CR 1.53* 1.36* 1.37* 1.28*   < > 1.19 1.25 1.45*   GFRESTIMATED 51* 58* 58* 63   < > 69 66 52*   MAG 1.0* 1.0*  --   --    "--   --   --   --    PROTTOTAL  --   --  7.0  --   --  7.7 7.6 7.6    < > = values in this interval not displayed.       CBC  Recent Labs   Lab Test 08/03/23  0759 04/06/23  0832 01/06/23  0747 10/04/22  1434 08/01/22  0803   HGB 13.5 14.6 13.4 13.8 13.4   WBC  --  6.1 5.0 10.0 5.8   HCT  --  42.4 39.2* 41.0 39.7*   MCV  --  91 92 95 91   PLT  --  278 299 299 347       DIABETES  Recent Labs   Lab Test 12/07/21  1349   A1C 5.5       HYPONATREMIANo lab results found.    Invalid input(s): \"UOSM\", \"OSM\"    MBD  Recent Labs   Lab Test 02/01/24  0851 08/03/23  0759 04/06/23  0832 01/06/23  0747   ERNESTINA 9.5 9.3 9.7 9.0   ALBUMIN 4.1 4.0 4.0 3.9   PHOS 3.0 3.0  --  2.6   PTHI 46 50  --  36        URINE STUDIES  Recent Labs   Lab Test 01/06/23  0805 08/01/22  0803 12/07/21  1401   COLOR Yellow Yellow Yellow   APPEARANCE Clear Clear Clear   URINEGLC Negative Negative Negative   URINEBILI Negative Negative Negative   URINEKETONE Negative Negative Negative   SG 1.020 1.010 1.015   UBLD Trace* Small* Moderate*   URINEPH 5.5 5.5 5.5   PROTEIN Trace* Negative 30*   UROBILINOGEN 0.2 0.2 0.2   NITRITE Negative Negative Negative   LEUKEST Negative Negative Negative   RBCU 0-2 0-2 2-5*   WBCU None Seen 0-5 None Seen     No lab results found.    ADDITIONAL LABS ORDERED/REVIEWED BY ME:  See below    Assessment/Plan  CKD Stage G3aA1  Established care with [facility] 1/10/2023    Onset of CKD appears to be around 3/12/2021 when creatinine was 1.52 from previous basline of 1.0-1.1 (I can see creatinine values dating back to 3/26/2018 when his creatinine was 1.01). I reviewed notes around 8/2021 and don't see obvious cause for his increase in creatinine. Peak was 2.0 on 8/30/2021 and subsequently downtrended back to the 1.1-1.3 range throughout much of 2022. 10/4/2022 creatinine back up to 1.68 but this improved to 1.28 on 1/6/2023 prior to establishing care with me. 1/6/2023 UA w/o hematuria or pyuria and UPCR at this time with 0.16g/g. 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 Very unlikely diagnosis in this patient's case   -?Oxalate Nephropathy - chronic loose stools. Perhaps some fat malabsorption leading to elevated oxalate absorption  -?Underlying GN - Felt to be very unlikely without hematuria or pyuria and given his overall trajectory. Note: Does have a borderline positive HAYES and a dsDNA of 24 8/1/2022    Plan:  -Update labs in 2 months. BMP, UA, UPCR  -We discussed potential indications for renal biopsy in the future. He currently does not meet indications for renal biopsy.  -Continue Lisinopril 40mg qDay and Nifedipine 30mg qDay. He reports home Bps quite consistently in the 110's and denies orthostatic symptoms.   -If eGFR < 45 consider addition of SGLT2i (or if albuminuria >200mg/g) per EMPA-KIDNEY enrollment criteria.  -Avoid NSAIDs/Nephrotoxic agents  -Discussed PPI and GI Mag wasting. He will call Soledad with his home Mag supplement dose and I will adjust as needed. I recommend lowest dose of PPI tolerated (he reports taking 40mg 1x per day even though it is listed as 40mg BID). We discussed that his PPI is certainly decreasing his Mag absorption. We discussed symptoms of hypoMag to watch out for. We discussed that Mag supplementation can worsen GI symptoms and that he should let me know if this occurs.     Lipid Management  -With CKD G3a/G3b we should consider statin for primary cardiac prevention    Plan:  -Discussed statin again today. He would like to hold off on this for now and reports that his wife's mother had SE from statin.    Anemia of Renal Disease  Hemoglobin: 13.5 (8/3/2023)  Ferritin: N/A  TSAT:N/A    Plan:  -No need for EPO at this time      Metabolic  Acidosis of Renal Disease  Bicarb: 25 (2/1/2024)    Plan:  -No need for NaHCO at this time      Mineral Bone Disease  PTH: 46 (2/1/2024)  Phos: 3 (2/1/2024)  Calcium: 9.5 (2/1/2024)  Vitamin D: 58 1/6/2023    Plan:  -No need for phos binder    Return to clinic: 6 months    Jeff Villa MD   of Medicine  Division of Nephrology and Hypertension  [facility]    40 minutes spent on the date of the encounter doing chart review, history and exam, documentation and further activities as noted above    The longitudinal plan of care for Hypertension, CKD G3aA1, Hypomagnesemia was addressed during this visit. Due to the added complexity in care, I will continue to support Prosper Miller in the subsequent management of this condition(s) and with the ongoing continuity of care of this condition(s).

## 2024-02-06 ENCOUNTER — OFFICE VISIT (OUTPATIENT)
Dept: RHEUMATOLOGY | Facility: CLINIC | Age: 64
End: 2024-02-06
Payer: COMMERCIAL

## 2024-02-06 ENCOUNTER — TELEPHONE (OUTPATIENT)
Dept: NEPHROLOGY | Facility: CLINIC | Age: 64
End: 2024-02-06

## 2024-02-06 ENCOUNTER — TELEPHONE (OUTPATIENT)
Dept: RHEUMATOLOGY | Facility: CLINIC | Age: 64
End: 2024-02-06

## 2024-02-06 ENCOUNTER — OFFICE VISIT (OUTPATIENT)
Dept: NEPHROLOGY | Facility: CLINIC | Age: 64
End: 2024-02-06
Payer: COMMERCIAL

## 2024-02-06 VITALS
BODY MASS INDEX: 37.59 KG/M2 | SYSTOLIC BLOOD PRESSURE: 122 MMHG | HEART RATE: 78 BPM | DIASTOLIC BLOOD PRESSURE: 76 MMHG | WEIGHT: 240 LBS

## 2024-02-06 VITALS
BODY MASS INDEX: 37.59 KG/M2 | SYSTOLIC BLOOD PRESSURE: 122 MMHG | DIASTOLIC BLOOD PRESSURE: 76 MMHG | HEART RATE: 78 BPM | WEIGHT: 240 LBS

## 2024-02-06 DIAGNOSIS — R76.8 ANA POSITIVE: ICD-10-CM

## 2024-02-06 DIAGNOSIS — N18.31 CHRONIC KIDNEY DISEASE, STAGE 3A (H): Primary | ICD-10-CM

## 2024-02-06 DIAGNOSIS — M06.9 RHEUMATOID ARTHRITIS INVOLVING MULTIPLE SITES, UNSPECIFIED WHETHER RHEUMATOID FACTOR PRESENT (H): ICD-10-CM

## 2024-02-06 DIAGNOSIS — H20.9 UVEITIS: ICD-10-CM

## 2024-02-06 PROCEDURE — G2211 COMPLEX E/M VISIT ADD ON: HCPCS | Performed by: INTERNAL MEDICINE

## 2024-02-06 PROCEDURE — 99215 OFFICE O/P EST HI 40 MIN: CPT | Performed by: STUDENT IN AN ORGANIZED HEALTH CARE EDUCATION/TRAINING PROGRAM

## 2024-02-06 PROCEDURE — G2211 COMPLEX E/M VISIT ADD ON: HCPCS | Performed by: STUDENT IN AN ORGANIZED HEALTH CARE EDUCATION/TRAINING PROGRAM

## 2024-02-06 PROCEDURE — 99214 OFFICE O/P EST MOD 30 MIN: CPT | Performed by: INTERNAL MEDICINE

## 2024-02-06 NOTE — PATIENT INSTRUCTIONS
"It was a pleasure to see you in nephrology clinic today. I've included a brief summary of our discussion and care plan from today's visit below.  _______________________________________________________________________    My recommendations are summarized as follows:  -Keep a Blood Pressure log. Please make sure that you are using a validated blood pressure device (check \"www.validatebp.org\").  -Avoid all NSAID's. Examples include Ibuprofen (Advil, Motrin), naprosyn (Aleve), celebrex among others. Acetaminophen (Tylenol) is ok with maximum dose in 24 hours of 3000mg.  -Healthy lifestyle measures will keep your kidney's functioning at their current best. This includes regular exercise, maintaining a healthy body weight and smoking cessation.   -Try to cut down on alcohol intake as much as possible especially with your low magnesium levels and your reported GI symptoms.  -Please give Soledad a call with your magnesium supplement amount. We will have to game plan what to adjust this to as your magnesium level as low (1.0). I want you to have a normal mag level but we need to also watch out for GI symptoms from the magnesium. My preferred mag supplement is slow-mag. Your proton pump inhibitor (protonix) is contributing to GI magnesium losses.   -I am going to update your labs in 2 months (including a Mag level).  -Continue to monitor blood pressure 3x per week or so  -We briefly discussed utility of kidney biopsy. I currently do not believe you have an indication for this.   -Aim for 60 ounces of water intake per day    Please return to Nephrology Clinic in 6 months to review your progress.     Who do I call with any questions after my visit?  There are multiple ways to contact your nephrology care team:    -To schedule or reschedule an appointment, please call [phone].  -To contact my nurse Soledad, please call [phone]  -Reach out via [employer]. These messages are answered by your nurse or doctor during business hours " and typically in 1-2 days. [employer] messages are best for quick questions/clarifications/updates. Frequently, your doctor or nurse will recommend setting up a follow up appointment to address any significant questions/concerns.  -For urgent questions after business hours, you may reach the on-call Nephrology Fellow by contacting the [facility]  at [phone].    To schedule imaging:   -Please call [phone]     To schedule your lab appointment at [facility]  -Please call [phone]    Sincerely,    Dr. Jeff Villa   of Medicine  Division of Nephrology and Hypertension  [facility]

## 2024-02-06 NOTE — TELEPHONE ENCOUNTER
Prior Authorization Approval    Medication: HUMIRA *CF* PEN 40 MG/0.4ML SC PNKT  Authorization Effective Date: 1/1/2024  Authorization Expiration Date: 2/1/2025  Approved Dose/Quantity: 2 / 28  Reference #:     Insurance Company: [employer] - Phone [phone] Fax [phone]  Expected CoPay: $    CoPay Card Available:      Financial Assistance Needed:   Which Pharmacy is filling the prescription: [facility] - [address]  Pharmacy Notified: renewal  Patient Notified: renewal    Called [facility] to verify PA dates on file.    Thank You,     Meghan Woody Sadie  [facility]  [email or web address]  Phone: [phone]  Fax: [phone]

## 2024-02-06 NOTE — TELEPHONE ENCOUNTER
[facility] Call Center    Phone Message    May a detailed message be left on voicemail: no     Reason for Call: Other: Patient wanted to tell Dr. Villa, 144 Mg is how much magnesium he takes. Please let Dr. Villa know that. Thank you.    Action Taken: [facility]    Travel Screening: Not Applicable

## 2024-02-06 NOTE — TELEPHONE ENCOUNTER
Spoke with patient regarding Dr Villa's recommendations.     I'm sending Slow-Mag to his pharmacy. He should take 2 pills 2x per day. He should let us know if his GI symptoms (diarrhea) worsen after this change. He should stop his home magnesium supplement and the new supplement will replace it. We will follow up his magnesium and other labs in 2 months     Labs are scheduled and patient stated he will get his prescription tomorrow, 2/7/24.  ELEANOR Rowe Care Coordinator  Nephrology

## 2024-02-06 NOTE — PATIENT INSTRUCTIONS
RHEUMATOLOGY    [facility]  [address]  [place] [address]    Phone number: [phone]  Fax number: [phone]    If you need a medication refill, please contact us as you may need lab work and/or a follow up visit prior to your refill.      Thank you for choosing [facility]!

## 2024-02-06 NOTE — PROGRESS NOTES
Rheumatology Clinic Visit      Prosper Miller MRN# [id number]   YOB: 1960 Age: 63 year old      Date of visit: 2/06/24   PCP: Momo Gooden at [facility]    Chief Complaint   Patient presents with:  RECHECK  Arthritis: RA    Assessment and Plan     1.  Rheumatoid arthritis: Reportedly dx'd in 2006. Previously followed by Dr. Phillips. Established care with me on 3/26/2018.  No active synovitis on exam when first evaluated by me. Records documents seropositive rheumatoid arthritis but I cannot find records for CCP or rheumatoid factor - check these today..  Previously on leflunomide, cyclosporine, infliximab (10 mg/kg IV every 7 weeks that was effective for arthritis but iritis was not controlled).  Currently on Humira 40 mg SQ every 14 days and doing well with regard to RA and iritis.    Chronic illness, stable.    - Continue Humira 40mg SQ every 14 days  - Labs in 6 months: CBC, Creatinine, Hepatic Panel, ESR, CRP, QuantiFERON-TB gold plus     2.  Iritis: Flare of iritis in 2021.    Continue following with ophthalmology.  Previously was doing well with infliximab but it lost benefit over time; changed to Humira and has been doing well since.   Chronic illness, stable.      3.  Osteoarthritis of the bilateral first CMC joints, and at the PIPs and DIPs: Most symptomatic at the first CMC joints that improves with self-massage.    4.  History of nonradiating chronic lower back pain, history: Improved with at-home exercises; he has refused physical therapy in the past.  Not an issue at this time.    5.  CKD: Following with nephrology.  Note that he is on Humira and previously had a low positive dsDNA and positive HAYES.  Complement C3 and C4 were normal and histone antibody was negative previously.  Recheck with next rheumatology labs in 6 months.  Discussed with Dr. Villa, nephrology: monitoring for now.   Chronic illness, stable;   - Labs in 6 months: dsDNA, C3, C4    6.   Intermittent pain of the right first MTP: Degenerative in nature.  Previously worse when he was wearing flip-flops during the summer.  Advised of soled shoes whenever ambulatory.  Doing well at this time.      7.  Hyperuricemia: No gout flares.  Historically had joint pain that was due to osteoarthritis, not gout.    8.  Vaccinations: Vaccinations reviewed with Mr. Miller.  Risks and benefits of vaccinations were discussed.    - Influenza: encouraged yearly vaccination  - [id number]: Advised vaccination  - Shingrix: up to date  - COVID-19: Advised keeping updated    9. Elevated blood pressure:  Sam to follow up with Primary Care provider regarding elevated blood pressure.     Total minutes spent in evaluation with patient, documentation, , and review of pertinent studies and chart notes: 14  The longitudinal plan of care for the rheumatology problem(s) were addressed during this visit.  Due to added complexity of care, we will continue to support the patient and the subsequent management of this condition with ongoing continuity of care.       Mr. Miller verbalized agreement with and understanding of the rational for the diagnosis and treatment plan.  All questions were answered to best of my ability and the patient's satisfaction. Mr. Miller was advised to contact the clinic with any questions that may arise after the clinic visit.      Thank you for involving me in the care of the patient    Return to clinic: 6 months      HPI   Prosper Miller is a 63 year old male with a past medical history significant for hypertension, hyperlipidemia, obstructive sleep apnea, IgA nephropathy, left TKA, right TKA (10/2022), rheumatoid arthritis, and iritis who presents for rheumatology follow-up.    2/25/2022 ophthalmology note by Dr. Michael Jenkins documents that the eye disease is now quiescent and will follow-up in 6 weeks.    3/31/2022: RA is doing well.  Iritis controlled.  Tolerating Humira well.  Only joint  pain is at the bilateral first CMC joints that is worse with activity and improves with rest.  Occasional pain of the left knee at the pes anserine bursa.  Right knee pain worse with activity and improves with rest.  No joint swelling.    8/1/2022: hands shake. Anxiety.  Hands hurt at the bilateral 1st CMC joints that is worse with self-palpation; and pain along the entire right 3rd finger that is on and off throughout the day and he notes hx of injury to the right 3rd finger.  Knees bother him; hx of left TKA and planning for right TKA.  Tolerating Humira well.  Uveitis remains controlled but he says that every now and then he feels like there might be inflammation that is going to start; he with follow-up with his ophthalmologist    8/18/2022: Reports that he was standing on the edge of a ladder and his left plantar midfoot began to hurt, making ambulation more difficult.  There was no swelling, increased warmth, or overlying erythema of the affected area.  He was seen by Dr. Castillo who diagnosed Planter fasciitis.  He was found to have hyperuricemia so was told to take prednisone 40 mg daily x3 days, then 20 mg daily x2 days and after 1 day of taking prednisone he has not felt any better.  Foot pain is worse with the first few steps after any period of inactivity, such as with the first few steps in the morning after waking up or after sitting in a chair for a long period of time.  Otherwise doing well.    1/9/2023: joints are okay.  Occasional random 3rd finger ache that goes away quickly and is not associated with swelling or redness. Left eye has watered, for couple hours for a day or two.     8/8/2023: Intermittent pain of the right second MTP, occurring approximately once every 2 days and resolving within 1-2 hours, typically activity related, and has only occurred since he has changed his shoe wear this summer to flip-flops.  When he has pain of the right second MTP he has no associated swelling,  increased warmth, or overlying erythema.  Other joints are doing well.  Morning stiffness for no more than 10 minutes.  Uveitis controlled.  Planning to see nephrology later this morning.    Today, 2/6/2024: Currently doing well with regard to arthritis and iritis.  Reports that he had more eye symptoms recently but it was not due to the iritis/uveitis and those symptoms resolved.  Only joint pain that he has currently is of the first CMC joints that is worse with activity and improves with self-massage.  Feels like he has been stable.  Has nephrology follow-up later today.    Denies fevers, chills, nausea, vomiting, constipation. No abdominal pain. No chest pain/pressure, palpitations, or shortness of breath. No LE swelling. No neck pain. No oral or nasal sores.  No rash.     Tobacco: Former smoker  EtOH: Weekly  Drugs: None  Occupation:     ROS   12 point review of system was completed and negative except as noted in the HPI     Active Problem List     Patient Active Problem List   Diagnosis    Iritis    Rheumatoid arthritis of hand, unspecified laterality, unspecified rheumatoid factor presence    Rheumatoid arthritis involving multiple sites, unspecified rheumatoid factor presence    Adenomatous colon polyp    JALEESA (obstructive sleep apnea)    Esophageal reflux    Benign essential hypertension    Acute glomerulonephritis with pathological lesion in kidney    Primary hypercholesterolemia    Morbid obesity (H)    Chronic kidney disease, stage 3a (H)    Benign neoplasm of sigmoid colon    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  Past Surgical History     Past Surgical History:   Procedure Laterality Date    EYE SURGERY      HC KNEE SCOPE, DIAGNOSTIC      Description: Arthroscopy Knee Right;  Recorded: 04/30/2008;    JOINT REPLACEMENT      OTHER SURGICAL HISTORY  "Left     left knee arthroplasty    OTHER SURGICAL HISTORY Bilateral     Cataract surgery     Allergy   No Known Allergies  Current Medication List     Current Outpatient Medications   Medication Sig    adalimumab (HUMIRA *CF*) 40 MG/0.4ML pen kit Inject 0.4 mLs (40 mg) Subcutaneous every 14 days . Hold for signs of infection, then seek medical attention.    Cholecalciferol (VITAMIN D3) 2000 UNITS CAPS     EQL NATURAL ZINC 50 MG TABS     escitalopram (LEXAPRO) 5 MG tablet TAKE 1 TABLET BY MOUTH EVERY DAY    fluticasone (FLONASE) 50 MCG/ACT nasal spray INSTILL 1 SPRAY INTO BOTH NOSTRILS DAILY    lisinopril (ZESTRIL) 40 MG tablet Take 1 tablet (40 mg) by mouth daily    NIFEdipine ER (ADALAT CC) 30 MG 24 hr tablet Take 1 tablet (30 mg) by mouth daily for 360 days    pantoprazole (PROTONIX) 40 MG EC tablet TAKE ONE TABLET BY MOUTH TWICE A DAY     No current facility-administered medications for this visit.     Social History   See HPI    Family History     Family History   Problem Relation Age of Onset    Cancer Father     Bone Cancer Father         Physical Exam     Temp Readings from Last 3 Encounters:   03/21/23 98.7  F (37.1  C) (Oral)   12/12/22 97.1  F (36.2  C) (Oral)   12/07/22 97.5  F (36.4  C) (Tympanic)     BP Readings from Last 5 Encounters:   02/06/24 (!) 140/83   08/08/23 100/68   08/08/23 100/68   04/11/23 (!) 156/95   03/21/23 (!) 149/98     Pulse Readings from Last 1 Encounters:   02/06/24 100     Resp Readings from Last 1 Encounters:   08/08/23 18     Estimated body mass index is 37.59 kg/m  as calculated from the following:    Height as of 12/12/22: 1.702 m (5' 7\").    Weight as of this encounter: 108.9 kg (240 lb).    GEN: NAD.   HEENT:  Anicteric, noninjected sclera. No obvious external lesions of the ear and nose. Hearing intact.  CV: S1, S2. RRR. No m/r/g  PULM: No increased work of breathing. CTA bilaterally   MSK: MCPs, PIPs, DIPs without swelling or tenderness to palpation.  Wrists without " swelling or tenderness to palpation.  Elbows and shoulders without swelling or tenderness to palpation. Knees, ankles, and MTPs without swelling or tenderness to palpation.    SKIN: No rash or jaundice seen  PSYCH: Alert. Appropriate.      Labs / Imaging (select studies)   RF/CCP  Recent Labs   Lab Test 12/15/21  0908   CCPIGG 12.0*   RHF 10     HAYES  Recent Labs   Lab Test 12/15/21  0908   WESLEY Borderline Positive*   ANAP1 Homogeneous   ANAT1 1:80     RNP/Sm/SSA/SSB  Recent Labs   Lab Test 12/15/21  0908   RNPIGG Negative   SMIGG Negative   SSAIGG Negative   SSBIGG Negative     dsDNA  Recent Labs   Lab Test 08/01/22  0803 12/15/21  0908   DNA 19.0* 23.0*     C3/C4  Recent Labs   Lab Test 08/01/22  0803 12/15/21  0908   T0QTYOR 145 145   W0ZOAED 30 33     Histone Antibody  Recent Labs   Lab Test 12/15/21  0908   HSTO 0.2     CBC  Recent Labs   Lab Test 08/03/23  0759 04/06/23  0832 01/06/23  0747 10/04/22  1434 08/01/22  0803 03/31/22  0805 03/12/21  1310 03/09/20  0812 09/16/19  0804 03/12/19  0849   WBC  --  6.1 5.0 10.0 5.8 6.3   < > 6.6 5.7 5.6   RBC  --  4.68 4.27* 4.33* 4.35* 4.21*   < > 4.76 4.50 4.88   HGB 13.5 14.6 13.4 13.8 13.4 13.2*   < > 15.2 14.5 15.7   HCT  --  42.4 39.2* 41.0 39.7* 39.3*   < > 44.5 42.3 44.5   MCV  --  91 92 95 91 93   < > 94 94 91   RDW  --  13.3 12.7 14.2 13.3 13.4   < > 14.0 12.7 13.1   PLT  --  278 299 299 347 347   < > 275 302 343   MCH  --  31.2 31.4 31.9 30.8 31.4   < > 31.9 32.2 32.2   MCHC  --  34.4 34.2 33.7 33.8 33.6   < > 34.2 34.3 35.3   NEUTROPHIL  --  45  --   --  34 42   < > 43.8 40.9 37.6   LYMPH  --  42  --   --  50 44   < > 43.4 43.2 47.1   MONOCYTE  --  7  --   --  11 10   < > 6.7 9.1 8.9   EOSINOPHIL  --  5  --   --  5 3   < > 5.6 6.3 5.5   BASOPHIL  --  1  --   --  1 1   < > 0.5 0.5 0.9   ANEU  --   --   --   --   --   --   --  2.9 2.3 2.1   ALYM  --   --   --   --   --   --   --  2.9 2.5 2.6   MICHAEL  --   --   --   --   --   --   --  0.4 0.5 0.5   AEOS  --   --    --   --   --   --   --  0.4 0.4 0.3   ABAS  --   --   --   --   --   --   --  0.0 0.0 0.1   ANEUTAUTO  --  2.7  --   --  1.9 2.7   < >  --   --   --    ALYMPAUTO  --  2.6  --   --  2.9 2.8   < >  --   --   --    AMONOAUTO  --  0.4  --   --  0.6 0.6   < >  --   --   --    AEOSAUTO  --  0.3  --   --  0.3 0.2   < >  --   --   --    ABSBASO  --  0.1  --   --  0.0 0.0   < >  --   --   --     < > = values in this interval not displayed.     CMP  Recent Labs   Lab Test 02/01/24  0851 08/03/23  0759 04/06/23  0832 01/06/23  0747 10/04/22  1434 08/01/22  0803 08/01/22  0803 03/31/22  0804 12/07/21  1349 08/30/21  0848 03/12/21  1310 03/09/20  0812 02/24/20  1634 09/16/19  0804    140 140 142 140  --  139  --  140  --   --   --  142  --    POTASSIUM 4.1 4.3 4.2 4.1 4.3   < > 5.0  --  4.2  --   --   --  3.8  --    CHLORIDE 101 106 106 109* 105   < > 109  --  102  --   --   --  105  --    CO2 25 20* 22 19* 24   < > 25  --  23  --   --   --  26  --    ANIONGAP 12 14 12 14 11   < > 5  --  15  --   --   --  11  --    GLC 96 101* 113* 99 105*  --  96  --  77   < >  --   --  92  --    BUN 26.7* 26.9* 18.8 20.4 29.8*   < > 17  --  27*  --   --   --  12  --    CR 1.53* 1.36* 1.37* 1.28* 1.68*  --  1.19 1.25 1.45*   < > 1.52* 1.05 1.07 1.06   GFRESTIMATED 51* 58* 58* 63 46*  --  69 66 52*   < > 49* 77 >60 76   GFRESTBLACK  --   --   --   --   --   --   --   --   --   --  57* 89 >60 88   ERNESTINA 9.5 9.3 9.7 9.0 9.3  --  9.5  --  10.2  --   --   --  9.2  --    BILITOTAL  --   --  0.4  --   --   --  0.3 0.4 0.5   < > 0.3 0.3  --  0.3   ALBUMIN 4.1 4.0 4.0 3.9  --   --  3.6 3.5 3.9   < > 3.2* 3.5  --  3.6   PROTTOTAL  --   --  7.0  --   --   --  7.7 7.6 7.6   < > 6.9 7.7  --  7.7   ALKPHOS  --   --  69  --   --   --  74 63 68   < > 70 69  --  72   AST  --   --  29  --   --   --  25 25 18   < > 28 21  --  24   ALT  --   --  24  --   --   --  43 45 26   < > 30 37  --  36    < > = values in this interval not displayed.     HgA1c  Recent  Labs   Lab Test 12/07/21  1349   A1C 5.5     Uric Acid  Recent Labs   Lab Test 01/06/23  0747 08/15/22  1026   URIC 8.1* 8.5*     Calcium/VitaminD  Recent Labs   Lab Test 02/01/24  0851 08/03/23  0759 04/06/23  0832 01/06/23  0747 08/01/22  0803 12/07/21  1349   ERNESTINA 9.5 9.3 9.7 9.0   < > 10.2   VITDT  --   --   --  58  --  50    < > = values in this interval not displayed.     ESR/CRP  Recent Labs   Lab Test 04/06/23  0832 08/01/22  0803 03/31/22  0805 03/31/22  0804 10/08/21  1330   SED 17* 20 27*  --  25*   CRP  --  3.0  --  4.4 3.6   CRPI <3.00  --   --   --   --      TSH/T4  Recent Labs   Lab Test 12/07/21  1349 02/24/20  1634   TSH 1.75 1.67     Hepatitis B  Recent Labs   Lab Test 03/26/18  1540   HBCAB Nonreactive   HEPBANG Nonreactive     Hepatitis C  Recent Labs   Lab Test 03/26/18  1540   HCVAB Nonreactive     Tuberculosis Screening  Recent Labs   Lab Test 08/30/21  0848 03/26/18  1541   TBRES Negative  --    TBRSLT  --  Negative   TBAGN  --  0.00     UA  Recent Labs   Lab Test 01/06/23  0805 08/01/22  0803 12/07/21  1401   COLOR Yellow Yellow Yellow   APPEARANCE Clear Clear Clear   URINEGLC Negative Negative Negative   URINEBILI Negative Negative Negative   SG 1.020 1.010 1.015   URINEPH 5.5 5.5 5.5   PROTEIN Trace* Negative 30*   UROBILINOGEN 0.2 0.2 0.2   NITRITE Negative Negative Negative   UBLD Trace* Small* Moderate*   LEUKEST Negative Negative Negative   WBCU None Seen 0-5 None Seen   RBCU 0-2 0-2 2-5*   BACTERIA Many*  --  None Seen     Urine Microscopic  Recent Labs   Lab Test 01/06/23  0805 08/01/22  0803 12/07/21  1401   WBCU None Seen 0-5 None Seen   RBCU 0-2 0-2 2-5*   BACTERIA Many*  --  None Seen     Urine Protein  GHUTP and UTP= Urine protein (random), GHUTPG and UTPG = urine protein:creatinine ratio (random), UCRR = urine creatinine (random)  Recent Labs   Lab Test 08/03/23  0759 01/06/23  0805 08/01/22  0803   GHUTP 15.2* 19.5* 12.2   GHUTPG 0.12 0.18 0.16   UCRR 121.9 107.0 74.8      Immunization History     Immunization History   Administered Date(s) Administered    COVID-19 MONOVALENT 12+ (Pfizer) 04/22/2021, 05/13/2021, 11/15/2021    Hepatitis A (ADULT 19+) 04/30/2008, 11/11/2008    Influenza (H1N1) 12/18/2009    Influenza Vaccine, 6+MO IM (QUADRIVALENT W/PRESERVATIVES) 11/11/2008, 12/18/2009, 09/18/2020    Mantoux Tuberculin Skin Test 02/01/2007    TDAP (Adacel,Boostrix) 03/09/2011, 03/21/2023    Zoster recombinant adjuvanted (SHINGRIX) 04/06/2023, 08/08/2023          Chart documentation done in part with Dragon Voice recognition Software. Although reviewed after completion, some word and grammatical error may remain.    Alex Segundo MD

## 2024-02-12 ENCOUNTER — PATIENT OUTREACH (OUTPATIENT)
Dept: GASTROENTEROLOGY | Facility: CLINIC | Age: 64
End: 2024-02-12
Payer: COMMERCIAL

## 2024-03-30 ENCOUNTER — HOSPITAL ENCOUNTER (EMERGENCY)
Facility: CLINIC | Age: 64
Discharge: HOME | End: 2024-03-30
Payer: COMMERCIAL

## 2024-03-30 ENCOUNTER — NURSE TRIAGE (OUTPATIENT)
Dept: NURSING | Facility: CLINIC | Age: 64
End: 2024-03-30
Payer: COMMERCIAL

## 2024-03-30 VITALS
HEART RATE: 87 BPM | OXYGEN SATURATION: 97 % | SYSTOLIC BLOOD PRESSURE: 154 MMHG | RESPIRATION RATE: 20 BRPM | TEMPERATURE: 97.8 F | DIASTOLIC BLOOD PRESSURE: 83 MMHG

## 2024-03-30 DIAGNOSIS — M79.674 PAIN OF TOE OF RIGHT FOOT: ICD-10-CM

## 2024-03-30 PROCEDURE — 99214 OFFICE O/P EST MOD 30 MIN: CPT

## 2024-03-30 PROCEDURE — G0463 HOSPITAL OUTPT CLINIC VISIT: HCPCS

## 2024-03-30 RX ORDER — PREDNISONE 20 MG/1
TABLET ORAL
Qty: 10 TABLET | Refills: 0 | Status: SHIPPED | OUTPATIENT
Start: 2024-03-30 | End: 2024-06-10

## 2024-03-30 RX ORDER — HYDROCODONE BITARTRATE AND ACETAMINOPHEN 5; 325 MG/1; MG/1
1 TABLET ORAL EVERY 6 HOURS PRN
Qty: 8 TABLET | Refills: 0 | Status: SHIPPED | OUTPATIENT
Start: 2024-03-30 | End: 2024-04-02

## 2024-03-30 ASSESSMENT — ENCOUNTER SYMPTOMS
APPETITE CHANGE: 0
WOUND: 0
FATIGUE: 0
CHILLS: 0
CONFUSION: 0
CHEST TIGHTNESS: 0
WEAKNESS: 0
NAUSEA: 0
SHORTNESS OF BREATH: 0
COLOR CHANGE: 0
VOMITING: 0
NUMBNESS: 0
ARTHRALGIAS: 1
FEVER: 0
JOINT SWELLING: 1

## 2024-03-30 ASSESSMENT — COLUMBIA-SUICIDE SEVERITY RATING SCALE - C-SSRS
1. IN THE PAST MONTH, HAVE YOU WISHED YOU WERE DEAD OR WISHED YOU COULD GO TO SLEEP AND NOT WAKE UP?: NO
6. HAVE YOU EVER DONE ANYTHING, STARTED TO DO ANYTHING, OR PREPARED TO DO ANYTHING TO END YOUR LIFE?: NO
2. HAVE YOU ACTUALLY HAD ANY THOUGHTS OF KILLING YOURSELF IN THE PAST MONTH?: NO

## 2024-03-30 NOTE — TELEPHONE ENCOUNTER
Flare up, first time I've had it, gout, right foot, big toe.  Pain at 8, using acetaminophen. He will go to [place] urgent care.  Do Nichols RN  [facility]    Reason for Disposition   Toe pain is main symptom   [1] SEVERE pain (e.g., excruciating, unable to do any normal activities) AND [2] not improved after 2 hours of pain medicine     Pain at 8    Additional Information   Negative: Followed a foot injury   Negative: Diabetes mellitus   Negative: Followed a toe injury   Negative: Wound looks infected   Negative: Caused by an animal bite   Negative: Caused by frostbite   Negative: Caused by an ingrown toenail   Negative: Athlete's Foot suspected (i.e., itchy red rash in web space between toes)   Negative: Foot pain is main symptom   Negative: Foot is cool or blue in comparison to other foot   Negative: Purple or black skin on toe  (Exception: Person remembers bruising the toe from an injury.)   Negative: [1] Looks infected (spreading redness, red streak, pus) AND [2] fever   Negative: Patient sounds very sick or weak to the triager    Protocols used: Foot Pain-A-AH, Toe Pain-A-AH

## 2024-03-30 NOTE — ED PROVIDER NOTES
History     Chief Complaint   Patient presents with    Foot Pain     Right foot pain x's 2 days   Difficulty ambulating, patient states he thinks he has gout   Patient does have rheumatid arthritis and is taking Humira 40 mg every other week   Patient states his rheumatologist has pointed out in the past he has high uric acid levels      HPI  Prosper Miller is a 63 year old male with a past medical history of rheumatoid arthritis and CKD stage III who presents for an acute onset of right great toe pain.  Pain began about 2 days ago and has been worsening in severity to the point where it is difficult for him to ambulate.  Denies any known injury or trauma to the area.  He has tried taking Tylenol which has not alleviated the pain.  Reports he has had elevated uric acid levels in the past (most recent was 8.1 on 1/6/23) without a diagnosis of gout.  He does have a history of rheumatoid arthritis which occasionally affects his fingers, pain does not feel like it is related to that.  He has not experienced anything like this in the past. Denies any numbness or tingling, laceration, erythema, spreading redness or radiating pain. Patient is not diabetic.    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  Created by Conversion      JALEESA (obstructive sleep apnea) 12/19/2021     Priority: Medium     Formatting of this note might be different from the original.  Created by Conversion      Esophageal reflux 12/19/2021     Priority: Medium     Formatting of this note might be different from the original.  Created by Conversion      Benign essential hypertension 12/19/2021     Priority: Medium     Formatting of this note might be different from the original.  Created by Conversion      Acute  glomerulonephritis with pathological lesion in kidney 12/19/2021     Priority: Medium     Formatting of this note might be different from the original.  Created by Conversion    Replacement Utility updated for latest IMO load      Primary hypercholesterolemia 12/19/2021     Priority: Medium     Formatting of this note might be different from the original.  Created by Conversion      Morbid obesity (H) 12/19/2021     Priority: Medium    Chronic kidney disease, stage 3a (H) 12/19/2021     Priority: Medium    Adenomatous colon polyp 12/07/2021     Priority: Medium    Benign neoplasm of sigmoid colon 03/26/2019     Priority: Medium    Diverticular disease of large intestine 03/22/2019     Priority: Medium    Rheumatoid arthritis involving multiple sites, unspecified rheumatoid factor presence 03/12/2019     Priority: Medium     IMO Regulatory Load OCT 2020      Iritis 11/30/2016     Priority: Medium    Rheumatoid arthritis of hand, unspecified laterality, unspecified rheumatoid factor presence 11/30/2016     Priority: Medium     IMO Regulatory Load OCT 2020          Past Medical History:    No past medical history on file.    Past Surgical History:    Past Surgical History:   Procedure Laterality Date    EYE SURGERY      HC KNEE SCOPE, DIAGNOSTIC      Description: Arthroscopy Knee Right;  Recorded: 04/30/2008;    JOINT REPLACEMENT      OTHER SURGICAL HISTORY Left     left knee arthroplasty    OTHER SURGICAL HISTORY Bilateral     Cataract surgery       Family History:    Family History   Problem Relation Age of Onset    Cancer Father     Bone Cancer Father        Social History:  Marital Status:   [2]  Social History     Tobacco Use    Smoking status: Former    Smokeless tobacco: Never   Vaping Use    Vaping Use: Never used   Substance Use Topics    Alcohol use: Yes     Comment: weekly    Drug use: No        Medications:    HYDROcodone-acetaminophen (NORCO) 5-325 MG tablet  HYDROcodone-acetaminophen (NORCO) 5-325  MG tablet  predniSONE (DELTASONE) 20 MG tablet  adalimumab (HUMIRA *CF*) 40 MG/0.4ML pen kit  Cholecalciferol (VITAMIN D3) 2000 UNITS CAPS  EQL NATURAL ZINC 50 MG TABS  escitalopram (LEXAPRO) 5 MG tablet  fluticasone (FLONASE) 50 MCG/ACT nasal spray  lisinopril (ZESTRIL) 40 MG tablet  magnesium chloride 535 (64 Mg) MG TBEC CR tablet  NIFEdipine ER (ADALAT CC) 30 MG 24 hr tablet  pantoprazole (PROTONIX) 40 MG EC tablet          Review of Systems   Constitutional:  Negative for appetite change, chills, fatigue and fever.   Respiratory:  Negative for chest tightness and shortness of breath.    Cardiovascular:  Negative for chest pain and leg swelling.   Gastrointestinal:  Negative for nausea and vomiting.   Musculoskeletal:  Positive for arthralgias and joint swelling.   Skin:  Negative for color change and wound.   Neurological:  Negative for weakness and numbness.   Psychiatric/Behavioral:  Negative for confusion.        Physical Exam   BP: (!) 154/83  Pulse: 87  Temp: 97.8  F (36.6  C)  Resp: 20  SpO2: 97 %      Physical Exam  Vitals and nursing note reviewed.   Constitutional:       General: He is not in acute distress.     Appearance: Normal appearance. He is not ill-appearing, toxic-appearing or diaphoretic.   Eyes:      Extraocular Movements: Extraocular movements intact.      Conjunctiva/sclera: Conjunctivae normal.   Cardiovascular:      Rate and Rhythm: Normal rate and regular rhythm.      Pulses:           Dorsalis pedis pulses are 2+ on the right side and 2+ on the left side.        Posterior tibial pulses are 2+ on the right side and 2+ on the left side.   Pulmonary:      Effort: Pulmonary effort is normal.      Breath sounds: Normal breath sounds.   Musculoskeletal:      Right foot: Decreased range of motion. No deformity, bunion or prominent metatarsal heads.      Left foot: Normal range of motion. No deformity, bunion or prominent metatarsal heads.   Feet:      Right foot:      Skin integrity: No ulcer,  blister, skin breakdown, erythema or warmth.      Toenail Condition: Right toenails are normal.      Left foot:      Skin integrity: No ulcer, blister, skin breakdown, erythema or warmth.      Toenail Condition: Left toenails are normal.      Comments: Dorsal aspect of the right foot at the base of the right toe is swollen when compared to left foot. TTP along base of great toe that does not extend to ankle. Neurovascularly intact.  Neurological:      Mental Status: He is alert.             Assessments & Plan (with Medical Decision Making)     63-year-old male who presents with a 2-day history of progressive right great toe pain.  On exam, he is sitting in a wheelchair unable to bear weight onto his right foot, the base of the right great toe on the dorsal aspect appears more swollen when compared to the left foot.  No erythema, fluctuance, or warmth.  He is exquisitely tender to light touch.  Neurovascularly intact.  Given the patient's symptoms along with elevated uric acid levels in the past, I suspect that he may be experiencing an acute gout flare.  I recommended treatment with a prednisone burst, patient has CKD stage III so cannot take NSAIDs.  Given the amount of pain is in, I also recommended Norco for pain relief as needed.  Recommended that he follow-up with primary care for further evaluation and establishment of diagnosis along with ongoing treatment.  Advised patient that if he does not experience any relief despite the recommended treatment, or he develops any new worrisome symptoms that he return for further evaluation.  All questions answered.  Patient verbalizes understanding and agreement with the above plan.    I have reviewed the nursing notes.    I have reviewed the findings, diagnosis, plan and need for follow up with the patient.    Discharge Medication List as of 3/30/2024  9:45 AM        START taking these medications    Details   HYDROcodone-acetaminophen (NORCO) 5-325 MG tablet Take 1  tablet by mouth every 6 hours as needed for severe pain, Disp-8 tablet, R-0, E-Prescribe      predniSONE (DELTASONE) 20 MG tablet Take two tablets (= 40mg) each day for 5 (five) days, Disp-10 tablet, R-0, E-Prescribe             Final diagnoses:   Pain of toe of right foot       3/30/2024   [facility] EMERGENCY DEPT       Latosha Ortez PA-C  03/30/24 [id number]

## 2024-03-30 NOTE — DISCHARGE INSTRUCTIONS
You were seen today for pain in your right great toe, suspicious of gout.  I sent a prescription for a prednisone burst to help with inflammation along with Norco for pain relief. I recommend you follow-up with your primary care provider to further evaluate and diagnose.  If your pain does not improve despite the recommended treatment, please return for further evaluation.

## 2024-04-10 ENCOUNTER — LAB (OUTPATIENT)
Dept: LAB | Facility: CLINIC | Age: 64
End: 2024-04-10
Payer: COMMERCIAL

## 2024-04-10 DIAGNOSIS — N18.31 CHRONIC KIDNEY DISEASE, STAGE 3A (H): ICD-10-CM

## 2024-04-10 LAB
ALBUMIN MFR UR ELPH: 16.3 MG/DL
ALBUMIN UR-MCNC: NEGATIVE MG/DL
ANION GAP SERPL CALCULATED.3IONS-SCNC: 12 MMOL/L (ref 7–15)
APPEARANCE UR: CLEAR
BILIRUB UR QL STRIP: NEGATIVE
BUN SERPL-MCNC: 28.3 MG/DL (ref 8–23)
CALCIUM SERPL-MCNC: 9.8 MG/DL (ref 8.8–10.2)
CHLORIDE SERPL-SCNC: 106 MMOL/L (ref 98–107)
COLOR UR AUTO: YELLOW
CREAT SERPL-MCNC: 1.55 MG/DL (ref 0.67–1.17)
CREAT UR-MCNC: 108.7 MG/DL
DEPRECATED HCO3 PLAS-SCNC: 23 MMOL/L (ref 22–29)
EGFRCR SERPLBLD CKD-EPI 2021: 50 ML/MIN/1.73M2
GLUCOSE SERPL-MCNC: 98 MG/DL (ref 70–99)
GLUCOSE UR STRIP-MCNC: NEGATIVE MG/DL
HGB UR QL STRIP: ABNORMAL
KETONES UR STRIP-MCNC: NEGATIVE MG/DL
LEUKOCYTE ESTERASE UR QL STRIP: NEGATIVE
MAGNESIUM SERPL-MCNC: 1.4 MG/DL (ref 1.7–2.3)
NITRATE UR QL: NEGATIVE
PH UR STRIP: 5.5 [PH] (ref 5–7)
POTASSIUM SERPL-SCNC: 4.7 MMOL/L (ref 3.4–5.3)
PROT/CREAT 24H UR: 0.15 MG/MG CR (ref 0–0.2)
RBC #/AREA URNS AUTO: ABNORMAL /HPF
SODIUM SERPL-SCNC: 141 MMOL/L (ref 135–145)
SP GR UR STRIP: 1.01 (ref 1–1.03)
UROBILINOGEN UR STRIP-ACNC: 0.2 E.U./DL
WBC #/AREA URNS AUTO: ABNORMAL /HPF

## 2024-04-10 PROCEDURE — 84156 ASSAY OF PROTEIN URINE: CPT

## 2024-04-10 PROCEDURE — 81001 URINALYSIS AUTO W/SCOPE: CPT

## 2024-04-10 PROCEDURE — 80048 BASIC METABOLIC PNL TOTAL CA: CPT

## 2024-04-10 PROCEDURE — 83735 ASSAY OF MAGNESIUM: CPT

## 2024-04-10 PROCEDURE — 36415 COLL VENOUS BLD VENIPUNCTURE: CPT

## 2024-04-24 ENCOUNTER — TELEPHONE (OUTPATIENT)
Dept: NEPHROLOGY | Facility: CLINIC | Age: 64
End: 2024-04-24
Payer: COMMERCIAL

## 2024-04-24 DIAGNOSIS — N18.31 CHRONIC KIDNEY DISEASE, STAGE 3A (H): Primary | ICD-10-CM

## 2024-04-24 NOTE — TELEPHONE ENCOUNTER
"Spoke with patient to relay result note from Dr Villa:  \"Overall things are stable or improving. Mag is moving in the right direction and he should continue his current supplement. Please let me know if he has any questions. \"    Patient stated an understanding and requested a paper copy of his lab results. His next follow up is marked for reschedule as provider is not in clinic 8/6. Appointment rescheduled for 7/30 at 1pm with Dr Villa.  Sending out an appointment reminder letter.  ELEANOR Rowe Care Coordinator  Nephrology    "

## 2024-05-07 ENCOUNTER — TELEPHONE (OUTPATIENT)
Dept: RHEUMATOLOGY | Facility: CLINIC | Age: 64
End: 2024-05-07
Payer: COMMERCIAL

## 2024-05-07 DIAGNOSIS — N18.31 CHRONIC KIDNEY DISEASE, STAGE 3A (H): ICD-10-CM

## 2024-05-07 DIAGNOSIS — K21.9 GASTROESOPHAGEAL REFLUX DISEASE WITHOUT ESOPHAGITIS: ICD-10-CM

## 2024-05-07 RX ORDER — LISINOPRIL 40 MG/1
40 TABLET ORAL DAILY
Qty: 90 TABLET | Refills: 3 | Status: SHIPPED | OUTPATIENT
Start: 2024-05-07 | End: 2024-08-13

## 2024-05-07 NOTE — TELEPHONE ENCOUNTER
[facility] Call Center    Phone Message    May a detailed message be left on voicemail: yes     Reason for Call: Other:  Per pt's wife, pt has [employer] insurance through her job, but she will be retiring. Pt will now be with [employer] through [place] [employer] instead. Requesting a call back to discuss if this would affect the assistance program for pt's humira. Please follow up.      Action Taken: Other: rheum     Travel Screening: Not Applicable

## 2024-05-07 NOTE — TELEPHONE ENCOUNTER
If patient insurance ID changes, new PA would be required.    Humira copay card should not be affected by change.    Called patient at [phone]    Expected change in insurance - patient is still shopping marketplace    Advised to look at fully funded plans vs. Accumulator or maximizer plans  Patient would like to stay with [facility] preferred in network with [facility]  Patient requests conversations regarding insurance coverage be with his wife since she is handling most of this.   Provided patient with my name and phone number to call and discuss insurance coverage.  Per patient his wife will call me in the next couple days to discuss.    If patients wife does not call this week, call back Monday to check in with patient.    Thank You,     Meghan Woody [facility]  Specialty Pharmacy Clinic Liaison II  [facility]  [email or web address]  [email or web address]  Phone: [phone]  Fax: [phone]

## 2024-05-08 RX ORDER — PANTOPRAZOLE SODIUM 40 MG/1
TABLET, DELAYED RELEASE ORAL
Qty: 180 TABLET | Refills: 1 | Status: SHIPPED | OUTPATIENT
Start: 2024-05-08

## 2024-05-09 NOTE — TELEPHONE ENCOUNTER
Patients wife called back:    She plans to stick with [employer] to stay in network with [facility]    Per Marium she chose the [employer] plan which has an estimated copay of $750 per month for Humira.    I encouraged her that this is good pricing and his copay card and network would not be affected by change.    Marium will call me when Prosper's new insurance is active to initiate a new prior authorization.    Thank You,     Ladarius Woody [facility]  Specialty Pharmacy Clinic Liaison II  [facility]  [email or web address]  [email or web address]  Phone: [phone]  Fax: [phone]

## 2024-05-21 ENCOUNTER — TELEPHONE (OUTPATIENT)
Dept: RHEUMATOLOGY | Facility: CLINIC | Age: 64
End: 2024-05-21

## 2024-05-21 ENCOUNTER — OFFICE VISIT (OUTPATIENT)
Dept: FAMILY MEDICINE | Facility: CLINIC | Age: 64
End: 2024-05-21
Payer: COMMERCIAL

## 2024-05-21 VITALS
DIASTOLIC BLOOD PRESSURE: 63 MMHG | RESPIRATION RATE: 20 BRPM | OXYGEN SATURATION: 96 % | BODY MASS INDEX: 38.17 KG/M2 | SYSTOLIC BLOOD PRESSURE: 110 MMHG | HEIGHT: 67 IN | WEIGHT: 243.2 LBS | HEART RATE: 82 BPM

## 2024-05-21 DIAGNOSIS — F39 MOOD DISORDER (H): ICD-10-CM

## 2024-05-21 DIAGNOSIS — E66.01 MORBID OBESITY (H): ICD-10-CM

## 2024-05-21 DIAGNOSIS — I10 BENIGN ESSENTIAL HYPERTENSION: ICD-10-CM

## 2024-05-21 DIAGNOSIS — M10.072 ACUTE IDIOPATHIC GOUT OF LEFT FOOT: Primary | ICD-10-CM

## 2024-05-21 DIAGNOSIS — G47.33 OSA (OBSTRUCTIVE SLEEP APNEA): ICD-10-CM

## 2024-05-21 DIAGNOSIS — E78.00 PRIMARY HYPERCHOLESTEROLEMIA: ICD-10-CM

## 2024-05-21 DIAGNOSIS — M06.9 RHEUMATOID ARTHRITIS INVOLVING MULTIPLE SITES, UNSPECIFIED WHETHER RHEUMATOID FACTOR PRESENT (H): ICD-10-CM

## 2024-05-21 DIAGNOSIS — N18.31 CHRONIC KIDNEY DISEASE, STAGE 3A (H): ICD-10-CM

## 2024-05-21 DIAGNOSIS — Z12.5 SCREENING FOR PROSTATE CANCER: ICD-10-CM

## 2024-05-21 PROCEDURE — G2211 COMPLEX E/M VISIT ADD ON: HCPCS | Performed by: FAMILY MEDICINE

## 2024-05-21 PROCEDURE — 99214 OFFICE O/P EST MOD 30 MIN: CPT | Performed by: FAMILY MEDICINE

## 2024-05-21 RX ORDER — PREDNISONE 20 MG/1
TABLET ORAL
Qty: 14 TABLET | Refills: 0 | Status: SHIPPED | OUTPATIENT
Start: 2024-05-21 | End: 2024-06-10

## 2024-05-21 ASSESSMENT — ENCOUNTER SYMPTOMS: NERVOUS/ANXIOUS: 1

## 2024-05-21 NOTE — PROGRESS NOTES
"  Assessment & Plan     Acute idiopathic gout of left foot    Will treat with a course of prednisone  Recommend follow-up with rheumatology  Consider a trial of a daily medication like allopurinol  He will discuss this with his rheumatologist    - predniSONE (DELTASONE) 20 MG tablet; Take one PO BID x 4 days then one PO Qday x 4 days then 1/2 PO Qday x 4 days    Benign essential hypertension    Currently stable  Continue lisinopril and nifedipine    Primary hypercholesterolemia    Check a lipid cascade  Calculate the 10 year cardiovascular risk    - Lipid panel reflex to direct LDL Fasting; Future  - Hepatic function panel; Future    Chronic kidney disease, stage 3a (H)    Recommend good control of blood pressure  Recommend avoiding NSAIDs  Continue lisinopril    JALEESA (obstructive sleep apnea)      Rheumatoid arthritis involving multiple sites, unspecified whether rheumatoid factor present (H)    Continue plan per rheumatology  He is treated with Humira    Screening for prostate cancer    Check a PSA  - PSA, screen; Future    Mood disorder (H24)    His mood has improved with Lexapro  Continue Lexapro    Morbid obesity (H)    Recommend working on diet and exercise      The longitudinal plan of care for the diagnosis(es)/condition(s) as documented were addressed during this visit. Due to the added complexity in care, I will continue to support Sam in the subsequent management and with ongoing continuity of care.              BMI  Estimated body mass index is 38.09 kg/m  as calculated from the following:    Height as of this encounter: 1.702 m (5' 7\").    Weight as of this encounter: 110.3 kg (243 lb 3.2 oz).   Weight management plan: Discussed healthy diet and exercise guidelines          Subjective   Sam is a 63 year old, presenting for the following health issues:  Recheck Medication and Arthritis (C/o gout left foot )        5/21/2024    11:14 AM   Additional Questions   Roomed by Kerrie CLAIRE CMA " "    Arthritis    Anxiety    History of Present Illness       Reason for visit:  Foot  Symptom onset:  More than a month  Symptoms include:  Sore  Symptom intensity:  Severe  Symptom progression:  Staying the same  Had these symptoms before:  Yes  Has tried/received treatment for these symptoms:  Yes  Previous treatment was successful:  No  What makes it worse:  Standing  What makes it better:  Ice    He eats 0-1 servings of fruits and vegetables daily.He consumes 1 sweetened beverage(s) daily.He exercises with enough effort to increase his heart rate 9 or less minutes per day.  He exercises with enough effort to increase his heart rate 3 or less days per week. He is missing 1 dose(s) of medications per week.     Sam presents to the clinic primarily because of concern regarding pain and swelling in his foot.  He has a history of gout and has had swelling and soreness in the left foot intermittently over the past month.  He has tried ice.  This has not been improving.  In the past he has required treatment with prednisone.  He has had an elevated uric acid level in the past as well.    His medical history is notable for hypertension, hypercholesterolemia, gastroesophageal reflux disease, sleep apnea, and rheumatoid arthritis.     He continues to follow-up with Dr. Segundo for rheumatoid arthritis and is treated with Humira.     Given chronic kidney disease he has had follow-up with a kidney specialist.  His kidney function normalized earlier in the year but his most recent GFR was low again at 46.     Following his last visit he was started on Lexapro for a mood disorder that has been helpful for him.    Review of Systems  Constitutional, HEENT, cardiovascular, pulmonary, gi and gu systems are negative, except as otherwise noted.      Objective    /63 (BP Location: Left arm, Patient Position: Sitting, Cuff Size: Adult Large)   Pulse 82   Resp 20   Ht 1.702 m (5' 7\")   Wt 110.3 kg (243 lb 3.2 oz)   SpO2 " 96%   BMI 38.09 kg/m    Body mass index is 38.09 kg/m .  Physical Exam   GENERAL: alert and no distress  EYES: Eyes grossly normal to inspection, PERRL and conjunctivae and sclerae normal  HENT: ear canals and TM's normal, nose and mouth without ulcers or lesions  NECK: no adenopathy, no asymmetry, masses, or scars  RESP: lungs clear to auscultation - no rales, rhonchi or wheezes  CV: regular rate and rhythm, normal S1 S2, no S3 or S4, no murmur, click or rub, no peripheral edema  MS: There is swelling noted over the dorsum of the left foot  SKIN: no suspicious lesions or rashes  NEURO: Normal strength and tone, mentation intact and speech normal  PSYCH: mentation appears normal, affect normal/bright            Signed Electronically by: Momo Hartley MD

## 2024-05-21 NOTE — PATIENT INSTRUCTIONS
Sam,    Take the prednisone as prescribed   Contact the office of Dr. Segundo about a daily medicine (like allopurinol)  Your blood pressure is excellent  I entered future labs. You may need to remind the lab people to do my labs in the future (prostate, cholesterol, and liver panel)  Continue the Lexapro    Say shravan to Marium!    Momo Hartley MD

## 2024-05-21 NOTE — TELEPHONE ENCOUNTER
[facility] Call Center    Phone Message    May a detailed message be left on voicemail: yes     Reason for Call: Other: Caller requesting a call back to speak with care team about a medication his PCP wants him to start on .      Action Taken: Other: Rheum     Travel Screening: Not Applicable

## 2024-05-21 NOTE — TELEPHONE ENCOUNTER
Pt states he has been having more frequent gout flare ups, two in the past 3 weeks, one in right big toe and one in left big toe. Pt saw PCP today and was told to ask Dr Segundo about taking a long-term med for gout treatment. Pt currently being treated with prednisone. Added pt on to Dr Segundo's schedule on 6/10 to be seen and evaluated and to further discuss long-term treatment of gout.

## 2024-05-27 PROBLEM — F39 MOOD DISORDER (H): Status: ACTIVE | Noted: 2024-05-27

## 2024-06-07 ENCOUNTER — TELEPHONE (OUTPATIENT)
Dept: UROLOGY | Facility: CLINIC | Age: 64
End: 2024-06-07
Payer: COMMERCIAL

## 2024-06-07 DIAGNOSIS — N18.31 CHRONIC KIDNEY DISEASE, STAGE 3A (H): ICD-10-CM

## 2024-06-07 NOTE — TELEPHONE ENCOUNTER
Left vm for patient to return call to verify he is taking magnesium chloride 64 mg.    Patient returned call and verified he takes 2 tablets twice daily. He states he does forget to take them twice a day a at times.   Nephrology Note: Medication Refill Request    Medication Refill Request:     Medication/Dose/Frequency:   magnesium chloride 535 (64 Mg) MG TBEC CR tablet 360 tablet 3 6/7/2024 -- No   Sig - Route: Take 2 tablets (1,070 mg) by mouth 2 times daily - Oral     Preferred Pharmacy: [facility] [id number] [place] [address] [address] [facility] (Ph: [phone])   Provider: Dr Villa                          SITUATION/BACKROUND:                 Last office visit: 2/6/24        Future office visit: 7/30/24     ASSESSMENT:     Neph Assessments:    Recent Labs:  CBC Results:  Recent Labs   Lab Test 08/03/23  0759 04/06/23  0832   WBC  --  6.1   RBC  --  4.68   HGB 13.5 14.6   HCT  --  42.4   MCV  --  91   MCH  --  31.2   MCHC  --  34.4   RDW  --  13.3   PLT  --  278     Last Renal Panel:  Sodium   Date Value Ref Range Status   04/10/2024 141 135 - 145 mmol/L Final     Comment:     Reference intervals for this test were updated on 09/26/2023 to more accurately reflect our healthy population. There may be differences in the flagging of prior results with similar values performed with this method. Interpretation of those prior results can be made in the context of the updated reference intervals.    09/13/2017 142 133 - 144 mmol/L Final     Potassium   Date Value Ref Range Status   04/10/2024 4.7 3.4 - 5.3 mmol/L Final   08/01/2022 5.0 3.4 - 5.3 mmol/L Final   09/13/2017 4.2 3.4 - 5.3 mmol/L Final     Chloride   Date Value Ref Range Status   04/10/2024 106 98 - 107 mmol/L Final   08/01/2022 109 94 - 109 mmol/L Final   09/13/2017 107 94 - 109 mmol/L Final     Carbon Dioxide   Date Value Ref Range Status   09/13/2017 28 20 - 32 mmol/L Final     Carbon Dioxide (CO2)   Date Value Ref Range Status   04/10/2024 23 22 - 29  mmol/L Final   08/01/2022 25 20 - 32 mmol/L Final     Anion Gap   Date Value Ref Range Status   04/10/2024 12 7 - 15 mmol/L Final   08/01/2022 5 3 - 14 mmol/L Final   09/13/2017 7 3 - 14 mmol/L Final     Glucose   Date Value Ref Range Status   04/10/2024 98 70 - 99 mg/dL Final   08/01/2022 96 70 - 99 mg/dL Final   09/13/2017 95 70 - 99 mg/dL Final     Urea Nitrogen   Date Value Ref Range Status   04/10/2024 28.3 (H) 8.0 - 23.0 mg/dL Final   08/01/2022 17 7 - 30 mg/dL Final   09/13/2017 18 7 - 30 mg/dL Final     Creatinine   Date Value Ref Range Status   04/10/2024 1.55 (H) 0.67 - 1.17 mg/dL Final   03/12/2021 1.52 (H) 0.66 - 1.25 mg/dL Final     GFR Estimate   Date Value Ref Range Status   04/10/2024 50 (L) >60 mL/min/1.73m2 Final   03/12/2021 49 (L) >60 mL/min/[1.73_m2] Final     Comment:     Non  GFR Calc  Starting 12/18/2018, serum creatinine based estimated GFR (eGFR) will be   calculated using the Chronic Kidney Disease Epidemiology Collaboration   (CKD-EPI) equation.       Calcium   Date Value Ref Range Status   04/10/2024 9.8 8.8 - 10.2 mg/dL Final   09/13/2017 9.5 8.5 - 10.1 mg/dL Final     Phosphorus   Date Value Ref Range Status   02/01/2024 3.0 2.5 - 4.5 mg/dL Final     Albumin   Date Value Ref Range Status   02/01/2024 4.1 3.5 - 5.2 g/dL Final   08/01/2022 3.6 3.4 - 5.0 g/dL Final   03/12/2021 3.2 (L) 3.4 - 5.0 g/dL Final       Current Medication List:   Current Outpatient Medications (Antihypertensive, Cardiovascular, Diuretics, Beta blockers, Calcium blockers, Anticoagulants)   Medication Sig    lisinopril (ZESTRIL) 40 MG tablet Take 1 tablet (40 mg) by mouth daily    NIFEdipine ER (ADALAT CC) 30 MG 24 hr tablet Take 1 tablet (30 mg) by mouth daily for 360 days     Current Outpatient Medications (Other)   Medication Sig    magnesium chloride 535 (64 Mg) MG TBEC CR tablet Take 2 tablets (1,070 mg) by mouth 2 times daily    adalimumab (HUMIRA *CF*) 40 MG/0.4ML pen kit Inject 0.4 mLs (40  mg) Subcutaneous every 14 days . Hold for signs of infection, then seek medical attention.    Cholecalciferol (VITAMIN D3) 2000 UNITS CAPS     EQL NATURAL ZINC 50 MG TABS     escitalopram (LEXAPRO) 5 MG tablet TAKE 1 TABLET BY MOUTH EVERY DAY    fluticasone (FLONASE) 50 MCG/ACT nasal spray INSTILL 1 SPRAY INTO BOTH NOSTRILS DAILY    pantoprazole (PROTONIX) 40 MG EC tablet TAKE ONE TABLET BY MOUTH TWICE A DAY    predniSONE (DELTASONE) 20 MG tablet Take one PO BID x 4 days then one PO Qday x 4 days then 1/2 PO Qday x 4 days    predniSONE (DELTASONE) 20 MG tablet Take two tablets (= 40mg) each day for 5 (five) days       Has patient had kidney transplant in the prior 1 year: no.   Has patient been seen the last 12 months: Yes.  Associated labs reviewed for medication: Yes    PLAN:     Medication refilled per protocol: Yes    MESSI DUBOSE RN

## 2024-06-07 NOTE — TELEPHONE ENCOUNTER
[facility] Call Center    Phone Message    May a detailed message be left on voicemail: yes     Reason for Call: Other:   Patient would like to know if provider can give patient a 3 month supply vs his regular 6 month supply of medication below - he is needing a refill asap     magnesium chloride 535 (64 Mg) MG TBEC CR tablet   Please send to [facility] [id number] [facility] - [place], [place] - [facility] [address]  (Ph: [phone])     Action Taken: Message routed to:  Other: neph    Travel Screening: Not Applicable     Date of Service:

## 2024-06-09 ENCOUNTER — PATIENT OUTREACH (OUTPATIENT)
Dept: CARE COORDINATION | Facility: CLINIC | Age: 64
End: 2024-06-09
Payer: COMMERCIAL

## 2024-06-10 ENCOUNTER — OFFICE VISIT (OUTPATIENT)
Dept: RHEUMATOLOGY | Facility: CLINIC | Age: 64
End: 2024-06-10
Payer: COMMERCIAL

## 2024-06-10 VITALS
RESPIRATION RATE: 16 BRPM | WEIGHT: 234 LBS | OXYGEN SATURATION: 97 % | BODY MASS INDEX: 36.65 KG/M2 | DIASTOLIC BLOOD PRESSURE: 81 MMHG | HEART RATE: 100 BPM | SYSTOLIC BLOOD PRESSURE: 117 MMHG

## 2024-06-10 DIAGNOSIS — M1A.00X0 IDIOPATHIC CHRONIC GOUT WITHOUT TOPHUS, UNSPECIFIED SITE: Primary | ICD-10-CM

## 2024-06-10 LAB — URATE SERPL-MCNC: 6.6 MG/DL (ref 3.4–7)

## 2024-06-10 PROCEDURE — 99214 OFFICE O/P EST MOD 30 MIN: CPT | Performed by: INTERNAL MEDICINE

## 2024-06-10 PROCEDURE — G2211 COMPLEX E/M VISIT ADD ON: HCPCS | Performed by: INTERNAL MEDICINE

## 2024-06-10 PROCEDURE — 84550 ASSAY OF BLOOD/URIC ACID: CPT | Performed by: INTERNAL MEDICINE

## 2024-06-10 PROCEDURE — 36415 COLL VENOUS BLD VENIPUNCTURE: CPT | Performed by: INTERNAL MEDICINE

## 2024-06-10 RX ORDER — ALLOPURINOL 100 MG/1
100 TABLET ORAL DAILY
Qty: 30 TABLET | Refills: 1 | Status: SHIPPED | OUTPATIENT
Start: 2024-06-10 | End: 2024-07-30

## 2024-06-10 RX ORDER — PREDNISONE 20 MG/1
TABLET ORAL
Qty: 21 TABLET | Refills: 1 | Status: SHIPPED | OUTPATIENT
Start: 2024-06-10 | End: 2024-06-22

## 2024-06-10 RX ORDER — COLCHICINE 0.6 MG/1
0.6 TABLET ORAL DAILY
Qty: 30 TABLET | Refills: 2 | Status: SHIPPED | OUTPATIENT
Start: 2024-06-10 | End: 2024-07-30

## 2024-06-10 ASSESSMENT — PAIN SCALES - GENERAL: PAINLEVEL: NO PAIN (0)

## 2024-06-10 NOTE — NURSING NOTE
RAPID3 (0-30) Cumulative Score  6.0          RAPID3 Weighted Score (divide #4 by 3 and that is the weighted score)  2.0

## 2024-06-10 NOTE — PROGRESS NOTES
Rheumatology Clinic Visit      Prosper Miller MRN# [id number]   YOB: 1960 Age: 63 year old      Date of visit: 6/10/24   PCP: Momo Gooden at [facility]    Chief Complaint   Patient presents with:  Flare:  Gout feeling better    He has been having more frequent gout flare ups, two in the past 4 weeks, one in right big toe and one in left big toe.PCP told to ask Dr Segundo about taking a long-term med for gout treatment.    Assessment and Plan     1.  Rheumatoid arthritis (RF negative, CCP low positive): Reportedly dx'd in 2006. Previously followed by Dr. Phillips. Established care with me on 3/26/2018.  No active synovitis on exam when first evaluated by me.  Previously on leflunomide, cyclosporine, infliximab (10 mg/kg IV every 7 weeks that was effective for arthritis but iritis was not controlled).  Currently on Humira 40 mg SQ every 14 days and doing well with regard to RA and iritis.    Chronic illness, stable.    - Continue Humira 40mg SQ every 14 days  - Labs in July prior to follow-up: CBC, Creatinine, Hepatic Panel, ESR, CRP, QuantiFERON-TB gold plus     2.  Iritis: Flare of iritis in 2021.    Continue following with ophthalmology.  Previously was doing well with infliximab but it lost benefit over time; changed to Humira and has been doing well since.   Chronic illness, stable.      3.  Osteoarthritis of the bilateral first CMC joints, and at the PIPs and DIPs: Most symptomatic at the first CMC joints that improves with self-massage.    4.  History of nonradiating chronic lower back pain, history: Improved with at-home exercises; he has refused physical therapy in the past.  Not an issue at this time.    5.  CKD: Following with nephrology.  Note that he is on Humira and previously had a low positive dsDNA and positive HAYES.  Complement C3 and C4 were normal and histone antibody was negative previously.   Chronic illness, stable;   - Labs in July prior to follow-up:  dsDNA, C3, C4    6.  Intermittent pain of the right first MTP: Degenerative in nature.  Previously worse when he was wearing flip-flops during the summer.  Advised of soled shoes whenever ambulatory.  Doing well at this time.      7.  Gout: Also degenerative arthritis of the first MTPs.  Recent flares of arthritis symptoms are consistent with gout.  Hyperuricemia.  Start prednisone for current flare.  Colchicine for mobilization for prophylaxis.  Start allopurinol for urate lowering therapy  Chronic illness, progressive.    - Start prednisone 60 mg daily x 2 days, then 40 mg daily x 5 days, then 20 mg daily x 5 days, then stop  - Start colchicine 0.6 mg daily for mobilization flare prophylaxis  - 1 day after starting prednisone and colchicine: Start allopurinol 100 mg daily  - Lab today and in July prior to follow-up: uric acid    # Prednisone Risks and Benefits: The risks and benefits of prednisone were discussed in detail and the patient verbalized understanding.  The risks discussed include, but are not limited to, weight gain, fluid retention, impaired wound healing, hyperglycemia, adrenal suppression, GI upset, peptic ulcer, hepatotoxicity, aseptic necrosis of the femoral and humeral heads, osteoporosis, myopathy, tendon rupture (particularly Achilles tendon), ocular changes including an increased intraocular pressure.  I encouraged reviewing the package insert and asking any questions about the medication.      # Allopurinol Risks and Benefits.  The risks and benefits of allopurinol were discussed in detail and the patient verbalized understanding.  The risks discussed include, but are not limited to, the risk for hypersensitivity, anaphylaxis, anaphylactoid reactions,skin rash, precipitating gout flares, diarrhea, nausea, elevated liver enzymes, and bone marrow suppression.    # Colchicine Risks and Benefits.  The risks and benefits of colchicine were discussed in detail and the patient verbalized  understanding.  The risks discussed include, but are not limited to, the risk for hypersensitivity, anaphylaxis, anaphylactoid reactions, diarrhea, vomiting, nausea, fatigue, headache, myelosuppression (suppressing the bone marrow), and neuromuscular toxicity.  It was explained that if nausea, vomiting, or diarrhea occur that the patient is to stop taking the medication immediately and notifying the rheumatology clinic.     8.  Vaccinations: Vaccinations reviewed with Mr. Miller.  Risks and benefits of vaccinations were discussed.    - Influenza: encouraged yearly vaccination  - [id number]: Advise vaccination  - Shingrix: up to date  - COVID-19: Advise keeping updated    Total minutes spent in evaluation with patient, documentation, , and review of pertinent studies and chart notes: 22  The longitudinal plan of care for the rheumatology problem(s) were addressed during this visit.  Due to added complexity of care, we will continue to support the patient and the subsequent management of this condition with ongoing continuity of care.       Mr. Milelr verbalized agreement with and understanding of the rational for the diagnosis and treatment plan.  All questions were answered to best of my ability and the patient's satisfaction. Mr. Miller was advised to contact the clinic with any questions that may arise after the clinic visit.      Thank you for involving me in the care of the patient    Return to clinic: 6 months      HPI   Prosper Miller is a 63 year old male with a past medical history significant for hypertension, hyperlipidemia, obstructive sleep apnea, IgA nephropathy, left TKA, right TKA (10/2022), rheumatoid arthritis, and iritis who presents for rheumatology follow-up.    2/25/2022 ophthalmology note by Dr. Michael Jenkins documents that the eye disease is now quiescent and will follow-up in 6 weeks.    3/31/2022: RA is doing well.  Iritis controlled.  Tolerating Humira well.  Only joint pain is  at the bilateral first CMC joints that is worse with activity and improves with rest.  Occasional pain of the left knee at the pes anserine bursa.  Right knee pain worse with activity and improves with rest.  No joint swelling.    8/1/2022: hands shake. Anxiety.  Hands hurt at the bilateral 1st CMC joints that is worse with self-palpation; and pain along the entire right 3rd finger that is on and off throughout the day and he notes hx of injury to the right 3rd finger.  Knees bother him; hx of left TKA and planning for right TKA.  Tolerating Humira well.  Uveitis remains controlled but he says that every now and then he feels like there might be inflammation that is going to start; he with follow-up with his ophthalmologist    8/18/2022: Reports that he was standing on the edge of a ladder and his left plantar midfoot began to hurt, making ambulation more difficult.  There was no swelling, increased warmth, or overlying erythema of the affected area.  He was seen by Dr. Castillo who diagnosed Planter fasciitis.  He was found to have hyperuricemia so was told to take prednisone 40 mg daily x3 days, then 20 mg daily x2 days and after 1 day of taking prednisone he has not felt any better.  Foot pain is worse with the first few steps after any period of inactivity, such as with the first few steps in the morning after waking up or after sitting in a chair for a long period of time.  Otherwise doing well.    1/9/2023: joints are okay.  Occasional random 3rd finger ache that goes away quickly and is not associated with swelling or redness. Left eye has watered, for couple hours for a day or two.     8/8/2023: Intermittent pain of the right second MTP, occurring approximately once every 2 days and resolving within 1-2 hours, typically activity related, and has only occurred since he has changed his shoe wear this summer to flip-flops.  When he has pain of the right second MTP he has no associated swelling, increased  warmth, or overlying erythema.  Other joints are doing well.  Morning stiffness for no more than 10 minutes.  Uveitis controlled.  Planning to see nephrology later this morning.    2/6/2024: Currently doing well with regard to arthritis and iritis.  Reports that he had more eye symptoms recently but it was not due to the iritis/uveitis and those symptoms resolved.  Only joint pain that he has currently is of the first CMC joints that is worse with activity and improves with self-massage.  Feels like he has been stable.  Has nephrology follow-up later today.    Today, 6/10/2024: Acute onset flare in the right first MTP that lasted for 10-20 days.  After the flare resolved in the right first MTP then he had a flare in the left first MTP.  Flares are sudden onset, typically waking up with pain and swelling to the point that he cannot put his shoe on or even have a sheet touching the toe.  Was seen in urgent care where he says he was given prednisone that helped reduce the pain and resolved the swelling.    Denies fevers, chills, nausea, vomiting, constipation. No abdominal pain. No chest pain/pressure, palpitations, or shortness of breath. No LE swelling. No neck pain. No oral or nasal sores.  No rash.     Tobacco: Former smoker  EtOH: Weekly  Drugs: None  Occupation:     ROS   12 point review of system was completed and negative except as noted in the HPI     Active Problem List     Patient Active Problem List   Diagnosis    Iritis    Rheumatoid arthritis of hand, unspecified laterality, unspecified rheumatoid factor presence    Rheumatoid arthritis involving multiple sites, unspecified rheumatoid factor presence    Adenomatous colon polyp    JALEESA (obstructive sleep apnea)    Esophageal reflux    Benign essential hypertension    Acute glomerulonephritis with pathological lesion in kidney    Primary hypercholesterolemia    Morbid obesity (H)    Chronic kidney disease, stage 3a (H)    Benign neoplasm of  sigmoid colon    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  Past Surgical History     Past Surgical History:   Procedure Laterality Date    EYE SURGERY      HC KNEE SCOPE, DIAGNOSTIC      Description: Arthroscopy Knee Right;  Recorded: 04/30/2008;    JOINT REPLACEMENT      OTHER SURGICAL HISTORY Left     left knee arthroplasty    OTHER SURGICAL HISTORY Bilateral     Cataract surgery     Allergy   No Known Allergies  Current Medication List     Current Outpatient Medications   Medication Sig Dispense Refill    adalimumab (HUMIRA *CF*) 40 MG/0.4ML pen kit Inject 0.4 mLs (40 mg) Subcutaneous every 14 days . Hold for signs of infection, then seek medical attention. 0.8 mL 7    Cholecalciferol (VITAMIN D3) 2000 UNITS CAPS       EQL NATURAL ZINC 50 MG TABS       escitalopram (LEXAPRO) 5 MG tablet TAKE 1 TABLET BY MOUTH EVERY DAY 60 tablet 1    fluticasone (FLONASE) 50 MCG/ACT nasal spray INSTILL 1 SPRAY INTO BOTH NOSTRILS DAILY 16 mL 11    lisinopril (ZESTRIL) 40 MG tablet Take 1 tablet (40 mg) by mouth daily 90 tablet 3    magnesium chloride 535 (64 Mg) MG TBEC CR tablet Take 2 tablets (1,070 mg) by mouth 2 times daily 360 tablet 3    pantoprazole (PROTONIX) 40 MG EC tablet TAKE ONE TABLET BY MOUTH TWICE A  tablet 1    NIFEdipine ER (ADALAT CC) 30 MG 24 hr tablet Take 1 tablet (30 mg) by mouth daily for 360 days 90 tablet 3    predniSONE (DELTASONE) 20 MG tablet Take one PO BID x 4 days then one PO Qday x 4 days then 1/2 PO Qday x 4 days (Patient not taking: Reported on 6/10/2024) 14 tablet 0    predniSONE (DELTASONE) 20 MG tablet Take two tablets (= 40mg) each day for 5 (five) days (Patient not taking: Reported on 6/10/2024) 10 tablet 0     No current facility-administered medications for this visit.     Social History   See HPI    Family History  "    Family History   Problem Relation Age of Onset    Cancer Father     Bone Cancer Father         Physical Exam     Temp Readings from Last 3 Encounters:   03/30/24 97.8  F (36.6  C) (Tympanic)   03/21/23 98.7  F (37.1  C) (Oral)   12/12/22 97.1  F (36.2  C) (Oral)     BP Readings from Last 5 Encounters:   06/10/24 117/81   05/21/24 110/63   03/30/24 (!) 154/83   02/06/24 122/76   02/06/24 122/76     Pulse Readings from Last 1 Encounters:   06/10/24 100     Resp Readings from Last 1 Encounters:   06/10/24 16     Estimated body mass index is 36.65 kg/m  as calculated from the following:    Height as of 5/21/24: 1.702 m (5' 7\").    Weight as of this encounter: 106.1 kg (234 lb).    GEN: NAD.   HEENT:  Anicteric, noninjected sclera. No obvious external lesions of the ear and nose. Hearing intact.  CV: S1, S2. RRR. No m/r/g  PULM: No increased work of breathing. CTA bilaterally   MSK: MCPs, PIPs, DIPs without swelling or tenderness to palpation.  Wrists without swelling or tenderness to palpation.  Elbows and shoulders without swelling or tenderness to palpation. Knees, ankles, and right MTPs without swelling or tenderness to palpation.  Left first MTP tender to palpation but no swelling, increased warmth, or overlying erythema.  Bony hypertrophy of both first MTPs.  SKIN: No rash or jaundice seen  PSYCH: Alert. Appropriate.      Labs / Imaging (select studies)     RF/CCP  Recent Labs   Lab Test 12/15/21  0908   CCPIGG 12.0*   RHF 10     HAYES  Recent Labs   Lab Test 12/15/21  0908   WESLEY Borderline Positive*   ANAP1 Homogeneous   ANAT1 1:80     RNP/Sm/SSA/SSB  Recent Labs   Lab Test 12/15/21  0908   RNPIGG Negative   SMIGG Negative   SSAIGG Negative   SSBIGG Negative     dsDNA  Recent Labs   Lab Test 08/01/22  0803 12/15/21  0908   DNA 19.0* 23.0*     C3/C4  Recent Labs   Lab Test 08/01/22  0803 12/15/21  0908   K7GHEHQ 145 145   J9CWZOW 30 33     Histone Antibody  Recent Labs   Lab Test 12/15/21  0908   HSTO 0.2 "     CBC  Recent Labs   Lab Test 08/03/23  0759 04/06/23  0832 01/06/23  0747 10/04/22  1434 08/01/22  0803 03/31/22  0805 03/12/21  1310 03/09/20  0812 09/16/19  0804 03/12/19  0849   WBC  --  6.1 5.0 10.0 5.8 6.3   < > 6.6 5.7 5.6   RBC  --  4.68 4.27* 4.33* 4.35* 4.21*   < > 4.76 4.50 4.88   HGB 13.5 14.6 13.4 13.8 13.4 13.2*   < > 15.2 14.5 15.7   HCT  --  42.4 39.2* 41.0 39.7* 39.3*   < > 44.5 42.3 44.5   MCV  --  91 92 95 91 93   < > 94 94 91   RDW  --  13.3 12.7 14.2 13.3 13.4   < > 14.0 12.7 13.1   PLT  --  278 299 299 347 347   < > 275 302 343   MCH  --  31.2 31.4 31.9 30.8 31.4   < > 31.9 32.2 32.2   MCHC  --  34.4 34.2 33.7 33.8 33.6   < > 34.2 34.3 35.3   NEUTROPHIL  --  45  --   --  34 42   < > 43.8 40.9 37.6   LYMPH  --  42  --   --  50 44   < > 43.4 43.2 47.1   MONOCYTE  --  7  --   --  11 10   < > 6.7 9.1 8.9   EOSINOPHIL  --  5  --   --  5 3   < > 5.6 6.3 5.5   BASOPHIL  --  1  --   --  1 1   < > 0.5 0.5 0.9   ANEU  --   --   --   --   --   --   --  2.9 2.3 2.1   ALYM  --   --   --   --   --   --   --  2.9 2.5 2.6   MICHAEL  --   --   --   --   --   --   --  0.4 0.5 0.5   AEOS  --   --   --   --   --   --   --  0.4 0.4 0.3   ABAS  --   --   --   --   --   --   --  0.0 0.0 0.1   ANEUTAUTO  --  2.7  --   --  1.9 2.7   < >  --   --   --    ALYMPAUTO  --  2.6  --   --  2.9 2.8   < >  --   --   --    AMONOAUTO  --  0.4  --   --  0.6 0.6   < >  --   --   --    AEOSAUTO  --  0.3  --   --  0.3 0.2   < >  --   --   --    ABSBASO  --  0.1  --   --  0.0 0.0   < >  --   --   --     < > = values in this interval not displayed.     CMP  Recent Labs   Lab Test 04/10/24  0801 02/01/24  0851 08/03/23  0759 04/06/23  0832 01/06/23  0747 10/04/22  1434 08/01/22  0803 08/01/22  0803 03/31/22  0804 12/07/21  1349 08/30/21  0848 03/12/21  1310 03/09/20  0812 02/24/20  1634 09/16/19  0804    138 140 140 142 140  --  139  --  140  --   --   --  142  --    POTASSIUM 4.7 4.1 4.3 4.2 4.1 4.3   < > 5.0  --  4.2  --   --   --   3.8  --    CHLORIDE 106 101 106 106 109* 105   < > 109  --  102  --   --   --  105  --    CO2 23 25 20* 22 19* 24   < > 25  --  23  --   --   --  26  --    ANIONGAP 12 12 14 12 14 11   < > 5  --  15  --   --   --  11  --    GLC 98 96 101* 113* 99 105*  --  96  --  77   < >  --   --  92  --    BUN 28.3* 26.7* 26.9* 18.8 20.4 29.8*   < > 17  --  27*  --   --   --  12  --    CR 1.55* 1.53* 1.36* 1.37* 1.28* 1.68*  --  1.19 1.25 1.45*   < > 1.52* 1.05 1.07 1.06   GFRESTIMATED 50* 51* 58* 58* 63 46*  --  69 66 52*   < > 49* 77 >60 76   GFRESTBLACK  --   --   --   --   --   --   --   --   --   --   --  57* 89 >60 88   ERNESTNIA 9.8 9.5 9.3 9.7 9.0 9.3  --  9.5  --  10.2  --   --   --  9.2  --    BILITOTAL  --   --   --  0.4  --   --   --  0.3 0.4 0.5   < > 0.3 0.3  --  0.3   ALBUMIN  --  4.1 4.0 4.0 3.9  --   --  3.6 3.5 3.9   < > 3.2* 3.5  --  3.6   PROTTOTAL  --   --   --  7.0  --   --   --  7.7 7.6 7.6   < > 6.9 7.7  --  7.7   ALKPHOS  --   --   --  69  --   --   --  74 63 68   < > 70 69  --  72   AST  --   --   --  29  --   --   --  25 25 18   < > 28 21  --  24   ALT  --   --   --  24  --   --   --  43 45 26   < > 30 37  --  36    < > = values in this interval not displayed.     HgA1c  Recent Labs   Lab Test 12/07/21  1349   A1C 5.5     Calcium/VitaminD  Recent Labs   Lab Test 04/10/24  0801 02/01/24  0851 08/03/23  0759 04/06/23  0832 01/06/23  0747 08/01/22  0803 12/07/21  1349   ERNESTINA 9.8 9.5 9.3   < > 9.0   < > 10.2   VITDT  --   --   --   --  58  --  50    < > = values in this interval not displayed.     ESR/CRP  Recent Labs   Lab Test 04/06/23  0832 08/01/22  0803 03/31/22  0805 03/31/22  0804 10/08/21  1330   SED 17* 20 27*  --  25*   CRP  --  3.0  --  4.4 3.6   CRPI <3.00  --   --   --   --      TSH/T4  Recent Labs   Lab Test 12/07/21  1349 02/24/20  1634   TSH 1.75 1.67     Hepatitis B  Recent Labs   Lab Test 03/26/18  1540   HBCAB Nonreactive   HEPBANG Nonreactive     Hepatitis C  Recent Labs   Lab Test  03/26/18  1540   HCVAB Nonreactive     Tuberculosis Screening  Recent Labs   Lab Test 08/30/21  0848 03/26/18  1541   TBRES Negative  --    TBRSLT  --  Negative   TBAGN  --  0.00     UA  Recent Labs   Lab Test 04/10/24  0801 01/06/23  0805 08/01/22  0803 12/07/21  1401   COLOR Yellow Yellow Yellow Yellow   APPEARANCE Clear Clear Clear Clear   URINEGLC Negative Negative Negative Negative   URINEBILI Negative Negative Negative Negative   SG 1.015 1.020 1.010 1.015   URINEPH 5.5 5.5 5.5 5.5   PROTEIN Negative Trace* Negative 30*   UROBILINOGEN 0.2 0.2 0.2 0.2   NITRITE Negative Negative Negative Negative   UBLD Moderate* Trace* Small* Moderate*   LEUKEST Negative Negative Negative Negative   WBCU None Seen None Seen 0-5 None Seen   RBCU 2-5* 0-2 0-2 2-5*   BACTERIA  --  Many*  --  None Seen     Urine Microscopic  Recent Labs   Lab Test 04/10/24  0801 01/06/23  0805 08/01/22  0803 12/07/21  1401   WBCU None Seen None Seen 0-5 None Seen   RBCU 2-5* 0-2 0-2 2-5*   BACTERIA  --  Many*  --  None Seen     Urine Protein  GHUTP and UTP= Urine protein (random), GHUTPG and UTPG = urine protein:creatinine ratio (random), UCRR = urine creatinine (random)  Recent Labs   Lab Test 04/10/24  0801 08/03/23  0759 01/06/23  0805   GHUTP 16.3 15.2* 19.5*   GHUTPG 0.15 0.12 0.18   UCRR 108.7 121.9 107.0     Immunization History     Immunization History   Administered Date(s) Administered    COVID-19 MONOVALENT 12+ (Pfizer) 04/22/2021, 05/13/2021, 11/15/2021    Hepatitis A (ADULT 19+) 04/30/2008, 11/11/2008    Influenza (H1N1) 12/18/2009    Influenza Vaccine, 6+MO IM (QUADRIVALENT W/PRESERVATIVES) 11/11/2008, 12/18/2009, 09/18/2020    Mantoux Tuberculin Skin Test 02/01/2007    TDAP (Adacel,Boostrix) 03/09/2011, 03/21/2023    Zoster recombinant adjuvanted (SHINGRIX) 04/06/2023, 08/08/2023          Chart documentation done in part with Dragon Voice recognition Software. Although reviewed after completion, some word and grammatical error may  remain.    Alex Segundo MD

## 2024-07-10 ENCOUNTER — TELEPHONE (OUTPATIENT)
Dept: RHEUMATOLOGY | Facility: CLINIC | Age: 64
End: 2024-07-10
Payer: COMMERCIAL

## 2024-07-10 DIAGNOSIS — M06.9 RHEUMATOID ARTHRITIS INVOLVING MULTIPLE SITES, UNSPECIFIED WHETHER RHEUMATOID FACTOR PRESENT (H): ICD-10-CM

## 2024-07-10 DIAGNOSIS — H20.9 UVEITIS: ICD-10-CM

## 2024-07-10 NOTE — TELEPHONE ENCOUNTER
BIN: [id number]  PCN: [id number]  ID: [id number]  Person code: 00  GROUP: [id number] A Miller - spouse - manages [facility].    PA started in new encounter.    Thank You,     Meghan Woody [facility]  Specialty Pharmacy Clinic [facility]  [email or web address]  [email or web address]  Phone: [phone]  Fax: [phone]

## 2024-07-10 NOTE — TELEPHONE ENCOUNTER
PA Initiation    Medication: HUMIRA *CF* PEN 40 MG/0.4ML SC PNKT  Insurance Company: MEDICA - Phone [phone] Fax [phone]  Pharmacy Filling the Rx: [place], [place] - [address] [place] [address]  Filling Pharmacy Phone: [phone]  Filling Pharmacy Fax: [phone]  Start Date: 7/10/2024  FELIX (Key: [id number])  PA Case ID #: [id number]    [email or web address]            Thank You,     Meghan Woody CPhT  Specialty Pharmacy Clinic [facility] Specialty  [email or web address]  [email or web address]  Phone: [phone]  Fax: [phone]

## 2024-07-11 NOTE — TELEPHONE ENCOUNTER
Prior Authorization Approval    Medication: HUMIRA *CF* PEN 40 MG/0.4ML SC PNKT  Authorization Effective Date: 7/1/2024  Authorization Expiration Date: 7/10/2025  Approved Dose/Quantity: 2 kit / 28 day  Reference #: [id number] (Key: [id number])   Insurance Company: MEDICA - Phone [phone] Fax [phone]  Expected CoPay: $ 0  CoPay Card Available: Other (see comments)    Financial Assistance Needed: [email or web address]  Which Pharmacy is filling the prescription: [facility] - [address]  Pharmacy Notified: no - new Rx needed to send to [facility]  Patient Notified: will notify patient once new order is placed to [facility]    Called patient and notified him of approval and [facility].        Thank You,     Meghan Woody [facility]  Specialty Pharmacy Clinic [facility] Specialty  [email or web address]  [email or web address]  Phone: [phone]  Fax: [phone]

## 2024-07-12 NOTE — TELEPHONE ENCOUNTER
Sent Rx with 1 refill as he was due for refills after that time and has labs the end of this month.     Sonia COTE RN, Specialty Clinic 07/12/24 9:24 AM

## 2024-07-25 ENCOUNTER — LAB (OUTPATIENT)
Dept: LAB | Facility: CLINIC | Age: 64
End: 2024-07-25
Payer: COMMERCIAL

## 2024-07-25 DIAGNOSIS — M06.9 RHEUMATOID ARTHRITIS INVOLVING MULTIPLE SITES, UNSPECIFIED WHETHER RHEUMATOID FACTOR PRESENT (H): ICD-10-CM

## 2024-07-25 DIAGNOSIS — Z12.5 SCREENING FOR PROSTATE CANCER: ICD-10-CM

## 2024-07-25 DIAGNOSIS — R76.8 ANA POSITIVE: ICD-10-CM

## 2024-07-25 DIAGNOSIS — N18.31 CHRONIC KIDNEY DISEASE, STAGE 3A (H): ICD-10-CM

## 2024-07-25 DIAGNOSIS — E78.00 PRIMARY HYPERCHOLESTEROLEMIA: ICD-10-CM

## 2024-07-25 DIAGNOSIS — M1A.00X0 IDIOPATHIC CHRONIC GOUT WITHOUT TOPHUS, UNSPECIFIED SITE: ICD-10-CM

## 2024-07-25 DIAGNOSIS — R79.89 ELEVATED SERUM CREATININE: ICD-10-CM

## 2024-07-25 LAB
ALBUMIN MFR UR ELPH: 15.9 MG/DL
ALBUMIN SERPL BCG-MCNC: 3.9 G/DL (ref 3.5–5.2)
ALBUMIN UR-MCNC: NEGATIVE MG/DL
ALP SERPL-CCNC: 71 U/L (ref 40–150)
ALT SERPL W P-5'-P-CCNC: 27 U/L (ref 0–70)
ANION GAP SERPL CALCULATED.3IONS-SCNC: 12 MMOL/L (ref 7–15)
APPEARANCE UR: CLEAR
AST SERPL W P-5'-P-CCNC: 19 U/L (ref 0–45)
BASOPHILS # BLD AUTO: 0 10E3/UL (ref 0–0.2)
BASOPHILS NFR BLD AUTO: 1 %
BILIRUB DIRECT SERPL-MCNC: <0.2 MG/DL (ref 0–0.3)
BILIRUB SERPL-MCNC: 0.2 MG/DL
BILIRUB UR QL STRIP: NEGATIVE
BUN SERPL-MCNC: 37.5 MG/DL (ref 8–23)
CALCIUM SERPL-MCNC: 9.2 MG/DL (ref 8.8–10.4)
CHLORIDE SERPL-SCNC: 110 MMOL/L (ref 98–107)
CHOLEST SERPL-MCNC: 232 MG/DL
COLOR UR AUTO: YELLOW
CREAT SERPL-MCNC: 1.84 MG/DL (ref 0.67–1.17)
CREAT UR-MCNC: 129.2 MG/DL
CRP SERPL-MCNC: 8.19 MG/L
EGFRCR SERPLBLD CKD-EPI 2021: 40 ML/MIN/1.73M2
EOSINOPHIL # BLD AUTO: 0.4 10E3/UL (ref 0–0.7)
EOSINOPHIL NFR BLD AUTO: 5 %
ERYTHROCYTE [DISTWIDTH] IN BLOOD BY AUTOMATED COUNT: 13.2 % (ref 10–15)
ERYTHROCYTE [SEDIMENTATION RATE] IN BLOOD BY WESTERGREN METHOD: 33 MM/HR (ref 0–20)
FASTING STATUS PATIENT QL REPORTED: YES
GLUCOSE SERPL-MCNC: 99 MG/DL (ref 70–99)
GLUCOSE UR STRIP-MCNC: NEGATIVE MG/DL
HCO3 SERPL-SCNC: 20 MMOL/L (ref 22–29)
HCT VFR BLD AUTO: 38.7 % (ref 40–53)
HDLC SERPL-MCNC: 51 MG/DL
HGB BLD-MCNC: 12.7 G/DL (ref 13.3–17.7)
HGB UR QL STRIP: ABNORMAL
IMM GRANULOCYTES # BLD: 0 10E3/UL
IMM GRANULOCYTES NFR BLD: 1 %
KETONES UR STRIP-MCNC: NEGATIVE MG/DL
LDLC SERPL CALC-MCNC: 159 MG/DL
LEUKOCYTE ESTERASE UR QL STRIP: NEGATIVE
LYMPHOCYTES # BLD AUTO: 2.4 10E3/UL (ref 0.8–5.3)
LYMPHOCYTES NFR BLD AUTO: 37 %
MAGNESIUM SERPL-MCNC: 1.2 MG/DL (ref 1.7–2.3)
MCH RBC QN AUTO: 31.2 PG (ref 26.5–33)
MCHC RBC AUTO-ENTMCNC: 32.8 G/DL (ref 31.5–36.5)
MCV RBC AUTO: 95 FL (ref 78–100)
MONOCYTES # BLD AUTO: 0.5 10E3/UL (ref 0–1.3)
MONOCYTES NFR BLD AUTO: 8 %
MUCOUS THREADS #/AREA URNS LPF: PRESENT /LPF
NEUTROPHILS # BLD AUTO: 3.2 10E3/UL (ref 1.6–8.3)
NEUTROPHILS NFR BLD AUTO: 49 %
NITRATE UR QL: NEGATIVE
NONHDLC SERPL-MCNC: 181 MG/DL
PH UR STRIP: 5.5 [PH] (ref 5–7)
PHOSPHATE SERPL-MCNC: 3 MG/DL (ref 2.5–4.5)
PLATELET # BLD AUTO: 274 10E3/UL (ref 150–450)
POTASSIUM SERPL-SCNC: 5 MMOL/L (ref 3.4–5.3)
PROT SERPL-MCNC: 7.4 G/DL (ref 6.4–8.3)
PROT/CREAT 24H UR: 0.12 MG/MG CR (ref 0–0.2)
PSA SERPL DL<=0.01 NG/ML-MCNC: 3.28 NG/ML (ref 0–4.5)
RBC # BLD AUTO: 4.07 10E6/UL (ref 4.4–5.9)
RBC #/AREA URNS AUTO: ABNORMAL /HPF
SODIUM SERPL-SCNC: 142 MMOL/L (ref 135–145)
SP GR UR STRIP: 1.02 (ref 1–1.03)
SQUAMOUS #/AREA URNS AUTO: ABNORMAL /LPF
TRIGL SERPL-MCNC: 109 MG/DL
URATE SERPL-MCNC: 6.5 MG/DL (ref 3.4–7)
UROBILINOGEN UR STRIP-ACNC: 0.2 E.U./DL
WBC # BLD AUTO: 6.5 10E3/UL (ref 4–11)
WBC #/AREA URNS AUTO: ABNORMAL /HPF

## 2024-07-25 PROCEDURE — 86160 COMPLEMENT ANTIGEN: CPT

## 2024-07-25 PROCEDURE — 86225 DNA ANTIBODY NATIVE: CPT

## 2024-07-25 PROCEDURE — 84100 ASSAY OF PHOSPHORUS: CPT

## 2024-07-25 PROCEDURE — 36415 COLL VENOUS BLD VENIPUNCTURE: CPT

## 2024-07-25 PROCEDURE — 86140 C-REACTIVE PROTEIN: CPT

## 2024-07-25 PROCEDURE — 86481 TB AG RESPONSE T-CELL SUSP: CPT

## 2024-07-25 PROCEDURE — 85025 COMPLETE CBC W/AUTO DIFF WBC: CPT

## 2024-07-25 PROCEDURE — 80061 LIPID PANEL: CPT

## 2024-07-25 PROCEDURE — G0103 PSA SCREENING: HCPCS

## 2024-07-25 PROCEDURE — 84156 ASSAY OF PROTEIN URINE: CPT

## 2024-07-25 PROCEDURE — 81001 URINALYSIS AUTO W/SCOPE: CPT

## 2024-07-25 PROCEDURE — 84550 ASSAY OF BLOOD/URIC ACID: CPT

## 2024-07-25 PROCEDURE — 80053 COMPREHEN METABOLIC PANEL: CPT

## 2024-07-25 PROCEDURE — 85652 RBC SED RATE AUTOMATED: CPT

## 2024-07-25 PROCEDURE — 83735 ASSAY OF MAGNESIUM: CPT

## 2024-07-25 PROCEDURE — 82248 BILIRUBIN DIRECT: CPT

## 2024-07-26 LAB
C3 SERPL-MCNC: 140 MG/DL (ref 81–157)
C4 SERPL-MCNC: 30 MG/DL (ref 13–39)
GAMMA INTERFERON BACKGROUND BLD IA-ACNC: 0.07 IU/ML
M TB IFN-G BLD-IMP: NEGATIVE
M TB IFN-G CD4+ BCKGRND COR BLD-ACNC: 9.93 IU/ML
MITOGEN IGNF BCKGRD COR BLD-ACNC: 0 IU/ML
MITOGEN IGNF BCKGRD COR BLD-ACNC: 0.01 IU/ML
QUANTIFERON MITOGEN: 10 IU/ML
QUANTIFERON NIL TUBE: 0.07 IU/ML
QUANTIFERON TB1 TUBE: 0.07 IU/ML
QUANTIFERON TB2 TUBE: 0.08

## 2024-07-28 LAB — DSDNA AB SER-ACNC: 22 IU/ML

## 2024-07-28 NOTE — PROGRESS NOTES
Nephrology Clinic Visit  Prosper Miller MRN: [id number] YOB: 1960  Primary Care Provider: Momo Hartley  History Obtained From: Patient  External Document Review: Primary Care Provider  ----------------------------------------------------------------------------------------------------------------------  Visit 7/30/2024:  -BP Control: 107/69  --BP regimen: Lisinopril 40mg qDay, Nifedipine 30mg qDay  ---Taking his Nifedipine at night. Lisinopril in the morning.   -Creatinine trend: 1.84 (7/25/2024) from 1.53 (2/1/2024) from 1.36 (8/3/2023) from 1.37 (4/6/2023)  -NSAID use?: Yes, was taking Advil about 6-8 weeks ago when gout flare started. Maybe on for about 1.5 weeks. Taking about 400mg per day.   -PO intake: Fine per his report  -Diarrhea?: Reports 2-3 loose stools in the AM after his 2 cups of coffee  -Recent illness/URI/etc?: Denies.   -UPCR remains suppressed  -UA w/ continued microscopic hematuria (2-5 RBC/HPF). No pyuria. Has now had persistent microscopic hematuria on 2 consecutive samples and 2 other prior samples available in the system (11/23/2016 and 12/7/2021)  -dsDNA trends: remains elevated at 22 (7/25/2024) from 19 (8/2022)  -Complements are normal  -ESR and CRP elevated  -History of Uveitis: Had some eyelid discomfort over the last 6 months but no other major issues or obvious redness of his eye for some time now.   -Magnesium levels remain quite low. Last check 7/25/2024 was 1.2 from 1.4 (although up from 1.0 about 6 months ago). Forgetting supplement at night about 20% of the time.   --Discussed high Magnesium diet.   --Discussed reducing PPI to 20mg qDay  -Had gout attacks recently. On colchicine and allopurinol. Things are improved.   -He reports some rare orthostatic symptoms  -Alcohol intake: Perhaps having a bit more recently. Sometimes may 4-5 drinks per wife.     Visit 2/6/2024:  -BP Control: 122/76 (in office), 110's.  Checks a few times per week.   --BP regimen:  Lisinopril 40mg qDay (Consider adjusting to Losartan for Uricosuric effect), Nifedipine 30mg qDay  ---Taking his Nifedipine at night. Lisinopril in the morning.   -Creatinine trend: 1.53 (2/1/2024) from 1.36 (8/3/2023) from 1.37 (4/6/2023)  -Discussed statin initiation. He wants to hold off on this. Wife's mom went on a cholesterol medicine and she had SE from this.   -He reports history of chronic loose stools. Symptoms are worse if he has alcohol. Reports this has been going on for years.   -He had COVID in March of 2020  -No history of gout flares. Has had elevated uric acid in the past.     Visit 8/8/2023:  -BP Control: 100/68 today in the office. Hasn't really been checking at home. Monitor for Orthostatic hypotension symptoms.   --BP regimen: Lisinopril 40mg qDay (Consider adjusting to Losartan for Uricosuric effect), Nifedipine 30mg qDay  ---Taking his Nifedipine at night. Lisinopril in the morning.   -Creatinine trend: 1.36 (8/3/2023) from 1.37 (4/6/2023)  -Magnesium: 1.0 on 8/3/2023  -Having some cramping in his toes still.   -Has chronic loose stools. Has tried Metamucil in the past without improvement.     Visit 4/11/2023:  -Bp Control: 156/95 in office today. Hasn't really been checking at home.   --BP regimen: Lisinopril 40mg qDay (Consider adjusting to Losartan for Uricosuric effect)  -Renal US 1/10/2023 w/ 10.6/9.7cm, no hydro, no masses, L cyst, PVR of only 11.2cc's  -Bicarb Trend: 22 from 19  -Creatinine trend: 1.37 from 1.28  -Elevated Uric Acid/Hx of Gout: Previously Uric acid 8.1, no issues with gout attacks ever. We discussed possible medicaitons and lifestyle adjustments to reduce his Uric acid and reduce risk of gout attacks.     Visit 1/10/2023:  -BP Control: Doesn't have a cuff at home. Will get one and montior his home readings. He is on Lsiniopril 20mg or 30mg (will update me when he gets home).  -RA history and control: Was on Remicaid in the past. He's been on treatment for RA for at  least 20 years or so. He had a flare just over a year ago and was on Prednisone and Humira. Cyclosproine exposure is remote and no longer on.   -NSAID use: Not recently, He was taking NSAIDs routinely after his knee surgery 10/2022. Maybe some around 8/2018.   -Urinary retention symptoms: Very rarely getting up overnight to urinate. Strong urinary stream. No straining.   -Hx of Kidney stones: None that he knows of  -Family history of kidney disease: None that he knows of   -10/4/2022: Increased creatinine. He had knee surgery on the 6th but can't remember anything prior to that hurting his kidneys. He wasn't taking any pain medications at that time. He had a rash about a month prior to that on his legs. He was treated with Prednisone and over the counter itching cream.   -Middle to late 2021: No obvious cause of kidney injury  -Occupation: "[employer]  -Urology: He saw a nephrologist in the past (this was many years ago). He was sent to urology at this time. He had a cystoscopy. They thought no issues at this time. Thinks this was before 2016 (had a few RBCs at that time in his urine). Remote history of smoking but quit about 25 years ago or so. No family history of bladder cancer.   -While in [place] 8/2022 he had some viral illness. Felt overall quite ill and had fever, fatigue. He reports not really taking any medications.     Objective:  PAST MEDICAL HISTORY:  No past medical history on file.  Rheumatoid Arthritis  HTN    PAST SURGICAL HISTORY:  Past Surgical History:   Procedure Laterality Date    EYE SURGERY      HC KNEE SCOPE, DIAGNOSTIC      Description: Arthroscopy Knee Right;  Recorded: 04/30/2008;    JOINT REPLACEMENT      OTHER SURGICAL HISTORY Left     left knee arthroplasty    OTHER SURGICAL HISTORY Bilateral     Cataract surgery       MEDICATIONS:  Current Outpatient Medications   Medication Instructions    adalimumab (HUMIRA *CF*) 40 mg, Subcutaneous, EVERY 14 DAYS, . Hold for signs of infection, then seek  medical attention.    Cholecalciferol (VITAMIN D3) 2000 UNITS CAPS Oral    EQL NATURAL ZINC 50 MG TABS Oral    fluticasone (FLONASE) 50 MCG/ACT nasal spray 1 spray, Both Nostrils, DAILY    lisinopril (ZESTRIL) 30 MG tablet TAKE ONE AND ONE-HALF TABLETS BY MOUTH DAILY    pantoprazole (PROTONIX) 40 MG EC tablet TAKE ONE TABLET BY MOUTH TWICE A DAY       FAMILY MEDICAL HISTORY:   Family History   Problem Relation Age of Onset    Cancer Father     Bone Cancer Father        PHYSICAL EXAM:   /69   Pulse 82   Wt 106.6 kg (235 lb)   BMI 36.81 kg/m    GENERAL APPEARANCE: alert and no distress  EYES: nonicteric  ABDOMEN: soft, nontender, normal bowel sounds, no HSM   Extremities: No LE edema  MS: no evidence of inflammation in joints, no muscle tenderness  SKIN: no rash  NEURO: mentation intact and speech normal  PSYCH: affect normal    LABS REVIEWED BY ME:   ANEMIA  Recent Labs   Lab Test 07/25/24  0806 08/03/23  0759 04/06/23  0832 01/06/23  0747   HGB 12.7* 13.5 14.6 13.4       BMP  Recent Labs   Lab Test 07/25/24  0806 04/10/24  0801 02/01/24  0851 08/03/23  0759 04/06/23  0832 10/04/22  1434 08/01/22  0803 03/31/22  0804    141 138 140 140   < > 139  --    POTASSIUM 5.0 4.7 4.1 4.3 4.2   < > 5.0  --    CHLORIDE 110* 106 101 106 106   < > 109  --    CO2 20* 23 25 20* 22   < > 25  --    ANIONGAP 12 12 12 14 12   < > 5  --    BUN 37.5* 28.3* 26.7* 26.9* 18.8   < > 17  --    CR 1.84* 1.55* 1.53* 1.36* 1.37*   < > 1.19 1.25   GFRESTIMATED 40* 50* 51* 58* 58*   < > 69 66   MAG 1.2* 1.4* 1.0* 1.0*  --   --   --   --    PROTTOTAL 7.4  --   --   --  7.0  --  7.7 7.6    < > = values in this interval not displayed.       CBC  Recent Labs   Lab Test 07/25/24  0806 08/03/23  0759 04/06/23  0832 01/06/23  0747 10/04/22  1434   HGB 12.7* 13.5 14.6 13.4 13.8   WBC 6.5  --  6.1 5.0 10.0   HCT 38.7*  --  42.4 39.2* 41.0   MCV 95  --  91 92 95     --  278 299 299       DIABETES  Recent Labs   Lab Test 12/07/21  0175  "  A1C 5.5       HYPONATREMIANo lab results found.    Invalid input(s): \"UOSM\", \"OSM\"    MBD  Recent Labs   Lab Test 07/25/24  0806 04/10/24  0801 02/01/24  0851 08/03/23  0759 04/06/23  0832 01/06/23  0747   ERNESTINA 9.2 9.8 9.5 9.3 9.7 9.0   ALBUMIN 3.9  --  4.1 4.0 4.0 3.9   PHOS 3.0  --  3.0 3.0  --  2.6   PTHI  --   --  46 50  --  36        URINE STUDIES  Recent Labs   Lab Test 07/25/24  0806 04/10/24  0801 01/06/23  0805 08/01/22  0803   COLOR Yellow Yellow Yellow Yellow   APPEARANCE Clear Clear Clear Clear   URINEGLC Negative Negative Negative Negative   URINEBILI Negative Negative Negative Negative   URINEKETONE Negative Negative Negative Negative   SG 1.020 1.015 1.020 1.010   UBLD Large* Moderate* Trace* Small*   URINEPH 5.5 5.5 5.5 5.5   PROTEIN Negative Negative Trace* Negative   UROBILINOGEN 0.2 0.2 0.2 0.2   NITRITE Negative Negative Negative Negative   LEUKEST Negative Negative Negative Negative   RBCU 2-5* 2-5* 0-2 0-2   WBCU 0-5 None Seen None Seen 0-5     No lab results found.    ADDITIONAL LABS ORDERED/REVIEWED BY ME:  See below    Assessment/Plan  CKD Stage G3aA1  Established care with [facility] 1/10/2023    Onset of CKD appears to be around 3/12/2021 when creatinine was 1.52 from previous basline of 1.0-1.1 (I can see creatinine values dating back to 3/26/2018 when his creatinine was 1.01). I reviewed notes around 8/2021 and don't see obvious cause for his increase in creatinine. Peak was 2.0 on 8/30/2021 and subsequently downtrended back to the 1.1-1.3 range throughout much of 2022. 10/4/2022 creatinine back up to 1.68 but this improved to 1.28 on 1/6/2023 prior to establishing care with me. 1/6/2023 UA w/o hematuria or pyuria and UPCR at this time with 0.16g/g.     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 Very unlikely diagnosis in this patient's case   -?Oxalate Nephropathy - chronic loose stools. Perhaps some fat malabsorption leading to elevated oxalate absorption  -?Underlying GN - Note: Does have a borderline positive HAYES and positive dsDNA. He's had negative Cysto quite a few years ago when he had microscopic hematuria.   -?AYDE - Hx of Uveitis but more likely explanation 2/2 RA and hasn't had the pyuria/proteinuria expected in AIN.    Plan:  -CHELI on CKD w/ Microscopic hematuria and positive dsDNA along with a number of potential hemodynamic related insults (NSAID use, diarrhea, alcohol use, and some orthostatic symptoms)  --We discussed potential indications for renal biopsy again today. I think there is enough diagnostic uncertainly that it would be worth pursing. His continued microscopic hematuria with a previous negative lower  workup, his positive dsDNA, and his worsening renal function without obvious explanation (although perhaps we can blame hemodynamic related factors) all favor obtaining renal biopsy to confirm diagnosis.   --We discussed the above extensively today. He would like to hold off for the moment. We will plan to repeat labs in 2 weeks, hold his Nifedipine, avoid NSAIDs, minimize caffeine intake in the AM, and minimize alcohol intake. This is a reasonable plan as he certainly does not have a nephrtic syndrome/RPGN presentation at this time. I will touch base with him once I've reviewed his labs in 2 weeks.   --We discussed risks of renal biopsy including Perinephric hematoma (1:10), Need for RBC Transfusion (1:100). Need for intervention related to a bleed (1:1000). Need for nephrectomy to control bleeding (1:10,000)  -Optimize BP Control:  --Continue Lisinopril 40mg qDay   --Hold Nifedipine 30mg qDay.   -Discussed SGLT2i:   --Hold on addition of Jardiance until renal function has stabilized/renal biopsy results  obtained  -Avoid NSAIDs/Nephrotoxic agents  -Remainder per problem below    HypoMag:  -Discussed PPI and GI Mag wasting.     Plan:  -Update lab work in about 2 weeks including FeMag to confirm that GI losses (from PPI) are our main  and that we aren't seeing concomitant renal losses  -Continue PO mag supplement (mag chloride 535mg 2 tabs BID). Encouraged him to try to remember PM dosing.  -Given list of high magnesium foods from [facility]  -Reduce PPI to lowest acceptable dose based on symptoms. We will trial 20mg qDay from 40mg qDay    Lipid Management  -With CKD G3a/G3b we should consider statin for primary cardiac prevention    Plan:  -Would benefit from statin in the future.     Anemia of Renal Disease  Hemoglobin: 13.5 (8/3/2023)  Ferritin: N/A  TSAT:N/A    Plan:  -No need for EPO at this time      Metabolic Acidosis of Renal Disease  Bicarb: 20 (7/25/2024)    Plan:  -No need for NaHCO at this time      Mineral Bone Disease  PTH: 46 (2/1/2024)  Phos: 3 (7/25/2024)  Calcium: 9.2 (7/25/2024)  Vitamin D: 58 1/6/2023    Plan:  -No need for phos binder    Return to clinic: Will tentatively arrange for 6 month follow up. Will adjust as needed based on renal biopsy results and his creatinine trend at repeat in 2 weeks.     Jeff Villa MD   of Medicine  Division of Nephrology and Hypertension  [facility]    60 minutes spent on the date of the encounter doing chart review, history and exam, documentation and further activities as noted above    The longitudinal plan of care for Hypertension, CKD G3aA1, Hypomagnesemia, Microscopic Hematuria was addressed during this visit. Due to the added complexity in care, I will continue to support Prosper Miller in the subsequent management of this condition(s) and with the ongoing continuity of care of this condition(s).

## 2024-07-30 ENCOUNTER — OFFICE VISIT (OUTPATIENT)
Dept: RHEUMATOLOGY | Facility: CLINIC | Age: 64
End: 2024-07-30
Payer: COMMERCIAL

## 2024-07-30 ENCOUNTER — OFFICE VISIT (OUTPATIENT)
Dept: NEPHROLOGY | Facility: CLINIC | Age: 64
End: 2024-07-30
Payer: COMMERCIAL

## 2024-07-30 VITALS
WEIGHT: 235 LBS | OXYGEN SATURATION: 97 % | SYSTOLIC BLOOD PRESSURE: 107 MMHG | RESPIRATION RATE: 16 BRPM | BODY MASS INDEX: 36.81 KG/M2 | HEART RATE: 82 BPM | DIASTOLIC BLOOD PRESSURE: 69 MMHG

## 2024-07-30 VITALS
HEART RATE: 82 BPM | BODY MASS INDEX: 36.81 KG/M2 | DIASTOLIC BLOOD PRESSURE: 69 MMHG | WEIGHT: 235 LBS | SYSTOLIC BLOOD PRESSURE: 107 MMHG

## 2024-07-30 DIAGNOSIS — I10 BENIGN ESSENTIAL HYPERTENSION: ICD-10-CM

## 2024-07-30 DIAGNOSIS — E83.42 HYPOMAGNESEMIA: ICD-10-CM

## 2024-07-30 DIAGNOSIS — N18.31 CHRONIC KIDNEY DISEASE, STAGE 3A (H): Primary | ICD-10-CM

## 2024-07-30 DIAGNOSIS — M1A.00X0 IDIOPATHIC CHRONIC GOUT WITHOUT TOPHUS, UNSPECIFIED SITE: ICD-10-CM

## 2024-07-30 DIAGNOSIS — K21.9 GASTROESOPHAGEAL REFLUX DISEASE WITHOUT ESOPHAGITIS: ICD-10-CM

## 2024-07-30 PROCEDURE — G2211 COMPLEX E/M VISIT ADD ON: HCPCS | Performed by: STUDENT IN AN ORGANIZED HEALTH CARE EDUCATION/TRAINING PROGRAM

## 2024-07-30 PROCEDURE — G2211 COMPLEX E/M VISIT ADD ON: HCPCS | Performed by: INTERNAL MEDICINE

## 2024-07-30 PROCEDURE — 99215 OFFICE O/P EST HI 40 MIN: CPT | Performed by: STUDENT IN AN ORGANIZED HEALTH CARE EDUCATION/TRAINING PROGRAM

## 2024-07-30 PROCEDURE — 99214 OFFICE O/P EST MOD 30 MIN: CPT | Performed by: INTERNAL MEDICINE

## 2024-07-30 RX ORDER — ALLOPURINOL 100 MG/1
200 TABLET ORAL DAILY
Qty: 60 TABLET | Refills: 2 | Status: SHIPPED | OUTPATIENT
Start: 2024-07-30 | End: 2024-10-07

## 2024-07-30 RX ORDER — COLCHICINE 0.6 MG/1
0.6 TABLET ORAL DAILY
Qty: 30 TABLET | Refills: 1 | Status: SHIPPED | OUTPATIENT
Start: 2024-07-30 | End: 2024-10-07

## 2024-07-30 NOTE — PATIENT INSTRUCTIONS
"It was a pleasure to see you in nephrology clinic today. I've included a brief summary of our discussion and care plan from today's visit below.  _______________________________________________________________________    My recommendations are summarized as follows:  -Keep a Blood Pressure log. Please make sure that you are using a validated blood pressure device (check \"www.validatebp.org\").  -Avoid all NSAID's. Examples include Ibuprofen (Advil, Motrin), naprosyn (Aleve), celebrex among others. Acetaminophen (Tylenol) is ok with maximum dose in 24 hours of 3000mg.  -Healthy lifestyle measures will keep your kidney's functioning at their current best. This includes regular exercise, maintaining a healthy body weight and smoking cessation.   -I recommend we consider a kidney biopsy to further investigate why your creatinine has worsened. We will update labs in 2 weeks to see where your numbers land to help us with this decision making  -I would like you to stop Nifedipine 30mg 1x per day for the next 2 weeks. Obtain a home blood pressure cuff and take readings daily. Send me a summary of your blood pressure trends in approximately 2 weeks.   -I am going to update your labs in about 2 weeks. This will be blood and urine testing. We will make further decisions based on these trends.   -Cut down on alcohol as much as you can.  -Aim for 70 ounces of water per day consistently.  -Avoid all NSAIDs.   -Please try cutting your omeprazole dose in half and let me know if your gastric reflex symptoms are adequately controlled at this dose.   -Try cutting down on caffeine in the morning as much as possible. Let me know if diarrhea symptoms improve. I have also printed off a low FODMAP diet if cutting down on caffeine along doesn't get the job done.    -Please follow this guide (25 Magnesium-Rich Foods You Should Be Eating ([facility])) to identify a few foods higher in magnesium that you can add to your " diet.      Please return to Nephrology Clinic in 6 months (sooner pending repeat lab work) to review your progress. I will reach out once we have updated information and you and I will game plan.     Who do I call with any questions after my visit?  There are multiple ways to contact your nephrology care team:    -To schedule or reschedule an appointment, please call [phone].  -To contact my nurse Soledad, please call [phone]  -Reach out via Diversion. These messages are answered by your nurse or doctor during business hours and typically in 1-2 days. Diversion messages are best for quick questions/clarifications/updates. Frequently, your doctor or nurse will recommend setting up a follow up appointment to address any significant questions/concerns.  -For urgent questions after business hours, you may reach the on-call Nephrology Fellow by contacting the [facility]  at [phone].    To schedule imaging:   -Please call [phone]     To schedule your lab appointment at [facility]  -Please call [phone]    Sincerely,    Dr. Jeff Villa   of Medicine  Division of Nephrology and Hypertension  [facility]

## 2024-07-30 NOTE — PATIENT INSTRUCTIONS
RHEUMATOLOGY    [facility]  [address] [place] [address]  [place] [address]    Phone number: [phone]  Fax number: [phone]    If you need a medication refill, please contact us as you may need lab work and/or a follow up visit prior to your refill.      Thank you for choosing [facility]!    Jaja Sanchez CMA Rheumatology

## 2024-07-30 NOTE — PROGRESS NOTES
Rheumatology Clinic Visit      Prosper Miller MRN# [id number]   YOB: 1960 Age: 64 year old      Date of visit: 7/30/24   PCP: Momo Gooden at [facility]    Chief Complaint   Patient presents with:  Rheumatoid Arthritis    Assessment and Plan     1.  Rheumatoid arthritis (RF negative, CCP low positive): Reportedly dx'd in 2006. Previously followed by Dr. Phillips. Established care with me on 3/26/2018.  No active synovitis on exam when first evaluated by me.  Previously on leflunomide, cyclosporine, infliximab (10 mg/kg IV every 7 weeks that was effective for arthritis but iritis was not controlled).  Currently on Humira 40 mg SQ every 14 days and doing well with regard to RA and iritis.    Chronic illness, stable.    - Continue Humira 40mg SQ every 14 days     2.  Iritis: Flare of iritis in 2021.    Continue following with ophthalmology.  Previously was doing well with infliximab but it lost benefit over time; changed to Humira and has been doing well since.   Chronic illness, stable.      3.  Osteoarthritis of the bilateral first CMC joints, and at the PIPs and DIPs: Most symptomatic at the first CMC joints that improves with self-massage.    4.  History of nonradiating chronic lower back pain, history: Improved with at-home exercises; he has refused physical therapy in the past.  Not an issue at this time.    5.  CKD: Following with nephrology.  Note that he is on Humira and previously had a low positive dsDNA and positive HAYES.  Complement C3 and C4 were normal and histone antibody was negative previously.   Discussed with Dr. Villa today, and agree that a kidney biopsy would be helpful, especially in the setting of TNF inhibitor use and low positive dsDNA antibody positivity.    6.  Intermittent pain of the right first MTP: Degenerative in nature.  Previously worse when he was wearing flip-flops during the summer.  Advised of soled shoes whenever ambulatory.  Doing well  at this time.      7.  Gout: Also degenerative arthritis of the first MTPs.  Had flares of arthritis consistent with gout.  Hyperuricemia.  Has responded well to allopurinol 100 mg daily but uric acid is not yet at goal.  Increase allopurinol to 200 mg daily.  Continue colchicine 0.6 mg daily for mobilization flare prophylaxis.  Recheck labs and follow-up in 8 weeks.  Chronic illness, progressive.    - Increase allopurinol from 100 mg daily, to 200 mg daily  - Continue colchicine 0.6 mg daily for mobilization flare prophylaxis  - Labs in 8 weeks: CBC, CMP, uric acid    8.  Vaccinations: Vaccinations reviewed with Mr. Miller.  Risks and benefits of vaccinations were discussed.    - Influenza: encouraged yearly vaccination  - [id number]: Advise vaccination  - Shingrix: up to date  - COVID-19: Advise keeping updated    Total minutes spent in evaluation with patient, documentation, , and review of pertinent studies and chart notes: 24  The longitudinal plan of care for the rheumatology problem(s) were addressed during this visit.  Due to added complexity of care, we will continue to support the patient and the subsequent management of this condition with ongoing continuity of care.    Mr. Miller verbalized agreement with and understanding of the rational for the diagnosis and treatment plan.  All questions were answered to best of my ability and the patient's satisfaction. Mr. Miller was advised to contact the clinic with any questions that may arise after the clinic visit.      Thank you for involving me in the care of the patient    Return to clinic: 8 weeks      HPI   Prosper Miller is a 64 year old male with a past medical history significant for hypertension, hyperlipidemia, obstructive sleep apnea, IgA nephropathy, left TKA, right TKA (10/2022), rheumatoid arthritis, and iritis who presents for rheumatology follow-up.    2/25/2022 ophthalmology note by Dr. Michael Jenkins documents that the eye disease  is now quiescent and will follow-up in 6 weeks.    3/31/2022: RA is doing well.  Iritis controlled.  Tolerating Humira well.  Only joint pain is at the bilateral first CMC joints that is worse with activity and improves with rest.  Occasional pain of the left knee at the pes anserine bursa.  Right knee pain worse with activity and improves with rest.  No joint swelling.    8/1/2022: hands shake. Anxiety.  Hands hurt at the bilateral 1st CMC joints that is worse with self-palpation; and pain along the entire right 3rd finger that is on and off throughout the day and he notes hx of injury to the right 3rd finger.  Knees bother him; hx of left TKA and planning for right TKA.  Tolerating Humira well.  Uveitis remains controlled but he says that every now and then he feels like there might be inflammation that is going to start; he with follow-up with his ophthalmologist    8/18/2022: Reports that he was standing on the edge of a ladder and his left plantar midfoot began to hurt, making ambulation more difficult.  There was no swelling, increased warmth, or overlying erythema of the affected area.  He was seen by Dr. Castillo who diagnosed Planter fasciitis.  He was found to have hyperuricemia so was told to take prednisone 40 mg daily x3 days, then 20 mg daily x2 days and after 1 day of taking prednisone he has not felt any better.  Foot pain is worse with the first few steps after any period of inactivity, such as with the first few steps in the morning after waking up or after sitting in a chair for a long period of time.  Otherwise doing well.    1/9/2023: joints are okay.  Occasional random 3rd finger ache that goes away quickly and is not associated with swelling or redness. Left eye has watered, for couple hours for a day or two.     8/8/2023: Intermittent pain of the right second MTP, occurring approximately once every 2 days and resolving within 1-2 hours, typically activity related, and has only occurred since  he has changed his shoe wear this summer to flip-flops.  When he has pain of the right second MTP he has no associated swelling, increased warmth, or overlying erythema.  Other joints are doing well.  Morning stiffness for no more than 10 minutes.  Uveitis controlled.  Planning to see nephrology later this morning.    2/6/2024: Currently doing well with regard to arthritis and iritis.  Reports that he had more eye symptoms recently but it was not due to the iritis/uveitis and those symptoms resolved.  Only joint pain that he has currently is of the first CMC joints that is worse with activity and improves with self-massage.  Feels like he has been stable.  Has nephrology follow-up later today.    6/10/2024: Acute onset flare in the right first MTP that lasted for 10-20 days.  After the flare resolved in the right first MTP then he had a flare in the left first MTP.  Flares are sudden onset, typically waking up with pain and swelling to the point that he cannot put his shoe on or even have a sheet touching the toe.  Was seen in urgent care where he says he was given prednisone that helped reduce the pain and resolved the swelling.    Today, 7/30/2024: Arthritis and uveitis controlled.  Stopped cleaning his eyelids with a warm washcloth that resulted in increase eyelid symptoms so he has restarted using the warm washcloth each morning. Following with nephrology and will hold off on kidney biopsy at this time but re-eval and possibly decide to proceed with bx depending on repeat labs in 2 weeks. No gout flares. No nausea, vomiting, or diarrhea associated with colchicine.     Denies fevers, chills, nausea, vomiting, constipation. No abdominal pain. No chest pain/pressure, palpitations, or shortness of breath. No LE swelling. No neck pain. No oral or nasal sores.  No rash.     Tobacco: Former smoker  EtOH: Weekly  Drugs: None  Occupation:     ROS   12 point review of system was completed and negative  except as noted in the HPI     Active Problem List     Patient Active Problem List   Diagnosis    Iritis    Rheumatoid arthritis of hand, unspecified laterality, unspecified rheumatoid factor presence    Rheumatoid arthritis involving multiple sites, unspecified rheumatoid factor presence    Adenomatous colon polyp    JALEESA (obstructive sleep apnea)    Esophageal reflux    Benign essential hypertension    Acute glomerulonephritis with pathological lesion in kidney    Primary hypercholesterolemia    Morbid obesity (H)    Chronic kidney disease, stage 3a (H)    Benign neoplasm of sigmoid colon    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 No pertinent past medical history.  Past Surgical History     Past Surgical History:   Procedure Laterality Date    EYE SURGERY      HC KNEE SCOPE, DIAGNOSTIC      Description: Arthroscopy Knee Right;  Recorded: 04/30/2008;    JOINT REPLACEMENT      OTHER SURGICAL HISTORY Left     left knee arthroplasty    OTHER SURGICAL HISTORY Bilateral     Cataract surgery     Allergy   No Known Allergies  Current Medication List     Current Outpatient Medications   Medication Sig Dispense Refill    adalimumab (HUMIRA *CF*) 40 MG/0.4ML pen kit Inject 0.4 mLs (40 mg) subcutaneously every 14 days . Hold for signs of infection, then seek medical attention. 0.8 mL 1    allopurinol (ZYLOPRIM) 100 MG tablet Take 1 tablet (100 mg) by mouth daily 30 tablet 1    Cholecalciferol (VITAMIN D3) 2000 UNITS CAPS       colchicine (COLCRYS) 0.6 MG tablet Take 1 tablet (0.6 mg) by mouth daily . Stop if associated nausea, vomiting, or diarrhea. 30 tablet 2    EQL NATURAL ZINC 50 MG TABS       escitalopram (LEXAPRO) 5 MG tablet TAKE 1 TABLET BY MOUTH EVERY DAY 60 tablet 1    fluticasone (FLONASE) 50 MCG/ACT nasal spray INSTILL 1 SPRAY INTO BOTH NOSTRILS DAILY 16 mL 11     "lisinopril (ZESTRIL) 40 MG tablet Take 1 tablet (40 mg) by mouth daily 90 tablet 3    magnesium chloride 535 (64 Mg) MG TBEC CR tablet Take 2 tablets (1,070 mg) by mouth 2 times daily 360 tablet 3    pantoprazole (PROTONIX) 40 MG EC tablet TAKE ONE TABLET BY MOUTH TWICE A  tablet 1     No current facility-administered medications for this visit.     Social History   See HPI    Family History     Family History   Problem Relation Age of Onset    Cancer Father     Bone Cancer Father         Physical Exam     Temp Readings from Last 3 Encounters:   03/30/24 97.8  F (36.6  C) (Tympanic)   03/21/23 98.7  F (37.1  C) (Oral)   12/12/22 97.1  F (36.2  C) (Oral)     BP Readings from Last 5 Encounters:   07/30/24 107/69   07/30/24 107/69   06/10/24 117/81   05/21/24 110/63   03/30/24 (!) 154/83     Pulse Readings from Last 1 Encounters:   07/30/24 82     Resp Readings from Last 1 Encounters:   07/30/24 16     Estimated body mass index is 36.81 kg/m  as calculated from the following:    Height as of 5/21/24: 1.702 m (5' 7\").    Weight as of this encounter: 106.6 kg (235 lb).    GEN: NAD.   HEENT:  Anicteric, noninjected sclera. No obvious external lesions of the ear and nose. Hearing intact.  CV: S1, S2. RRR. No m/r/g  PULM: No increased work of breathing. CTA bilaterally   MSK: MCPs, PIPs, DIPs without swelling or tenderness to palpation.  Wrists without swelling or tenderness to palpation.  Elbows and shoulders without swelling or tenderness to palpation. Knees, ankles, and MTPs without swelling or tenderness to palpation.    Bony hypertrophy of both first MTPs.  SKIN: No rash or jaundice seen  PSYCH: Alert. Appropriate.      Labs / Imaging (select studies)     RF/CCP  Recent Labs   Lab Test 12/15/21  0908   CCPIGG 12.0*   RHF 10     HAYES  Recent Labs   Lab Test 12/15/21  0908   WESLEY Borderline Positive*   ANAP1 Homogeneous   ANAT1 1:80     RNP/Sm/SSA/SSB  Recent Labs   Lab Test 12/15/21  0908   RNPIGG Negative   JOEGG " Negative   SSAIGG Negative   SSBIGG Negative     dsDNA  Recent Labs   Lab Test 07/25/24  0806 08/01/22  0803 12/15/21  0908   DNA 22.0* 19.0* 23.0*     C3/C4  Recent Labs   Lab Test 07/25/24  0806 08/01/22  0803 12/15/21  0908   [id number] 140 145 145   O9PIKZX 30 30 33     Histone Antibody  Recent Labs   Lab Test 12/15/21  0908   HSTO 0.2     CBC  Recent Labs   Lab Test 07/25/24  0806 08/03/23  0759 04/06/23  0832 01/06/23  0747 10/04/22  1434 08/01/22  0803 03/12/21  1310 03/09/20  0812 09/16/19  0804 03/12/19  0849   WBC 6.5  --  6.1 5.0   < > 5.8   < > 6.6 5.7 5.6   RBC 4.07*  --  4.68 4.27*   < > 4.35*   < > 4.76 4.50 4.88   HGB 12.7* 13.5 14.6 13.4   < > 13.4   < > 15.2 14.5 15.7   HCT 38.7*  --  42.4 39.2*   < > 39.7*   < > 44.5 42.3 44.5   MCV 95  --  91 92   < > 91   < > 94 94 91   RDW 13.2  --  13.3 12.7   < > 13.3   < > 14.0 12.7 13.1     --  278 299   < > 347   < > 275 302 343   MCH 31.2  --  31.2 31.4   < > 30.8   < > 31.9 32.2 32.2   MCHC 32.8  --  34.4 34.2   < > 33.8   < > 34.2 34.3 35.3   NEUTROPHIL 49  --  45  --   --  34   < > 43.8 40.9 37.6   LYMPH 37  --  42  --   --  50   < > 43.4 43.2 47.1   MONOCYTE 8  --  7  --   --  11   < > 6.7 9.1 8.9   EOSINOPHIL 5  --  5  --   --  5   < > 5.6 6.3 5.5   BASOPHIL 1  --  1  --   --  1   < > 0.5 0.5 0.9   ANEU  --   --   --   --   --   --   --  2.9 2.3 2.1   ALYM  --   --   --   --   --   --   --  2.9 2.5 2.6   MICHAEL  --   --   --   --   --   --   --  0.4 0.5 0.5   AEOS  --   --   --   --   --   --   --  0.4 0.4 0.3   ABAS  --   --   --   --   --   --   --  0.0 0.0 0.1   ANEUTAUTO 3.2  --  2.7  --   --  1.9   < >  --   --   --    ALYMPAUTO 2.4  --  2.6  --   --  2.9   < >  --   --   --    AMONOAUTO 0.5  --  0.4  --   --  0.6   < >  --   --   --    AEOSAUTO 0.4  --  0.3  --   --  0.3   < >  --   --   --    ABSBASO 0.0  --  0.1  --   --  0.0   < >  --   --   --     < > = values in this interval not displayed.     [facility]  Recent Labs   Lab Test 07/25/24  0863  04/10/24  0801 02/01/24  0851 08/03/23  0759 04/06/23  0832 01/06/23  0747 10/04/22  1434 08/01/22  0803 03/31/22  0804 08/30/21  0848 03/12/21  1310 03/09/20  0812 02/24/20  1634 09/16/19  0804    141 138 140 140 142   < > 139  --    < >  --   --  142  --    POTASSIUM 5.0 4.7 4.1 4.3 4.2 4.1   < > 5.0  --    < >  --   --  3.8  --    CHLORIDE 110* 106 101 106 106 109*   < > 109  --    < >  --   --  105  --    CO2 20* 23 25 20* 22 19*   < > 25  --    < >  --   --  26  --    ANIONGAP 12 12 12 14 12 14   < > 5  --    < >  --   --  11  --    GLC 99 98 96 101* 113* 99   < > 96  --    < >  --   --  92  --    BUN 37.5* 28.3* 26.7* 26.9* 18.8 20.4   < > 17  --    < >  --   --  12  --    CR 1.84* 1.55* 1.53* 1.36* 1.37* 1.28*   < > 1.19 1.25   < > 1.52* 1.05 1.07 1.06   GFRESTIMATED 40* 50* 51* 58* 58* 63   < > 69 66   < > 49* 77 >60 76   GFRESTBLACK  --   --   --   --   --   --   --   --   --   --  57* 89 >60 88   ERNESTINA 9.2 9.8 9.5 9.3 9.7 9.0   < > 9.5  --    < >  --   --  9.2  --    BILITOTAL 0.2  --   --   --  0.4  --   --  0.3 0.4   < > 0.3 0.3  --  0.3   ALBUMIN 3.9  --  4.1 4.0 4.0 3.9   < > 3.6 3.5   < > 3.2* 3.5  --  3.6   PROTTOTAL 7.4  --   --   --  7.0  --   --  7.7 7.6   < > 6.9 7.7  --  7.7   ALKPHOS 71  --   --   --  69  --   --  74 63   < > 70 69  --  72   AST 19  --   --   --  29  --   --  25 25   < > 28 21  --  24   ALT 27  --   --   --  24  --   --  43 45   < > 30 37  --  36    < > = values in this interval not displayed.     HgA1c  Recent Labs   Lab Test 12/07/21  1349   A1C 5.5     Uric Acid  Recent Labs   Lab Test 07/25/24  0806 06/10/24  1035 01/06/23  0747 08/15/22  1026   URIC 6.5 6.6 8.1* 8.5*     Calcium/VitaminD  Recent Labs   Lab Test 07/25/24  0806 04/10/24  0801 02/01/24  0851 04/06/23  0832 01/06/23  0747 08/01/22  0803 12/07/21  1349   ERNESTINA 9.2 9.8 9.5   < > 9.0   < > 10.2   VITDT  --   --   --   --  58  --  50    < > = values in this interval not displayed.     ESR/CRP  Recent Labs   Lab  Test 07/25/24  0806 04/06/23  0832 08/01/22  0803 03/31/22  0805 03/31/22  0804 10/08/21  1330   SED 33* 17* 20   < >  --  25*   CRP  --   --  3.0  --  4.4 3.6   CRPI 8.19* <3.00  --   --   --   --     < > = values in this interval not displayed.     TSH/T4  Recent Labs   Lab Test 12/07/21  1349 02/24/20  1634   TSH 1.75 1.67     Hepatitis B  Recent Labs   Lab Test 03/26/18  1540   HBCAB Nonreactive   HEPBANG Nonreactive     Hepatitis C  Recent Labs   Lab Test 03/26/18  1540   HCVAB Nonreactive     Tuberculosis Screening  Recent Labs   Lab Test 07/25/24  0806 08/30/21  0848 03/26/18  1541   TBRES Negative Negative  --    TBRSLT  --   --  Negative   TBAGN  --   --  0.00     UA  Recent Labs   Lab Test 07/25/24  0806 04/10/24  0801 01/06/23  0805 08/01/22  0803 12/07/21  1401   COLOR Yellow Yellow Yellow   < > Yellow   APPEARANCE Clear Clear Clear   < > Clear   URINEGLC Negative Negative Negative   < > Negative   URINEBILI Negative Negative Negative   < > Negative   SG 1.020 1.015 1.020   < > 1.015   URINEPH 5.5 5.5 5.5   < > 5.5   PROTEIN Negative Negative Trace*   < > 30*   UROBILINOGEN 0.2 0.2 0.2   < > 0.2   NITRITE Negative Negative Negative   < > Negative   UBLD Large* Moderate* Trace*   < > Moderate*   LEUKEST Negative Negative Negative   < > Negative   WBCU 0-5 None Seen None Seen   < > None Seen   RBCU 2-5* 2-5* 0-2   < > 2-5*   BACTERIA  --   --  Many*  --  None Seen    < > = values in this interval not displayed.     Urine Microscopic  Recent Labs   Lab Test 07/25/24  0806 04/10/24  0801 01/06/23  0805 08/01/22  0803 12/07/21  1401   WBCU 0-5 None Seen None Seen   < > None Seen   RBCU 2-5* 2-5* 0-2   < > 2-5*   BACTERIA  --   --  Many*  --  None Seen    < > = values in this interval not displayed.     Urine Protein  GHUTP and UTP= Urine protein (random), GHUTPG and UTPG = urine protein:creatinine ratio (random), UCRR = urine creatinine (random)  Recent Labs   Lab Test 07/25/24  0806 04/10/24  0801  08/03/23  0759   GHUTP 15.9 16.3 15.2*   GHUTPG 0.12 0.15 0.12   UCRR 129.2 108.7 121.9     Immunization History     Immunization History   Administered Date(s) Administered    COVID-19 MONOVALENT 12+ (Pfizer) 04/22/2021, 05/13/2021, 11/15/2021    Hepatitis A (ADULT 19+) 04/30/2008, 11/11/2008    Influenza (H1N1) 12/18/2009    Influenza Vaccine, 6+MO IM (QUADRIVALENT W/PRESERVATIVES) 11/11/2008, 12/18/2009, 09/18/2020    Mantoux Tuberculin Skin Test 02/01/2007    TDAP (Adacel,Boostrix) 03/09/2011, 03/21/2023    Zoster recombinant adjuvanted (SHINGRIX) 04/06/2023, 08/08/2023          Chart documentation done in part with Dragon Voice recognition Software. Although reviewed after completion, some word and grammatical error may remain.    Alex Segundo MD

## 2024-07-30 NOTE — NURSING NOTE
RAPID3 (0-30) Cumulative Score  0.5          RAPID3 Weighted Score (divide #4 by 3 and that is the weighted score)  0.1

## 2024-08-13 ENCOUNTER — TELEPHONE (OUTPATIENT)
Dept: NEPHROLOGY | Facility: CLINIC | Age: 64
End: 2024-08-13

## 2024-08-13 ENCOUNTER — LAB (OUTPATIENT)
Dept: LAB | Facility: CLINIC | Age: 64
End: 2024-08-13
Payer: COMMERCIAL

## 2024-08-13 DIAGNOSIS — I10 BENIGN ESSENTIAL HYPERTENSION: ICD-10-CM

## 2024-08-13 DIAGNOSIS — N18.31 CHRONIC KIDNEY DISEASE, STAGE 3A (H): ICD-10-CM

## 2024-08-13 DIAGNOSIS — E83.42 HYPOMAGNESEMIA: ICD-10-CM

## 2024-08-13 LAB
ALBUMIN SERPL BCG-MCNC: 3.7 G/DL (ref 3.5–5.2)
ALBUMIN UR-MCNC: NEGATIVE MG/DL
ANION GAP SERPL CALCULATED.3IONS-SCNC: 12 MMOL/L (ref 7–15)
APPEARANCE UR: CLEAR
APTT PPP: 58 SECONDS (ref 22–38)
BACTERIA #/AREA URNS HPF: ABNORMAL /HPF
BILIRUB UR QL STRIP: NEGATIVE
BUN SERPL-MCNC: 32 MG/DL (ref 8–23)
CALCIUM SERPL-MCNC: 9.6 MG/DL (ref 8.8–10.4)
CHLORIDE SERPL-SCNC: 106 MMOL/L (ref 98–107)
COLOR UR AUTO: YELLOW
CREAT SERPL-MCNC: 1.64 MG/DL (ref 0.67–1.17)
CREAT UR-MCNC: 100 MG/DL
CREAT UR-MCNC: 104.2 MG/DL
CYSTATIN C (ROCHE): 1.9 MG/L (ref 0.6–1)
EGFRCR SERPLBLD CKD-EPI 2021: 46 ML/MIN/1.73M2
GFR/BSA.PRED SERPLBLD CYS-BASED-ARV: 33 ML/MIN/1.73M2
GLUCOSE SERPL-MCNC: 97 MG/DL (ref 70–99)
GLUCOSE UR STRIP-MCNC: NEGATIVE MG/DL
HCO3 SERPL-SCNC: 20 MMOL/L (ref 22–29)
HGB BLD-MCNC: 11.9 G/DL (ref 13.3–17.7)
HGB UR QL STRIP: ABNORMAL
HYALINE CASTS #/AREA URNS LPF: ABNORMAL /LPF
INR PPP: 1.14 (ref 0.85–1.15)
KETONES UR STRIP-MCNC: NEGATIVE MG/DL
LEUKOCYTE ESTERASE UR QL STRIP: NEGATIVE
MAGNESIUM SERPL-MCNC: 1.1 MG/DL (ref 1.7–2.3)
MAGNESIUM UR-MCNC: 2.7 MG/DL
MAGNESIUM URINE MG/MG CR: 0.03 MG/MG CR (ref 0.04–0.12)
MICROALBUMIN UR-MCNC: 27.4 MG/L
MICROALBUMIN/CREAT UR: 26.3 MG/G CR (ref 0–17)
NITRATE UR QL: NEGATIVE
PH UR STRIP: 5.5 [PH] (ref 5–7)
PHOSPHATE SERPL-MCNC: 3 MG/DL (ref 2.5–4.5)
POTASSIUM SERPL-SCNC: 5 MMOL/L (ref 3.4–5.3)
RBC #/AREA URNS AUTO: ABNORMAL /HPF
SODIUM SERPL-SCNC: 138 MMOL/L (ref 135–145)
SP GR UR STRIP: 1.01 (ref 1–1.03)
SQUAMOUS #/AREA URNS AUTO: ABNORMAL /LPF
TRANS CELLS #/AREA URNS HPF: ABNORMAL /HPF
UROBILINOGEN UR STRIP-ACNC: 0.2 E.U./DL
WBC #/AREA URNS AUTO: ABNORMAL /HPF

## 2024-08-13 PROCEDURE — 85018 HEMOGLOBIN: CPT

## 2024-08-13 PROCEDURE — 81001 URINALYSIS AUTO W/SCOPE: CPT

## 2024-08-13 PROCEDURE — 82610 CYSTATIN C: CPT

## 2024-08-13 PROCEDURE — 82570 ASSAY OF URINE CREATININE: CPT

## 2024-08-13 PROCEDURE — 82043 UR ALBUMIN QUANTITATIVE: CPT

## 2024-08-13 PROCEDURE — 36415 COLL VENOUS BLD VENIPUNCTURE: CPT

## 2024-08-13 PROCEDURE — 83735 ASSAY OF MAGNESIUM: CPT

## 2024-08-13 PROCEDURE — 85610 PROTHROMBIN TIME: CPT

## 2024-08-13 PROCEDURE — 80069 RENAL FUNCTION PANEL: CPT

## 2024-08-13 PROCEDURE — 85730 THROMBOPLASTIN TIME PARTIAL: CPT

## 2024-08-13 PROCEDURE — 83735 ASSAY OF MAGNESIUM: CPT | Mod: 59

## 2024-08-13 RX ORDER — LISINOPRIL 40 MG/1
40 TABLET ORAL DAILY
Qty: 90 TABLET | Refills: 3 | Status: SHIPPED | OUTPATIENT
Start: 2024-08-13

## 2024-08-13 NOTE — TELEPHONE ENCOUNTER
[facility] Call Center    Phone Message    May a detailed message be left on voicemail: yes     Reason for Call: Medication Question or concern regarding medication   Prescription Clarification  Name of Medication: lisinopril (ZESTRIL) 40 MG tablet     Prescribing Provider: Allen   Pharmacy:   [facility] [id number] [place] [address]       What on the order needs clarification? Pt needs this prescription to go to [facility]. Please call pt to let him know it was sent there. Thank you!    Here are his BP readings:   105/80 7/31  126/78 8/1  112/70 8/2  107/69 8/3  114/61 8/4  116/65 8/5  109/69 8/6  113/70 8/7  108/72 8/8  114/77 8/9  116/74 8/10  115/75 8/11  116/73 8/12  117/76 8/13      Action Taken: Message routed to:  [facility]    Travel Screening: Not Applicable     Date of Service:

## 2024-08-13 NOTE — TELEPHONE ENCOUNTER
Nephrology Note: Medication Refill Request    Medication Refill Request:     Medication/Dose/Frequency:   lisinopril (ZESTRIL) 40 MG tablet 90 tablet 3 8/13/2024 -- No   Sig - Route: Take 1 tablet (40 mg) by mouth daily - Oral     Preferred Pharmacy:   [facility] [id number] [place] [address] [address] [facility]     Provider: Dr Villa                          SITUATION/BACKROUND:                 Last office visit: 7/30/24        Future office visit: 2/11/25     ASSESSMENT:     Neph Assessments:    Recent Labs:  CBC Results:  Recent Labs   Lab Test 08/13/24  0816 07/25/24  0806   WBC  --  6.5   RBC  --  4.07*   HGB 11.9* 12.7*   HCT  --  38.7*   MCV  --  95   MCH  --  31.2   MCHC  --  32.8   RDW  --  13.2   PLT  --  274     Last Renal Panel:  Sodium   Date Value Ref Range Status   08/13/2024 138 135 - 145 mmol/L Final   09/13/2017 142 133 - 144 mmol/L Final     Potassium   Date Value Ref Range Status   08/13/2024 5.0 3.4 - 5.3 mmol/L Final   08/01/2022 5.0 3.4 - 5.3 mmol/L Final   09/13/2017 4.2 3.4 - 5.3 mmol/L Final     Chloride   Date Value Ref Range Status   08/13/2024 106 98 - 107 mmol/L Final   08/01/2022 109 94 - 109 mmol/L Final   09/13/2017 107 94 - 109 mmol/L Final     Carbon Dioxide   Date Value Ref Range Status   09/13/2017 28 20 - 32 mmol/L Final     Carbon Dioxide (CO2)   Date Value Ref Range Status   08/13/2024 20 (L) 22 - 29 mmol/L Final   08/01/2022 25 20 - 32 mmol/L Final     Anion Gap   Date Value Ref Range Status   08/13/2024 12 7 - 15 mmol/L Final   08/01/2022 5 3 - 14 mmol/L Final   09/13/2017 7 3 - 14 mmol/L Final     Glucose   Date Value Ref Range Status   08/13/2024 97 70 - 99 mg/dL Final   08/01/2022 96 70 - 99 mg/dL Final   09/13/2017 95 70 - 99 mg/dL Final     Urea Nitrogen   Date Value Ref Range Status   08/13/2024 32.0 (H) 8.0 - 23.0 mg/dL Final   08/01/2022 17 7 - 30 mg/dL Final   09/13/2017 18 7 - 30 mg/dL Final     Creatinine   Date Value Ref Range Status   08/13/2024 1.64 (H) 0.67 - 1.17  mg/dL Final   03/12/2021 1.52 (H) 0.66 - 1.25 mg/dL Final     GFR Estimate   Date Value Ref Range Status   08/13/2024 46 (L) >60 mL/min/1.73m2 Final     Comment:     eGFR calculated using 2021 CKD-EPI equation.   03/12/2021 49 (L) >60 mL/min/[1.73_m2] Final     Comment:     Non  GFR Calc  Starting 12/18/2018, serum creatinine based estimated GFR (eGFR) will be   calculated using the Chronic Kidney Disease Epidemiology Collaboration   (CKD-EPI) equation.       Calcium   Date Value Ref Range Status   08/13/2024 9.6 8.8 - 10.4 mg/dL Final     Comment:     Reference intervals for this test were updated on 7/16/2024 to reflect our healthy population more accurately. There may be differences in the flagging of prior results with similar values performed with this method. Those prior results can be interpreted in the context of the updated reference intervals.   09/13/2017 9.5 8.5 - 10.1 mg/dL Final     Phosphorus   Date Value Ref Range Status   08/13/2024 3.0 2.5 - 4.5 mg/dL Final     Albumin   Date Value Ref Range Status   08/13/2024 3.7 3.5 - 5.2 g/dL Final   08/01/2022 3.6 3.4 - 5.0 g/dL Final   03/12/2021 3.2 (L) 3.4 - 5.0 g/dL Final       Current Medication List:   Current Outpatient Medications (Antihypertensive, Cardiovascular, Diuretics, Beta blockers, Calcium blockers, Anticoagulants)   Medication Sig    lisinopril (ZESTRIL) 40 MG tablet Take 1 tablet (40 mg) by mouth daily     Current Outpatient Medications (Other)   Medication Sig    adalimumab (HUMIRA *CF*) 40 MG/0.4ML pen kit Inject 0.4 mLs (40 mg) subcutaneously every 14 days . Hold for signs of infection, then seek medical attention.    allopurinol (ZYLOPRIM) 100 MG tablet Take 2 tablets (200 mg) by mouth daily    Cholecalciferol (VITAMIN D3) 2000 UNITS CAPS     colchicine (COLCRYS) 0.6 MG tablet Take 1 tablet (0.6 mg) by mouth daily . Stop if associated nausea, vomiting, or diarrhea.    EQL NATURAL ZINC 50 MG TABS     escitalopram (LEXAPRO)  5 MG tablet TAKE 1 TABLET BY MOUTH EVERY DAY    fluticasone (FLONASE) 50 MCG/ACT nasal spray INSTILL 1 SPRAY INTO BOTH NOSTRILS DAILY    magnesium chloride 535 (64 Mg) MG TBEC CR tablet Take 2 tablets (1,070 mg) by mouth 2 times daily    pantoprazole (PROTONIX) 40 MG EC tablet TAKE ONE TABLET BY MOUTH TWICE A DAY       Has patient had kidney transplant in the prior 1 year: no.   Has patient been seen the last 12 months: Yes.  Associated labs reviewed for medication: Yes    PLAN:     Medication refilled per protocol: Yes    MESSI DUBOSE RN

## 2024-08-21 ENCOUNTER — DOCUMENTATION ONLY (OUTPATIENT)
Dept: NEPHROLOGY | Facility: CLINIC | Age: 64
End: 2024-08-21
Payer: COMMERCIAL

## 2024-08-21 NOTE — PROGRESS NOTES
Spoke with patient via phone.    Discussed lab results from last week.    We discussed plan to move forward with kidney biopsy given microscopic hematuria, positive dsDNA, and progressive renal dysfunction (eGFR via Cystatin C in the 30's and Creatinine based eGFR in the 40's). He is in agreement with this plan. Will arrange renal biopsy with IR in approximately 2.5-3 weeks (once he returns from vacation).    We are also increasing his Mag supplementation to 6 pills total in the day for persistent hypoMag.

## 2024-08-22 ENCOUNTER — PATIENT OUTREACH (OUTPATIENT)
Dept: NEPHROLOGY | Facility: CLINIC | Age: 64
End: 2024-08-22
Payer: COMMERCIAL

## 2024-08-22 DIAGNOSIS — N18.31 CHRONIC KIDNEY DISEASE, STAGE 3A (H): Primary | ICD-10-CM

## 2024-09-05 ENCOUNTER — TELEPHONE (OUTPATIENT)
Dept: NEPHROLOGY | Facility: CLINIC | Age: 64
End: 2024-09-05
Payer: COMMERCIAL

## 2024-09-05 ENCOUNTER — TELEPHONE (OUTPATIENT)
Dept: INTERVENTIONAL RADIOLOGY/VASCULAR | Facility: HOSPITAL | Age: 64
End: 2024-09-05
Payer: COMMERCIAL

## 2024-09-05 DIAGNOSIS — M06.9 RHEUMATOID ARTHRITIS INVOLVING MULTIPLE SITES, UNSPECIFIED WHETHER RHEUMATOID FACTOR PRESENT (H): ICD-10-CM

## 2024-09-05 DIAGNOSIS — H20.9 UVEITIS: ICD-10-CM

## 2024-09-05 NOTE — TELEPHONE ENCOUNTER
Health Call Center    Phone Message    May a detailed message be left on voicemail: yes     Reason for Call: Other: Pt is wondering if needs preauthorization for Med[employer] and doesn't know the process or procedure number. Please call pt's wife Cyndy at [phone]. Sending HP since procedure is Tuesday.Thank you!     Action Taken: Message routed to:  [facility]rology [place]    Travel Screening: Not Applicable     Date of Service:

## 2024-09-05 NOTE — TELEPHONE ENCOUNTER
Patient's number called as the number given to his spouse does not match the one on file.   Left vm to inform him that a PA is usually not needed but to check with insurance regarding this question.  ELEANOR Rowe Care Coordinator  Nephrology

## 2024-09-10 ENCOUNTER — TELEPHONE (OUTPATIENT)
Dept: INTERVENTIONAL RADIOLOGY/VASCULAR | Facility: HOSPITAL | Age: 64
End: 2024-09-10
Payer: COMMERCIAL

## 2024-09-10 NOTE — TELEPHONE ENCOUNTER
INTERVENTIONAL RADIOLOGY INSTRUCTIONS     You are scheduled for an upcoming procedure in the   Interventional Radiology Department at [facility].     Date:  Tuesday September 17     Procedure: Kidney Biopsy     Address: [place] [address]     Free parking is available for patients & visitors in Lot A or B.  Lot A is closest to the main entrance of hospital.    Check in at main entrance WELCOME DESK.        Check into the Radiology Department at: 09:30 am    On the date of procedure, please do not eat any food after: 01:30 am (8 hours before arrival)    On the date of this procedure, you may drink clear liquids until 07:30 am (2 hours before arrival)     Clear liquid examples are: water, apple juice, black coffee or tea (no cream, sugar or milk), Gatorade & Jello.        You will need to have someone available to drive you home.     A ride share service ([employer], [employer], [employer]) does not qualify for transportation home unless you have another adult accompanying you home.    We recommend that you do not not drive, or operate heavy machinery for 24 hours after receiving sedation for this procedure.     We recommend that you have a responsible adult stay with you for 24 hours after you get home.    Please bring list of current medications to your appointment.    Please take all of your medications as prescribed with a small sip of water on the date of this procedure, unless you are contacted by a nurse from our department to hold them.    You are required to have a pre-operative physical exam (H&P) by a healthcare provider in the 30 day period prior to your scheduled procedure.  Please contact your primary care team for this appointment.  If you are planning on having this pre-operative exam done outside of [facility], please fax the documentation to [phone].    Please confirm that you have received  these instructions by replying to this [employer] message, or if you have any questions, please call the Interventional Radiology team at [phone].       Thank you!    Janelle BATRES  Interventional Radiology Intake Nurse Coordinator  [phone]

## 2024-09-10 NOTE — TELEPHONE ENCOUNTER
Writer has spoken with Sam regarding planned procedure with IR via telephone.      Sam acknowledges understanding of pre-procedure instructions.         I have provided Sam with IR number ([phone]) for questions or concerns.    Patient confirms that he will contact primary physician and request pre-operative physical prior to 9/17/2024 procedure date.    Janelle BATRES  Interventional Radiology RN   [phone]

## 2024-09-16 ENCOUNTER — OFFICE VISIT (OUTPATIENT)
Dept: FAMILY MEDICINE | Facility: CLINIC | Age: 64
End: 2024-09-16
Payer: COMMERCIAL

## 2024-09-16 VITALS
WEIGHT: 236.2 LBS | TEMPERATURE: 97.9 F | DIASTOLIC BLOOD PRESSURE: 74 MMHG | HEART RATE: 83 BPM | OXYGEN SATURATION: 96 % | SYSTOLIC BLOOD PRESSURE: 106 MMHG | BODY MASS INDEX: 36.99 KG/M2

## 2024-09-16 DIAGNOSIS — F43.23 ADJUSTMENT DISORDER WITH MIXED ANXIETY AND DEPRESSED MOOD: ICD-10-CM

## 2024-09-16 DIAGNOSIS — Z22.322 CARRIER OR SUSPECTED CARRIER OF METHICILLIN RESISTANT STAPHYLOCOCCUS AUREUS: ICD-10-CM

## 2024-09-16 DIAGNOSIS — N28.9 RENAL LESION: ICD-10-CM

## 2024-09-16 DIAGNOSIS — M06.9 RHEUMATOID ARTHRITIS INVOLVING MULTIPLE SITES, UNSPECIFIED WHETHER RHEUMATOID FACTOR PRESENT (H): ICD-10-CM

## 2024-09-16 DIAGNOSIS — N00.9 ACUTE GLOMERULONEPHRITIS WITH PATHOLOGICAL LESION IN KIDNEY: ICD-10-CM

## 2024-09-16 DIAGNOSIS — M1A.09X0 CHRONIC GOUT OF MULTIPLE SITES, UNSPECIFIED CAUSE: ICD-10-CM

## 2024-09-16 DIAGNOSIS — E66.01 MORBID OBESITY (H): ICD-10-CM

## 2024-09-16 DIAGNOSIS — Z79.899 IMMUNOCOMPROMISED STATE DUE TO DRUG THERAPY (H): ICD-10-CM

## 2024-09-16 DIAGNOSIS — D50.0 IRON DEFICIENCY ANEMIA DUE TO CHRONIC BLOOD LOSS: ICD-10-CM

## 2024-09-16 DIAGNOSIS — N18.31 CHRONIC KIDNEY DISEASE, STAGE 3A (H): ICD-10-CM

## 2024-09-16 DIAGNOSIS — Z01.818 PREOP GENERAL PHYSICAL EXAM: Primary | ICD-10-CM

## 2024-09-16 DIAGNOSIS — G47.33 OSA (OBSTRUCTIVE SLEEP APNEA): ICD-10-CM

## 2024-09-16 DIAGNOSIS — D84.821 IMMUNOCOMPROMISED STATE DUE TO DRUG THERAPY (H): ICD-10-CM

## 2024-09-16 DIAGNOSIS — I10 BENIGN ESSENTIAL HYPERTENSION: ICD-10-CM

## 2024-09-16 DIAGNOSIS — K21.9 GASTROESOPHAGEAL REFLUX DISEASE WITHOUT ESOPHAGITIS: ICD-10-CM

## 2024-09-16 LAB
ANION GAP SERPL CALCULATED.3IONS-SCNC: 11 MMOL/L (ref 7–15)
BUN SERPL-MCNC: 27.8 MG/DL (ref 8–23)
CALCIUM SERPL-MCNC: 9.4 MG/DL (ref 8.8–10.4)
CHLORIDE SERPL-SCNC: 107 MMOL/L (ref 98–107)
CREAT SERPL-MCNC: 1.57 MG/DL (ref 0.67–1.17)
EGFRCR SERPLBLD CKD-EPI 2021: 49 ML/MIN/1.73M2
ERYTHROCYTE [DISTWIDTH] IN BLOOD BY AUTOMATED COUNT: 13.5 % (ref 10–15)
GLUCOSE SERPL-MCNC: 91 MG/DL (ref 70–99)
HCO3 SERPL-SCNC: 22 MMOL/L (ref 22–29)
HCT VFR BLD AUTO: 37.4 % (ref 40–53)
HGB BLD-MCNC: 12.8 G/DL (ref 13.3–17.7)
MCH RBC QN AUTO: 32.1 PG (ref 26.5–33)
MCHC RBC AUTO-ENTMCNC: 34.2 G/DL (ref 31.5–36.5)
MCV RBC AUTO: 94 FL (ref 78–100)
PLATELET # BLD AUTO: 286 10E3/UL (ref 150–450)
POTASSIUM SERPL-SCNC: 4.8 MMOL/L (ref 3.4–5.3)
RBC # BLD AUTO: 3.99 10E6/UL (ref 4.4–5.9)
SODIUM SERPL-SCNC: 140 MMOL/L (ref 135–145)
WBC # BLD AUTO: 5.8 10E3/UL (ref 4–11)

## 2024-09-16 PROCEDURE — 85027 COMPLETE CBC AUTOMATED: CPT | Performed by: FAMILY MEDICINE

## 2024-09-16 PROCEDURE — 93000 ELECTROCARDIOGRAM COMPLETE: CPT | Performed by: FAMILY MEDICINE

## 2024-09-16 PROCEDURE — 99214 OFFICE O/P EST MOD 30 MIN: CPT | Performed by: FAMILY MEDICINE

## 2024-09-16 PROCEDURE — 36415 COLL VENOUS BLD VENIPUNCTURE: CPT | Performed by: FAMILY MEDICINE

## 2024-09-16 PROCEDURE — 80048 BASIC METABOLIC PNL TOTAL CA: CPT | Performed by: FAMILY MEDICINE

## 2024-09-16 NOTE — PATIENT INSTRUCTIONS

## 2024-09-16 NOTE — PROGRESS NOTES
Preoperative Evaluation  [facility]  [address] [place] [address]  Phone: [phone]  Primary Provider: Momo Hartley MD  Pre-op Performing Provider: Luciano Suarez MD  Sep 16, 2024             9/12/2024   Surgical Information   What procedure is being done? Biopsy of kidney   Facility or Hospital where procedure/surgery will be performed: [facility]     Who is doing the procedure / surgery?   Jeff Villa MD      Date of surgery / procedure: 09'/17/24   Time of surgery / procedure: 9:30   Where do you plan to recover after surgery? at home with family        Fax number for surgical facility: Note does not need to be faxed, will be available electronically in Epic.    Assessment & Plan     The proposed surgical procedure is considered INTERMEDIATE risk.    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  Continue currant medications, continue to monitor.      Gastroesophageal reflux disease without esophagitis  Good control.  Continue currant medications, continue to monitor.      Morbid obesity (H)  Good control.  Continue currant medications, continue to monitor.      Benign essential hypertension  Good control.  Continue currant medications, continue to monitor.    Renal lesion  Okay for bx    Chronic gout of multiple sites, unspecified cause      Adjustment disorder with mixed anxiety and depressed mood  Good control.  Continue currant medications, continue to  monitor.      Iron deficiency anemia due to chronic blood loss  Will recheck    Risks and Recommendations  The patient has the following additional risks and recommendations for perioperative complications:  Obstructive Sleep Apnea:   Will bring cpap with him    Antiplatelet or Anticoagulation Medication Instructions   - Patient is on no antiplatelet or anticoagulation medications.    Additional Medication Instructions  Take all scheduled medications on the day of surgery    Recommendation  Approval given to proceed with proposed procedure, without further diagnostic evaluation.    Jase Vargas is a 64 year old, presenting for the following:  Pre-Op Exam          9/16/2024    11:03 AM   Additional Questions   Roomed by MARY Santiago   Accompanied by Self      HPI related to upcoming procedure:         9/12/2024   Pre-Op Questionnaire   Have you ever had a heart attack or stroke? No   Have you ever had surgery on your heart or blood vessels, such as a stent placement, a coronary artery bypass, or surgery on an artery in your head, neck, heart, or legs? No   Do you have chest pain with activity? No   Do you have a history of heart failure? No   Do you currently have a cold, bronchitis or symptoms of other infection? No   Do you have a cough, shortness of breath, or wheezing? No   Do you or anyone in your family have previous history of blood clots? No   Do you or does anyone in your family have a serious bleeding problem such as prolonged bleeding following surgeries or cuts? No   Have you ever had problems with anemia or been told to take iron pills? No   Have you had any abnormal blood loss such as black, tarry or bloody stools? No   Have you ever had a blood transfusion? No   Are you willing to have a blood transfusion if it is medically needed before, during, or after your surgery? Yes   Have you or any of your relatives ever had problems with anesthesia? No   Do you have sleep apnea, excessive snoring or  daytime drowsiness? (!) YES   Do you have a CPAP machine? Yes   Do you have any artifical heart valves or other implanted medical devices like a pacemaker, defibrillator, or continuous glucose monitor? No   Do you have artificial joints? (!) YES   Are you allergic to latex? No        Health Care Directive  Patient does not have a Health Care Directive or Living Will: Discussed advance care planning with patient; information given to patient to review.    Preoperative Review of    reviewed - controlled substances reflected in medication list.      Status of Chronic Conditions:  See problem list for active medical problems.  Problems all longstanding and stable, except as noted/documented.  See ROS for pertinent symptoms related to these conditions.    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  Created by Conversion      JALEESA (obstructive sleep apnea) 12/19/2021     Priority: Medium     Formatting of this note might be different from the original.  Created by Conversion      Esophageal reflux 12/19/2021     Priority: Medium     Formatting of this note might be different from the original.  Created by Conversion      Benign essential hypertension 12/19/2021     Priority: Medium     Formatting of this note might be different from the original.  Created by Conversion      Acute glomerulonephritis with pathological lesion in kidney 12/19/2021     Priority: Medium     Formatting of this note might be different from the original.  Created by Conversion    Replacement Utility updated for latest IMO load      Primary hypercholesterolemia 12/19/2021     Priority: Medium     Formatting of this note might be different from the original.  Created by Conversion      Morbid obesity (H)  12/19/2021     Priority: Medium    Chronic kidney disease, stage 3a (H) 12/19/2021     Priority: Medium    Adenomatous colon polyp 12/07/2021     Priority: Medium    Benign neoplasm of sigmoid colon 03/26/2019     Priority: Medium    Diverticular disease of large intestine 03/22/2019     Priority: Medium    Rheumatoid arthritis involving multiple sites, unspecified rheumatoid factor presence 03/12/2019     Priority: Medium     IMO Regulatory Load OCT 2020      Iritis 11/30/2016     Priority: Medium    Rheumatoid arthritis of hand, unspecified laterality, unspecified rheumatoid factor presence 11/30/2016     Priority: Medium     IMO Regulatory Load OCT 2020        Past Medical History:   Diagnosis Date    Arthritis ?     Past Surgical History:   Procedure Laterality Date    COLONOSCOPY  ?    EYE SURGERY      HC KNEE SCOPE, DIAGNOSTIC      Description: Arthroscopy Knee Right;  Recorded: 04/30/2008;    JOINT REPLACEMENT      OTHER SURGICAL HISTORY Left     left knee arthroplasty    OTHER SURGICAL HISTORY Bilateral     Cataract surgery    VASCULAR SURGERY       Current Outpatient Medications   Medication Sig Dispense Refill    adalimumab (HUMIRA *CF*) 40 MG/0.4ML pen kit Inject 0.4 mLs (40 mg) subcutaneously every 14 days. . Hold for signs of infection, then seek medical attention. 0.8 mL 7    allopurinol (ZYLOPRIM) 100 MG tablet Take 2 tablets (200 mg) by mouth daily 60 tablet 2    Cholecalciferol (VITAMIN D3) 2000 UNITS CAPS       colchicine (COLCRYS) 0.6 MG tablet Take 1 tablet (0.6 mg) by mouth daily . Stop if associated nausea, vomiting, or diarrhea. 30 tablet 1    EQL NATURAL ZINC 50 MG TABS       escitalopram (LEXAPRO) 5 MG tablet TAKE 1 TABLET BY MOUTH EVERY DAY 60 tablet 1    fluticasone (FLONASE) 50 MCG/ACT nasal spray INSTILL 1 SPRAY INTO BOTH NOSTRILS DAILY 16 mL 11    lisinopril (ZESTRIL) 40 MG tablet Take 1 tablet (40 mg) by mouth daily 90 tablet 3    magnesium chloride 535 (64 Mg) MG TBEC CR tablet Take 2  "tablets (1,070 mg) by mouth 2 times daily 360 tablet 3    pantoprazole (PROTONIX) 40 MG EC tablet TAKE ONE TABLET BY MOUTH TWICE A  tablet 1       No Known Allergies     Social History     Tobacco Use    Smoking status: Former     Current packs/day: 0.00     Average packs/day: 0.5 packs/day for 10.0 years (5.0 ttl pk-yrs)     Types: Cigarettes     Start date: 1977     Quit date: 1987     Years since quittin.7    Smokeless tobacco: Never   Substance Use Topics    Alcohol use: Yes     Comment: weekly     Family History   Problem Relation Age of Onset    Cancer Father     Bone Cancer Father      History   Drug Use No             Review of Systems  Constitutional, HEENT, cardiovascular, pulmonary, gi and gu systems are negative, except as otherwise noted.    Objective    /74   Pulse 83   Temp 97.9  F (36.6  C) (Tympanic)   Wt 107.1 kg (236 lb 3.2 oz)   SpO2 96%   BMI 36.99 kg/m     Estimated body mass index is 36.99 kg/m  as calculated from the following:    Height as of 24: 1.702 m (5' 7\").    Weight as of this encounter: 107.1 kg (236 lb 3.2 oz).  Physical Exam  GENERAL: alert and no distress  NECK: no adenopathy, no asymmetry, masses, or scars  RESP: lungs clear to auscultation - no rales, rhonchi or wheezes  CV: regular rate and rhythm, normal S1 S2, no S3 or S4, no murmur, click or rub, no peripheral edema  ABDOMEN: soft, nontender, no hepatosplenomegaly, no masses and bowel sounds normal  MS: no gross musculoskeletal defects noted, no edema    Recent Labs   Lab Test 24  0816 24  0806   HGB 11.9* 12.7*   PLT  --  274   INR 1.14  --     142   POTASSIUM 5.0 5.0   CR 1.64* 1.84*    Diagnostics  Labs pending at this time.  Results will be reviewed when available.   EKG: appears normal, NSR, normal axis, normal intervals, no acute ST/T changes c/w ischemia, no LVH by voltage criteria, unchanged from previous tracings    Revised Cardiac Risk Index (RCRI)  The patient " has the following serious cardiovascular risks for perioperative complications:   - No serious cardiac risks = 0 points     RCRI Interpretation: 1 point: Class II (low risk - 0.9% complication rate)         Signed Electronically by: Luciano Suarez MD  A copy of this evaluation report is provided to the requesting physician.

## 2024-09-17 ENCOUNTER — HOSPITAL ENCOUNTER (OUTPATIENT)
Dept: CT IMAGING | Facility: HOSPITAL | Age: 64
Discharge: HOME | End: 2024-09-17
Attending: STUDENT IN AN ORGANIZED HEALTH CARE EDUCATION/TRAINING PROGRAM | Admitting: STUDENT IN AN ORGANIZED HEALTH CARE EDUCATION/TRAINING PROGRAM
Payer: COMMERCIAL

## 2024-09-17 VITALS
HEART RATE: 80 BPM | RESPIRATION RATE: 16 BRPM | DIASTOLIC BLOOD PRESSURE: 51 MMHG | SYSTOLIC BLOOD PRESSURE: 91 MMHG | OXYGEN SATURATION: 94 %

## 2024-09-17 DIAGNOSIS — N18.31 CHRONIC KIDNEY DISEASE, STAGE 3A (H): ICD-10-CM

## 2024-09-17 PROCEDURE — 99152 MOD SED SAME PHYS/QHP 5/>YRS: CPT

## 2024-09-17 PROCEDURE — 250N000011 HC RX IP 250 OP 636: Performed by: STUDENT IN AN ORGANIZED HEALTH CARE EDUCATION/TRAINING PROGRAM

## 2024-09-17 RX ORDER — FENTANYL CITRATE 50 UG/ML
25-50 INJECTION, SOLUTION INTRAMUSCULAR; INTRAVENOUS EVERY 5 MIN PRN
Status: DISCONTINUED | OUTPATIENT
Start: 2024-09-17 | End: 2024-09-18 | Stop reason: HOSPADM

## 2024-09-17 RX ORDER — NALOXONE HYDROCHLORIDE 0.4 MG/ML
0.4 INJECTION, SOLUTION INTRAMUSCULAR; INTRAVENOUS; SUBCUTANEOUS
Status: DISCONTINUED | OUTPATIENT
Start: 2024-09-17 | End: 2024-09-18 | Stop reason: HOSPADM

## 2024-09-17 RX ORDER — ACETAMINOPHEN 325 MG/1
650 TABLET ORAL EVERY 4 HOURS PRN
Status: DISCONTINUED | OUTPATIENT
Start: 2024-09-17 | End: 2024-09-18 | Stop reason: HOSPADM

## 2024-09-17 RX ORDER — NALOXONE HYDROCHLORIDE 0.4 MG/ML
0.2 INJECTION, SOLUTION INTRAMUSCULAR; INTRAVENOUS; SUBCUTANEOUS
Status: DISCONTINUED | OUTPATIENT
Start: 2024-09-17 | End: 2024-09-18 | Stop reason: HOSPADM

## 2024-09-17 RX ORDER — FLUMAZENIL 0.1 MG/ML
0.2 INJECTION, SOLUTION INTRAVENOUS
Status: DISCONTINUED | OUTPATIENT
Start: 2024-09-17 | End: 2024-09-18 | Stop reason: HOSPADM

## 2024-09-17 RX ADMIN — FENTANYL CITRATE 50 MCG: 50 INJECTION, SOLUTION INTRAMUSCULAR; INTRAVENOUS at 11:09

## 2024-09-17 RX ADMIN — FENTANYL CITRATE 50 MCG: 50 INJECTION, SOLUTION INTRAMUSCULAR; INTRAVENOUS at 11:02

## 2024-09-17 RX ADMIN — MIDAZOLAM HYDROCHLORIDE 1 MG: 1 INJECTION, SOLUTION INTRAMUSCULAR; INTRAVENOUS at 10:48

## 2024-09-17 RX ADMIN — MIDAZOLAM HYDROCHLORIDE 1 MG: 1 INJECTION, SOLUTION INTRAMUSCULAR; INTRAVENOUS at 10:58

## 2024-09-17 RX ADMIN — FENTANYL CITRATE 50 MCG: 50 INJECTION, SOLUTION INTRAMUSCULAR; INTRAVENOUS at 10:55

## 2024-09-17 RX ADMIN — FENTANYL CITRATE 50 MCG: 50 INJECTION, SOLUTION INTRAMUSCULAR; INTRAVENOUS at 10:50

## 2024-09-17 RX ADMIN — MIDAZOLAM HYDROCHLORIDE 2 MG: 1 INJECTION, SOLUTION INTRAMUSCULAR; INTRAVENOUS at 10:53

## 2024-09-17 NOTE — PROGRESS NOTES
Pt discharged to home.  Dr Fahrner at bedside.  Pt denies any pain.  Instructions reviewed previously acknowledged understanding.  Escorted to vehicle via wheelchair.  Accompanied by spouse.

## 2024-09-17 NOTE — PROCEDURES
[facility]    Procedure: CT guided left kidney biopsy with moderate sedation    Date/Time: 9/17/2024 11:24 AM    Performed by: Fahrner, Lester, MD  Authorized by: Fahrner, Lester, MD  IR Fellow Physician:    Pre Procedure Diagnosis: CKD  Post Procedure Diagnosis: CKD    UNIVERSAL PROTOCOL   Site Marked: Yes  Prior Images Obtained and Reviewed:  Yes  Required items: Required blood products, implants, devices and special equipment available    Patient identity confirmed:  Verbally with patient and arm band  Patient was reevaluated immediately before administering moderate or deep sedation or anesthesia  Confirmation Checklist:  Patient's identity using two indicators, relevant allergies, procedure was appropriate and matched the consent or emergent situation and correct equipment/implants were available  Time out: Immediately prior to the procedure a time out was called    Universal Protocol: the Joint Commission Universal Protocol was followed    Preparation: Patient was prepped and draped in usual sterile fashion    ESBL (mL):  0     ANESTHESIA    Anesthesia:  Local infiltration  Local Anesthetic:  Lidocaine 1% without epinephrine  Anesthetic Total (mL):  8      SEDATION  Patient Sedated: Yes    Sedation Type:  Moderate (conscious) sedation  Sedation:  Fentanyl, midazolam and see MAR for details  Vital signs: Vital signs monitored during sedation    See dictated procedure note for full details.  Findings: See CT report for details    Specimens: fluid and/or tissue for laboratory analysis    Procedural Complications: None    Condition: Stable      PROCEDURE    Patient Tolerance:  Patient tolerated the procedure well with no immediate complications  Length of time physician/provider present for 1:1 monitoring during sedation:  23-37 min

## 2024-09-17 NOTE — IR NOTE
Patient Name: Prosper Miller  Medical Record Number: [id number]  Today's Date: 9/17/2024    Procedure: Kidney Biopsy  Proceduralist: Dr. Fahrner    Sedation medications administered: 4 mg midazolam and 200 mcg fentanyl   Sedation time: 25 minutes

## 2024-09-17 NOTE — PRE-PROCEDURE
GENERAL PRE-PROCEDURE:   Procedure:  CT-guided kidney biopsy with moderate sedation.  Date/Time:  9/17/2024 9:50 AM    Verbal consent obtained?: Yes    Written consent obtained?: Yes    Risks and benefits: Risks, benefits and alternatives were discussed    DC Plan: Appropriate discharge home plan in place for patients who are going home after procedure   Consent given by:  Patient  Patient states understanding of procedure being performed: Yes    Patient's understanding of procedure matches consent: Yes    Procedure consent matches procedure scheduled: Yes    Expected level of sedation:  Moderate  Appropriately NPO:  Yes  ASA Class:  2  Mallampati  :  Grade 2- soft palate, base of uvula, tonsillar pillars, and portion of posterior pharyngeal wall visible  Lungs:  Lungs clear with good breath sounds bilaterally  Heart:  Normal heart sounds and rate  History & Physical reviewed:  History and physical reviewed and no updates needed  Statement of review:  I have reviewed the lab findings, diagnostic data, medications, and the plan for sedation

## 2024-09-21 ENCOUNTER — HEALTH MAINTENANCE LETTER (OUTPATIENT)
Age: 64
End: 2024-09-21

## 2024-09-25 ENCOUNTER — LAB (OUTPATIENT)
Dept: LAB | Facility: CLINIC | Age: 64
End: 2024-09-25
Payer: COMMERCIAL

## 2024-09-25 DIAGNOSIS — M1A.00X0 IDIOPATHIC CHRONIC GOUT WITHOUT TOPHUS, UNSPECIFIED SITE: ICD-10-CM

## 2024-09-25 DIAGNOSIS — D64.9 ANEMIA, UNSPECIFIED TYPE: ICD-10-CM

## 2024-09-25 LAB
ALBUMIN SERPL BCG-MCNC: 4 G/DL (ref 3.5–5.2)
ALP SERPL-CCNC: 77 U/L (ref 40–150)
ALT SERPL W P-5'-P-CCNC: 27 U/L (ref 0–70)
ANION GAP SERPL CALCULATED.3IONS-SCNC: 12 MMOL/L (ref 7–15)
AST SERPL W P-5'-P-CCNC: 28 U/L (ref 0–45)
BASOPHILS # BLD AUTO: 0.1 10E3/UL (ref 0–0.2)
BASOPHILS NFR BLD AUTO: 1 %
BILIRUB SERPL-MCNC: 0.3 MG/DL
BUN SERPL-MCNC: 31 MG/DL (ref 8–23)
CALCIUM SERPL-MCNC: 9.2 MG/DL (ref 8.8–10.4)
CHLORIDE SERPL-SCNC: 109 MMOL/L (ref 98–107)
CREAT SERPL-MCNC: 2.09 MG/DL (ref 0.67–1.17)
EGFRCR SERPLBLD CKD-EPI 2021: 35 ML/MIN/1.73M2
EOSINOPHIL # BLD AUTO: 0.4 10E3/UL (ref 0–0.7)
EOSINOPHIL NFR BLD AUTO: 7 %
ERYTHROCYTE [DISTWIDTH] IN BLOOD BY AUTOMATED COUNT: 13.6 % (ref 10–15)
GLUCOSE SERPL-MCNC: 101 MG/DL (ref 70–99)
HCO3 SERPL-SCNC: 20 MMOL/L (ref 22–29)
HCT VFR BLD AUTO: 37.9 % (ref 40–53)
HGB BLD-MCNC: 12.5 G/DL (ref 13.3–17.7)
IMM GRANULOCYTES # BLD: 0 10E3/UL
IMM GRANULOCYTES NFR BLD: 1 %
LYMPHOCYTES # BLD AUTO: 2.5 10E3/UL (ref 0.8–5.3)
LYMPHOCYTES NFR BLD AUTO: 39 %
MCH RBC QN AUTO: 31.6 PG (ref 26.5–33)
MCHC RBC AUTO-ENTMCNC: 33 G/DL (ref 31.5–36.5)
MCV RBC AUTO: 96 FL (ref 78–100)
MONOCYTES # BLD AUTO: 0.6 10E3/UL (ref 0–1.3)
MONOCYTES NFR BLD AUTO: 10 %
NEUTROPHILS # BLD AUTO: 2.8 10E3/UL (ref 1.6–8.3)
NEUTROPHILS NFR BLD AUTO: 43 %
PLATELET # BLD AUTO: 283 10E3/UL (ref 150–450)
POTASSIUM SERPL-SCNC: 4.4 MMOL/L (ref 3.4–5.3)
PROT SERPL-MCNC: 7.3 G/DL (ref 6.4–8.3)
RBC # BLD AUTO: 3.95 10E6/UL (ref 4.4–5.9)
SODIUM SERPL-SCNC: 141 MMOL/L (ref 135–145)
URATE SERPL-MCNC: 6.6 MG/DL (ref 3.4–7)
WBC # BLD AUTO: 6.4 10E3/UL (ref 4–11)

## 2024-09-25 PROCEDURE — 36415 COLL VENOUS BLD VENIPUNCTURE: CPT

## 2024-09-25 PROCEDURE — 85025 COMPLETE CBC W/AUTO DIFF WBC: CPT

## 2024-09-25 PROCEDURE — 84550 ASSAY OF BLOOD/URIC ACID: CPT

## 2024-09-25 PROCEDURE — 82728 ASSAY OF FERRITIN: CPT

## 2024-09-25 PROCEDURE — 83550 IRON BINDING TEST: CPT

## 2024-09-25 PROCEDURE — 83540 ASSAY OF IRON: CPT

## 2024-09-25 PROCEDURE — 80053 COMPREHEN METABOLIC PANEL: CPT

## 2024-09-26 DIAGNOSIS — D64.9 ANEMIA, UNSPECIFIED TYPE: Primary | ICD-10-CM

## 2024-09-26 LAB
FERRITIN SERPL-MCNC: 156 NG/ML (ref 31–409)
IRON BINDING CAPACITY (ROCHE): 281 UG/DL (ref 240–430)
IRON SATN MFR SERPL: 41 % (ref 15–46)
IRON SERPL-MCNC: 115 UG/DL (ref 61–157)

## 2024-10-03 ENCOUNTER — TELEPHONE (OUTPATIENT)
Dept: RHEUMATOLOGY | Facility: CLINIC | Age: 64
End: 2024-10-03
Payer: COMMERCIAL

## 2024-10-03 DIAGNOSIS — F39 MOOD DISORDER (H): ICD-10-CM

## 2024-10-03 DIAGNOSIS — M1A.00X0 IDIOPATHIC CHRONIC GOUT WITHOUT TOPHUS, UNSPECIFIED SITE: ICD-10-CM

## 2024-10-03 NOTE — TELEPHONE ENCOUNTER
Patient called back and states that his last refill was on 06/25/24. States that he doesn't take the medication every day as directed because of med he takes. The last he had this medication was a month ago. Please call patient from [phone].

## 2024-10-03 NOTE — TELEPHONE ENCOUNTER
General Call    Contacts       Contact Date/Time Type Contact Phone/Fax    10/03/2024 01:19 PM CDT Phone (Incoming) Sam Miller (Self) [phone] (M)     Wondering which medication I need to continue taking and which one I can discontinue.          Reason for Call:  Med Question    What are your questions or concerns:  Wondering which medication Sam should continue taking and which one he should discontinue between the two    colchicine (COLCRYS) 0.6 MG tablet    allopurinol (ZYLOPRIM) 100 MG tablet    Would like a call back today from anyone on the care team      Date of last appointment with provider: 7/30/24    Could we send this information to you in [employer] or would you prefer to receive a phone call?:   Patient would prefer a phone call   Okay to leave a detailed message?: Yes at Cell number on file:    Telephone Information:   Mobile [phone]

## 2024-10-03 NOTE — TELEPHONE ENCOUNTER
Called patient and LMTCB. Patient requested a refill for lexapro that should have ran out 6 months ago. Please ask patient how frequently his is taking this medication and when he ran out of medication.

## 2024-10-04 LAB — SPECIMEN STATUS: NORMAL

## 2024-10-04 NOTE — TELEPHONE ENCOUNTER
Tried calling patient to tell him that he should be taking both medications. There was no answer. Left message for patient to call back.     Sonia COTE RN, Specialty Clinic 10/04/24 9:25 AM

## 2024-10-07 RX ORDER — ALLOPURINOL 300 MG/1
300 TABLET ORAL DAILY
Qty: 90 TABLET | Refills: 1 | Status: SHIPPED | OUTPATIENT
Start: 2024-10-07

## 2024-10-07 RX ORDER — COLCHICINE 0.6 MG/1
0.6 TABLET ORAL DAILY
Qty: 30 TABLET | Refills: 1 | Status: SHIPPED | OUTPATIENT
Start: 2024-10-07

## 2024-10-07 RX ORDER — ESCITALOPRAM OXALATE 5 MG/1
5 TABLET ORAL DAILY
Qty: 60 TABLET | Refills: 1 | OUTPATIENT
Start: 2024-10-07

## 2024-10-07 NOTE — TELEPHONE ENCOUNTER
Per dr newton-  if he WANTS to stop colchicine he can, but if he starts having mobilization flares as we are lowering the uric acid then we can restart it.    TONY Quinn RN 10/7/2024 12:09 PM

## 2024-10-07 NOTE — TELEPHONE ENCOUNTER
RN: Please call to notify Prosper Miller that he should continue colchicine 0.6 mg daily.  Increase allopurinol to 300 mg daily.  Recheck labs in 8 weeks.     1. Idiopathic chronic gout without tophus, unspecified site  - colchicine (COLCRYS) 0.6 MG tablet; Take 1 tablet (0.6 mg) by mouth daily. . Stop if associated nausea, vomiting, or diarrhea.  Dispense: 30 tablet; Refill: 1  - allopurinol (ZYLOPRIM) 300 MG tablet; Take 1 tablet (300 mg) by mouth daily.  Dispense: 90 tablet; Refill: 1  - Uric acid; Future  - CBC with Platelets & Differential; Future  - Comprehensive metabolic panel; Future    Alex Segundo MD  10/7/2024

## 2024-10-07 NOTE — TELEPHONE ENCOUNTER
Patient reports that he accidentally ran out and forgot to renew the script at the pharmacy. He is planning on restarting with the new script that was sent in this morning.    Patient will reach out as needed.    Mateusz Bolden RN     [facility]

## 2024-10-07 NOTE — TELEPHONE ENCOUNTER
colchicine (COLCRYS) 0.6 MG tablet - over all better. Back to base line as far as great toe pain is concerned. Does he need to continue colcrys and or allopurinol.    TONY Quinn RN 10/7/2024 11:51 AM

## 2024-10-08 DIAGNOSIS — N18.31 CHRONIC KIDNEY DISEASE, STAGE 3A (H): Primary | ICD-10-CM

## 2024-10-15 ENCOUNTER — DOCUMENTATION ONLY (OUTPATIENT)
Dept: OTHER | Facility: CLINIC | Age: 64
End: 2024-10-15
Payer: COMMERCIAL

## 2024-11-18 ENCOUNTER — TELEPHONE (OUTPATIENT)
Dept: RHEUMATOLOGY | Facility: CLINIC | Age: 64
End: 2024-11-18
Payer: COMMERCIAL

## 2024-11-18 DIAGNOSIS — M06.9 RHEUMATOID ARTHRITIS INVOLVING MULTIPLE SITES, UNSPECIFIED WHETHER RHEUMATOID FACTOR PRESENT (H): Primary | ICD-10-CM

## 2024-11-18 NOTE — TELEPHONE ENCOUNTER
PA Initiation    Medication: SIMLANDI (2 PEN) 40 MG/0.4ML SC AJKT  Insurance Company: MEDICA - Phone [phone] Fax [phone]  Pharmacy Filling the Rx: [address]  Filling Pharmacy Phone: [phone]  Filling Pharmacy Fax: [phone]  Start Date: 11/18/2024  FELIX (Key: [id number])  PA Case ID #: [id number]        Thank You,     Meghan Woody CPhT  Specialty Pharmacy Clinic [facility] Specialty  [email or web address]  [email or web address]  Phone: [phone]  Fax: [phone]

## 2024-11-18 NOTE — TELEPHONE ENCOUNTER
[facility] Call Center    Phone Message    May a detailed message be left on voicemail: yes     Reason for Call: Pt called, he states Medicare is discontinuing coverage on Humira. He notes the medication is very expensive, although medicare provided him some alternatives. Pt is requesting a call back to discuss this from Pharmacy LiaisonParviz Christianson. Please advise.    Action Taken: Other: RHEUM    Travel Screening: Not Applicable     Date of Service:

## 2024-11-18 NOTE — TELEPHONE ENCOUNTER
Humira bio similar documentation done in [facility] encounter 11/18/2024    Thank You,     Meghan Woody [facility]  Specialty Pharmacy Clinic [facility] Specialty  [email or web address]  [email or web address]  Phone: [phone]  Fax: [phone]

## 2024-11-19 RX ORDER — ADALIMUMAB-RYVK 40MG/0.4ML
40 KIT SUBCUTANEOUS
Qty: 2 EACH | Refills: 5 | Status: SHIPPED | OUTPATIENT
Start: 2024-11-19

## 2024-11-19 NOTE — TELEPHONE ENCOUNTER
Sent patient copay card sign up link via [employer].    Thank You,     Meghan Woody [facility]  Specialty Pharmacy Clinic [facility] Specialty  [email or web address]  [email or web address]  Phone: [phone]  Fax: [phone]

## 2024-11-19 NOTE — TELEPHONE ENCOUNTER
Put in new Rx. Discussed patient assistance with patient. He is going to go on the [facility] website and start the process for patient assistance.     Sonia COTE RN, Specialty Clinic 11/19/24 9:53 AM

## 2024-11-19 NOTE — TELEPHONE ENCOUNTER
Prior Authorization Approval    Medication: SIMLANDI (2 PEN) 40 MG/0.4ML SC AJKT  Authorization Effective Date: 10/19/2024  Authorization Expiration Date: 11/18/2025  Approved Dose/Quantity: 2 pen kit per 28 day supply  Reference #: HULBACK (Key: [id number])   Insurance Company: MEDICA - Phone [phone] Fax [phone]  Expected CoPay: $    CoPay Card Available: Other (see comments)    Financial Assistance Needed: [email or web address]-[email or web address]-[email or web address]  Which Pharmacy is filling the prescription: [facility] - [address]  Pharmacy Notified: Rx for Simlandi needed  Patient Notified: Will notify once Rx for Simlandi is placed to [facility]        Thank You,     Meghan Woody [facility]  Specialty Pharmacy Clinic [facility] Specialty  [email or web address]  [email or web address]  Phone: [phone]  Fax: [phone]

## 2025-02-06 ENCOUNTER — LAB (OUTPATIENT)
Dept: LAB | Facility: CLINIC | Age: 65
End: 2025-02-06
Payer: COMMERCIAL

## 2025-02-06 DIAGNOSIS — N18.31 CHRONIC KIDNEY DISEASE, STAGE 3A (H): ICD-10-CM

## 2025-02-06 DIAGNOSIS — M1A.00X0 IDIOPATHIC CHRONIC GOUT WITHOUT TOPHUS, UNSPECIFIED SITE: ICD-10-CM

## 2025-02-06 LAB
ALBUMIN SERPL BCG-MCNC: 3.9 G/DL (ref 3.5–5.2)
ALP SERPL-CCNC: 68 U/L (ref 40–150)
ALT SERPL W P-5'-P-CCNC: 44 U/L (ref 0–70)
ANION GAP SERPL CALCULATED.3IONS-SCNC: 14 MMOL/L (ref 7–15)
AST SERPL W P-5'-P-CCNC: 30 U/L (ref 0–45)
BASOPHILS # BLD AUTO: 0 10E3/UL (ref 0–0.2)
BASOPHILS NFR BLD AUTO: 1 %
BILIRUB SERPL-MCNC: 0.3 MG/DL
BUN SERPL-MCNC: 44.3 MG/DL (ref 8–23)
CALCIUM SERPL-MCNC: 9.6 MG/DL (ref 8.8–10.4)
CHLORIDE SERPL-SCNC: 108 MMOL/L (ref 98–107)
CREAT SERPL-MCNC: 1.86 MG/DL (ref 0.67–1.17)
EGFRCR SERPLBLD CKD-EPI 2021: 40 ML/MIN/1.73M2
EOSINOPHIL # BLD AUTO: 0.4 10E3/UL (ref 0–0.7)
EOSINOPHIL NFR BLD AUTO: 5 %
ERYTHROCYTE [DISTWIDTH] IN BLOOD BY AUTOMATED COUNT: 12.3 % (ref 10–15)
GLUCOSE SERPL-MCNC: 104 MG/DL (ref 70–99)
HCO3 SERPL-SCNC: 18 MMOL/L (ref 22–29)
HCT VFR BLD AUTO: 39.1 % (ref 40–53)
HGB BLD-MCNC: 13.3 G/DL (ref 13.3–17.7)
IMM GRANULOCYTES # BLD: 0 10E3/UL
IMM GRANULOCYTES NFR BLD: 0 %
LYMPHOCYTES # BLD AUTO: 2.5 10E3/UL (ref 0.8–5.3)
LYMPHOCYTES NFR BLD AUTO: 28 %
MCH RBC QN AUTO: 32 PG (ref 26.5–33)
MCHC RBC AUTO-ENTMCNC: 34 G/DL (ref 31.5–36.5)
MCV RBC AUTO: 94 FL (ref 78–100)
MONOCYTES # BLD AUTO: 0.6 10E3/UL (ref 0–1.3)
MONOCYTES NFR BLD AUTO: 6 %
NEUTROPHILS # BLD AUTO: 5.1 10E3/UL (ref 1.6–8.3)
NEUTROPHILS NFR BLD AUTO: 59 %
PLATELET # BLD AUTO: 299 10E3/UL (ref 150–450)
POTASSIUM SERPL-SCNC: 4.9 MMOL/L (ref 3.4–5.3)
PROT SERPL-MCNC: 7.3 G/DL (ref 6.4–8.3)
RBC # BLD AUTO: 4.15 10E6/UL (ref 4.4–5.9)
SODIUM SERPL-SCNC: 140 MMOL/L (ref 135–145)
URATE SERPL-MCNC: 8 MG/DL (ref 3.4–7)
WBC # BLD AUTO: 8.7 10E3/UL (ref 4–11)

## 2025-02-08 DIAGNOSIS — F39 MOOD DISORDER: ICD-10-CM

## 2025-02-10 RX ORDER — ESCITALOPRAM OXALATE 5 MG/1
5 TABLET ORAL DAILY
Qty: 90 TABLET | Refills: 0 | Status: SHIPPED | OUTPATIENT
Start: 2025-02-10

## 2025-02-11 ENCOUNTER — OFFICE VISIT (OUTPATIENT)
Dept: RHEUMATOLOGY | Facility: CLINIC | Age: 65
End: 2025-02-11
Payer: COMMERCIAL

## 2025-02-11 ENCOUNTER — MYC MEDICAL ADVICE (OUTPATIENT)
Dept: NEPHROLOGY | Facility: CLINIC | Age: 65
End: 2025-02-11

## 2025-02-11 VITALS
HEIGHT: 67 IN | DIASTOLIC BLOOD PRESSURE: 77 MMHG | HEART RATE: 89 BPM | BODY MASS INDEX: 37.98 KG/M2 | OXYGEN SATURATION: 96 % | WEIGHT: 242 LBS | SYSTOLIC BLOOD PRESSURE: 124 MMHG

## 2025-02-11 DIAGNOSIS — M06.9 RHEUMATOID ARTHRITIS INVOLVING MULTIPLE SITES, UNSPECIFIED WHETHER RHEUMATOID FACTOR PRESENT (H): Primary | ICD-10-CM

## 2025-02-11 DIAGNOSIS — N18.31 CHRONIC KIDNEY DISEASE, STAGE 3A (H): Primary | ICD-10-CM

## 2025-02-11 DIAGNOSIS — M1A.00X0 IDIOPATHIC CHRONIC GOUT WITHOUT TOPHUS, UNSPECIFIED SITE: ICD-10-CM

## 2025-02-11 DIAGNOSIS — N00.9 ACUTE GLOMERULONEPHRITIS WITH PATHOLOGICAL LESION IN KIDNEY: ICD-10-CM

## 2025-02-11 PROCEDURE — G2211 COMPLEX E/M VISIT ADD ON: HCPCS | Performed by: INTERNAL MEDICINE

## 2025-02-11 PROCEDURE — 99214 OFFICE O/P EST MOD 30 MIN: CPT | Performed by: INTERNAL MEDICINE

## 2025-02-11 RX ORDER — ALLOPURINOL 300 MG/1
300 TABLET ORAL DAILY
Qty: 90 TABLET | Refills: 0 | Status: SHIPPED | OUTPATIENT
Start: 2025-02-11

## 2025-02-11 RX ORDER — ADALIMUMAB-RYVK 40MG/0.4ML
40 KIT SUBCUTANEOUS
Qty: 0.8 ML | Refills: 7 | Status: SHIPPED | OUTPATIENT
Start: 2025-02-11

## 2025-02-11 ASSESSMENT — PAIN SCALES - GENERAL: PAINLEVEL_OUTOF10: MILD PAIN (1)

## 2025-02-11 NOTE — NURSING NOTE
RAPID3 (0-30) Cumulative Score  4          RAPID3 Weighted Score (divide #4 by 3 and that is the weighted score)  1.33     Earnestine Blanchard Certified Medical Assistant

## 2025-02-11 NOTE — PROGRESS NOTES
Rheumatology Clinic Visit      Prosper Miller MRN# [id number]   YOB: 1960 Age: 64 year old      Date of visit: 2/11/25   PCP: Momo Gooden at [facility]    Chief Complaint   Patient presents with:  RECHECK: RA. Things have been going ok. No issues or concerns.     Assessment and Plan     1.  Rheumatoid arthritis (RF negative, CCP low positive): Reportedly dx'd in 2006. Previously followed by Dr. Phillips. Established care with me on 3/26/2018.  No active synovitis on exam when first evaluated by me.  Previously on leflunomide, cyclosporine, infliximab (10 mg/kg IV every 7 weeks that was effective for arthritis but iritis was not controlled).  Currently on Humira 40 mg SQ every 14 days and doing well with regard to RA and iritis.    Chronic illness, stable.    - Continue Humira 40mg SQ every 14 days  - Labs in 3 months: CBC, creatinine, hepatic panel     2.  Iritis: Flare of iritis in 2021.    Continue following with ophthalmology.  Previously was doing well with infliximab but it lost benefit over time; changed to Humira and has been doing well since.   Chronic illness, stable.      3.  Osteoarthritis of the bilateral first CMC joints, and at the PIPs and DIPs: Most symptomatic at the first CMC joints that improves with self-massage.    4.  History of nonradiating chronic lower back pain, history: Improved with at-home exercises; he has refused physical therapy in the past.  Not an issue at this time.    5.  CKD: Following with nephrology.  Note that he is on Humira and previously had a low positive dsDNA and positive HAYES.  Complement C3 and C4 were normal and histone antibody was negative previously.   Discussed with Dr. Villa previously, and agree that a kidney biopsy would be helpful, especially in the setting of TNF inhibitor use and low positive dsDNA antibody positivity.    6.  Intermittent pain of the right first MTP: Degenerative in nature.  Previously worse when  he was wearing flip-flops during the summer.  Advised of soled shoes whenever ambulatory.  Doing well at this time.      7.  Gout: Also degenerative arthritis of the first MTPs.  Had flares of arthritis consistent with gout.  Hyperuricemia.  Most recent increase allopurinol from 200 mg daily to 300 mg daily but he associated with stomach upset that may or may not have been heartburn related and he has been on chronic heartburn treatment; discussed restarting allopurinol 300 mg daily and if not tolerated then he can notify this clinic so that allopurinol 300 mg daily can be changed to Uloric 40 mg daily.  We reviewed the risks and side effects of Uloric in detail today in case it is needed.   Continue colchicine 0.6 mg daily for mobilization flare prophylaxis.   Chronic illness, side effect from treatment, progressive.    - Restart allopurinol 300 mg daily but if not tolerated then he is to notify this clinic, where will consider stopping allopurinol and starting Uloric 40 mg daily as we have discussed today   - Continue colchicine 0.6 mg daily for mobilization flare prophylaxis  - Labs in 3 months: CBC, creatinine, hepatic panel, uric acid    8.  Vaccinations: Vaccinations reviewed with Mr. Miller.  Risks and benefits of vaccinations were discussed.    - Influenza: encouraged yearly vaccination  - [id number]: Advise vaccination  - Shingrix: up to date  - COVID-19: Advise keeping updated    Total minutes spent in evaluation with patient, documentation, , and review of pertinent studies and chart notes: 20  The longitudinal plan of care for the rheumatology problem(s) were addressed during this visit.  Due to added complexity of care, we will continue to support the patient and the subsequent management of this condition with ongoing continuity of care.    Mr. Miller verbalized agreement with and understanding of the rational for the diagnosis and treatment plan.  All questions were answered to best of my  ability and the patient's satisfaction. Mr. Miller was advised to contact the clinic with any questions that may arise after the clinic visit.      Thank you for involving me in the care of the patient    Return to clinic: 6 months      HPI   Prosper Miller is a 64 year old male with a past medical history significant for hypertension, hyperlipidemia, obstructive sleep apnea, IgA nephropathy, left TKA, right TKA (10/2022), rheumatoid arthritis, and iritis who presents for rheumatology follow-up.    2/25/2022 ophthalmology note by Dr. Michael Jenkins documents that the eye disease is now quiescent and will follow-up in 6 weeks.    3/31/2022: RA is doing well.  Iritis controlled.  Tolerating Humira well.  Only joint pain is at the bilateral first CMC joints that is worse with activity and improves with rest.  Occasional pain of the left knee at the pes anserine bursa.  Right knee pain worse with activity and improves with rest.  No joint swelling.    8/1/2022: hands shake. Anxiety.  Hands hurt at the bilateral 1st CMC joints that is worse with self-palpation; and pain along the entire right 3rd finger that is on and off throughout the day and he notes hx of injury to the right 3rd finger.  Knees bother him; hx of left TKA and planning for right TKA.  Tolerating Humira well.  Uveitis remains controlled but he says that every now and then he feels like there might be inflammation that is going to start; he with follow-up with his ophthalmologist    8/18/2022: Reports that he was standing on the edge of a ladder and his left plantar midfoot began to hurt, making ambulation more difficult.  There was no swelling, increased warmth, or overlying erythema of the affected area.  He was seen by Dr. Castillo who diagnosed Planter fasciitis.  He was found to have hyperuricemia so was told to take prednisone 40 mg daily x3 days, then 20 mg daily x2 days and after 1 day of taking prednisone he has not felt any better.  Foot pain is  worse with the first few steps after any period of inactivity, such as with the first few steps in the morning after waking up or after sitting in a chair for a long period of time.  Otherwise doing well.    1/9/2023: joints are okay.  Occasional random 3rd finger ache that goes away quickly and is not associated with swelling or redness. Left eye has watered, for couple hours for a day or two.     8/8/2023: Intermittent pain of the right second MTP, occurring approximately once every 2 days and resolving within 1-2 hours, typically activity related, and has only occurred since he has changed his shoe wear this summer to flip-flops.  When he has pain of the right second MTP he has no associated swelling, increased warmth, or overlying erythema.  Other joints are doing well.  Morning stiffness for no more than 10 minutes.  Uveitis controlled.  Planning to see nephrology later this morning.    2/6/2024: Currently doing well with regard to arthritis and iritis.  Reports that he had more eye symptoms recently but it was not due to the iritis/uveitis and those symptoms resolved.  Only joint pain that he has currently is of the first CMC joints that is worse with activity and improves with self-massage.  Feels like he has been stable.  Has nephrology follow-up later today.    6/10/2024: Acute onset flare in the right first MTP that lasted for 10-20 days.  After the flare resolved in the right first MTP then he had a flare in the left first MTP.  Flares are sudden onset, typically waking up with pain and swelling to the point that he cannot put his shoe on or even have a sheet touching the toe.  Was seen in urgent care where he says he was given prednisone that helped reduce the pain and resolved the swelling.    7/30/2024: Arthritis and uveitis controlled.  Stopped cleaning his eyelids with a warm washcloth that resulted in increase eyelid symptoms so he has restarted using the warm washcloth each morning. Following with  nephrology and will hold off on kidney biopsy at this time but re-eval and possibly decide to proceed with bx depending on repeat labs in 2 weeks. No gout flares. No nausea, vomiting, or diarrhea associated with colchicine.     Today, 2/11/2025: Stop taking allopurinol 300 mg daily about 3 months ago because he says it caused some stomach upset but he is not certain if it was actually allopurinol or his longstanding heartburn that he treats with heartburn medication.  Currently without joint pain or swelling.  No gout flares since last seen.  No side effects from Humira.      Denies fevers, chills, nausea, vomiting, constipation. No abdominal pain. No chest pain/pressure, palpitations, or shortness of breath. No LE swelling. No neck pain. No oral or nasal sores.  No rash.     Tobacco: Former smoker  EtOH: Weekly  Drugs: None  Occupation:     ROS   12 point review of system was completed and negative except as noted in the HPI     Active Problem List     Patient Active Problem List   Diagnosis    Iritis    Rheumatoid arthritis of hand, unspecified laterality, unspecified rheumatoid factor presence    Rheumatoid arthritis involving multiple sites, unspecified rheumatoid factor presence    Adenomatous colon polyp    JALEESA (obstructive sleep apnea)    Esophageal reflux    Benign essential hypertension    Acute glomerulonephritis with pathological lesion in kidney    Primary hypercholesterolemia    Morbid obesity (H)    Chronic kidney disease, stage 3a (H)    Benign neoplasm of sigmoid colon    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     Past Surgical History     Past Surgical History:   Procedure Laterality Date    COLONOSCOPY  ?    EYE SURGERY      HC KNEE SCOPE, DIAGNOSTIC      Description: Arthroscopy Knee Right;  Recorded:  04/30/2008;    JOINT REPLACEMENT      OTHER SURGICAL HISTORY Left     left knee arthroplasty    OTHER SURGICAL HISTORY Bilateral     Cataract surgery    VASCULAR SURGERY       Allergy   No Known Allergies  Current Medication List     Current Outpatient Medications   Medication Sig Dispense Refill    adalimumab (HUMIRA *CF*) 40 MG/0.4ML pen kit Inject 0.4 mLs (40 mg) subcutaneously every 14 days. . Hold for signs of infection, then seek medical attention. 0.8 mL 7    adalimumab-ryvk (SIMLANDI, 2 PEN,) 40 MG/0.4ML auto-injector kit Inject 0.4 mLs (40 mg) subcutaneously every 14 days. .  Hold for infection and seek medical attention. 2 each 5    allopurinol (ZYLOPRIM) 300 MG tablet Take 1 tablet (300 mg) by mouth daily. 90 tablet 1    Cholecalciferol (VITAMIN D3) 2000 UNITS CAPS       colchicine (COLCRYS) 0.6 MG tablet Take 1 tablet (0.6 mg) by mouth daily. . Stop if associated nausea, vomiting, or diarrhea. 30 tablet 1    EQL NATURAL ZINC 50 MG TABS       escitalopram (LEXAPRO) 5 MG tablet TAKE 1 TABLET BY MOUTH EVERY DAY 90 tablet 0    fluticasone (FLONASE) 50 MCG/ACT nasal spray INSTILL 1 SPRAY INTO BOTH NOSTRILS DAILY 16 mL 11    lisinopril (ZESTRIL) 40 MG tablet Take 1 tablet (40 mg) by mouth daily 90 tablet 3    magnesium chloride 535 (64 Mg) MG TBEC CR tablet Take 2 tablets (1,070 mg) by mouth 2 times daily 360 tablet 3    pantoprazole (PROTONIX) 40 MG EC tablet TAKE ONE TABLET BY MOUTH TWICE A  tablet 1     No current facility-administered medications for this visit.     Social History   See HPI    Family History     Family History   Problem Relation Age of Onset    Cancer Father     Bone Cancer Father         Physical Exam     Temp Readings from Last 3 Encounters:   09/16/24 97.9  F (36.6  C) (Tympanic)   03/30/24 97.8  F (36.6  C) (Tympanic)   03/21/23 98.7  F (37.1  C) (Oral)     BP Readings from Last 5 Encounters:   02/11/25 124/77   09/17/24 91/51   09/16/24 106/74   07/30/24 107/69   07/30/24  "107/69     Pulse Readings from Last 1 Encounters:   02/11/25 89     Resp Readings from Last 1 Encounters:   09/17/24 16     Estimated body mass index is 37.9 kg/m  as calculated from the following:    Height as of this encounter: 1.702 m (5' 7\").    Weight as of this encounter: 109.8 kg (242 lb).    GEN: NAD.   HEENT:  Anicteric, noninjected sclera. No obvious external lesions of the ear and nose. Hearing intact.  CV: S1, S2. RRR. No m/r/g  PULM: No increased work of breathing. CTA bilaterally   MSK: MCPs, PIPs, DIPs without swelling or tenderness to palpation.  Wrists without swelling or tenderness to palpation.  Elbows and shoulders without swelling or tenderness to palpation. Knees, ankles, and MTPs without swelling or tenderness to palpation.    Bony hypertrophy of both first MTPs.  SKIN: No rash or jaundice seen  PSYCH: Alert. Appropriate.      Labs / Imaging (select studies)     RF/CCP  Recent Labs   Lab Test 12/15/21  0908   CCPIGG 12.0*   RHF 10     CBC  Recent Labs   Lab Test 02/06/25  0804 09/25/24  1034 09/16/24  1206 08/13/24  0816 07/25/24  0806 03/12/21  1310 03/09/20  0812 09/16/19  0804 03/12/19  0849   WBC 8.7 6.4 5.8  --  6.5   < > 6.6 5.7 5.6   RBC 4.15* 3.95* 3.99*  --  4.07*   < > 4.76 4.50 4.88   HGB 13.3 12.5* 12.8*   < > 12.7*   < > 15.2 14.5 15.7   HCT 39.1* 37.9* 37.4*  --  38.7*   < > 44.5 42.3 44.5   MCV 94 96 94  --  95   < > 94 94 91   RDW 12.3 13.6 13.5  --  13.2   < > 14.0 12.7 13.1    283 286  --  274   < > 275 302 343   MCH 32.0 31.6 32.1  --  31.2   < > 31.9 32.2 32.2   MCHC 34.0 33.0 34.2  --  32.8   < > 34.2 34.3 35.3   NEUTROPHIL 59 43  --   --  49   < > 43.8 40.9 37.6   LYMPH 28 39  --   --  37   < > 43.4 43.2 47.1   MONOCYTE 6 10  --   --  8   < > 6.7 9.1 8.9   EOSINOPHIL 5 7  --   --  5   < > 5.6 6.3 5.5   BASOPHIL 1 1  --   --  1   < > 0.5 0.5 0.9   ANEU  --   --   --   --   --   --  2.9 2.3 2.1   ALYM  --   --   --   --   --   --  2.9 2.5 2.6   MICHAEL  --   --   --   " --   --   --  0.4 0.5 0.5   AEOS  --   --   --   --   --   --  0.4 0.4 0.3   ABAS  --   --   --   --   --   --  0.0 0.0 0.1   ANEUTAUTO 5.1 2.8  --   --  3.2   < >  --   --   --    ALYMPAUTO 2.5 2.5  --   --  2.4   < >  --   --   --    AMONOAUTO 0.6 0.6  --   --  0.5   < >  --   --   --    AEOSAUTO 0.4 0.4  --   --  0.4   < >  --   --   --    ABSBASO 0.0 0.1  --   --  0.0   < >  --   --   --     < > = values in this interval not displayed.     CMP  Recent Labs   Lab Test 02/06/25  0804 09/25/24  1034 09/16/24  1206 08/13/24  0816 07/25/24  0806 08/30/21  0848 03/12/21  1310 03/09/20  0812 02/24/20  1634    141 140 138 142   < >  --   --  142   POTASSIUM 4.9 4.4 4.8 5.0 5.0   < >  --   --  3.8   CHLORIDE 108* 109* 107 106 110*   < >  --   --  105   CO2 18* 20* 22 20* 20*   < >  --   --  26   ANIONGAP 14 12 11 12 12   < >  --   --  11   * 101* 91 97 99   < >  --   --  92   BUN 44.3* 31.0* 27.8* 32.0* 37.5*   < >  --   --  12   CR 1.86* 2.09* 1.57* 1.64* 1.84*   < > 1.52* 1.05 1.07   GFRESTIMATED 40* 35* 49* 46* 40*   < > 49* 77 >60   GFRESTBLACK  --   --   --   --   --   --  57* 89 >60   ERNESTINA 9.6 9.2 9.4 9.6 9.2   < >  --   --  9.2   BILITOTAL 0.3 0.3  --   --  0.2   < > 0.3 0.3  --    ALBUMIN 3.9 4.0  --  3.7 3.9   < > 3.2* 3.5  --    PROTTOTAL 7.3 7.3  --   --  7.4   < > 6.9 7.7  --    ALKPHOS 68 77  --   --  71   < > 70 69  --    AST 30 28  --   --  19   < > 28 21  --    ALT 44 27  --   --  27   < > 30 37  --     < > = values in this interval not displayed.     Calcium/VitaminD  Recent Labs   Lab Test 02/06/25  0804 09/25/24  1034 09/16/24  1206 04/06/23  0832 01/06/23  0747 08/01/22  0803 12/07/21  1349   ERNESTINA 9.6 9.2 9.4   < > 9.0   < > 10.2   VITDT  --   --   --   --  58  --  50    < > = values in this interval not displayed.     ESR/CRP  Recent Labs   Lab Test 07/25/24  0806 04/06/23  0832 08/01/22  0803 03/31/22  0805 03/31/22  0804 10/08/21  1330   SED 33* 17* 20   < >  --  25*   CRP  --   --  3.0   --  4.4 3.6   CRPI 8.19* <3.00  --   --   --   --     < > = values in this interval not displayed.     Hepatitis B  Recent Labs   Lab Test 03/26/18  1540   HBCAB Nonreactive   HEPBANG Nonreactive     Hepatitis C  Recent Labs   Lab Test 03/26/18  1540   HCVAB Nonreactive       Tuberculosis Screening  Recent Labs   Lab Test 07/25/24  0806 08/30/21  0848 03/26/18  1541   TBRES Negative Negative  --    TBRSLT  --   --  Negative   TBAGN  --   --  0.00     Uric Acid  Recent Labs   Lab Test 02/06/25  0804 09/25/24  1034 07/25/24  0806 06/10/24  1035 01/06/23  0747 08/15/22  1026   URIC 8.0* 6.6 6.5 6.6 8.1* 8.5*     Immunization History     Immunization History   Administered Date(s) Administered    COVID-19 MONOVALENT 12+ (Pfizer) 04/22/2021, 05/13/2021, 11/15/2021    Hepatitis A (ADULT 19+) 04/30/2008, 11/11/2008    Influenza (H1N1) 12/18/2009    Influenza Vaccine, 6+MO IM (QUADRIVALENT W/PRESERVATIVES) 11/11/2008, 12/18/2009, 09/18/2020    Mantoux Tuberculin Skin Test 02/01/2007    TDAP (Adacel,Boostrix) 03/09/2011, 03/21/2023    Zoster recombinant adjuvanted (SHINGRIX) 04/06/2023, 08/08/2023          Chart documentation done in part with Dragon Voice recognition Software. Although reviewed after completion, some word and grammatical error may remain.    Alex Segundo MD

## 2025-02-11 NOTE — PATIENT INSTRUCTIONS
Restart allopurinol 300 mg daily.  If you are having stomach upset with the allopurinol then please notify Dr. Segundo.  The alternative that can be used is called Uloric.

## 2025-02-17 ENCOUNTER — DOCUMENTATION ONLY (OUTPATIENT)
Dept: NEPHROLOGY | Facility: CLINIC | Age: 65
End: 2025-02-17
Payer: COMMERCIAL

## 2025-02-17 DIAGNOSIS — N18.31 CHRONIC KIDNEY DISEASE, STAGE 3A (H): Primary | ICD-10-CM

## 2025-02-17 NOTE — PROGRESS NOTES
Called and talked to patient for appointment reminder on Fri Feb 21 st at 12:00 PM virtual. Pt in agreement, but declined to get labs done. Pt had most recent labs done on 2/6.  Donna Mcgill,  Nephrology Clinic Navigator  [facility]  Direct: [phone].

## 2025-03-20 DIAGNOSIS — M1A.00X0 IDIOPATHIC CHRONIC GOUT WITHOUT TOPHUS, UNSPECIFIED SITE: ICD-10-CM

## 2025-03-20 RX ORDER — ALLOPURINOL 300 MG/1
300 TABLET ORAL DAILY
Qty: 90 TABLET | Refills: 0 | Status: SHIPPED | OUTPATIENT
Start: 2025-03-20

## 2025-03-20 NOTE — TELEPHONE ENCOUNTER
Medication:   allopurinol (ZYLOPRIM) 300 MG tablet  Last written on:   02/11/2025  Quantity:   90    Refills:   0    Last office visit:   02/11/2025  Next office visit:   08/12/2025  Last labs:   02/06/2025  Next lab:  05/13/2025    Patient has not filled at [facility]. Requesting refill be sent to [facility] pharmacy, [address] in [place].    Thank You,     Meghan Woody [facility]  Specialty Pharmacy Clinic [facility] Specialty  [email or web address]  [email or web address]  Phone: [phone]  Fax: [phone]

## 2025-03-20 NOTE — TELEPHONE ENCOUNTER
After chart review and based on prior discussion with MD it was noted that this is appropriate for a refill.    RAIZA JulienN RN Specialty Triage 3/20/2025 3:22 PM

## 2025-04-30 DIAGNOSIS — J01.90 ACUTE SINUSITIS WITH SYMPTOMS > 10 DAYS: ICD-10-CM

## 2025-04-30 RX ORDER — FLUTICASONE PROPIONATE 50 MCG
1 SPRAY, SUSPENSION (ML) NASAL DAILY
Qty: 16 ML | Refills: 3 | Status: SHIPPED | OUTPATIENT
Start: 2025-04-30

## 2025-05-13 ENCOUNTER — LAB (OUTPATIENT)
Dept: LAB | Facility: CLINIC | Age: 65
End: 2025-05-13
Payer: COMMERCIAL

## 2025-05-13 ENCOUNTER — RESULTS FOLLOW-UP (OUTPATIENT)
Dept: RHEUMATOLOGY | Facility: CLINIC | Age: 65
End: 2025-05-13

## 2025-05-13 DIAGNOSIS — Z91.89 AT INCREASED RISK FOR CARDIOVASCULAR DISEASE: ICD-10-CM

## 2025-05-13 DIAGNOSIS — I10 PRIMARY HYPERTENSION: ICD-10-CM

## 2025-05-13 DIAGNOSIS — E87.20 METABOLIC ACIDOSIS: ICD-10-CM

## 2025-05-13 DIAGNOSIS — M1A.00X0 IDIOPATHIC CHRONIC GOUT WITHOUT TOPHUS, UNSPECIFIED SITE: ICD-10-CM

## 2025-05-13 DIAGNOSIS — N18.31 CHRONIC KIDNEY DISEASE, STAGE 3A (H): ICD-10-CM

## 2025-05-13 DIAGNOSIS — E83.42 HYPOMAGNESEMIA: ICD-10-CM

## 2025-05-13 DIAGNOSIS — N02.B9 IGA NEPHROPATHY: ICD-10-CM

## 2025-05-13 DIAGNOSIS — N18.32 STAGE 3B CHRONIC KIDNEY DISEASE (H): ICD-10-CM

## 2025-05-13 LAB
ALBUMIN MFR UR ELPH: 43.7 MG/DL
ALBUMIN SERPL BCG-MCNC: 4 G/DL (ref 3.5–5.2)
ALBUMIN UR-MCNC: 30 MG/DL
ALP SERPL-CCNC: 72 U/L (ref 40–150)
ALT SERPL W P-5'-P-CCNC: 36 U/L (ref 0–70)
ANION GAP SERPL CALCULATED.3IONS-SCNC: 9 MMOL/L (ref 7–15)
APPEARANCE UR: CLEAR
AST SERPL W P-5'-P-CCNC: 23 U/L (ref 0–45)
BACTERIA #/AREA URNS HPF: ABNORMAL /HPF
BASOPHILS # BLD AUTO: 0.1 10E3/UL (ref 0–0.2)
BASOPHILS NFR BLD AUTO: 1 %
BILIRUB DIRECT SERPL-MCNC: 0.1 MG/DL (ref 0–0.3)
BILIRUB SERPL-MCNC: 0.3 MG/DL
BILIRUB UR QL STRIP: NEGATIVE
BUN SERPL-MCNC: 41.1 MG/DL (ref 8–23)
CALCIUM SERPL-MCNC: 9.8 MG/DL (ref 8.8–10.4)
CHLORIDE SERPL-SCNC: 107 MMOL/L (ref 98–107)
COLOR UR AUTO: YELLOW
CREAT SERPL-MCNC: 1.81 MG/DL (ref 0.67–1.17)
CREAT UR-MCNC: 126.5 MG/DL
CREAT UR-MCNC: 127.9 MG/DL
CYSTATIN C (ROCHE): 2.2 MG/L (ref 0.6–1)
EGFRCR SERPLBLD CKD-EPI 2021: 41 ML/MIN/1.73M2
EOSINOPHIL # BLD AUTO: 0.3 10E3/UL (ref 0–0.7)
EOSINOPHIL NFR BLD AUTO: 4 %
ERYTHROCYTE [DISTWIDTH] IN BLOOD BY AUTOMATED COUNT: 13.6 % (ref 10–15)
GFR/BSA.PRED SERPLBLD CYS-BASED-ARV: 27 ML/MIN/1.73M2
GLUCOSE SERPL-MCNC: 110 MG/DL (ref 70–99)
GLUCOSE UR STRIP-MCNC: NEGATIVE MG/DL
HCO3 SERPL-SCNC: 22 MMOL/L (ref 22–29)
HCT VFR BLD AUTO: 37.5 % (ref 40–53)
HGB BLD-MCNC: 12.9 G/DL (ref 13.3–17.7)
HGB UR QL STRIP: ABNORMAL
HYALINE CASTS #/AREA URNS LPF: ABNORMAL /LPF
IMM GRANULOCYTES # BLD: 0 10E3/UL
IMM GRANULOCYTES NFR BLD: 0 %
KETONES UR STRIP-MCNC: NEGATIVE MG/DL
LEUKOCYTE ESTERASE UR QL STRIP: NEGATIVE
LYMPHOCYTES # BLD AUTO: 2.5 10E3/UL (ref 0.8–5.3)
LYMPHOCYTES NFR BLD AUTO: 37 %
MAGNESIUM SERPL-MCNC: 1.3 MG/DL (ref 1.7–2.3)
MCH RBC QN AUTO: 32.7 PG (ref 26.5–33)
MCHC RBC AUTO-ENTMCNC: 34.4 G/DL (ref 31.5–36.5)
MCV RBC AUTO: 95 FL (ref 78–100)
MICROALBUMIN UR-MCNC: 142.9 MG/L
MICROALBUMIN/CREAT UR: 112.96 MG/G CR (ref 0–17)
MONOCYTES # BLD AUTO: 0.5 10E3/UL (ref 0–1.3)
MONOCYTES NFR BLD AUTO: 7 %
MUCOUS THREADS #/AREA URNS LPF: PRESENT /LPF
NEUTROPHILS # BLD AUTO: 3.5 10E3/UL (ref 1.6–8.3)
NEUTROPHILS NFR BLD AUTO: 51 %
NITRATE UR QL: NEGATIVE
PH UR STRIP: 5.5 [PH] (ref 5–7)
PHOSPHATE SERPL-MCNC: 2.8 MG/DL (ref 2.5–4.5)
PLATELET # BLD AUTO: 290 10E3/UL (ref 150–450)
POTASSIUM SERPL-SCNC: 5 MMOL/L (ref 3.4–5.3)
PROT SERPL-MCNC: 7.7 G/DL (ref 6.4–8.3)
PROT/CREAT 24H UR: 0.34 MG/MG CR (ref 0–0.2)
RBC # BLD AUTO: 3.95 10E6/UL (ref 4.4–5.9)
RBC #/AREA URNS AUTO: ABNORMAL /HPF
SODIUM SERPL-SCNC: 138 MMOL/L (ref 135–145)
SP GR UR STRIP: 1.01 (ref 1–1.03)
SQUAMOUS #/AREA URNS AUTO: ABNORMAL /LPF
URATE SERPL-MCNC: 3.3 MG/DL (ref 3.4–7)
UROBILINOGEN UR STRIP-ACNC: 0.2 E.U./DL
WBC # BLD AUTO: 6.8 10E3/UL (ref 4–11)
WBC #/AREA URNS AUTO: ABNORMAL /HPF

## 2025-05-13 PROCEDURE — 83735 ASSAY OF MAGNESIUM: CPT

## 2025-05-13 PROCEDURE — 36415 COLL VENOUS BLD VENIPUNCTURE: CPT

## 2025-05-13 PROCEDURE — 84156 ASSAY OF PROTEIN URINE: CPT

## 2025-05-13 PROCEDURE — 82570 ASSAY OF URINE CREATININE: CPT

## 2025-05-13 PROCEDURE — 82248 BILIRUBIN DIRECT: CPT

## 2025-05-13 PROCEDURE — 84100 ASSAY OF PHOSPHORUS: CPT

## 2025-05-13 PROCEDURE — 82610 CYSTATIN C: CPT

## 2025-05-13 PROCEDURE — 84550 ASSAY OF BLOOD/URIC ACID: CPT

## 2025-05-13 PROCEDURE — 85025 COMPLETE CBC W/AUTO DIFF WBC: CPT

## 2025-05-13 PROCEDURE — 81001 URINALYSIS AUTO W/SCOPE: CPT

## 2025-05-13 PROCEDURE — 82043 UR ALBUMIN QUANTITATIVE: CPT

## 2025-05-13 PROCEDURE — 80053 COMPREHEN METABOLIC PANEL: CPT

## 2025-06-02 ENCOUNTER — TELEPHONE (OUTPATIENT)
Dept: RHEUMATOLOGY | Facility: CLINIC | Age: 65
End: 2025-06-02
Payer: COMMERCIAL

## 2025-06-02 DIAGNOSIS — M06.9 RHEUMATOID ARTHRITIS INVOLVING MULTIPLE SITES, UNSPECIFIED WHETHER RHEUMATOID FACTOR PRESENT (H): ICD-10-CM

## 2025-06-02 DIAGNOSIS — D84.9 IMMUNOSUPPRESSION: Primary | ICD-10-CM

## 2025-06-05 RX ORDER — ADALIMUMAB-RYVK 40MG/0.4ML
40 KIT SUBCUTANEOUS
Qty: 0.8 ML | Refills: 7 | OUTPATIENT
Start: 2025-06-05

## 2025-06-05 NOTE — TELEPHONE ENCOUNTER
KADIE APPROVED    Medication: SIMLANDI (2 PEN) 40 MG/0.4ML SC AJKT  Amount: $ 2,000  [facility] Name: FPAP  Foundation Phone:    Beth Fax: [phone]  Member ID: [id number]  BIN:   PCN:   Group:   Foundation Effective Date: 6/5/2025  Foundation Expiration Date: 12/31/2025  Additional Information: FPAP* APPROVED (SIMLANDI), PRIMARY INSURNCE ([facility] PART D NVT) SECONDARY INS= (N/A). FPAP IS APPROVED EFFECTIVE (06/05/2025) THROUGH (12/31/2025). UPDATED CPS AND PAYER FIELDS.  Patient Notified: No - will notify once Rx is sent to new filling pharmacy.          Thank You,     Ladarius Woody [facility]  Specialty Pharmacy Clinic [facility] Specialty  [email or web address]  [email or web address]  Phone: [phone]  Fax: [phone]    
PA Initiation    Medication: SIMLANDI (2 PEN) 40 MG/0.4ML SC AJKT  Insurance Company: [employer] - Phone [phone] Fax [phone]:  PART D  Pharmacy Filling the Rx: [facility] - [place], [place] - [address]  Filling Pharmacy Phone: [phone]  Filling Pharmacy Fax: [phone]  Start Date: 6/2/2025  FELIX (Key: [id number])  PA Case ID #: [id number]        Thank You,     Meghan Woody CPhT  Specialty Pharmacy Clinic [facility] Specialty  [email or web address]  [email or web address]  Phone: [phone]  Fax: [phone]    
Prior Authorization Approval    Medication: SIMLANDI (2 PEN) 40 MG/0.4ML SC AJKT  Authorization Effective Date: 6/2/2025  Authorization Expiration Date: 6/2/2026  Approved Dose/Quantity: 2 pens per 28-day supply  Reference #: [id number] (Key: [id number])   Insurance Company: [employer] - Phone [phone] Fax [phone]:  PART D  Expected CoPay: $ 347.31  CoPay Card Available: No    Financial Assistance Needed: Yes - pursue FPAP  Which Pharmacy is filling the prescription: [facility] - [place], [place] - [address] [place] [address]  Pharmacy Notified: Yes - PA APPROVAL*CL SIMLANDI (2 PEN) 40MG/0.4ML AJKT, IS APPROVED BY [facility] PART D, EFFECTIVE 06/02/2025 THROUGH 06/02/2026, PA#: [id number]  Patient Notified: Yes - called patient - expressed Medicare prescription payment plan is unaffordable. Pursue FPAP.        Called and spoke to Prosper: patient aware to expect call from [facility] and is considering filling all medications with [facility].    KADIE INITIATED    Medication: SIMLANDI (2 PEN) 40 MG/0.4ML SC AJKT  Foundation Name: FPAP  Date submitted: 6/3/2025  3:32 PM   Foundation Phone:    [facility] Fax: [phone]    FPAP questionnaire complete and patient aware to expect call from [facility] to schedule med review.    Thank You,     Ladarius Woody [facility]  Specialty Pharmacy Clinic [facility] Specialty  [email or web address]  [email or web address]  Phone: [phone]  Fax: [phone]    
RN: Please call to notify Prosper Miller that I have placed the order for Simlandi to [facility] as was requested and needed for the jose program.    I have also placed a referral for an [facility] pharmacist to contact him to go over his medications, which is one of the steps needed for the jose program.    Alex Segundo MD  6/5/2025   
abnormal

## 2025-06-09 ENCOUNTER — VIRTUAL VISIT (OUTPATIENT)
Dept: PHARMACY | Facility: CLINIC | Age: 65
End: 2025-06-09
Attending: INTERNAL MEDICINE
Payer: COMMERCIAL

## 2025-06-09 DIAGNOSIS — F39 MOOD DISORDER: ICD-10-CM

## 2025-06-09 DIAGNOSIS — N18.31 CHRONIC KIDNEY DISEASE, STAGE 3A (H): ICD-10-CM

## 2025-06-09 DIAGNOSIS — K21.9 GERD (GASTROESOPHAGEAL REFLUX DISEASE): ICD-10-CM

## 2025-06-09 DIAGNOSIS — M06.9 RHEUMATOID ARTHRITIS OF HAND (H): Primary | ICD-10-CM

## 2025-06-09 DIAGNOSIS — T78.40XA ALLERGIES: ICD-10-CM

## 2025-06-09 DIAGNOSIS — E83.42 HYPOMAGNESEMIA: ICD-10-CM

## 2025-06-09 DIAGNOSIS — Z71.85 VACCINE COUNSELING: ICD-10-CM

## 2025-06-09 DIAGNOSIS — M10.9 GOUT: ICD-10-CM

## 2025-06-09 DIAGNOSIS — Z78.9 TAKES DIETARY SUPPLEMENTS: ICD-10-CM

## 2025-06-09 NOTE — PROGRESS NOTES
Medication Therapy Management (MTM) Encounter    ASSESSMENT:                            Medication Adherence/Access: {adherencechoices:357023}    ***:  ***    PLAN:                            {MTM Rheum Plan:432336}      Consider the following vaccines:  Pneumonia (Prevnar 20)  Annual flu  COVID Booster  Respiratory Syncytial Virus (RSV)  Recommended over age of 60.      Follow-up: with Dr. Segundo on 8/12/25.     SUBJECTIVE/OBJECTIVE:                          Sam Miller is a 64 year old male seen for an initial visit. He was referred to me from Alex Segundo MD.    Reason for visit: Medication education and financial assistance.    Allergies/ADRs: Reviewed in chart  Past Medical History: Reviewed in chart  Tobacco: He reports that he quit smoking about 38 years ago. His smoking use included cigarettes. He started smoking about 48 years ago. He has a 5 pack-year smoking history. He has never used smokeless tobacco.  Alcohol: 1-3 beverages / week    Medication Adherence/Access: no issues reported.    Rheumatoid arthritis/Iritis  Adalimumab-ryvk (Simlandi) 40 mg subcutaneous injections every 14 days (started ***)    Patient reports ***      Has been on Simlandi before - no issues (dose yesterday)  Issue is with eyes - can feel inflammation if it is coming   No flare ups in quite some time        Previous therapies: Previously on leflunomide, cyclosporine, infliximab (10 mg/kg IV every 7 weeks that was effective for arthritis but iritis was not controlled     Last lab monitoring completed: ***    Pre-Biologic Screening:   Hep B Core Antibody  Non-reactive (3/26/18)    Hep B Surface Antigen Non-reactive (3/26/18)    Hep C Antibody  Non-reactive (3/26/18)    Quantiferon TB Gold Negative (7/25/24)      Vaccinations  Immunization History   Covid-19 vaccine (2940-4133 version)  Due to receive   Influenza (annual) Due to receive, avoid live FluMist   Pneumococcal Due to receive   Tetanus/Tdap  Up-to-date   Shingrix  Preoperative Evaluation  [facility]  [address]  [place]  [place] [address]  Phone: [phone]  Primary Provider: Jason Leal DO  Pre-op Performing Provider: Jason Leal DO  Oct 15, 2024             10/15/2024   Surgical Information   What procedure is being done? knee replacement right   [facility]   [facility]   Date of surgery / procedure: 290801   Time of surgery / procedure: 600   Where do you plan to recover after surgery? at home with family        Fax number for surgical facility: Note does not need to be faxed, will be available electronically in Epic.    Assessment & Plan     The proposed surgical procedure is considered INTERMEDIATE risk.    Problem List Items Addressed This Visit          Nervous and Auditory    Parkinson disease (H)       Endocrine    Hyperlipidemia LDL goal <100       Circulatory    Benign essential hypertension     Other Visit Diagnoses       Pre-op exam    -  Primary    Relevant Orders    CBC with platelets and differential    Basic metabolic panel                    - No identified additional risk factors other than previously addressed    Antiplatelet or Anticoagulation Medication Instructions  Instructed to hold aspirin, ibuprofen and supplements 1 week prior to the procedure.    Additional Medication Instructions  Patient is to only take his amlodipine and his metoprolol morning of procedure with small sip of water.  All other medications can be resumed after the procedure.    Recommendation  Approval given to proceed with proposed procedure, without further diagnostic evaluation.    Mayda Pang is a 63 year old, presenting for the following:  Pre-Op Exam        HPI related to upcoming procedure: Right total knee replacement        10/15/2024   Pre-Op Questionnaire   Have you ever had a heart attack or stroke? No    Have you ever had surgery on your heart or blood vessels, such as a stent placement, a coronary artery bypass, or surgery on an artery in your head, neck, heart, or legs? No   Do you have chest pain with activity? No   Do you have a history of heart failure? No   Do you currently have a cold, bronchitis or symptoms of other infection? No   Do you have a cough, shortness of breath, or wheezing? No   Do you or anyone in your family have previous history of blood clots? No   Do you or does anyone in your family have a serious bleeding problem such as prolonged bleeding following surgeries or cuts? No   Have you ever had problems with anemia or been told to take iron pills? No   Have you had any abnormal blood loss such as black, tarry or bloody stools? No   Have you ever had a blood transfusion? No   Are you willing to have a blood transfusion if it is medically needed before, during, or after your surgery? Yes   Have you or any of your relatives ever had problems with anesthesia? No   Do you have sleep apnea, excessive snoring or daytime drowsiness? No   Do you have any artifical heart valves or other implanted medical devices like a pacemaker, defibrillator, or continuous glucose monitor? No   Do you have artificial joints? No   Are you allergic to latex? No        [id number]}    Status of Chronic Conditions:  HYPERLIPIDEMIA - Patient has a long history of significant Hyperlipidemia requiring medication for treatment with recent good control. Patient reports no problems or side effects with the medication.     HYPERTENSION - Patient has longstanding history of HTN , currently denies any symptoms referable to elevated blood pressure. Specifically denies chest pain, palpitations, dyspnea, orthopnea, PND or peripheral edema. Blood pressure readings have been in normal range. Current medication regimen is as listed below. Patient denies any side effects of medication.     Parkinson's disease    Patient Active Problem List  Complete   RSV (only for >= 60 years old)  Due to receive   All patients on biologics should avoid live vaccines (varicella/VZV, intranasal influenza, MMR, or yellow fever vaccine (if traveling))       CKD  Jardiance 10 mg daily   Lisinopril 40 mg daily       No issues with side effects       Will check blood pressures at home periodically - if good stops for awhile    110-115/80 mmHg  No issus with lows     BP Readings from Last 3 Encounters:   02/11/25 124/77   09/17/24 91/51   09/16/24 106/74       Gout  Allopurinol 300 mg daily   Colchicine 0.6 mg daily     No side effects   Not taking colchicine -    Uric Acid   Date Value Ref Range Status   05/13/2025 3.3 (L) 3.4 - 7.0 mg/dL Final        Allergy   Flonase (fluticasone) nasal spray - 1 spray(s) each nostril once daily    Patient reports {:732474}.    {allergy:649709}   Patient feels that current therapy {IS NOT/IS:947317} effective.     Using as needed - will take daily during allergies - wors      GERD    Pantoprazole 40 mg daily     Patient feels that current regimen is effective.  The patient does notice symptoms if they miss a dose.     Mental Health - Depression  Escitalopram 5 mg once daily    Patient reports { :032174}.  {mtmdepassess:673703}    Unsure on benefits        Hypomagnesemia  Magnesium chloride 1070 mg twice daily     Patient reports   Low leve     Supplements   Vitamin D 2000 international unit(s) daily   Zinc 50 mg daily     Patient taking because in pills   {supplement:345235}     Today's Vitals: There were no vitals taken for this visit.  ----------------    I spent *** minutes with this patient today. I offer these suggestions for consideration by Alex Segundo MD.     A summary of these recommendations was sent via [employer].    Britt RichardD  Medication Therapy Management Pharmacist  [facility]  Phone: [phone]     Telemedicine Visit Details  The patient's medications can be safely assessed via a     Diagnosis Date Noted    Anxiety 03/29/2022     Priority: Medium    Prediabetes 03/18/2022     Priority: Medium    Tremor 03/18/2022     Priority: Medium    Hyperlipidemia LDL goal <100 12/07/2020     Priority: Medium    Encounter for monitoring statin therapy 12/07/2020     Priority: Medium    Benign essential hypertension 06/03/2020     Priority: Medium    Benign non-nodular prostatic hyperplasia with lower urinary tract symptoms 08/29/2016     Priority: Medium      Past Medical History:   Diagnosis Date    Encounter for monitoring statin therapy 12/07/2020    HTN (hypertension)     Hyperlipidemia LDL goal <100 12/07/2020     Past Surgical History:   Procedure Laterality Date    APPENDECTOMY      GENITOURINARY SURGERY      circ    SOFT TISSUE SURGERY      wisdom teeth extracted     Current Outpatient Medications   Medication Sig Dispense Refill    amLODIPine (NORVASC) 10 MG tablet Take 1 tablet (10 mg) by mouth daily. 90 tablet 2    carbidopa-levodopa (SINEMET)  MG tablet Take 1.5 tablets by mouth 4 times daily.      losartan (COZAAR) 100 MG tablet TAKE 1 TABLET(100 MG) BY MOUTH DAILY 90 tablet 3    metoprolol succinate ER (TOPROL XL) 100 MG 24 hr tablet TAKE 1 TABLET(100 MG) BY MOUTH TWICE DAILY 180 tablet 3    Omega-3 Fatty Acids (OMEGA 3 PO) Take 1 tablet by mouth daily.      omeprazole (PRILOSEC) 40 MG DR capsule Take 1 capsule (40 mg) by mouth daily 90 capsule 1    sertraline (ZOLOFT) 25 MG tablet TAKE 1 TABLET(25 MG) BY MOUTH DAILY 90 tablet 3    tamsulosin (FLOMAX) 0.4 MG capsule Take 0.4 mg by mouth daily      traZODone (DESYREL) 50 MG tablet TAKE 1/2 TO 1 TABLET(25 TO 50 MG) BY MOUTH AT BEDTIME 90 tablet 1    zinc 50 MG TABS          Allergies   Allergen Reactions    Atorvastatin      Insomnia         Social History     Tobacco Use    Smoking status: Never    Smokeless tobacco: Never   Substance Use Topics    Alcohol use: Yes     Comment: 6 beers a week     Family History   Problem Relation Age of  telemedicine encounter.  Type of service:  Telephone visit  Originating Location (pt. Location): Home  {PROVIDER LOCATION On-site should be selected for visits conducted from your clinic location or adjoining [facility] hospital, academic office, or other nearby [facility] building. Off-site should be selected for all other provider locations, including home:[address]}  Distant Location (provider location):  On-site  Start Time: 9:00 AM  End Time: ***     Medication Therapy Recommendations  No medication therapy recommendations to display       "Onset    Other Cancer Father         brain tumor- at 52    Diabetes No family hx of     Thyroid Disease No family hx of     Asthma No family hx of      History   Drug Use No             Review of Systems  Constitutional, neuro, ENT, endocrine, pulmonary, cardiac, gastrointestinal, genitourinary, musculoskeletal, integument and psychiatric systems are negative, except as otherwise noted.    Objective    /87 (BP Location: Left arm, Patient Position: Sitting, Cuff Size: Adult Large)   Pulse 84   Temp 98.5  F (36.9  C) (Tympanic)   Resp 20   Wt 96.6 kg (213 lb)   SpO2 96%   BMI 28.89 kg/m     Estimated body mass index is 28.89 kg/m  as calculated from the following:    Height as of 3/29/22: 1.829 m (6').    Weight as of this encounter: 96.6 kg (213 lb).  Physical Exam  GENERAL: alert and no distress  EYES: Eyes grossly normal to inspection, PERRL and conjunctivae and sclerae normal  RESP: lungs clear to auscultation - no rales, rhonchi or wheezes  CV: regular rate and rhythm, normal S1 S2, no S3 or S4, no murmur, click or rub, no peripheral edema  MS: no gross musculoskeletal defects noted, no edema  NEURO: Tremors in all extremities  PSYCH: mentation appears normal, affect normal/bright    No results for input(s): \"HGB\", \"PLT\", \"INR\", \"NA\", \"POTASSIUM\", \"CR\", \"A1C\" in the last 8760 hours.     Diagnostics  Recent Results (from the past 24 hour(s))   Basic metabolic panel    Collection Time: 10/15/24  2:35 PM   Result Value Ref Range    Sodium 134 (L) 135 - 145 mmol/L    Potassium 4.4 3.4 - 5.3 mmol/L    Chloride 98 98 - 107 mmol/L    Carbon Dioxide (CO2) 24 22 - 29 mmol/L    Anion Gap 12 7 - 15 mmol/L    Urea Nitrogen 18.0 8.0 - 23.0 mg/dL    Creatinine 1.31 (H) 0.67 - 1.17 mg/dL    GFR Estimate 61 >60 mL/min/1.73m2    Calcium 9.6 8.8 - 10.4 mg/dL    Glucose 108 (H) 70 - 99 mg/dL   CBC with platelets and differential    Collection Time: 10/15/24  2:35 PM   Result Value Ref Range    WBC Count 7.6 4.0 - " assessed via a telemedicine encounter.  Type of service:  Telephone visit  Originating Location (pt. Location): Home    Distant Location (provider location):  On-site  Start Time: 9:00 AM  End Time: 9:40 AM     Medication Therapy Recommendations  Vaccine counseling   1 Rationale: Preventive therapy - Needs additional medication therapy - Indication   Recommendation: Provide Education - PREVNAR 20 IM - Also due for COVID, flu, and RSV   Status: Declined per Patient   Identified Date: 6/9/2025 Completed Date: 6/9/2025                11.0 10e3/uL    RBC Count 4.86 4.40 - 5.90 10e6/uL    Hemoglobin 14.8 13.3 - 17.7 g/dL    Hematocrit 42.6 40.0 - 53.0 %    MCV 88 78 - 100 fL    MCH 30.5 26.5 - 33.0 pg    MCHC 34.7 31.5 - 36.5 g/dL    RDW 11.7 10.0 - 15.0 %    Platelet Count 227 150 - 450 10e3/uL    % Neutrophils 62 %    % Lymphocytes 24 %    % Monocytes 11 %    % Eosinophils 3 %    % Basophils 1 %    % Immature Granulocytes 0 %    Absolute Neutrophils 4.7 1.6 - 8.3 10e3/uL    Absolute Lymphocytes 1.8 0.8 - 5.3 10e3/uL    Absolute Monocytes 0.8 0.0 - 1.3 10e3/uL    Absolute Eosinophils 0.2 0.0 - 0.7 10e3/uL    Absolute Basophils 0.1 0.0 - 0.2 10e3/uL    Absolute Immature Granulocytes 0.0 <=0.4 10e3/uL      EKG: Normal Sinus Rhythm rate 73 on date September 5, 2024.    Revised Cardiac Risk Index (RCRI)  The patient has the following serious cardiovascular risks for perioperative complications:   - No serious cardiac risks = 0 points     RCRI Interpretation: 0 points: Class I (very low risk - 0.4% complication rate)         Signed Electronically by: Jason Leal DO  A copy of this evaluation report is provided to the requesting physician.

## 2025-06-19 NOTE — PATIENT INSTRUCTIONS
"Recommendations from today's MTM visit:                                                    MTM (medication therapy management) is a service provided by a clinical pharmacist designed to help you get the most of out of your medicines.      Continue Simlandi 40 mg subcutaneous injections every 14 days.     Complete routine lab monitoring as directed.     Consider the following vaccines:  Pneumonia (Prevnar 20)  Annual flu  COVID Booster  Respiratory Syncytial Virus (RSV)  Recommended over age of 60.      Discuss the following with your prescriber at follow-up:  Escitalopram - whether this medication is effective for you.   Pantoprazole - this medication can contribute to low magnesium levels.     Follow-up: with Dr. Segundo on 8/12/25.     It was great speaking with you today.  I value your experience and would be very thankful for your time in providing feedback in our clinic survey. In the next few days, you may receive an email or text message from [employer] with a link to a survey related to your  clinical pharmacist.\"     To schedule another MTM appointment, please call the clinic directly or you may call the MTM scheduling line at [phone].    My Clinical Pharmacist's contact information:                                                      Please feel free to contact me with any questions or concerns you have.      Deejay Jose, PharmD  Medication Therapy Management Pharmacist  [facility]  Phone: [phone]    "

## 2025-07-07 ENCOUNTER — NURSE TRIAGE (OUTPATIENT)
Dept: FAMILY MEDICINE | Facility: CLINIC | Age: 65
End: 2025-07-07

## 2025-07-07 ENCOUNTER — MYC MEDICAL ADVICE (OUTPATIENT)
Dept: RHEUMATOLOGY | Facility: CLINIC | Age: 65
End: 2025-07-07

## 2025-07-07 ENCOUNTER — OFFICE VISIT (OUTPATIENT)
Dept: FAMILY MEDICINE | Facility: CLINIC | Age: 65
End: 2025-07-07
Payer: COMMERCIAL

## 2025-07-07 VITALS
TEMPERATURE: 98.6 F | DIASTOLIC BLOOD PRESSURE: 77 MMHG | HEIGHT: 67 IN | OXYGEN SATURATION: 94 % | HEART RATE: 106 BPM | WEIGHT: 242.4 LBS | RESPIRATION RATE: 16 BRPM | BODY MASS INDEX: 38.04 KG/M2 | SYSTOLIC BLOOD PRESSURE: 113 MMHG

## 2025-07-07 DIAGNOSIS — J34.2 DEVIATED NASAL SEPTUM: ICD-10-CM

## 2025-07-07 DIAGNOSIS — H92.03 EAR DISCOMFORT, BILATERAL: Primary | ICD-10-CM

## 2025-07-07 DIAGNOSIS — H93.13 TINNITUS, BILATERAL: ICD-10-CM

## 2025-07-07 PROCEDURE — G2211 COMPLEX E/M VISIT ADD ON: HCPCS | Performed by: PHYSICIAN ASSISTANT

## 2025-07-07 PROCEDURE — 3074F SYST BP LT 130 MM HG: CPT | Performed by: PHYSICIAN ASSISTANT

## 2025-07-07 PROCEDURE — 99213 OFFICE O/P EST LOW 20 MIN: CPT | Performed by: PHYSICIAN ASSISTANT

## 2025-07-07 PROCEDURE — 3078F DIAST BP <80 MM HG: CPT | Performed by: PHYSICIAN ASSISTANT

## 2025-07-07 NOTE — TELEPHONE ENCOUNTER
RN: Please notify Prosper Miller that additional rheumatology labs are not needed prior to the 8/12/2025 rheumatology follow-up appointment.  Please cancel the 8/7/2025 lab appointment if that lab appointment is for rheumatology labs only.    Alex Segundo MD  7/7/2025

## 2025-07-07 NOTE — PROGRESS NOTES
"  Assessment & Plan     Ear discomfort, bilateral  Tinnitus, bilateral  Patient is here today for evaluation of bilateral ear discomfort and ringing ongoing for months but recently worsened without improvement by use of Sudafed. On exam shows retracted TM bilateral with clear effusion in left ear. Recommend Flonase 1 puff to each nostril BID, Zyrtec daily and use of Benadryl as needed ( discussed may make him drowsy- advised use without use of alcohol or driving). Advised to follow up if symptoms worsen or do not improve. Could consider ENT referral if persistent or worsening issue- he will think about this and reach out if he wants a referral.    Deviated nasal septum  Chronic issue, offered ENT for evaluation and treatment and he will think about it.            BMI  Estimated body mass index is 37.97 kg/m  as calculated from the following:    Height as of this encounter: 1.702 m (5' 7\").    Weight as of this encounter: 110 kg (242 lb 6.4 oz).   Weight management plan: Discussed healthy diet and exercise guidelines      Risks, benefits and alternatives were discussed with patient. Agreeable to the plan of care.    The longitudinal plan of care for the diagnosis(es)/condition(s) as documented were addressed during this visit. Due to the added complexity in care, I will continue to support the patient in the subsequent management and ongoing continuity of care.      Jase Vargas is a 65 year old, presenting for the following health issues:  Ear Problem (Bilateral ear pain, has hx of ear problems, usually takes sudafed, is not working, muffled slightly, ringing in ears, dull pain under jaw, reports that 2x months ago lymph nodes in armpit were swelled up, swelling has gone down, still itches)        7/7/2025    10:28 AM   Additional Questions   Roomed by Florinda EPSTEIN CMA   Accompanied by self     Ear Problem    History of Present Illness       Reason for visit:  Issues with ears  Symptom onset:  More than a month " "  He is taking medications regularly.      Patient is here today for evaluation of bilateral ear pain and ringing ongoing for months but recently worsened  Usually takes Sudafed when this happens but has not helped symptoms this time  He also notes some radiation of discomfort under jaw line bilaterally and lymph nodes that were tender and swollen in axilla  No fever, chills, sore throat, cough, sinus congestion or sinus drainage  Notes that he feels like he may have gotten cold recently- hard to tell.        Review of Systems  Constitutional, HEENT, cardiovascular, pulmonary, gi and gu systems are negative, except as otherwise noted.      Objective    /77 (BP Location: Left arm, Patient Position: Sitting, Cuff Size: Adult Large)   Pulse 106   Temp 98.6  F (37  C) (Oral)   Resp 16   Ht 1.702 m (5' 7\")   Wt 110 kg (242 lb 6.4 oz)   SpO2 94%   BMI 37.97 kg/m    Body mass index is 37.97 kg/m .  Physical Exam   GENERAL: alert and no distress  EYES: Eyes grossly normal to inspection, PERRL and conjunctivae and sclerae normal  HENT: normal cephalic/atraumatic, both ears: TM retracted bilateral, left TM shows clear effusion, nose and mouth without ulcers or lesions, oropharynx clear, and oral mucous membranes moist  NECK: no adenopathy, no asymmetry, masses, or scars  RESP: lungs clear to auscultation - no rales, rhonchi or wheezes  CV: regular rate and rhythm, normal S1 S2, no S3 or S4, no murmur, click or rub, no peripheral edema  MS: no gross musculoskeletal defects noted, no edema          Signed Electronically by: Pamela Yoder PA-C    "

## 2025-07-07 NOTE — TELEPHONE ENCOUNTER
"Nurse Triage SBAR    Is this a 2nd Level Triage? NO    Situation:   Incoming call from patient with spouse present on the phone as well.  Primary concern is chronic ear discomfort and ringing.    Background:   Patient states that for the last few months he has been having some issues with his ears ranging from discomfort, crackling, to ringing.    Assessment:   Today patient states that he is having bilateral ear pain and ringing. No other significant symptoms    Protocol Recommended Disposition:   See in Office Today or Tomorrow    Recommendation:   Advised evaluation per protocol. Patient scheduled for eval today.     Not routed    Does the patient meet one of the following criteria for ADS visit consideration? No  Reason for Disposition   All other earaches (Exceptions: Brief ear pain lasting < 1 hour, and earache occurring during air travel.)    Answer Assessment - Initial Assessment Questions  1. LOCATION: \"Which ear is involved?\"      [place]  2. ONSET: \"When did the ear pain start?\"       Few months  3. SEVERITY: \"How bad is the pain?\"  (Scale 1-10; mild, moderate or severe)      Moderate  4. URI SYMPTOMS: \"Do you have a runny nose or cough?\"      No  5. FEVER: \"Do you have a fever?\" If Yes, ask: \"What is your temperature, how was it measured, and when did it start?\"      No  6. CAUSE: \"Have you been swimming recently?\", \"How often do you use Q-TIPS?\", \"Have you had any recent air travel or scuba diving?\"      No  7. OTHER SYMPTOMS: \"Do you have any other symptoms?\" (e.g., decreased hearing, dizziness, headache, stiff neck, vomiting)      Ringing  8. PREGNANCY: \"Is there any chance you are pregnant?\" \"When was your last menstrual period?\"      No    Protocols used: Earache-A-[place]    "

## 2025-07-07 NOTE — PATIENT INSTRUCTIONS
Flonase: 1 puff to each nostril twice per day    Zyrtec: 1 tablet daily    Benadryl: 1 tab daily as needed

## 2025-07-17 DIAGNOSIS — K21.9 GASTROESOPHAGEAL REFLUX DISEASE WITHOUT ESOPHAGITIS: ICD-10-CM

## 2025-07-22 RX ORDER — PANTOPRAZOLE SODIUM 40 MG/1
40 TABLET, DELAYED RELEASE ORAL 2 TIMES DAILY
Qty: 180 TABLET | Refills: 1 | Status: SHIPPED | OUTPATIENT
Start: 2025-07-22

## 2025-07-22 NOTE — TELEPHONE ENCOUNTER
I refilled.  If he is not requiring this medication twice daily this can be weaned to once a day.  We can review this at his next visit.

## 2025-07-22 NOTE — TELEPHONE ENCOUNTER
Pt called about his RX refill, It is a new pharmacy with a previous refill, Please address ASAP as patient is heading out of town in a few days.     Pharmacy attatched

## 2025-08-07 ENCOUNTER — LAB (OUTPATIENT)
Dept: LAB | Facility: CLINIC | Age: 65
End: 2025-08-07
Payer: COMMERCIAL

## 2025-08-07 DIAGNOSIS — Z91.89 AT INCREASED RISK FOR CARDIOVASCULAR DISEASE: ICD-10-CM

## 2025-08-07 DIAGNOSIS — N18.32 STAGE 3B CHRONIC KIDNEY DISEASE (H): ICD-10-CM

## 2025-08-07 DIAGNOSIS — I10 PRIMARY HYPERTENSION: ICD-10-CM

## 2025-08-07 DIAGNOSIS — E83.42 HYPOMAGNESEMIA: ICD-10-CM

## 2025-08-07 DIAGNOSIS — E87.20 METABOLIC ACIDOSIS: ICD-10-CM

## 2025-08-07 DIAGNOSIS — N02.B9 IGA NEPHROPATHY: ICD-10-CM

## 2025-08-07 LAB
ALBUMIN SERPL BCG-MCNC: 4 G/DL (ref 3.5–5.2)
ALBUMIN UR-MCNC: 100 MG/DL
ANION GAP SERPL CALCULATED.3IONS-SCNC: 11 MMOL/L (ref 7–15)
BUN SERPL-MCNC: 42.9 MG/DL (ref 8–23)
CALCIUM SERPL-MCNC: 9 MG/DL (ref 8.8–10.4)
CHLORIDE SERPL-SCNC: 107 MMOL/L (ref 98–107)
CREAT SERPL-MCNC: 2.1 MG/DL (ref 0.67–1.17)
CREAT UR-MCNC: 156.5 MG/DL
EGFRCR SERPLBLD CKD-EPI 2021: 34 ML/MIN/1.73M2
FERRITIN SERPL-MCNC: 297 NG/ML (ref 31–409)
GLUCOSE SERPL-MCNC: 101 MG/DL (ref 70–99)
HCO3 SERPL-SCNC: 20 MMOL/L (ref 22–29)
HGB BLD-MCNC: 11.9 G/DL (ref 13.3–17.7)
IRON BINDING CAPACITY (ROCHE): 243 UG/DL (ref 240–430)
IRON SATN MFR SERPL: 40 % (ref 15–46)
IRON SERPL-MCNC: 96 UG/DL (ref 61–157)
MCV RBC AUTO: 96 FL (ref 78–100)
MICROALBUMIN UR-MCNC: 132.3 MG/L
MICROALBUMIN/CREAT UR: 84.54 MG/G CR (ref 0–17)
PHOSPHATE SERPL-MCNC: 3 MG/DL (ref 2.5–4.5)
POTASSIUM SERPL-SCNC: 4.4 MMOL/L (ref 3.4–5.3)
PTH-INTACT SERPL-MCNC: 70 PG/ML (ref 15–65)
SODIUM SERPL-SCNC: 138 MMOL/L (ref 135–145)
VIT D+METAB SERPL-MCNC: 55 NG/ML (ref 20–50)

## 2025-08-09 ENCOUNTER — HEALTH MAINTENANCE LETTER (OUTPATIENT)
Age: 65
End: 2025-08-09

## 2025-08-12 ENCOUNTER — OFFICE VISIT (OUTPATIENT)
Dept: RHEUMATOLOGY | Facility: CLINIC | Age: 65
End: 2025-08-12
Payer: COMMERCIAL

## 2025-08-12 VITALS
SYSTOLIC BLOOD PRESSURE: 126 MMHG | WEIGHT: 241.2 LBS | BODY MASS INDEX: 37.78 KG/M2 | DIASTOLIC BLOOD PRESSURE: 83 MMHG | RESPIRATION RATE: 16 BRPM | OXYGEN SATURATION: 98 % | HEART RATE: 92 BPM

## 2025-08-12 DIAGNOSIS — G56.23 ULNAR NEUROPATHY OF BOTH UPPER EXTREMITIES: ICD-10-CM

## 2025-08-12 DIAGNOSIS — M1A.00X0 IDIOPATHIC CHRONIC GOUT WITHOUT TOPHUS, UNSPECIFIED SITE: ICD-10-CM

## 2025-08-12 DIAGNOSIS — M06.9 RHEUMATOID ARTHRITIS INVOLVING MULTIPLE SITES, UNSPECIFIED WHETHER RHEUMATOID FACTOR PRESENT (H): Primary | ICD-10-CM

## 2025-08-12 PROCEDURE — 3079F DIAST BP 80-89 MM HG: CPT | Performed by: INTERNAL MEDICINE

## 2025-08-12 PROCEDURE — G2211 COMPLEX E/M VISIT ADD ON: HCPCS | Performed by: INTERNAL MEDICINE

## 2025-08-12 PROCEDURE — 3074F SYST BP LT 130 MM HG: CPT | Performed by: INTERNAL MEDICINE

## 2025-08-12 PROCEDURE — 99214 OFFICE O/P EST MOD 30 MIN: CPT | Performed by: INTERNAL MEDICINE

## 2025-08-12 RX ORDER — ADALIMUMAB-RYVK 40MG/0.4ML
40 KIT SUBCUTANEOUS
Qty: 0.8 ML | Refills: 7 | Status: SHIPPED | OUTPATIENT
Start: 2025-08-12

## 2025-08-12 RX ORDER — ALLOPURINOL 300 MG/1
300 TABLET ORAL DAILY
Qty: 90 TABLET | Refills: 2 | Status: SHIPPED | OUTPATIENT
Start: 2025-08-12

## 2025-08-13 DIAGNOSIS — N18.32 STAGE 3B CHRONIC KIDNEY DISEASE (H): Primary | ICD-10-CM

## 2025-08-17 DIAGNOSIS — F39 MOOD DISORDER: ICD-10-CM

## 2025-08-18 RX ORDER — ESCITALOPRAM OXALATE 5 MG/1
5 TABLET ORAL DAILY
Qty: 90 TABLET | Refills: 2 | Status: SHIPPED | OUTPATIENT
Start: 2025-08-18

## 2025-08-20 ENCOUNTER — PATIENT OUTREACH (OUTPATIENT)
Dept: CARE COORDINATION | Facility: CLINIC | Age: 65
End: 2025-08-20
Payer: COMMERCIAL

## 2025-08-21 ENCOUNTER — VIRTUAL VISIT (OUTPATIENT)
Dept: NEPHROLOGY | Facility: CLINIC | Age: 65
End: 2025-08-21
Attending: STUDENT IN AN ORGANIZED HEALTH CARE EDUCATION/TRAINING PROGRAM
Payer: COMMERCIAL

## 2025-08-21 VITALS — BODY MASS INDEX: 37.2 KG/M2 | HEIGHT: 67 IN | WEIGHT: 237 LBS

## 2025-08-21 DIAGNOSIS — N18.31 CHRONIC KIDNEY DISEASE, STAGE 3A (H): ICD-10-CM

## 2025-08-21 DIAGNOSIS — E83.42 HYPOMAGNESEMIA: ICD-10-CM

## 2025-08-21 DIAGNOSIS — N18.32 STAGE 3B CHRONIC KIDNEY DISEASE (H): Primary | ICD-10-CM

## 2025-08-21 ASSESSMENT — PAIN SCALES - GENERAL: PAINLEVEL_OUTOF10: NO PAIN (0)

## 2025-08-26 ENCOUNTER — LAB (OUTPATIENT)
Dept: LAB | Facility: CLINIC | Age: 65
End: 2025-08-26
Payer: COMMERCIAL

## 2025-08-26 DIAGNOSIS — N18.32 STAGE 3B CHRONIC KIDNEY DISEASE (H): ICD-10-CM

## 2025-08-26 DIAGNOSIS — E83.42 HYPOMAGNESEMIA: ICD-10-CM

## 2025-08-26 LAB
ALBUMIN MFR UR ELPH: 16 MG/DL
ALBUMIN SERPL BCG-MCNC: 4 G/DL (ref 3.5–5.2)
ALBUMIN UR-MCNC: ABNORMAL MG/DL
ALP SERPL-CCNC: 92 U/L (ref 40–150)
ALT SERPL W P-5'-P-CCNC: 28 U/L (ref 0–70)
ANION GAP SERPL CALCULATED.3IONS-SCNC: 9 MMOL/L (ref 7–15)
APPEARANCE UR: CLEAR
AST SERPL W P-5'-P-CCNC: 22 U/L (ref 0–45)
BACTERIA #/AREA URNS HPF: ABNORMAL /HPF
BILIRUB SERPL-MCNC: 0.2 MG/DL
BILIRUB UR QL STRIP: NEGATIVE
BUN SERPL-MCNC: 43.9 MG/DL (ref 8–23)
CALCIUM SERPL-MCNC: 9.6 MG/DL (ref 8.8–10.4)
CHLORIDE SERPL-SCNC: 107 MMOL/L (ref 98–107)
COLOR UR AUTO: YELLOW
CREAT SERPL-MCNC: 2.32 MG/DL (ref 0.67–1.17)
CREAT UR-MCNC: 109.1 MG/DL
CREAT UR-MCNC: 109.1 MG/DL
EGFRCR SERPLBLD CKD-EPI 2021: 30 ML/MIN/1.73M2
ERYTHROCYTE [DISTWIDTH] IN BLOOD BY AUTOMATED COUNT: 13.2 % (ref 10–15)
FERRITIN SERPL-MCNC: 291 NG/ML (ref 31–409)
GLUCOSE SERPL-MCNC: 97 MG/DL (ref 70–99)
GLUCOSE UR STRIP-MCNC: NEGATIVE MG/DL
HCO3 SERPL-SCNC: 21 MMOL/L (ref 22–29)
HCT VFR BLD AUTO: 37.2 % (ref 40–53)
HGB BLD-MCNC: 12.5 G/DL (ref 13.3–17.7)
HGB UR QL STRIP: ABNORMAL
IRON BINDING CAPACITY (ROCHE): 262 UG/DL (ref 240–430)
IRON SATN MFR SERPL: 38 % (ref 15–46)
IRON SERPL-MCNC: 100 UG/DL (ref 61–157)
KETONES UR STRIP-MCNC: NEGATIVE MG/DL
LEUKOCYTE ESTERASE UR QL STRIP: NEGATIVE
MAGNESIUM SERPL-MCNC: 1.3 MG/DL (ref 1.7–2.3)
MCH RBC QN AUTO: 32.7 PG (ref 26.5–33)
MCHC RBC AUTO-ENTMCNC: 33.6 G/DL (ref 31.5–36.5)
MCV RBC AUTO: 97.4 FL (ref 78–100)
MICROALBUMIN UR-MCNC: 33.4 MG/L
MICROALBUMIN/CREAT UR: 30.61 MG/G CR (ref 0–17)
NITRATE UR QL: NEGATIVE
PH UR STRIP: 5.5 [PH] (ref 5–7)
PHOSPHATE SERPL-MCNC: 3.3 MG/DL (ref 2.5–4.5)
PLATELET # BLD AUTO: 305 10E3/UL (ref 150–450)
POTASSIUM SERPL-SCNC: 5.4 MMOL/L (ref 3.4–5.3)
PROT SERPL-MCNC: 7.2 G/DL (ref 6.4–8.3)
PROT/CREAT 24H UR: 0.15 MG/MG CR (ref 0–0.2)
RBC # BLD AUTO: 3.82 10E6/UL (ref 4.4–5.9)
RBC #/AREA URNS AUTO: ABNORMAL /HPF
SODIUM SERPL-SCNC: 137 MMOL/L (ref 135–145)
SP GR UR STRIP: 1.01 (ref 1–1.03)
SQUAMOUS #/AREA URNS AUTO: ABNORMAL /LPF
UROBILINOGEN UR STRIP-ACNC: 0.2 E.U./DL
WBC # BLD AUTO: 7.27 10E3/UL (ref 4–11)
WBC #/AREA URNS AUTO: ABNORMAL /HPF

## 2025-08-26 PROCEDURE — 81001 URINALYSIS AUTO W/SCOPE: CPT

## 2025-08-26 PROCEDURE — 82728 ASSAY OF FERRITIN: CPT

## 2025-08-26 PROCEDURE — 80053 COMPREHEN METABOLIC PANEL: CPT

## 2025-08-26 PROCEDURE — 84156 ASSAY OF PROTEIN URINE: CPT

## 2025-08-26 PROCEDURE — 83735 ASSAY OF MAGNESIUM: CPT

## 2025-08-26 PROCEDURE — 83540 ASSAY OF IRON: CPT

## 2025-08-26 PROCEDURE — 83550 IRON BINDING TEST: CPT

## 2025-08-26 PROCEDURE — 82570 ASSAY OF URINE CREATININE: CPT

## 2025-08-26 PROCEDURE — 82043 UR ALBUMIN QUANTITATIVE: CPT

## 2025-08-26 PROCEDURE — 85027 COMPLETE CBC AUTOMATED: CPT

## 2025-08-26 PROCEDURE — 84100 ASSAY OF PHOSPHORUS: CPT

## 2025-08-26 PROCEDURE — 36415 COLL VENOUS BLD VENIPUNCTURE: CPT

## 2025-09-03 ENCOUNTER — TELEPHONE (OUTPATIENT)
Dept: NEPHROLOGY | Facility: CLINIC | Age: 65
End: 2025-09-03
Payer: COMMERCIAL

## 2025-09-03 DIAGNOSIS — N18.31 CHRONIC KIDNEY DISEASE, STAGE 3A (H): ICD-10-CM

## 2025-09-04 RX ORDER — LISINOPRIL 40 MG/1
20 TABLET ORAL DAILY
Qty: 45 TABLET | Refills: 1 | Status: SHIPPED | OUTPATIENT
Start: 2025-09-04

## (undated) RX ORDER — FENTANYL CITRATE 50 UG/ML
INJECTION, SOLUTION INTRAMUSCULAR; INTRAVENOUS
Status: DISPENSED
Start: 2024-09-17